# Patient Record
Sex: FEMALE | Race: WHITE
[De-identification: names, ages, dates, MRNs, and addresses within clinical notes are randomized per-mention and may not be internally consistent; named-entity substitution may affect disease eponyms.]

---

## 2017-03-12 ENCOUNTER — HOSPITAL ENCOUNTER (EMERGENCY)
Dept: HOSPITAL 75 - ER | Age: 42
Discharge: HOME | End: 2017-03-12
Payer: MEDICARE

## 2017-03-12 VITALS — HEIGHT: 62 IN | WEIGHT: 146 LBS | BODY MASS INDEX: 26.87 KG/M2

## 2017-03-12 VITALS — SYSTOLIC BLOOD PRESSURE: 149 MMHG | DIASTOLIC BLOOD PRESSURE: 86 MMHG

## 2017-03-12 VITALS — SYSTOLIC BLOOD PRESSURE: 146 MMHG | DIASTOLIC BLOOD PRESSURE: 79 MMHG

## 2017-03-12 DIAGNOSIS — R51: Primary | ICD-10-CM

## 2017-03-12 DIAGNOSIS — R11.2: ICD-10-CM

## 2017-03-12 DIAGNOSIS — I25.2: ICD-10-CM

## 2017-03-12 DIAGNOSIS — Z79.899: ICD-10-CM

## 2017-03-12 DIAGNOSIS — I10: ICD-10-CM

## 2017-03-12 DIAGNOSIS — I25.10: ICD-10-CM

## 2017-03-12 DIAGNOSIS — F17.210: ICD-10-CM

## 2017-03-12 LAB
ALBUMIN SERPL-MCNC: 4 G/DL (ref 3.2–4.5)
ALT SERPL-CCNC: 14 U/L (ref 0–55)
AMYLASE SERPL-CCNC: 47 U/L (ref 25–125)
ANION GAP SERPL CALC-SCNC: 14 MMOL/L (ref 5–14)
APTT BLD: < 20 SEC (ref 24–35)
AST SERPL-CCNC: 17 U/L (ref 5–34)
BASOPHILS # BLD AUTO: 0 10^3/UL (ref 0–0.1)
BASOPHILS NFR BLD AUTO: 0 % (ref 0–10)
BILIRUB SERPL-MCNC: 0.6 MG/DL (ref 0.1–1)
BUN SERPL-MCNC: 10 MG/DL (ref 7–18)
BUN/CREAT SERPL: 12
CALCIUM SERPL-MCNC: 9 MG/DL (ref 8.5–10.1)
CHLORIDE SERPL-SCNC: 112 MMOL/L (ref 98–107)
CO2 SERPL-SCNC: 16 MMOL/L (ref 21–32)
CREAT SERPL-MCNC: 0.81 MG/DL (ref 0.6–1.3)
EOSINOPHIL # BLD AUTO: 0.1 10^3/UL (ref 0–0.3)
EOSINOPHIL NFR BLD AUTO: 1 % (ref 0–10)
ERYTHROCYTE [DISTWIDTH] IN BLOOD BY AUTOMATED COUNT: 12.6 % (ref 10–14.5)
GFR SERPLBLD BASED ON 1.73 SQ M-ARVRAT: > 60 ML/MIN
GLUCOSE SERPL-MCNC: 137 MG/DL (ref 70–105)
INR PPP: 1 (ref 0.8–1.4)
LIPASE SERPL-CCNC: 35 U/L (ref 8–78)
LYMPHOCYTES # BLD AUTO: 1.9 X 10^3 (ref 1–4)
LYMPHOCYTES NFR BLD AUTO: 18 % (ref 12–44)
MAGNESIUM SERPL-MCNC: 1.7 MG/DL (ref 1.8–2.4)
MCH RBC QN AUTO: 33 PG (ref 25–34)
MCHC RBC AUTO-ENTMCNC: 35 G/DL (ref 32–36)
MCV RBC AUTO: 94 FL (ref 80–99)
MONOCYTES # BLD AUTO: 0.5 X 10^3 (ref 0–1)
MONOCYTES NFR BLD AUTO: 5 % (ref 0–12)
NEUTROPHILS # BLD AUTO: 7.9 X 10^3 (ref 1.8–7.8)
NEUTROPHILS NFR BLD AUTO: 76 % (ref 42–75)
PLATELET # BLD: 217 10^3/UL (ref 130–400)
PMV BLD AUTO: 10.9 FL (ref 7.4–10.4)
POTASSIUM SERPL-SCNC: 3.7 MMOL/L (ref 3.6–5)
PROT SERPL-MCNC: 7.1 G/DL (ref 6.4–8.2)
PROTHROMBIN TIME: 12.4 SEC (ref 12.2–14.7)
RBC # BLD AUTO: 4.73 10^6/UL (ref 4.35–5.85)
SODIUM SERPL-SCNC: 142 MMOL/L (ref 135–145)
TROPONIN I SERPL-MCNC: < 0.3 NG/ML (ref ?–0.3)
WBC # BLD AUTO: 10.4 10^3/UL (ref 4.3–11)

## 2017-03-12 PROCEDURE — 36415 COLL VENOUS BLD VENIPUNCTURE: CPT

## 2017-03-12 PROCEDURE — 84484 ASSAY OF TROPONIN QUANT: CPT

## 2017-03-12 PROCEDURE — 83735 ASSAY OF MAGNESIUM: CPT

## 2017-03-12 PROCEDURE — 93005 ELECTROCARDIOGRAM TRACING: CPT

## 2017-03-12 PROCEDURE — 80053 COMPREHEN METABOLIC PANEL: CPT

## 2017-03-12 PROCEDURE — 96375 TX/PRO/DX INJ NEW DRUG ADDON: CPT

## 2017-03-12 PROCEDURE — 71020: CPT

## 2017-03-12 PROCEDURE — 70450 CT HEAD/BRAIN W/O DYE: CPT

## 2017-03-12 PROCEDURE — 85025 COMPLETE CBC W/AUTO DIFF WBC: CPT

## 2017-03-12 PROCEDURE — 85610 PROTHROMBIN TIME: CPT

## 2017-03-12 PROCEDURE — 85730 THROMBOPLASTIN TIME PARTIAL: CPT

## 2017-03-12 PROCEDURE — 84703 CHORIONIC GONADOTROPIN ASSAY: CPT

## 2017-03-12 PROCEDURE — 83690 ASSAY OF LIPASE: CPT

## 2017-03-12 PROCEDURE — 96374 THER/PROPH/DIAG INJ IV PUSH: CPT

## 2017-03-12 PROCEDURE — 93041 RHYTHM ECG TRACING: CPT

## 2017-03-12 PROCEDURE — 82150 ASSAY OF AMYLASE: CPT

## 2017-03-12 NOTE — DIAGNOSTIC IMAGING REPORT
PROCEDURE: CT head without contrast.



TECHNIQUE: Multiple contiguous axial images were obtained

through the brain without the use of intravenous contrast.



INDICATION: Headache and nausea. Pregnancy test was negative.



CT brain without contrast 03/12/2017.



COMPARISON: 11/23/2016.



FINDINGS:



There is no evidence for acute hemorrhage or infarct. The brain

is stable in appearance with no mass, mass effect or midline

shift. There is no hydrocephalus. The calvarium is intact. No

acute sinus disease appreciated.



IMPRESSION:

1. No acute process.



Dictated by:



Dictated on workstation # [id number]

## 2017-03-12 NOTE — DIAGNOSTIC IMAGING REPORT
INDICATION:  Nausea, headache, hypertension..



TECHNIQUE:  Two view chest   10:57 AM



CORRELATION STUDY:   11/20/2016



FINDINGS:

The heart size, mediastinal configuration and pulmonary

vasculature are within normal limits.  The lungs are clear with

no consolidating infiltrate. There is no significant pleural

effusion or pneumothorax.  Visualized osseous structures are

unremarkable.



IMPRESSION:

1. Negative two-view chest.



Dictated by:



Dictated on workstation # [id number]

## 2017-03-12 NOTE — ED HEADACHE
General


Stated Complaint:  HEADACHE/NAUSEA


Source:  patient, EMS





History of Present Illness


Time seen by provider:  09:41


Initial Comments


PT ARRIVES VIA EMS FROM HOME


PT STATES SHE FELT FINE WHEN SHE WOKE UP, THEN WAS HAVING A BOWEL MOVEMENT AND 

BEGAN TO HAVE A SEVERE HEADACHE FOLLOWED BY NAUSEA AND VOMITING--BEGAN 

APPROXIMATELY 1 HOUR AGO


EMS REPORT ELEVATED BP /100


TOOK A CLONIDINE JUST PRIOR TO ARRIVAL


HAS NOT TAKEN ANYTHING FOR HEADACHE


NO DIZZINESS


NO VISION CHANGES


NO PARESTHESIAS OR MOTOR DEFICITS


NO PALPITATIONS


NO CHEST PAIN 


NO SHORTNESS OF BREATH


NO HISTORY OF FREQUENT HEADACHES, BUT OCCASIONALLY HAS HAD HEADACHES LIKE THIS 

WITH BM'S AND HAD VASOVAGAL REACTION WITH SYNCOPE IN THE PAST. ALSO HAD SIMILAR 

WITH MI, BUT THIS IS DIFFERENT THAN WITH MI IN THAT SHE HAD OTHER SYMPTOMS WITH 

MI


PT HAS HX OF HTN, MI, DVT AND IS CURRENTLY TAKING ELIQUIS.





PCP: DR. CAMARGO





Allergies and Home Medications


Allergies


Coded Allergies:  


     prochlorperazine (Unverified  Allergy, Severe, TONGUE SWELLS, 2/16/07)


     metoclopramide (Verified  Allergy, Unknown, 9/7/09)


     topiramate (Unverified  Allergy, Unknown, 6/26/16)


 causes metabolic acidosis





Home Medications


Alprazolam 1 Mg Tablet 1 MG PO TID PRN PRN ANXIETY (Reported) 


   TAKES 1/2 TO 1 (1MG) TABLET 


Apixaban 5 Mg Tablet #60 5 MG PO BID 


   Prescribed by: STACY CAMARGO on 11/22/16 0857


Atorvastatin Calcium 20 Mg Tablet 20 MG PO HS (Reported) 


Carisoprodol 350 Mg Tablet 350 MG PO BID PRN PRN MUSCLE SPASMS (Reported) 


Escitalopram Oxalate 20 Mg Tablet 20 MG PO HS (Reported) 


Famotidine 20 Mg Tablet 20 MG PO HS (Reported) 


Folic Acid 1 Mg Tablet 1 MG PO HS (Reported) 


Lamotrigine 100 Mg Tab.er.24 #30 (Reported) 


Levetiracetam 500 Mg Tablet #60 500 MG PO BID 


   Prescribed by: STACY CAMARGO on 11/22/16 0857


Metoprolol Tartrate 25 Mg Tablet 25 MG PO HS (Reported) 


Oxycodone HCl/Acetaminophen 1 Each Tablet 1 TAB PO Q6H PRN PRN SEVERE PAIN (

Reported) 





Constitutional:   no symptoms reported


Eyes:   No Symptoms Reported


Ears, Nose, Mouth, Throat:   no symptoms reported


Respiratory:   no symptoms reported


Cardiovascular:   no symptoms reported


Gastrointestinal:   see HPI nausea vomiting


Genitourinary:   no symptoms reported


Musculoskeletal:   no symptoms reported


Skin:   no symptoms reported


Psychiatric/Neurological:   See HPI Headache





Past Medical-Social-Family Hx


Patient Social History


Alcohol Use:  Occasionally Uses


Recreational Drug Use:  Yes


Drug of Choice:  MARIJUANA


Smoking Status:  Current Everyday Smoker (1 PPD)


Type Used:  Cigarettes


2nd Hand Smoke Exposure:  Yes


Recent Hopitalizations:  Yes (just discharged 2 days ago for supratherapeutic 

INR)





Immunizations Up To Date


Tetanus Booster (TDap):  Less than 5yrs





Seasonal Allergies


Seasonal Allergies:  No





Surgeries


HX Surgeries:  Yes (CARDIAC CATH  X 2--2007 & 2011--NO INTERVENTION; LEFT 

SALPINGO-OOPHORECTOMY FOR TORSION; BILATERAL HERNIA SURGERY AS CHILD)


Surgeries:  Abdominal, Adenoidectomy, Cardiac, Oophorectomy, Tonsillectomy





Respiratory


Hx Respiratory Disorders:  Yes


Respiratory Disorders:  Pneumonia





Cardiovascular


Hx Cardiac Disorders:  Yes (hx blood clots; MI X 2-PER PT. CARDIAC CATHS X2 --

NO INTERVENTION)


Cardiac Disorders:  Coronary Artery Disease, Deep Vein Thrombosis, Heart Attack

, Hypertension





Neurological


Hx Neurological Disorders:  Yes


Neurological Disorders:  Seizure Disorder





Reproductive System


Hx Reproductive Disorders:  Yes (HPV; OVARIAN TORSION/LEFT S.O.)


Sexually Transmitted Disease:  Yes (HPV)


Female Reproductive Disorders:  Polycystic Ovarian Dis





Genitourinary


Hx Genitourinary Disorders:  Yes (uti)


Genitourinary Disorders:  Bladder Infection





Gastrointestinal


Hx Gastrointestinal Disorders:  Yes (GI BLEED 11/20/16,  ALL OTHER DISORDERS AS 

A CHILD)


Gastrointestinal Disorders:  Abdominal Hernia, Gastroesophageal Reflux, 

Gastrointestinal Bleed, Hepatitis





Musculoskeletal


Hx Musculoskeletal Disorders:  Yes (STENOSIS)


Musculoskeletal Disorders:  Scoliosis, Chronic Back Pain





Endocrine


Hx Endocrine Disorders:  No





HEENT


HX ENT Disorders:  No


Loss of Vision:  Denies


Hearing Impairment:  Denies





Cancer


Hx Cancer:  No





Psychosocial


Hx Psychiatric Problems:  Yes


Behavioral Health Disorders:  Anxiety, Depression





Integumentary


HX Skin/Integumentary Disorder:  No (BRUISING)





Blood Transfusions


Hx Blood Disorders:  Yes (DVT; FACTOR 5 LEIDEN)





Family Medical History


Significant Family History:  Heart Disease, Cancer, Hypertension


Family Medial History:  


Family history: Cardiovascular disease


  03 FATHER


Family history: Coronary thrombosis


  03 FATHER


Family history: Diabetes mellitus


  03 MOTHER


Heart disease


  03 FATHER


History of - disorder


  03 FATHER (FACTOR FIVE LEIDEN)


Psychotic disorder


  03 MOTHER (DEPRESSION)





Physical Exam


Vital Signs





 Vital Sign - Last 12Hours








 3/12/17





 09:39


 


Temp 96.2


 


Pulse 58


 


Resp 18


 


B/P 161/82


 


Pulse Ox 96


 


O2 Delivery Room Air





Capillary Refill :


General Appearance:   WD/WN no apparent distress (RETCHING AND ANXIOUS ON 

ARRIVAL ) other (RETCHING, LOOKS UNCOMFORTABLE, ANXIOUS)


HEENT:   PERRL/EOMI normal ENT inspection


Neck:   non-tender full range of motion supple normal inspectionNo carotid bruit


Cardiovascular:   regular rate, rhythm no murmur


Respiratory:   normal breath sounds no respiratory distress no accessory muscle 

use


Gastrointestinal:   normal bowel sounds non tender soft no organomegaly


Extremities:   normal range of motion non-tender normal inspection no pedal 

edema no calf tenderness normal capillary refill


Psychiatric:   alert oriented x 3


Crainal Nerves:   normal hearing normal speech PERRL


Motor/Sensory:   no motor deficit no sensory deficit no pronator drift


Skin:   normal color warm/dry





Progress/Results/Core Measures


Results/Orders


Lab Results





 Laboratory Tests








Test


  3/12/17


10:15 Range/Units


 


 


Activated Partial


Thromboplast Time < 20 L


  24-35  SEC


 


 


Alanine Aminotransferase


(ALT/SGPT) 14 


  0-55  U/L


 


 


Albumin 4.0  3.2-4.5  G/DL


 


Alkaline Phosphatase 69    U/L


 


Amylase Level 47    U/L


 


Anion Gap 14  5-14  MMOL/L


 


Aspartate Amino Transf


(AST/SGOT) 17 


  5-34  U/L


 


 


BUN/Creatinine Ratio 12   


 


Basophils # (Auto)


  0.0 


  0.0-0.1


10^3/uL


 


Basophils (%) (Auto) 0  0-10  %


 


Blood Urea Nitrogen 10  7-18  MG/DL


 


Calcium Level 9.0  8.5-10.1  MG/DL


 


Carbon Dioxide Level 16 L 21-32  MMOL/L


 


Chloride Level 112 H   MMOL/L


 


Creatinine


  0.81 


  0.60-1.30


MG/DL


 


Eosinophils # (Auto)


  0.1 


  0.0-0.3


10^3/uL


 


Eosinophils (%) (Auto) 1  0-10  %


 


Estimat Glomerular Filtration


Rate > 60 


   


 


 


Glucose Level 137 H   MG/DL


 


Hematocrit 45  35-52  %


 


Hemoglobin 15.4  11.5-16.0  G/DL


 


INR Comment 1.0  0.8-1.4  


 


Lipase 35  8-78  U/L


 


Lymphocytes # (Auto) 1.9  1.0-4.0  X 10^3


 


Lymphocytes (%) (Auto) 18  12-44  %


 


Magnesium Level 1.7 L 1.8-2.4  MG/DL


 


Mean Corpuscular Hemoglobin 33  25-34  PG


 


Mean Corpuscular Hemoglobin


Concent 35 


  32-36  G/DL


 


 


Mean Corpuscular Volume 94  80-99  FL


 


Mean Platelet Volume 10.9 H 7.4-10.4  FL


 


Monocytes # (Auto) 0.5  0.0-1.0  X 10^3


 


Monocytes (%) (Auto) 5  0-12  %


 


Neutrophils # (Auto) 7.9 H 1.8-7.8  X 10^3


 


Neutrophils (%) (Auto) 76 H 42-75  %


 


Platelet Count


  217 


  130-400


10^3/uL


 


Potassium Level 3.7  3.6-5.0  MMOL/L


 


Prothrombin Time 12.4  12.2-14.7  SEC


 


Red Blood Count


  4.73 


  4.35-5.85


10^6/uL


 


Red Cell Distribution Width 12.6  10.0-14.5  %


 


Serum Pregnancy Test,


Qualitative NEGATIVE 


  NEGATIVE  


 


 


Sodium Level 142  135-145  MMOL/L


 


Total Bilirubin 0.6  0.1-1.0  MG/DL


 


Total Protein 7.1  6.4-8.2  G/DL


 


Troponin I < 0.30  <0.30  NG/ML


 


White Blood Count


  10.4 


  4.3-11.0


10^3/uL








My Orders





 Orders-CHE BEARD DO


Saline Lock/Iv-Start (3/12/17 09:42)


Ekg Tracing (3/12/17 09:42)


Monitor-Rhythm Ecg Trace Only (3/12/17 09:42)


Ct Head Wo (3/12/17 09:42)


Amylase (3/12/17 09:42)


Cbc With Automated Diff (3/12/17 09:42)


Comprehensive Metabolic Panel (3/12/17 09:42)


Hcg,Qualitative Serum (3/12/17 09:42)


Lipase (3/12/17 09:42)


Magnesium (3/12/17 09:42)


Protime With Inr (3/12/17 09:42)


Partial Thromboplastin Time (3/12/17 09:42)


Troponin I (3/12/17 09:42)


Ondansetron Injection (Zofran Injectio (3/12/17 09:45)


Ondansetron  Oral Dissolve Tab (Zofran (3/12/17 10:00)


Ondansetron  Oral Dissolve Tab (Zofran (3/12/17 09:58)


Chest Pa/Lat (2 View) (3/12/17 10:18)


Ondansetron Injection (Zofran Injectio (3/12/17 10:30)


Fentanyl  Injection (Sublimaze Injection (3/12/17 10:48)





Medications Given in ED





 Current Medications








 Medications  Dose


 Ordered  Sig/Elizabeth


 Route  Start Time


 Stop Time Status Last Admin


Dose Admin


 


 Ondansetron HCl  4 mg  ONCE  ONCE


 IVP  3/12/17 10:30


 3/12/17 10:31 DC 3/12/17 10:27


4 MG


 


 Ondansetron HCl  8 mg  ONCE  ONCE


 PO  3/12/17 10:00


 3/12/17 10:01 DC 3/12/17 10:00


8 MG








Vital Signs/I&O





 Vital Sign - Last 12Hours








 3/12/17 3/12/17





 09:39 10:58


 


Temp 96.2 


 


Pulse 58 77


 


Resp 18 18


 


B/P 161/82 146/79


 


Pulse Ox 96 96


 


O2 Delivery Room Air Room Air








Progress Note :  


Progress Note


SYMPTOMS IMPROVING EVEN PRIOR TO BEING GIVEN ANY MEDICATIONS


HEADACHE AND NAUSEA MUCH BETTER EVEN BEFORE GOING TO CT, ABLE TO SIT UP NOW--

COULD NOT SIT UP INITIALLY AS IT CAUSED HER HEAD TO HURT WORSE. 


ON RETURN FROM CT, PT IS SITTING UP AND SMILING, AND TALKATIVE, AND CONTINUES 

TO FEEL BETTER


SYMPTOMS COMPLETELY RESOLVED AT DISMISSAL





ECG


Initial ECG Impression Time:  09:08


Initial ECG Rate:  51


Initial ECG Rhythm:  Normal Sinus


Initial ECG Impression:  Normal


Initial ECG Comparisson:  Unchanged (EXCEPT T-WAVES NOW PROMINENT)





Diagnostic Imaging





Comments


CXR--NO ACUTE PROCESS, PER RADIOLOGIST REPORT @ 1049


CT HEAD--NO ACUTE PROCESS, PER RADIOLOGIST REPORT @ 1053


   Reviewed:  Reviewed by Me





Departure


Communication


Progress Notes


1105--DR. SORIANO HERE TO SEE PT. HE DOES NOT FEEL THAT PT HAS ANY CARDIAC 

CONCERNS AT THIS TIME. HE WILL SEE PT IN OFFICE TOMORROW. PT AND FAMILY 

COMFORTABLE WITH THIS PLAN.





Impression


Impression:  


 Primary Impression:  


 Headache


Disposition:  01 HOME, SELF-CARE


Condition:  Improved





Departure-Patient Inst.


Referrals:  


STACY CAMARGO MD (PCP/Family)


Primary Care Physician








YONATHAN SORIANO MD


Patient Instructions:  Headache, Adult (DC)





Add. Discharge Instructions:


HOME, REST





LOTS OF CLEAR LIQUIDS





TAKE YOUR MEDICATIONS AS PRESCRIBED





TYLENOL AND MOTRIN AS NEEDED FOR PAIN 





TAKE YOUR HOME ZOFRAN AS NEEDED FOR NAUSEA





FOLLOW UP WITH DR. SORIANO TOMORROW





RETURN TO ER IF SYMPTOMS RETURN.








CHE BEARD DO Mar 12, 2017 09:57

## 2017-03-12 NOTE — XMS REPORT
Continuity of Care Document

 Created on: 2017



Erin Richmond

External Reference #: [id number]

: 1975

Sex: Female



Demographics







 Address  [address] [place]  [address]

 

 Home Phone  [phone]

 

 Preferred Language  English

 

 Marital Status  Single

 

 Islam Affiliation  CAT

 

 Race  White or 

 

 Ethnic Group  Not  or 





Author







 Author  [facility]

 

 Organization  [facility]

 

 Address  Unknown

 

 Phone  Unavailable







Support







 Name  Relationship  Address  Phone

 

 , Kelsy Blake  [place], [place]    [phone]







Care Team Providers







 Care Team Member Name  Role  Phone

 

 Milena Nixon  PCP  [phone]







Source Comments

Some departments are not documenting in the electronic medical record.  If you 
do not see the information that you expected, contact Release of Information in 
the Health Information Management department at [phone] for further 
assistance in locating additional records.[facility]



Active Allergies and Adverse Reactions







    



  Allergen   Noted Date   Severity   Reactions   Comments

 

    



  Aripiprazole   2008     Panic attacks

 

    



  Prochlorperazine   2008    SHORTNESS OF BREATH, 



  Edisylate     SWELLING 







Current Medications







      



  Prescription   Sig.   Disp.   Refills   Start   End Date   Status



      Date  

 

      



  COUMADIN PO      20    Active



      08  

 

      



  METOPROLOL TARTRATE PO      20    Active



      08  

 

      



  LOVASTATIN PO      20    Active



      08  

 

      



  LEXAPRO PO      20    Active



      08  

 

      



  PEPCID PO      20    Active



      08  

 

      



  aspirin 81 mg chew tablet      20    Active



      08  

 

      



  FOLIC ACID PO      20    Active



      08  

 

      



  M-VIT PO      20    Active



      08  

 

      



  escitalopram (LEXAPRO) 10   Take 1 Tab by mouth   30   0   20    Active



  mg tablet   Daily.     08  

 

      



  escitalopram (LEXAPRO) 10   Take 1 Tab by mouth   30   0   20    Active



  mg tablet   Daily.     08  

 

      



  mirtazapine (REMERON) 15   Take 1 Tab by mouth At   30   0   20    
Active



  mg tablet   Bedtime Daily.     08  







Active Problems







 



  Problem   Noted Date

 

 



  Generalized anxiety disorder   2008







Social History







    



  Tobacco Use   Types   Packs/Day   Years Used   Date

 

    



  Never Smoker    









   



  Alcohol Use   Drinks/Week   oz/Week   Comments

 

   



  Yes     rare







Last Filed Vital Signs







  



  Vital Sign   Reading   Time Taken

 

  



  Blood Pressure   121/71   2008 11:00 AM CST

 

  



  Pulse   54   2008 11:00 AM CST

 

  



  Temperature   36.6   C (97.9   F)   2008 11:00 AM CST

 

  



  Respiratory Rate   -   -

 

  



  Height   1.62 m (5' 3.78")   2008 11:00 PM CST

 

  



  Weight   77 kg (169 lb 12.1 oz)   2008 11:00 PM CST

 

  



  Body Mass Index   29.34   2008 11:00 PM CST

 

  



  Oxygen Saturation   97%   2008  6:15 PM CST







Plan of Care







   



  Health Maintenance   Due Date   Last Done   Comments

 

   



  Physical (Comprehensive)   1984  



  Exam   

 

   



  Pertussis Vaccine   1988  

 

   



  Tetanus Vaccine   1994  

 

   



  Cervical Cancer Screening   1998  

 

   



  Influenza Vaccine   2016  







Results from Last 3 Months

Not on file

## 2017-04-12 ENCOUNTER — HOSPITAL ENCOUNTER (OUTPATIENT)
Dept: HOSPITAL 75 - CARD | Age: 42
End: 2017-04-12
Attending: INTERNAL MEDICINE
Payer: MEDICARE

## 2017-04-12 DIAGNOSIS — D68.51: ICD-10-CM

## 2017-04-12 DIAGNOSIS — I65.23: ICD-10-CM

## 2017-04-12 DIAGNOSIS — I25.10: Primary | ICD-10-CM

## 2017-04-12 DIAGNOSIS — I10: ICD-10-CM

## 2017-04-12 DIAGNOSIS — E78.2: ICD-10-CM

## 2017-04-12 PROCEDURE — 93351 STRESS TTE COMPLETE: CPT

## 2017-04-12 PROCEDURE — 93306 TTE W/DOPPLER COMPLETE: CPT

## 2017-04-13 NOTE — STRESS TEST
PROCEDURE PHYSICIAN:   YONATHAN SORIANO

 

DATE OF PROCEDURE:  04/12/2017

 

EXERCISE STRESS ECHOCARDIOGRAM REPORT: 

 

INDICATION:

1.   Coronary artery disease.  

2.   Chest pain. 

 

BASELINE HEART RATE: 

58 

 

BASELINE BLOOD PRESSURE: 

140/81  

 

BASELINE EKG: 

Sinus rhythm with no ischemic changes. 

 

IN SUMMARY:

The patient started exercising with a baseline heart rate, blood

pressure, EKG mentioned above.  Heart rate was increasing

appropriately with her exercise. She was able to exercise for a

total of 9 minutes and 30 seconds on standard Jerad protocol,

achieving maximum heart rate of 170, which is 94% of maximum

expected heart rate. With peak exercise level, her blood pressure

was 214/83.  Early in recovery, within 30 seconds of recovery,

the patient had a transient episode of bradycardia alternating

with sinus tachycardia.  Heart rate was ranging between 150 then

dropping suddenly to 80. The patient had a short episode of

junctional rhythm with heart rate 72. She felt extremely tired

and continued to have transient episode of atrial versus sinus

tachycardia.  At minute 2 and 30 seconds, the patient had another

episode of heart rate of 60 then she recovered.  At the end of

the test, her heart rate was 71. Blood pressure 170/88. She was

feeling better. Echocardiographic images were reviewed acquired

in the apical 4 chamber, apical 2 chamber, parasternal long axis,

and parasternal short axis views. Review of the images showed

normal left ventricular size with normal contractility with no

ischemic changes. 

 

IN CONCLUSION:

1.   Excellent exercise tolerance a total of 9 minutes 30 seconds

on standard Jerad protocol. Total of 10.1 METs, achieving 94% of

maximum expected heart rate. Sudden drop in heart rate during

recovery after 30 seconds of recovery with the heart rate

dropping from 170 to 70 with the patient feeling tired, but no

dizziness or lightheadedness.  Continued to have alternating

sinus bradycardia and sinus tachycardia early in recovery then

continued with sinus rhythm.  Had transient episode of escape

junctional rhythm. 

2.   Hypertensive response to exercise returned to baseline

during recovery. 

3.   The patient was instructed to discontinuing metoprolol and

continue on lisinopril and monitoring her symptoms.  

 

 

 

 

Job ID: [id number]

Dictated Date: 04/12/2017 16:37:06 

Transcription Date: 04/13/2017 08:41:36 / tbk

## 2017-08-07 ENCOUNTER — HOSPITAL ENCOUNTER (OUTPATIENT)
Dept: HOSPITAL 75 - REHAB | Age: 42
LOS: 38 days | Discharge: HOME | End: 2017-09-14
Attending: FAMILY MEDICINE
Payer: MEDICARE

## 2017-08-07 DIAGNOSIS — M54.6: ICD-10-CM

## 2017-08-07 DIAGNOSIS — M54.5: Primary | ICD-10-CM

## 2017-09-08 ENCOUNTER — HOSPITAL ENCOUNTER (OUTPATIENT)
Dept: HOSPITAL 75 - RAD | Age: 42
End: 2017-09-08
Attending: NURSE PRACTITIONER
Payer: MEDICARE

## 2017-09-08 DIAGNOSIS — R07.89: Primary | ICD-10-CM

## 2017-09-08 PROCEDURE — 71020: CPT

## 2017-10-25 ENCOUNTER — HOSPITAL ENCOUNTER (OUTPATIENT)
Dept: HOSPITAL 75 - LAB | Age: 42
End: 2017-10-25
Attending: FAMILY MEDICINE
Payer: MEDICARE

## 2017-10-25 DIAGNOSIS — I10: Primary | ICD-10-CM

## 2017-10-25 DIAGNOSIS — Z79.899: ICD-10-CM

## 2017-10-25 LAB
ALBUMIN SERPL-MCNC: 4.4 GM/DL (ref 3.2–4.5)
ALT SERPL-CCNC: 12 U/L (ref 0–55)
ANION GAP SERPL CALC-SCNC: 9 MMOL/L (ref 5–14)
AST SERPL-CCNC: 18 U/L (ref 5–34)
BASOPHILS # BLD AUTO: 0 10^3/UL (ref 0–0.1)
BASOPHILS NFR BLD AUTO: 1 % (ref 0–10)
BILIRUB SERPL-MCNC: 1.1 MG/DL (ref 0.1–1)
BUN SERPL-MCNC: 8 MG/DL (ref 7–18)
BUN/CREAT SERPL: 10
CALCIUM SERPL-MCNC: 9.4 MG/DL (ref 8.5–10.1)
CHLORIDE SERPL-SCNC: 110 MMOL/L (ref 98–107)
CHOLEST SERPL-MCNC: 139 MG/DL (ref ?–200)
CO2 SERPL-SCNC: 19 MMOL/L (ref 21–32)
CREAT SERPL-MCNC: 0.8 MG/DL (ref 0.6–1.3)
EOSINOPHIL # BLD AUTO: 0.1 10^3/UL (ref 0–0.3)
EOSINOPHIL NFR BLD AUTO: 2 % (ref 0–10)
ERYTHROCYTE [DISTWIDTH] IN BLOOD BY AUTOMATED COUNT: 12.5 % (ref 10–14.5)
GFR SERPLBLD BASED ON 1.73 SQ M-ARVRAT: > 60 ML/MIN
GLUCOSE SERPL-MCNC: 100 MG/DL (ref 70–105)
LDLC SERPL DIRECT ASSAY-MCNC: 73 MG/DL (ref 1–129)
LYMPHOCYTES # BLD AUTO: 2.3 X 10^3 (ref 1–4)
LYMPHOCYTES NFR BLD AUTO: 29 % (ref 12–44)
MCH RBC QN AUTO: 34 PG (ref 25–34)
MCHC RBC AUTO-ENTMCNC: 35 G/DL (ref 32–36)
MCV RBC AUTO: 97 FL (ref 80–99)
MONOCYTES # BLD AUTO: 0.6 X 10^3 (ref 0–1)
MONOCYTES NFR BLD AUTO: 8 % (ref 0–12)
NEUTROPHILS # BLD AUTO: 4.7 X 10^3 (ref 1.8–7.8)
NEUTROPHILS NFR BLD AUTO: 60 % (ref 42–75)
PLATELET # BLD: 246 10^3/UL (ref 130–400)
PMV BLD AUTO: 10.2 FL (ref 7.4–10.4)
POTASSIUM SERPL-SCNC: 4.2 MMOL/L (ref 3.6–5)
PROT SERPL-MCNC: 8.1 GM/DL (ref 6.4–8.2)
RBC # BLD AUTO: 4.58 10^6/UL (ref 4.35–5.85)
SODIUM SERPL-SCNC: 138 MMOL/L (ref 135–145)
TRIGL SERPL-MCNC: 118 MG/DL (ref ?–150)
TSH SERPL-ACNC: 0.8 UIU/ML (ref 0.35–4.94)
VLDLC SERPL CALC-MCNC: 24 MG/DL (ref 5–40)
WBC # BLD AUTO: 7.8 10^3/UL (ref 4.3–11)

## 2017-10-25 PROCEDURE — 36415 COLL VENOUS BLD VENIPUNCTURE: CPT

## 2017-10-25 PROCEDURE — 80053 COMPREHEN METABOLIC PANEL: CPT

## 2017-10-25 PROCEDURE — 85025 COMPLETE CBC W/AUTO DIFF WBC: CPT

## 2017-10-25 PROCEDURE — 84443 ASSAY THYROID STIM HORMONE: CPT

## 2017-10-25 PROCEDURE — 80061 LIPID PANEL: CPT

## 2017-11-12 ENCOUNTER — HOSPITAL ENCOUNTER (EMERGENCY)
Dept: HOSPITAL 75 - ER | Age: 42
Discharge: HOME | End: 2017-11-12
Payer: MEDICARE

## 2017-11-12 VITALS — WEIGHT: 140 LBS | BODY MASS INDEX: 25.76 KG/M2 | HEIGHT: 62 IN

## 2017-11-12 VITALS — DIASTOLIC BLOOD PRESSURE: 72 MMHG | SYSTOLIC BLOOD PRESSURE: 127 MMHG

## 2017-11-12 DIAGNOSIS — Z86.718: ICD-10-CM

## 2017-11-12 DIAGNOSIS — Z82.49: ICD-10-CM

## 2017-11-12 DIAGNOSIS — F41.9: ICD-10-CM

## 2017-11-12 DIAGNOSIS — Z90.89: ICD-10-CM

## 2017-11-12 DIAGNOSIS — G40.909: ICD-10-CM

## 2017-11-12 DIAGNOSIS — N39.0: Primary | ICD-10-CM

## 2017-11-12 DIAGNOSIS — M41.20: ICD-10-CM

## 2017-11-12 DIAGNOSIS — I10: ICD-10-CM

## 2017-11-12 DIAGNOSIS — Z87.01: ICD-10-CM

## 2017-11-12 DIAGNOSIS — Z77.22: ICD-10-CM

## 2017-11-12 DIAGNOSIS — Z79.01: ICD-10-CM

## 2017-11-12 DIAGNOSIS — K21.9: ICD-10-CM

## 2017-11-12 DIAGNOSIS — Z87.19: ICD-10-CM

## 2017-11-12 DIAGNOSIS — I25.2: ICD-10-CM

## 2017-11-12 DIAGNOSIS — Z87.448: ICD-10-CM

## 2017-11-12 DIAGNOSIS — I25.10: ICD-10-CM

## 2017-11-12 DIAGNOSIS — F32.9: ICD-10-CM

## 2017-11-12 LAB
ALBUMIN SERPL-MCNC: 4.2 GM/DL (ref 3.2–4.5)
ALT SERPL-CCNC: 17 U/L (ref 0–55)
ANION GAP SERPL CALC-SCNC: 12 MMOL/L (ref 5–14)
AST SERPL-CCNC: 26 U/L (ref 5–34)
BASOPHILS # BLD AUTO: 0.1 10^3/UL (ref 0–0.1)
BASOPHILS NFR BLD AUTO: 3 % (ref 0–10)
BILIRUB SERPL-MCNC: 0.6 MG/DL (ref 0.1–1)
BILIRUB UR QL STRIP: (no result)
BUN SERPL-MCNC: 10 MG/DL (ref 7–18)
BUN/CREAT SERPL: 14
CALCIUM SERPL-MCNC: 9.3 MG/DL (ref 8.5–10.1)
CHLORIDE SERPL-SCNC: 106 MMOL/L (ref 98–107)
CO2 SERPL-SCNC: 20 MMOL/L (ref 21–32)
CREAT SERPL-MCNC: 0.72 MG/DL (ref 0.6–1.3)
EOSINOPHIL # BLD AUTO: 0.2 10^3/UL (ref 0–0.3)
EOSINOPHIL NFR BLD AUTO: 3 % (ref 0–10)
ERYTHROCYTE [DISTWIDTH] IN BLOOD BY AUTOMATED COUNT: 11.7 % (ref 10–14.5)
GFR SERPLBLD BASED ON 1.73 SQ M-ARVRAT: > 60 ML/MIN
GLUCOSE SERPL-MCNC: 93 MG/DL (ref 70–105)
KETONES UR QL STRIP: (no result)
LEUKOCYTE ESTERASE UR QL STRIP: (no result)
LIPASE SERPL-CCNC: 30 U/L (ref 8–78)
LYMPHOCYTES # BLD AUTO: 1.8 X 10^3 (ref 1–4)
LYMPHOCYTES NFR BLD AUTO: 37 % (ref 12–44)
MCH RBC QN AUTO: 34 PG (ref 25–34)
MCHC RBC AUTO-ENTMCNC: 36 G/DL (ref 32–36)
MCV RBC AUTO: 95 FL (ref 80–99)
MONOCYTES # BLD AUTO: 0.6 X 10^3 (ref 0–1)
MONOCYTES NFR BLD AUTO: 12 % (ref 0–12)
NEUTROPHILS # BLD AUTO: 2.2 X 10^3 (ref 1.8–7.8)
NEUTROPHILS NFR BLD AUTO: 46 % (ref 42–75)
NITRITE UR QL STRIP: NEGATIVE
PH UR STRIP: 6.5 [PH] (ref 5–9)
PLATELET # BLD: 182 10^3/UL (ref 130–400)
PMV BLD AUTO: 10.1 FL (ref 7.4–10.4)
POTASSIUM SERPL-SCNC: 4.2 MMOL/L (ref 3.6–5)
PROT SERPL-MCNC: 8.3 GM/DL (ref 6.4–8.2)
PROT UR QL STRIP: (no result)
RBC # BLD AUTO: 4.51 10^6/UL (ref 4.35–5.85)
SODIUM SERPL-SCNC: 138 MMOL/L (ref 135–145)
SP GR UR STRIP: 1.01 (ref 1.02–1.02)
SQUAMOUS #/AREA URNS HPF: (no result) /HPF
TROPONIN I SERPL-MCNC: < 0.3 NG/ML (ref ?–0.3)
UROBILINOGEN UR-MCNC: 4 MG/DL
WBC # BLD AUTO: 4.9 10^3/UL (ref 4.3–11)
WBC #/AREA URNS HPF: (no result) /HPF

## 2017-11-12 PROCEDURE — 85025 COMPLETE CBC W/AUTO DIFF WBC: CPT

## 2017-11-12 PROCEDURE — 36415 COLL VENOUS BLD VENIPUNCTURE: CPT

## 2017-11-12 PROCEDURE — 99282 EMERGENCY DEPT VISIT SF MDM: CPT

## 2017-11-12 PROCEDURE — 71260 CT THORAX DX C+: CPT

## 2017-11-12 PROCEDURE — 74177 CT ABD & PELVIS W/CONTRAST: CPT

## 2017-11-12 PROCEDURE — 83690 ASSAY OF LIPASE: CPT

## 2017-11-12 PROCEDURE — 71020: CPT

## 2017-11-12 PROCEDURE — 84484 ASSAY OF TROPONIN QUANT: CPT

## 2017-11-12 PROCEDURE — 80053 COMPREHEN METABOLIC PANEL: CPT

## 2017-11-12 PROCEDURE — 81000 URINALYSIS NONAUTO W/SCOPE: CPT

## 2017-11-12 NOTE — ED ABDOMINAL PAIN
General


Chief Complaint:  Abdominal/GI Problems


Stated Complaint:  L SIDE ABD PAIN


Source of Information:  Patient


Exam Limitations:  No Limitations





History of Present Illness


Time Seen By Provider:  13:26


Initial Comments


To ER with left-sided abdominal pain.  This is been present since yesterday.  

No known injury or precipitating event.  She did have a low-grade fever last 

night and does have chills today.  No cough.  Pain is worsened by deep 

breathing.  No dysuria.  No bowel changes.  She is on Eliquis for history of 

factor V deficiency


Timing/Duration:  1-2 Days


Severity/Quality:  Moderate


Location:  [facility]


Radiation:  Chest


Activities at Onset:  None





Allergies and Home Medications


Allergies


Coded Allergies:  


     prochlorperazine (Unverified  Allergy, Severe, TONGUE SWELLS, 07)


     metoclopramide (Verified  Allergy, Unknown, 09)


     topiramate (Unverified  Allergy, Unknown, 16)


 causes metabolic acidosis





Home Medications


Alprazolam 1 Mg Tablet, 1 MG PO TID PRN for ANXIETY, (Reported)


   TAKES 1/2 TO 1 (1MG) TABLET 


Apixaban 5 Mg Tablet, 5 MG PO BID, #60 Ref 11


   Prescribed by: STACY HURT on 16 0857


Atorvastatin Calcium 20 Mg Tablet, 20 MG PO HS, (Reported)


Carisoprodol 350 Mg Tablet, 350 MG PO BID PRN for MUSCLE SPASMS, (Reported)


Escitalopram Oxalate 20 Mg Tablet, 20 MG PO HS, (Reported)


Famotidine 20 Mg Tablet, 20 MG PO HS, (Reported)


Folic Acid 1 Mg Tablet, 1 MG PO HS, (Reported)


Lamotrigine 100 Mg Tab.er.24, #30 (Reported)


Levetiracetam 500 Mg Tablet, 500 MG PO BID, #60 Ref 11


   Prescribed by: STACY HURT on 16 0857


Metoprolol Tartrate 25 Mg Tablet, 25 MG PO HS, (Reported)


Oxycodone HCl/Acetaminophen 1 Each Tablet, 1 TAB PO Q6H PRN for SEVERE PAIN, (

Reported)





Review of Systems


Constitutional:  see HPI, chills, No fever


EENTM:  No Symptoms Reported


Respiratory:  See HPI


Cardiovascular:  See HPI, Chest Pain


Gastrointestinal:  No Symptoms Reported


Genitourinary:  No Symptoms Reported


Musculoskeletal:  no symptoms reported


Skin:  no symptoms reported


Endocrine:  No Symptoms Reported





Past Medical-Social-Family Hx


Patient Social History


Drug of Choice:  MARIJUANA


Type Used:  Cigarettes


2nd Hand Smoke Exposure:  Yes


Recent Foreign Travel:  No


Contact w/Someone Who Travel:  No


Recent Hopitalizations:  Yes (just discharged 2 days ago for supratherapeutic 

INR)





Immunizations Up To Date


Tetanus Booster (TDap):  Less than 5yrs





Seasonal Allergies


Seasonal Allergies:  No





Surgeries


Surgeries:  Abdominal, Adenoidectomy, Cardiac, Oophorectomy, Tonsillectomy





Respiratory


Respiratory Disorders:  Pneumonia


Currently Using CPAP:  No


Currently Using BIPAP:  No





Cardiovascular


Cardiac Disorders:  Coronary Artery Disease, Deep Vein Thrombosis, Heart Attack

, Hypertension





Neurological


Neurological Disorders:  Seizure Disorder





Reproductive System


Hx Reproductive Disorders:  Yes (HPV; OVARIAN TORSION/LEFT S.O.)


Sexually Transmitted Disease:  Yes (HPV)


Female Reproductive Disorders:  Polycystic Ovarian Dis





Genitourinary


Genitourinary Disorders:  Bladder Infection





Gastrointestinal


Gastrointestinal Disorders:  Abdominal Hernia, Gastroesophageal Reflux, 

Gastrointestinal Bleed, Hepatitis





Musculoskeletal


Musculoskeletal Disorders:  Scoliosis, Chronic Back Pain





HEENT


Loss of Vision:  Denies


Hearing Impairment:  Denies





Psychosocial


Behavioral Health Disorders:  Anxiety, Depression





Family Medical History


Significant Family History:  Heart Disease, Cancer, Hypertension


Family Medial History:  


Family history: Cardiovascular disease


  03 FATHER


Family history: Coronary thrombosis


  03 FATHER


Family history: Diabetes mellitus


  03 MOTHER


Heart disease


  03 FATHER


History of - disorder


  03 FATHER (FACTOR FIVE LEIDEN)


Psychotic disorder


  03 MOTHER (DEPRESSION)





Physical Exam


Vital Signs





 VS - Last 72 Hours, by Label








 17





 13:17


 


Temp 98.8


 


Pulse 66


 


Resp 18


 


B/P (MAP) 131/82


 


Pulse Ox 97





Capillary Refill :


General Appearance:  WD/WN, no apparent distress


HEENT:  PERRL/EOMI, normal ENT inspection


Neck:  non-tender, full range of motion


Respiratory:  normal breath sounds, no respiratory distress, no accessory 

muscle use


Cardiovascular:  regular rate, rhythm, no murmur


Gastrointestinal:  normal bowel sounds, soft, tenderness (ttp luq/llq)


Extremities:  normal range of motion, non-tender


Neurologic/Psychiatric:  alert, normal mood/affect, oriented x 3


Skin:  normal color, warm/dry





Progress/Results/Core Measures


Results/Orders


Lab Results





Laboratory Tests








Test


  17


13:25 17


14:07 Range/Units


 


 


White Blood Count


  4.9 


  


  4.3-11.0


10^3/uL


 


Red Blood Count


  4.51 


  


  4.35-5.85


10^6/uL


 


Hemoglobin 15.2   11.5-16.0  G/DL


 


Hematocrit 43   35-52  %


 


Mean Corpuscular Volume 95   80-99  FL


 


Mean Corpuscular Hemoglobin 34   25-34  PG


 


Mean Corpuscular Hemoglobin


Concent 36 


  


  32-36  G/DL


 


 


Red Cell Distribution Width 11.7   10.0-14.5  %


 


Platelet Count


  182 


  


  130-400


10^3/uL


 


Mean Platelet Volume 10.1   7.4-10.4  FL


 


Neutrophils (%) (Auto) 46   42-75  %


 


Lymphocytes (%) (Auto) 37   12-44  %


 


Monocytes (%) (Auto) 12   0-12  %


 


Eosinophils (%) (Auto) 3   0-10  %


 


Basophils (%) (Auto) 3   0-10  %


 


Neutrophils # (Auto) 2.2   1.8-7.8  X 10^3


 


Lymphocytes # (Auto) 1.8   1.0-4.0  X 10^3


 


Monocytes # (Auto) 0.6   0.0-1.0  X 10^3


 


Eosinophils # (Auto)


  0.2 


  


  0.0-0.3


10^3/uL


 


Basophils # (Auto)


  0.1 


  


  0.0-0.1


10^3/uL


 


Sodium Level 138   135-145  MMOL/L


 


Potassium Level 4.2   3.6-5.0  MMOL/L


 


Chloride Level 106     MMOL/L


 


Carbon Dioxide Level 20 L  21-32  MMOL/L


 


Anion Gap 12   5-14  MMOL/L


 


Blood Urea Nitrogen 10   7-18  MG/DL


 


Creatinine


  0.72 


  


  0.60-1.30


MG/DL


 


Estimat Glomerular Filtration


Rate > 60 


  


   


 


 


BUN/Creatinine Ratio 14    


 


Glucose Level 93     MG/DL


 


Calcium Level 9.3   8.5-10.1  MG/DL


 


Total Bilirubin 0.6   0.1-1.0  MG/DL


 


Aspartate Amino Transf


(AST/SGOT) 26 


  


  5-34  U/L


 


 


Alanine Aminotransferase


(ALT/SGPT) 17 


  


  0-55  U/L


 


 


Alkaline Phosphatase 61     U/L


 


Troponin I < 0.30   <0.30  NG/ML


 


Total Protein 8.3 H  6.4-8.2  GM/DL


 


Albumin 4.2   3.2-4.5  GM/DL


 


Lipase 30   8-78  U/L


 


Urine Color  YELLOW   


 


Urine Clarity  CLEAR   


 


Urine pH  6.5  5-9  


 


Urine Specific Gravity  1.015 L 1.016-1.022  


 


Urine Protein  2+ H NEGATIVE  


 


Urine Glucose (UA)  NEGATIVE  NEGATIVE  


 


Urine Ketones  3+ H NEGATIVE  


 


Urine Nitrite  NEGATIVE  NEGATIVE  


 


Urine Bilirubin  1+ H NEGATIVE  


 


Urine Urobilinogen  4 H NORMAL  MG/DL


 


Urine Leukocyte Esterase  1+ H NEGATIVE  


 


Urine RBC (Auto)  1+ H NEGATIVE  


 


Urine RBC  NONE   /HPF


 


Urine WBC  5-10 H  /HPF


 


Urine Squamous Epithelial


Cells 


  5-10 


   /HPF


 


 


Urine Crystals  NONE   /LPF


 


Urine Bacteria  TRACE   /HPF


 


Urine Casts  NONE   /LPF


 


Urine Mucus  SMALL H  /LPF


 


Urine Culture Indicated  NO   








My Orders





Orders - ZAFAR NATARAJAN


Cbc With Automated Diff (17 13:25)


Comprehensive Metabolic Panel (17 13:25)


Lipase (17 13:25)


Ua Culture If Indicated (17 13:25)


Saline Lock/Iv-Start (17 13:25)


Chest Pa/Lat (2 View) (17 13:25)


Ketorolac Injection (Toradol Injection) (17 13:30)


Troponin I (17 13:29)


Ct Chest/Abdomen/Pelvis W (17 14:32)


Iohexol Injection (Omnipaque 350 Mg/Ml 1 (17 14:45)


Ns (Ivpb) (Sodium Chloride 0.9% Ivpb Bag (17 14:45)





Medications Given in ED





Current Medications








 Medications  Dose


 Ordered  Sig/Elizabeth


 Route  Start Time


 Stop Time Status Last Admin


Dose Admin


 


 Iohexol  100 ml  ONCE  ONCE


 IV  17 14:45


 17 14:46 DC 17 14:41


100 ML


 


 Ketorolac


 Tromethamine  30 mg  ONCE  ONCE


 IVP  17 13:30


 17 13:31 DC 17 13:38


30 MG


 


 Sodium Chloride  100 ml  ONCE  ONCE


 IV  17 14:45


 17 14:46 DC 17 14:41


80 ML








Vital Signs/I&O





Vital Sign - Last 12Hours








 17





 13:17


 


Temp 98.8


 


Pulse 66


 


Resp 18


 


B/P (MAP) 131/82


 


Pulse Ox 97











Diagnostic Imaging





   Diagonstic Imaging:  Xray


   Plain Films/CT/US/NM/MRI:  chest


Comments


NAME:   CHIARA AGUIRRE


[facility] REC#:   [id number]


ACCOUNT#:   [id number]


PT STATUS:   REG ER


:   1975


PHYSICIAN:   ZAFAR NATARAJAN


ADMIT DATE:   17/ER


 ***Draft***


Date of Exam:17





CHEST PA/LAT (2 VIEW)








CLINICAL INDICATION: Patient with pain radiating from left


shoulder into back approximately three days.





EXAM: Chest x-ray PA and lateral views.





COMPARISONS: Chest x-ray dated 2017.





FINDINGS:





Lungs/pleura: Lungs are clear. There is no pneumothorax. There is


no pleural effusion.





Mediastinum: Unremarkable. 





Pulmonary vasculature: Unremarkable.





Heart: Unremarkable.





Bones/extrathoracic soft tissue: There are degenerative spurs


involving the thoracic spine.





IMPRESSION:


There is no radiographic evidence of acute cardiopulmonary


process.





  Dictated on workstation # [id number]








Dict:   17 1355


Trans:   17 1403


[id number] [id number]





Interpreted by:     GILMAR PIKE MD


Electronically signed by:





Departure


Impression


Impression:  


 Primary Impression:  


 Urinary tract infection


Disposition:   HOME, SELF-CARE


Condition:  Stable





Departure-Patient Inst.


Decision time for Depature:  15:04


Referrals:  


STACY HURT MD (PCP/Family)


Primary Care Physician


Patient Instructions:  Urinary Tract Infection, Adult (DC)





Add. Discharge Instructions:  


.  Antibiotics as directed


2.  Return to ER for any concerns


3.  Follow-up with Dr. Hurt next week


All discharge instructions reviewed with patient and/or family. Voiced 

understanding.


Scripts


Sulfamethoxazole/Trimethoprim (Bactrim Ds Tablet) 1 Each Tablet


1 EACH PO BID, #10 TAB


   Prov: ZAFAR NATARAJAN         17





Images


Torso/Trunk











1 - Tenderness














ZAFAR NATARAJAN 2017 13:29

## 2017-11-12 NOTE — DIAGNOSTIC IMAGING REPORT
CLINICAL INDICATION: Patient with pain radiating from left

shoulder into back approximately three days.



EXAM: Chest x-ray PA and lateral views.



COMPARISONS: Chest x-ray dated 09/08/2017.



FINDINGS:



Lungs/pleura: Lungs are clear. There is no pneumothorax. There is

no pleural effusion.



Mediastinum: Unremarkable. 



Pulmonary vasculature: Unremarkable.



Heart: Unremarkable.



Bones/extrathoracic soft tissue: There are degenerative spurs

involving the thoracic spine.



IMPRESSION:

There is no radiographic evidence of acute cardiopulmonary

process.



Dictated by: 



  Dictated on workstation # [id number]

## 2017-11-12 NOTE — XMS REPORT
[facility]

 Created on: 2017



Erin Richmond

External Reference #: [id number]

: 1975

Sex: Female



Demographics







 Address  [address] [place] [address]  [address]

 

 Preferred Language  Unknown

 

 Marital Status  Unknown

 

 Muslim Affiliation  Unknown

 

 Race  Unknown

 

 Ethnic Group  Unknown





Author







 Author  CHANDRA SMILEY

 

 Organization  [facility]

 

 Address  [address] [place]  [address]



 

 Phone  [phone]







Care Team Providers







 Care Team Member Name  Role  Phone

 

 CHANDRA SMILEY  Unavailable  [phone]







PROBLEMS







 Type  Condition  ICD9-CM Code  PAU54-ZE Code  Onset Dates  Condition Status  
SNOMED Code

 

 Problem  Dysthymic disorder     F34.1     Active  94284094

 

 Problem  Need for prophylactic vaccination and inoculation, Influenza  V04.81 
       Active  [id number]

 

 Problem  DTAP TEST  V06.1        Active   







ALLERGIES

Unknown Allergies



SOCIAL HISTORY

No smoking Hx information available



PLAN OF CARE





VITAL SIGNS





MEDICATIONS







 Medication  Instructions  Dosage  Frequency  Start Date  End Date  Duration  
Status

 

 Multivitamin Multiple Vitamins     1 tablet by Oral route 1 time per day     
        Active

 

 Folic Acid 1 mg     1 time per day             Active

 

 Soma 350 mg                   Active

 

 Metoprolol Tartrate 25 mg     take 1/2 tablet qAM and 1 tablet qPM     05 Sep, 
2013        Active

 

 Famotidine     by Oral route     04 Oct, 2012        Active

 

 Lexapro 20 mg     1 tablet by Oral route 1 time per day     23 Aug, 2013      
  Active

 

 Ativan                    Active

 

 Lovastatin 10 mg     1 time per day             Active

 

 Eliquis                    Active

 

 Lamictal                    Active







RESULTS

No Results



PROCEDURES







 Procedure  Date Ordered  Related Diagnosis  Body Site

 

 [facility] VISIT MENTAL HEALTH ESTAB PT  2017      

 

 Psychotherapy, patient &/family, 30 minutes, established patient  2017
      







IMMUNIZATIONS

No Known Immunizations

## 2017-11-12 NOTE — DIAGNOSTIC IMAGING REPORT
PROCEDURE: CT chest, abdomen, and pelvis with contrast.



TECHNIQUE: Multiple contiguous axial images were obtained through

the chest, abdomen, and pelvis after the administration of

intravenous contrast.



INDICATION: Back pain, abdominal pain, left shoulder pain. Chills

and night sweats.



COMPARISON is made with a chest radiograph from earlier the same

day and with prior CT abdomen and pelvis from November 20, 2016. 



CORRELATION is also made with CT from November 14, 2016.



FINDINGS:  

The lungs demonstrate no focal pulmonary infiltrates or evidence

of consolidation. There is no effusion or pneumothorax.



There is a small degree of residual parenchymal distortion near

the left hilum with some associated calcifications which are

significantly improved from the prior examination and  suggest a

prior post inflammatory process. There is no longer evidence of a

significant left lower lobe bronchial narrowing. There are a few

small calcifications present within the mediastinum which are

stable from the prior examination. There is no recurrent

mediastinal adenopathy. The aorta appears normal in caliber

without aneurysm or dissection. On this nondedicated exam, there

is no filling defect within the central pulmonary arteries.



The liver demonstrates no evidence of a focal intrahepatic

abnormality. The liver is of low density suggesting a component

of steatosis. The main portal vein is patent. The gallbladder is

nondistended. There is no radiodense gallstone or biliary

dilatation. The spleen appears normal in size. The pancreas is

unremarkable. There is no adrenal mass. The kidneys enhance

normally and appear nonobstructed.



The small and large bowel are normal in caliber without evidence

of obstruction. There is moderate stool demonstrated throughout

the colon. There is no abnormal small or large bowel mucosal

thickening. There are no findings to suggest an appendicitis.



The uterus is unremarkable. The right adnexa again demonstrates

low-density lesions which appear different than on the prior

examination and are likely new rather than 

persistence of the prior findings. The largest apparent cyst

measures 2 cm. The left ovary is not evident. The urinary bladder

is unremarkable.



There are tiny perirectal of lymph nodes demonstrated near the

mesorectal fascia. These are nonspecific. No other abnormal lymph

nodes within the abdomen and pelvis are evident.



There is no acute or suspicious osseous abnormality demonstrated.



IMPRESSION:

1. Other than minimal scarring adjacent to the left hilum, the

lungs now appear clear. Mediastinal and left hilar calcifications

are unchanged from previous examination. This may reflect sequela

of a previous granulomatous process. There is no recurrent

pulmonary mass or mediastinal adenopathy evident. No acute

process within the chest demonstrated.

2. CT of the abdomen and pelvis is remarkable only for small

lymph nodes demonstrated within the fat about the rectum. While

nonspecific, these are likely on an  inflammatory basis. I would

however, recommend followup and reassessment and if a  persistent

finding, colonoscopy may need to be considered.

3. There is no free air, free fluid or abscess.

4. Low-density right ovarian lesion is smaller than on previous

examination and is most compatible with a right ovarian cyst.

This measures 2 cm.



Dictated by: 



  Dictated on workstation # [id number]

## 2017-11-12 NOTE — XMS REPORT
Clinical Summary

 Created on: 2017



Erin Richmond

External Reference #: [id number]

: 1975

Sex: Female



Demographics







 Address  [address] [place]  [address]

 

 Home Phone  [phone]

 

 Preferred Language  English

 

 Marital Status  Unknown

 

 Restorationism Affiliation  CAT

 

 Race  White

 

 Ethnic Group  Not  or 





Author







 Author  [facility]

 

 Organization  [facility]

 

 Address  Unknown

 

 Phone  Unavailable







Support







 Name  Relationship  Address  Phone

 

 , [place], [place]    [phone]







Care Team Providers







 Care Team Member Name  Role  Phone

 

  PCP  Unavailable







Source Comments

Some departments are not documenting in the electronic medical record.  If you 
do not see the information that you expected, contact Release of Information in 
the Health Information Management department at [phone] for further 
assistance in locating additional records.[facility]



Allergies







    



  Active Allergy   Reactions   Severity   Noted Date   Comments

 

    



  Aripiprazole     2008   Panic attacks

 

    



  Prochlorperazine   SHORTNESS OF BREATH,    2008 



  Edisylate   SWELLING   







Current Medications







      



  Prescription   Sig.   Disp.   Refills   Start   End Date   Status



      Date  

 

      



  COUMADIN PO      20    Active



      08  

 

      



  METOPROLOL TARTRATE PO      20    Active



      08  

 

      



  LOVASTATIN PO      20    Active



      08  

 

      



  LEXAPRO PO      20    Active



      08  

 

      



  PEPCID PO      20    Active



      08  

 

      



  aspirin 81 mg chew tablet      20    Active



      08  

 

      



  FOLIC ACID PO      20    Active



      08  

 

      



  M-VIT PO      20    Active



      08  

 

      



  escitalopram (LEXAPRO) 10   Take 1 Tab by mouth   30   0   20    Active



  mg tablet   Daily.     08  

 

      



  escitalopram (LEXAPRO) 10   Take 1 Tab by mouth   30   0   20    Active



  mg tablet   Daily.     08  

 

      



  mirtazapine (REMERON) 15   Take 1 Tab by mouth At   30   0   20    
Active



  mg tablet   Bedtime Daily.     08  







Active Problems







 



  Problem   Noted Date

 

 



  Generalized anxiety disorder   2008







Family History







   



  Medical History   Relation   Name   Comments

 

   



  Depression   Maternal  



   Grandfather  

 

   



  Depression   Mother  

 

   



  Depression   Sister  









   



  Relation   Name   Status   Comments

 

   



  Maternal Grandfather   

 

   



  Mother   

 

   



  Sister   







Social History







    



  Tobacco Use   Types   Packs/Day   Years Used   Date

 

    



  Never Smoker    









   



  Alcohol Use   Drinks/Week   oz/Week   Comments

 

   



  Yes     rare









 



  Sex Assigned at Birth   Date Recorded

 

 



  Not on file 







Last Filed Vital Signs







  



  Vital Sign   Reading   Time Taken

 

  



  Blood Pressure   121/71   2008 11:00 AM CST

 

  



  Pulse   54   2008 11:00 AM CST

 

  



  Temperature   36.6   C (97.9   F)   2008 11:00 AM CST

 

  



  Respiratory Rate   -   -

 

  



  Oxygen Saturation   97%   2008  6:15 PM CST

 

  



  Inhaled Oxygen   -   -



  Concentration  

 

  



  Weight   77 kg (169 lb 12.1 oz)   2008 11:00 PM CST

 

  



  Height   162 cm (5' 3.78")   2008 11:00 PM CST

 

  



  Body Mass Index   29.34   2008 11:00 PM CST







Plan of Treatment







   



  Health Maintenance   Due Date   Last Done   Comments

 

   



  PHYSICAL (COMPREHENSIVE)   1984  



  EXAM   

 

   



  PERTUSSIS VACCINE   1988  

 

   



  TETANUS VACCINE   1994  

 

   



  CERVICAL CANCER SCREENING   2007  

 

   



  BREAST CANCER SCREENING   2017  

 

   



  INFLUENZA VACCINE   2017  







Results

Not on filefrom Last 3 Months

## 2018-03-20 ENCOUNTER — HOSPITAL ENCOUNTER (OUTPATIENT)
Dept: HOSPITAL 75 - REHAB | Age: 43
Discharge: HOME | End: 2018-03-20
Attending: FAMILY MEDICINE
Payer: MEDICARE

## 2018-03-20 DIAGNOSIS — M25.512: Primary | ICD-10-CM

## 2018-04-26 ENCOUNTER — HOSPITAL ENCOUNTER (OUTPATIENT)
Dept: HOSPITAL 75 - RAD | Age: 43
End: 2018-04-26
Attending: OBSTETRICS & GYNECOLOGY
Payer: MEDICARE

## 2018-04-26 DIAGNOSIS — Z12.31: Primary | ICD-10-CM

## 2018-04-26 DIAGNOSIS — N92.1: ICD-10-CM

## 2018-04-26 PROCEDURE — 77067 SCR MAMMO BI INCL CAD: CPT

## 2018-04-26 PROCEDURE — 76856 US EXAM PELVIC COMPLETE: CPT

## 2018-04-26 PROCEDURE — 76830 TRANSVAGINAL US NON-OB: CPT

## 2018-04-26 NOTE — DIAGNOSTIC IMAGING REPORT
INDICATION: Chronic right pelvic pain.



TECHNIQUE: Transabdominal and transvaginal pelvic sonography was

performed.



FINDINGS: The uterus measures 5.6 x 3.4 x 2.9 cm. No uterine mass

is identified. The endometrium is 5 mm in thickness. The right

ovary measures 3.1 x 2.1 x 1.7 cm. There is normal blood flow to

the right ovary. The left ovary is surgically absent. No adnexal

mass or free fluid is seen.



IMPRESSION: Unremarkable transabdominal and transvaginal pelvic

sonography.



Dictated by: 



  Dictated on workstation # [id number]

## 2018-04-26 NOTE — DIAGNOSTIC IMAGING REPORT
INDICATION: Routine screening.



COMPARISON: No prior mammograms are available for comparison.

This is a baseline study.



TECHNIQUE:  Bilateral 3D digital tomographic views were obtained

with [email or web address] and reviewed on a [employer]

workstation.  In addition, CAD - computer aided detection was

utilized.



FINDINGS: Scattered fibroglandular densities are identified

bilaterally. There is a slightly nodular irregular density in the

superior right breast at mid-to-posterior depth. No corresponding

density on the CC view is seen. Additional views are recommended.

There are benign calcifications on the left. The axillae are

unremarkable.



IMPRESSION: Right breast density. Additional views including spot

compression and mediolateral views are recommended for further

evaluation.



ACR BI-RADS Category 0: Incomplete. (Needs additional imaging

evaluation).

Result letter will be mailed to the patient.

Note: At least 10% of breast cancer is not imaged by mammography.



Dictated by: 



  Dictated on workstation # [id number]

## 2018-05-16 ENCOUNTER — HOSPITAL ENCOUNTER (OUTPATIENT)
Dept: HOSPITAL 75 - RAD | Age: 43
End: 2018-05-16
Attending: OBSTETRICS & GYNECOLOGY
Payer: MEDICARE

## 2018-05-16 DIAGNOSIS — N63.11: Primary | ICD-10-CM

## 2018-05-16 NOTE — DIAGNOSTIC IMAGING REPORT
INDICATION: Right breast density. The patient presents for

additional views.



Correlation is made with recent screening study from 4/26/2018.



2-D and 3-D unilateral right diagnostic mammography was

performed. This included spot compression CC and mediolateral

views as well as conventional mediolateral view.



The current study was also evaluated with a Computer Aided

Detection (CAD) system.



Additional views show some persistent mild nodularity in the

upper outer right breast approximately 7 cm from the nipple.

Further evaluation of this area with ultrasound is recommended.

No suspicious calcifications are seen. Remainder of the right

breast is unremarkable.



IMPRESSION: Persistent mild nodularity and increased density in

upper outer right breast posterior depth. Further evaluation with

ultrasound is recommended.



ACR BI-RADS Category 0: Incomplete. (Needs additional imaging

evaluation).

Result letter will be mailed to the patient.

Note: At least 10% of breast cancer is not imaged by mammography.



Dictated by: 



  Dictated on workstation # [id number]

## 2018-05-16 NOTE — DIAGNOSTIC IMAGING REPORT
INDICATION: Asymmetry in the upper-outer right breast on recent

mammogram. The study is performed for further evaluation.



Correlation is made with diagnostic mammogram earlier the same

day. 



FINDINGS: Sonographic interrogation of the upper-outer right

breast was performed. No solid or cystic masses are seen. No

sonographic abnormality is detected.



IMPRESSION: No sonographic abnormality is seen. Even so,

follow-up right mammogram in six months is recommended to confirm

stability of the area of asymmetry in the upper-outer right

breast.



ACR BI-RADS Category 3: Probably benign findings.



Dictated by: 



  Dictated on workstation # [id number]

## 2018-07-10 ENCOUNTER — HOSPITAL ENCOUNTER (OUTPATIENT)
Dept: HOSPITAL 75 - PREOP | Age: 43
Discharge: HOME | End: 2018-07-10
Attending: OBSTETRICS & GYNECOLOGY
Payer: MEDICARE

## 2018-07-10 VITALS — DIASTOLIC BLOOD PRESSURE: 60 MMHG | SYSTOLIC BLOOD PRESSURE: 100 MMHG

## 2018-07-10 VITALS — BODY MASS INDEX: 25.58 KG/M2 | HEIGHT: 62 IN | WEIGHT: 139 LBS

## 2018-07-10 DIAGNOSIS — Z01.818: Primary | ICD-10-CM

## 2018-07-10 PROCEDURE — 87081 CULTURE SCREEN ONLY: CPT

## 2018-07-19 ENCOUNTER — HOSPITAL ENCOUNTER (OUTPATIENT)
Dept: HOSPITAL 75 - SDC | Age: 43
Discharge: HOME | End: 2018-07-19
Attending: OBSTETRICS & GYNECOLOGY
Payer: MEDICARE

## 2018-07-19 VITALS — DIASTOLIC BLOOD PRESSURE: 64 MMHG | SYSTOLIC BLOOD PRESSURE: 111 MMHG

## 2018-07-19 VITALS — DIASTOLIC BLOOD PRESSURE: 70 MMHG | SYSTOLIC BLOOD PRESSURE: 108 MMHG

## 2018-07-19 VITALS — WEIGHT: 136 LBS | HEIGHT: 62 IN | BODY MASS INDEX: 25.03 KG/M2

## 2018-07-19 VITALS — DIASTOLIC BLOOD PRESSURE: 65 MMHG | SYSTOLIC BLOOD PRESSURE: 105 MMHG

## 2018-07-19 VITALS — SYSTOLIC BLOOD PRESSURE: 92 MMHG | DIASTOLIC BLOOD PRESSURE: 48 MMHG

## 2018-07-19 DIAGNOSIS — I10: ICD-10-CM

## 2018-07-19 DIAGNOSIS — R10.2: Primary | ICD-10-CM

## 2018-07-19 DIAGNOSIS — D68.2: ICD-10-CM

## 2018-07-19 DIAGNOSIS — F17.210: ICD-10-CM

## 2018-07-19 DIAGNOSIS — Z79.899: ICD-10-CM

## 2018-07-19 DIAGNOSIS — R56.9: ICD-10-CM

## 2018-07-19 DIAGNOSIS — N92.0: ICD-10-CM

## 2018-07-19 DIAGNOSIS — N72: ICD-10-CM

## 2018-07-19 DIAGNOSIS — Z79.01: ICD-10-CM

## 2018-07-19 LAB
BASOPHILS # BLD AUTO: 0 10^3/UL (ref 0–0.1)
BASOPHILS NFR BLD AUTO: 0 % (ref 0–10)
EOSINOPHIL # BLD AUTO: 0.2 10^3/UL (ref 0–0.3)
EOSINOPHIL NFR BLD AUTO: 2 % (ref 0–10)
ERYTHROCYTE [DISTWIDTH] IN BLOOD BY AUTOMATED COUNT: 11.8 % (ref 10–14.5)
HCT VFR BLD CALC: 39 % (ref 35–52)
HGB BLD-MCNC: 13.5 G/DL (ref 11.5–16)
LYMPHOCYTES # BLD AUTO: 2.4 X 10^3 (ref 1–4)
LYMPHOCYTES NFR BLD AUTO: 33 % (ref 12–44)
MANUAL DIFFERENTIAL PERFORMED BLD QL: NO
MCH RBC QN AUTO: 34 PG (ref 25–34)
MCHC RBC AUTO-ENTMCNC: 35 G/DL (ref 32–36)
MCV RBC AUTO: 98 FL (ref 80–99)
MONOCYTES # BLD AUTO: 0.6 X 10^3 (ref 0–1)
MONOCYTES NFR BLD AUTO: 9 % (ref 0–12)
NEUTROPHILS # BLD AUTO: 4.1 X 10^3 (ref 1.8–7.8)
NEUTROPHILS NFR BLD AUTO: 56 % (ref 42–75)
PLATELET # BLD: 250 10^3/UL (ref 130–400)
PMV BLD AUTO: 9.9 FL (ref 7.4–10.4)
RBC # BLD AUTO: 3.95 10^6/UL (ref 4.35–5.85)
WBC # BLD AUTO: 7.3 10^3/UL (ref 4.3–11)

## 2018-07-19 PROCEDURE — 86900 BLOOD TYPING SEROLOGIC ABO: CPT

## 2018-07-19 PROCEDURE — 85025 COMPLETE CBC W/AUTO DIFF WBC: CPT

## 2018-07-19 PROCEDURE — 86850 RBC ANTIBODY SCREEN: CPT

## 2018-07-19 PROCEDURE — 94664 DEMO&/EVAL PT USE INHALER: CPT

## 2018-07-19 PROCEDURE — 86901 BLOOD TYPING SEROLOGIC RH(D): CPT

## 2018-07-19 PROCEDURE — 36415 COLL VENOUS BLD VENIPUNCTURE: CPT

## 2018-07-19 PROCEDURE — 84703 CHORIONIC GONADOTROPIN ASSAY: CPT

## 2018-07-19 RX ADMIN — KETOROLAC TROMETHAMINE PRN MG: 30 INJECTION, SOLUTION INTRAMUSCULAR; INTRAVENOUS at 15:26

## 2018-07-19 RX ADMIN — SODIUM CHLORIDE, SODIUM LACTATE, POTASSIUM CHLORIDE, AND CALCIUM CHLORIDE PRN MLS/HR: 600; 310; 30; 20 INJECTION, SOLUTION INTRAVENOUS at 08:21

## 2018-07-19 RX ADMIN — KETOROLAC TROMETHAMINE PRN MG: 30 INJECTION, SOLUTION INTRAMUSCULAR; INTRAVENOUS at 08:59

## 2018-07-19 RX ADMIN — SODIUM CHLORIDE, SODIUM LACTATE, POTASSIUM CHLORIDE, AND CALCIUM CHLORIDE PRN MLS/HR: 600; 310; 30; 20 INJECTION, SOLUTION INTRAVENOUS at 07:30

## 2018-07-19 RX ADMIN — MORPHINE SULFATE PRN MG: 10 INJECTION, SOLUTION INTRAMUSCULAR; INTRAVENOUS at 09:21

## 2018-07-19 RX ADMIN — MORPHINE SULFATE PRN MG: 10 INJECTION, SOLUTION INTRAMUSCULAR; INTRAVENOUS at 09:15

## 2018-07-19 NOTE — XMS REPORT
CCD document using C-CDA

 Created on: 2018



Erin Richmond

External Reference #: [id number]

: 1975

Sex: Female



Demographics







 Address  [facility] [address]

[place]  [address]

 

 Home Phone  [phone]

 

 Preferred Language  English

 

 Marital Status  Unknown

 

 Hindu Affiliation  Unknown

 

 Race  White

 

 Ethnic Group  Not  or 





Author







 Author  Jordyn Rosenberg MD, [facility]

 

 Address  [address] [place]  [address]



 

 Phone  [phone]







Care Team Providers







 Care Team Member Name  Role  Phone

 

  PP  Unavailable

 

  CCM  Unavailable



                                            



Summary Purpose

          Interface Exchange                                                   
                 



Insurance Providers

                      





 Payer name                    Policy type / Coverage type                    
Covered party ID                    Effective Begin Date                    
Effective End Date                

 

 [employer] Medicare Part B                    Medicare Part B                    
[id number]                    Unknown                    Unknown                

 

 [place] [facility] [place]                    Medicare Part B                  
  [id number]                    Unknown                    Unknown            
    



                                                                               
         



Family history

                      



Mother            





 Diagnosis                    Age At Onset                

 

 kidney disease                    Unknown                

 

 Depression                    Unknown                



            



Father            





 Diagnosis                    Age At Onset                

 

 Coronary Artery Disease                    Unknown                

 

 Hyperlipidemia                    Unknown                

 

 Bleeding disorders                    Unknown                

 

 Hypertension                    Unknown                



                                                                               
         



Social History

                      





 Social History Element                    Codes                    Description
                    Effective Dates                

 

 Marital status                    Unknown                    Single           
         2015                

 

 Number of children                    Unknown                    0            
        2015                

 

 Tobacco history                    SNOMED CT: 2565035                    
Former smoker                    2015                

 

 Alcohol history                    SNOMED CT: 463082879                    
Never drinks alcohol                    2015                



                                                                               
                                       



Allergies, Adverse Reactions, Alerts

          Allergies, Adverse Reactions, Alerts data not found                  
                                                  



Past Medical History

                      





 Illness                    Codes                    Condition Status          
          Onset Date                    Resolved Date                

 

                     Encounter for immunization                                
      ICD-9: V04.81

ICD-10: Z23                    Active                    10/18/2017            
        Unknown                

 

                     Essential (primary) hypertension                          
            ICD-9: 401.1

ICD-10: I10                    Active                    2016            
        Unknown                

 

                     Generalized anxiety disorder                              
        ICD-9: 300.00

ICD-10: F41.1                    Active                    2015          
          Unknown                

 

                     Low back pain                                      ICD-9: 
724.2

ICD-10: M54.5                    Active                    2015          
          Unknown                

 

                     Other depressive episodes                                 
     ICD-9: 311

ICD-10: F32.8                    Active                    2015          
          Unknown                

 

                     Other generalized epilepsy and epileptic syndromes, not 
intractable, without status epilepticus                                      ICD
-9: 345.90

ICD-10: G40.409                    Active                    2015        
            Unknown                

 

                     Encounter for screening mammogram for malignant neoplasm 
of breast                                      ICD-9: V76.12

ICD-10: Z12.31                    Active                    2017         
           Unknown                

 

                     Pain in thoracic spine                                    
  ICD-9: 724.1

ICD-10: M54.6                    Active                    2015          
          Unknown                

 

                     Essential (primary) hypertension                          
            ICD-9: 401.9

ICD-10: I10                    Active                    2015            
        Unknown                

 

                     Restlessness and agitation                                
      ICD-9: 307.9

ICD-10: R45.1                    Active                    2016          
          Unknown                

 

                     Localized swelling, mass and lump, trunk                  
                    ICD-9: 786.6

ICD-10: R22.2                    Active                    2016          
          Unknown                

 

                     Cough                                      ICD-9: 786.2

ICD-10: R05                    Active                    10/25/2016            
        Unknown                

 

                     Pleurisy                                      ICD-9: 511.0

ICD-10: R09.1                    Active                    10/25/2016          
          Unknown                

 

                     Cervicalgia                                      ICD-9: 
723.1

ICD-10: M54.2                    Active                    2016          
          Unknown                

 

                     Radiculopathy, cervicothoracic region                     
                 ICD-9: 723.4

ICD-10: M54.13                    Active                    2016         
           Unknown                

 

                     Rash and other nonspecific skin eruption                  
                    ICD-9: 782.1

ICD-10: R21                    Active                    2016            
        Unknown                

 

                     Generalized abdominal pain                                
      ICD-9: 789.07

ICD-10: R10.84                    Active                    2016         
           Unknown                

 

                     Gross hematuria                                      ICD-9
: 599.71

ICD-10: R31.0                    Active                    2016          
          Unknown                

 

                     Long term (current) use of antithrombotics/antiplatelets  
                                    ICD-9: V58.63

ICD-10: Z79.02                    Active                    2016         
           Unknown                

 

                     Urinary tract infection, site not specified               
                       ICD-9: 599.0

ICD-10: N39.0                    Active                    2016          
          Unknown                

 

                     Lumbar spine pain                                      ICD-
9: 724.2                    Active                    2015               
     Unknown                

 

                     Plantar fasciitis of right foot                           
           ICD-9: 728.71                    Active                    2015                    Unknown                

 

                     Right knee pain                                      ICD-9
: 719.46                    Active                    2015               
     Unknown                

 

                     Depression                                      Unknown   
                 Active                    2015                    
Unknown                

 

                     Factor V                                      Unknown     
               Active                    2015                    Unknown 
               

 

                     Hypertension                                      Unknown 
                   Active                    2015                    
Unknown                

 

                     Seizures                                      Unknown     
               Active                    2015                    Unknown 
               

 

                     Anxiety                                      ICD-9: 300.00
                    Active                    2015                    
Unknown                

 

                     Depression                                      ICD-9: 311
                    Active                    2015                    
Unknown                

 

                     ESSENTIAL HYPERTENSION                                    
  ICD-9: 401.9                    Active                    2015         
           Unknown                

 

                     Factor 5 Leiden mutation, heterozygous                    
                  ICD-9: 289.81                    Active                    2015                    Unknown                

 

                     Seizure disorder                                      ICD-9
: 345.90                    Active                    2015               
     Unknown                



                                                                               
                                                                               
                                                                               
                                                                               
                                                                               
   



Problems

                      





 Condition                    Codes                    Effective Dates         
           Condition Status                

 

                     Encounter for immunization                                
      ICD-9: V04.81

ICD-10: Z23                    10/18/2017                    Active            
    

 

                     Essential (primary) hypertension                          
            ICD-9: 401.1

ICD-10: I10                    2016                    Active            
    

 

                     Generalized anxiety disorder                              
        ICD-9: 300.00

ICD-10: F41.1                    2015                    Active          
      

 

                     Low back pain                                      ICD-9: 
724.2

ICD-10: M54.5                    2015                    Active          
      

 

                     Other depressive episodes                                 
     ICD-9: 311

ICD-10: F32.8                    2015                    Active          
      

 

                     Other generalized epilepsy and epileptic syndromes, not 
intractable, without status epilepticus                                      ICD
-9: 345.90

ICD-10: G40.409                    2015                    Active        
        

 

                     Encounter for screening mammogram for malignant neoplasm 
of breast                                      ICD-9: V76.12

ICD-10: Z12.31                    2017                    Active         
       

 

                     Pain in thoracic spine                                    
  ICD-9: 724.1

ICD-10: M54.6                    2015                    Active          
      

 

                     Essential (primary) hypertension                          
            ICD-9: 401.9

ICD-10: I10                    2015                    Active            
    

 

                     Restlessness and agitation                                
      ICD-9: 307.9

ICD-10: R45.1                    2016                    Active          
      

 

                     Localized swelling, mass and lump, trunk                  
                    ICD-9: 786.6

ICD-10: R22.2                    2016                    Active          
      

 

                     Cough                                      ICD-9: 786.2

ICD-10: R05                    10/25/2016                    Active            
    

 

                     Pleurisy                                      ICD-9: 511.0

ICD-10: R09.1                    10/25/2016                    Active          
      

 

                     Cervicalgia                                      ICD-9: 
723.1

ICD-10: M54.2                    2016                    Active          
      

 

                     Radiculopathy, cervicothoracic region                     
                 ICD-9: 723.4

ICD-10: M54.13                    2016                    Active         
       

 

                     Rash and other nonspecific skin eruption                  
                    ICD-9: 782.1

ICD-10: R21                    2016                    Active            
    

 

                     Generalized abdominal pain                                
      ICD-9: 789.07

ICD-10: R10.84                    2016                    Active         
       

 

                     Gross hematuria                                      ICD-9
: 599.71

ICD-10: R31.0                    2016                    Active          
      

 

                     Long term (current) use of antithrombotics/antiplatelets  
                                    ICD-9: V58.63

ICD-10: Z79.02                    2016                    Active         
       

 

                     Urinary tract infection, site not specified               
                       ICD-9: 599.0

ICD-10: N39.0                    2016                    Active          
      

 

                     Lumbar spine pain                                      ICD-
9: 724.2                    2015                    Active                

 

                     Plantar fasciitis of right foot                           
           ICD-9: 728.71                    2015                    
Active                

 

                     Right knee pain                                      ICD-9
: 719.46                    2015                    Active                

 

                     Depression                                      Unknown   
                 2015                    Active                

 

                     Factor V                                      Unknown     
               2015                    Active                

 

                     Hypertension                                      Unknown 
                   2015                    Active                

 

                     Seizures                                      Unknown     
               2015                    Active                

 

                     Anxiety                                      ICD-9: 300.00
                    2015                    Active                

 

                     Depression                                      ICD-9: 311
                    2015                    Active                

 

                     ESSENTIAL HYPERTENSION                                    
  ICD-9: 401.9                    2015                    Active         
       

 

                     Factor 5 Leiden mutation, heterozygous                    
                  ICD-9: 289.81                    2015                  
  Active                

 

                     Seizure disorder                                      ICD-9
: 345.90                    2015                    Active                



                                                                               
                                                                               
                                                                               
                                                                               
                                                                               
   



Medications

                      





 Medication                    Codes                    Instructions           
         Start Date                    Stop Date                    Status     
               Fill Instructions                

 

                     Lexapro 20 mg tablet                                      
RxNorm: 592558                    TAKE ONE TABLET BY MOUTH DAILY               
     2018                    Active        
                            

 

                     Ativan 1 mg tablet                                      
RxNorm: 844589                    1-2 Tablet(s) PO TID as needed               
     2018                    Active        
                            

 

                     Percocet 5 mg-325 mg tablet                               
       RxNorm: 7159840                    1-2 Tablet(s) PO Q6 as needed for 
back pain                    2017          
          Inactive                                    

 

                     Percocet 5 mg-325 mg tablet                               
       RxNorm: 4047736                    1-2 Tablet(s) PO Q6 as needed for 
back pain                    11/15/2017                    2017          
          Inactive                                    

 

                     Percocet 5 mg-325 mg tablet                               
       RxNorm: 5636409                    1-2 Tablet(s) PO Q6 as needed for 
back pain                    10/03/2017                    10/13/2017          
          Inactive                                    

 

                     Lexapro 20 mg tablet                                      
RxNorm: 341500                    TAKE ONE TABLET BY MOUTH DAILY               
     2017                    Inactive      
                              

 

                     Percocet 5 mg-325 mg tablet                               
       RxNorm: 5980531                    1-2 Tablet(s) PO Q6 as needed for 
back pain                    2017          
          Inactive                                    

 

                     Percocet 5 mg-325 mg tablet                               
       RxNorm: 0835809                    1-2 Tablet(s) PO Q6 as needed for 
back pain                    2017          
          Inactive                                    

 

                     famotidine 20 mg tablet                                   
   RxNorm: 349770                    1 Tablet(s) PO daily                                        No Stop Date                    Active              
                      

 

                     Percocet 5 mg-325 mg tablet                               
       RxNorm: 4210049                    1 Tablet(s) PO Q6 as needed for back 
pain                    2017               
     Inactive                                    

 

                     Ativan 1 mg tablet                                      
RxNorm: 763835                    1-2 Tablet(s) PO TID as needed               
     2017                    Inactive      
                              

 

                     Percocet 5 mg-325 mg tablet                               
       RxNorm: 1220508                    1 Tablet(s) PO Q6 as needed for back 
pain                    2017               
     Inactive                                    

 

                     Lexapro 20 mg tablet                                      
RxNorm: 521251                    TAKE ONE TABLET BY MOUTH DAILY               
     2017                    Inactive      
                              

 

                     Percocet 5 mg-325 mg tablet                               
       RxNorm: 1110249                    1 Tablet(s) PO Q6 as needed for back 
pain                    2017               
     Inactive                                    

 

                     Percocet 5 mg-325 mg tablet                               
       RxNorm: 1236749                    1 Tablet(s) PO Q6 as needed for pain 
                   2017                    
Inactive                                    

 

                     Ativan 1 mg tablet                                      
RxNorm: 530836                    1-2 Tablet(s) PO TID                    2017                    Inactive                 
                   

 

                     Lamictal 25 mg tablet                                      
RxNorm: 403736                    1 Tablet(s) PO BID                    2017                    Inactive                 
                   

 

                     Flonase Allergy Relief 50 mcg/actuation nasal spray,
suspension                                      RxNorm: 6286420                
    1 Spray NASAL BID                    2017                    Inactive                                    

 

                     Lamictal XR 50 mg tablet,extended release                 
                     RxNorm: 319421                    TAKE ONE TABLET BY MOUTH 
TWICE A DAY                    2016        
            Inactive                                    

 

                     Lamictal XR 25 mg tablet,extended release                 
                     RxNorm: 466367                    1 Tablet(s) PO BID      
              2016                    
Inactive                                    

 

                     Trokendi XR 50 mg capsule, extended release               
                       RxNorm: 0868367                    1 Capsule(s) PO daily
                    2016                    
Inactive                                    

 

                     Lamictal XR 50 mg tablet,extended release                 
                     RxNorm: 147988                    1 Tablet(s) PO daily    
                2016                    
Inactive                                    

 

                     Trokendi XR 50 mg capsule, extended release               
                       RxNorm: 3759481                    1 Capsule(s) PO daily
                    2016                    
Inactive                                    

 

                     Lamictal XR 50 mg tablet,extended release                 
                     RxNorm: 729473                    1 Tablet(s) PO daily    
                2016                    
Inactive                                    

 

                     Ativan 1 mg tablet                                      
RxNorm: 776167                    1-2 Tablet(s) PO TID                    2018                    Inactive                 
                   

 

                     Lexapro 20 mg tablet                                      
RxNorm: 830927                    Tablet(s) TAKE ONE TABLET BY MOUTH DAILY     
               2016                    
Inactive                                    

 

                     Diflucan 150 mg tablet                                    
  RxNorm: 177605                    1 Tablet(s) PO daily                    2016                    Inactive              
                      

 

                     Diflucan 150 mg tablet                                    
  RxNorm: 818398                    1 Tablet(s) PO daily                    2016                    Inactive              
                      

 

                     Zofran 4 mg tablet                                      
RxNorm: 365638                    1 Tablet(s) PO TID as needed nausea          
          2016                    Inactive 
                                   

 

                     Percocet 5 mg-325 mg tablet                               
       RxNorm: 8531188                    1 Tablet(s) PO Q6 as needed for pain 
                   10/26/2016                    2017                    
Inactive                                    

 

                     clonidine HCl 0.1 mg tablet                               
       RxNorm: 601694                    1 Tablet(s) PO BID                    
2016                    Inactive           
                         

 

                     Percocet 5 mg-325 mg tablet                               
       RxNorm: 3022960                    1-2 Tablet(s) PO Q6 as needed for 
pain                    2016               
     Inactive                                    

 

                     clonidine HCl 0.1 mg tablet                               
       RxNorm: 968153                    1 Tablet(s) PO BID                    
2016                    Inactive           
                         

 

                     Lexapro 20 mg tablet                                      
RxNorm: 454700                    TAKE ONE TABLET BY MOUTH DAILY               
     2016                    11/10/2016                    Inactive      
                              

 

                     Kenalog 40 mg/mL suspension for injection                 
                     RxNorm: 8595542                    Milliliter(s) Inj      
              2016                    
Inactive                                    

 

                     Dilantin Kapseal 100 mg capsule                           
           RxNorm: 046454                    TAKE THREE CAPSULES BY MOUTH EVERY 
NIGHT AT BEDTIME                    2016   
                 Inactive                                    

 

                     Keflex 500 mg capsule                                      
RxNorm: 486945                    1 Capsule(s) PO TID                    2016                    Inactive                 
                   

 

                     Lexapro 20 mg tablet                                      
RxNorm: 511348                    TAKE ONE TABLET BY MOUTH DAILY               
     2015                    06/10/2016                    Inactive      
                              

 

                     metoprolol tartrate 25 mg tablet                          
            RxNorm: 243422                    1 Tablet(s) PO BID               
     2015                    Inactive      
                              

 

                     warfarin 5 mg tablet                                      
RxNorm: 763456                    m and w; all other days takes 7.5 Tablet(s) 
PO daily                    2015           
         Inactive                                    

 

                     tramadol 50 mg tablet                                      
RxNorm: 543714                    1-2 Tablet(s) PO Q6 as needed                
    2015                    Inactive       
                             

 

                     Soma 350 mg tablet                                      
RxNorm: 016870                    1 Tablet(s) PO Q6 as needed                  
  2015                    No Stop Date                    Active         
                           

 

                     Xanax 1 mg tablet                                      
RxNorm: 163951                    Tablet(s) PO daily as needed                 
   2015                    Inactive        
                            

 

                     Lexapro 20 mg tablet                                      
RxNorm: 256875                    1 Tablet(s) PO daily                    2015                    Inactive                 
                   

 

                     Dilantin Kapseal 100 mg capsule                           
           RxNorm: 956179                    3 Capsule(s) PO QHS               
     2015                    Inactive      
                              

 

                     Eliquis 5 mg tablet                                      
RxNorm: 8290927                    1 Tablet(s) PO BID                    No 
Start Date                                         Active                      
              

 

                     lisinopril 10 mg tablet                                   
   RxNorm: 454230                    1 Tablet(s) PO daily                    No 
Start Date                                         Active                      
              

 

                     folic acid 1 mg tablet                                    
  RxNorm: 716319                    1 Tablet(s) PO BID                    No 
Start Date                                         Active                      
              

 

                     Norvasc 5 mg tablet                                      
RxNorm: 494628                    1 Tablet(s) PO QAM                    No 
Start Date                                         Active                      
              

 

                     Lamictal  mg tablet,extended release                
                      RxNorm: 388285                    1 Tablet(s) PO QHS -
Started by Dr. Ugarte                    No Start Date                       
                  Active                                    

 

                     warfarin 5 mg tablet                                      
RxNorm: 026118                    m-f all other days takes 7.2 Tablet(s) PO    
                No Start Date                    2015                    
Inactive                                    

 

                     lisinopril 20 mg tablet                                   
   RxNorm: 752084                    1 Tablet(s) PO daily                    No 
Start Date                    10/17/2017                    Inactive           
                         

 

                     tramadol 50 mg tablet                                      
RxNorm: 324190                    1-2 Tablet(s) PO Q6 as needed                
    No Start Date                    11/15/2015                    Inactive    
                                

 

                     Soma 350 mg tablet                                      
RxNorm: 307493                    1 Tablet(s) PO as needed                    
No Start Date                    11/10/2015                    Inactive        
                            

 

                     famotidine 20 mg tablet                                   
   RxNorm: 897003                    1 Tablet(s) PO BID                    No 
Start Date                    2017                    Inactive           
                         

 

                     aspirin, buffered 81 mg tablet                            
          RxNorm: 998561                    1 Tablet(s) PO daily               
     No Start Date                    10/17/2017                    Inactive   
                                 

 

                     Xanax 1 mg tablet                                      
RxNorm: 056040                    Tablet(s) PO as needed                    No 
Start Date                    2015                    Inactive           
                         

 

                     metoprolol tartrate 25 mg tablet                          
            RxNorm: 650565                    1/2 in am a 1 in pm Tablet(s) PO 
BID                    No Start Date                    2015             
       Inactive                                    

 

                     Dilantin Kapseal 100 mg capsule                           
           RxNorm: 418657                    3 Capsule(s) PO daily             
       No Start Date                    2015                    Inactive 
                                   

 

                     lovastatin 20 mg tablet                                   
   RxNorm: 355743                    1 Tablet(s) PO daily                    No 
Start Date                    10/17/2017                    Inactive           
                         

 

                     Lexapro 20 mg tablet                                      
RxNorm: 576410                    1 Tablet(s) PO daily                    No 
Start Date                    2015                    Inactive           
                         

 

                     Percocet 5 mg-325 mg tablet                               
       RxNorm: 4220305                    1 Tablet(s) PO Q6 as needed for back 
pain                    No Start Date                    2017            
        Inactive                                    



                                                                               
                                                                               
                                                                               
                                                                               
                                                                               
                                                                               
                                                                               
                                                                               
                                                        



Medication Administered

                      





 Medication                    Codes                    Instructions           
         Start Date                    Status                

 

 Kenalog 40 mg/mL suspension for injection                    RxNorm: 5190379  
                  Milliliter                    2016                    
No longer Active                



                                                                              



Immunizations

                      





 Vaccine                    Codes                    Date                    
Status                

 

 Influenza                    CVX: 141                    10/18/2017           
         completed                

 

 Influenza                    CVX: 141                    2015           
         completed                

 

 Pneumococcal                    CVX: 33                    2015         
           completed                



                                                                               
                   



Assessments

                      





 Condition                    Codes                    Effective Dates         
       

 

 Other depressive episodes                    ICD-10: F32.8

ICD-9: 311                    10/18/2017                

 

 Other generalized epilepsy and epileptic syndromes, not intractable, without 
status epilepticus                    ICD-10: G40.409

ICD-9: 345.90                    10/18/2017                

 

 Low back pain                    ICD-10: M54.5

ICD-9: 724.2                    10/18/2017                

 

 Encounter for immunization                    ICD-10: Z23

ICD-9: V04.81                    10/18/2017                

 

 Essential (primary) hypertension                    ICD-10: I10

ICD-9: 401.1                    10/18/2017                

 

 Generalized anxiety disorder                    ICD-10: F41.1

ICD-9: 300.00                    10/18/2017                

 

 Pain in thoracic spine                    ICD-10: M54.6

ICD-9: 724.1                    2017                

 

 Encounter for screening mammogram for malignant neoplasm of breast            
        ICD-10: Z12.31

ICD-9: V76.12                    2017                

 

 Essential (primary) hypertension                    ICD-10: I10

ICD-9: 401.9                    2016                

 

 Restlessness and agitation                    ICD-10: R45.1

ICD-9: 307.9                    2016                

 

 Localized swelling, mass and lump, trunk                    ICD-10: R22.2

ICD-9: 786.6                    2016                

 

 Pleurisy                    ICD-10: R09.1

ICD-9: 511.0                    10/26/2016                

 

 Cough                    ICD-10: R05

ICD-9: 786.2                    10/26/2016                

 

 Radiculopathy, cervicothoracic region                    ICD-10: M54.13

ICD-9: 723.4                    2016                

 

 Cervicalgia                    ICD-10: M54.2

ICD-9: 723.1                    2016                

 

 Rash and other nonspecific skin eruption                    ICD-10: R21

ICD-9: 782.1                    2016                

 

 Long term (current) use of antithrombotics/antiplatelets                    ICD
-10: Z79.02

ICD-9: V58.63                    2016                

 

 Gross hematuria                    ICD-10: R31.0

ICD-9: 599.71                    2016                

 

 Generalized abdominal pain                    ICD-10: R10.84

ICD-9: 789.07                    2016                

 

 Urinary tract infection, site not specified                    ICD-10: N39.0

ICD-9: 599.0                    2016                

 

 Plantar fasciitis of right foot                    ICD-9: 728.71              
      2015                

 

 Right knee pain                    ICD-9: 719.46                    2015
                

 

 Lumbar spine pain                    ICD-9: 724.2                    2015                

 

 Seizure disorder                    ICD-9: 345.90                    2015                

 

 Anxiety                    ICD-9: 300.00                    2015        
        

 

 Factor 5 Leiden mutation, heterozygous                    ICD-9: 289.81       
             2015                

 

 ESSENTIAL HYPERTENSION                    ICD-9: 401.9                                    

 

 Depression                    ICD-9: 311                    2015        
        



                                                                    



Reason For Visit

                      





 Reason For Visit                    Effective Dates                    Notes  
              

 

 back pain                    10/18/2017                    thoracic pain that 
wraps around to rib pain.                

 

 back pain                    2017                    thoracic pain that 
wraps around to rib pain.                 

 

 medication follow up                    2017                            
        

 

 back pain                    2016                                    

 

 anxiety                    2016                                    

 

 Hospital Follow Up                    2016                              
      

 

 chest pain/pressure                    10/26/2016                             
       

 

 hypertension                    2016                                    

 

 Hospital Follow Up                    2016                              
      

 

 back pain                    2016                                    

 

 pelvic pain                    2016                                    

 

 pelvic pain                    2016                                    

 

 back pain                    2015                                    

 

 back pain                    2015                                    

 

 foot pain                    2015                                    

 

 depression                    2015                    she uses xanax 
prn.                 



                                                                              



Results

                      





 Observation                    Observation Code                    Item       
             Item Code                    Result                    Date       
         

 

 Urine Culture                    Ucult                    Preliminary         
                               No Growth Day 1                     2016  
              

 

 Urine Culture                    Ucult                    Complete            
                            No Growth Day 2                     2016     
           

 

 Dilantin                    Ord7                    DILANTIN                  
                      6.9 UG/ML                    2016                

 

 Cbc With Differential                    Ord2                    WBC          
                              8.06 K/ul                    2016          
      

 

 Cbc With Differential                    Ord2                    RBC          
                              4.69 M/ul                    2016          
      

 

 Cbc With Differential                    Ord2                    HGB          
                              15.6 g/dl                    2016          
      

 

 Cbc With Differential                    Ord2                    HCT          
                              44.2 %                    2016             
   

 

 Cbc With Differential                    Ord2                    Neut%        
                                58.7 %                    2016           
     

 

 Cbc With Differential                    Ord2                    Lymph%       
                                 30.0 %                    2016          
      

 

 Cbc With Differential                    Ord2                    MCV          
                              94.2 fl                    2016            
    

 

 Cbc With Differential                    Ord2                    MCH          
                              33.3 pg                    2016            
    

 

 Cbc With Differential                    Ord2                    Mono%        
                                7.8 %                    2016            
    

 

 Cbc With Differential                    Ord2                    Eos%         
                               3.3 %                    2016             
   

 

 Cbc With Differential                    Ord2                    MCHC         
                               35.3 pg                    2016           
     

 

 Cbc With Differential                    Ord2                    PLT          
                              292 K/ul                    2016           
     

 

 Cbc With Differential                    Ord2                    Baso%        
                                0.2 %                    2016            
    

 

 Cbc With Differential                    Ord2                    RDW          
                              12.8 %                    2016             
   

 

 Cbc With Differential                    Ord2                    Neut ABS#    
                                    4.72 K/ul                    2016    
            

 

 Cbc With Differential                    Ord2                    Lymph ABS#   
                                     2.42 K/ul                    2016   
             

 

 Cbc With Differential                    Ord2                    Mono ABS#    
                                    0.6 K/ul                    2016     
           

 

 Cbc With Differential                    Ord2                    Eos ABS#     
                                   0.3 K/ul                    2016      
          

 

 Cbc With Differential                    Ord2                    Baso ABS#    
                                    0.0 K/ul                    2016     
           

 

 Cbc With Differential                    Ord2                    New Analyzer 
Notice                                        Please note new ref ranges 
starting 2016 due to implemntation of new five part differential hematolgy 
analyzer.                     2016                

 

 Pt                    [id number]                    PT                           
             26.0 seconds                    2016                

 

 Pt                    [id number]                    INR                          
              2.5                     2016                

 

 Pt                    [id number]                    Low Intensity                
                        - 1.5-2.0                    2016                

 

 Pt                    [id number]                    Mod intensity                
                        - 2.0-3.0                    2016                

 

 Pt                    [id number]                    Hi intensity                 
                       - 3.0-4.0                    2016                

 

 Comp Metabolic                    Axf066                    NA                
                        136 mEq/L                    2016                

 

 Comp Metabolic                    Qxb153                    K                 
                       4.1 mEq/L                    2016                

 

 Comp Metabolic                    Jbu346                    CL                
                        103 mEq/L                    2016                

 

 Comp Metabolic                    Yjg860                    CO2               
                         23.0 mEq/L                    2016              
  

 

 Comp Metabolic                    Pgn799                    ANION GAP         
                               14                     2016                

 

 Comp Metabolic                    Ykj578                    GLUCOSE           
                             88 mg/dL                    2016            
    

 

 Comp Metabolic                    Qei391                    Creat             
                           0.6 mg/dL                    2016             
   

 

 Comp Metabolic                    Zgi160                    eGFR              
                          114 ml/min/1.73m2                    2016      
          

 

 Comp Metabolic                    Pjj935                    BUN               
                         10 mg/dL                    2016                

 

 Comp Metabolic                    Lsn449                    B/C Ratio         
                               16.1 Ratio                    2016        
        

 

 Comp Metabolic                    Fpv640                    CALCIUM           
                             8.7 mg/dL                    2016           
     

 

 Comp Metabolic                    Wfr281                    ALK PHOS          
                              98 U/L                    2016             
   

 

 Comp Metabolic                    Jpa588                    AST(SGOT)         
                               20 U/L                    2016            
    

 

 Comp Metabolic                    Wpa868                    ALT(SGPT)         
                               21 U/L                    2016            
    

 

 Comp Metabolic                    Wdo838                    BILI T            
                            0.5 mg/dL                    2016            
    

 

 Comp Metabolic                    Gyg531                    ALBUMIN           
                             4.1 g/dL                    2016            
    

 

 Comp Metabolic                    Qfu063                    TPRO              
                          7.0 g/dL                    2016                

 

 Comp Metabolic                    Rxn664                    GLOB              
                          2.9 g/dL                    2016                

 

 Comp Metabolic                    Gyi593                    A/G Ratio         
                               1.4 Ratio                    2016         
       

 

 Comp Metabolic                    Wlt093                    Osmo              
                          270 mOsmo                    2016              
  



                                                                               
                                       



Review of Systems

                      





 System                    Result                    Effective Dates           
     

 

 Constitutional                    No recent illness                    10/18/
2017                

 

 Constitutional                    No anorexia                    10/18/2017   
             

 

 Constitutional                    No night sweats                    10/18/
2017                

 

 Constitutional                    No chills                    10/18/2017     
           

 

 Constitutional                    No diaphoresis                    10/18/2017
                

 

 Constitutional                    No fatigue                    10/18/2017    
            

 

 Constitutional                    No fever                    10/18/2017      
          

 

 Constitutional                    No insomnia                    10/18/2017   
             

 

 Constitutional                    No malaise                    10/18/2017    
            

 

 Eyes                    No eye discharge                    10/18/2017        
        

 

 Eyes                    No eye erythema                    10/18/2017         
       

 

 Ears/Nose/Throat/Neck                    No dizziness                    10/18/
2017                

 

 Ears/Nose/Throat/Neck                    No headache                    10/18/
2017                

 

 Cardiovascular                    No chest pain/pressure                    10/
                

 

 Respiratory                    No cough                    10/18/2017         
       

 

 Gastrointestinal                    No abdominal pain                    10/18/
2017                

 

 Gastrointestinal                    No constipation                    10/18/
2017                

 

 Gastrointestinal                    No diarrhea                    10/18/2017 
               

 

 Genitourinary/Nephrology                    No dysuria                    10/18
/2017                

 

 Musculoskeletal                    back pain                    10/18/2017    
            

 

 Dermatologic                    No rash                    10/18/2017         
       

 

 Neurologic                    No alteration of consciousness                  
  10/18/2017                

 

 Neurologic                    neck pain                    10/18/2017         
       

 

 Neurologic                    paresthesia                    10/18/2017       
         

 

 Psychiatric                    No anxiety                    10/18/2017       
         

 

 Psychiatric                    No depression                    10/18/2017    
            

 

 Constitutional                    recent illness                    2017
                

 

 Constitutional                    No anorexia                    2017   
             

 

 Constitutional                    No night sweats                    2017                

 

 Constitutional                    No chills                    2017     
           

 

 Constitutional                    No diaphoresis                    2017
                

 

 Constitutional                    fatigue                    2017       
         

 

 Constitutional                    No fever                    2017      
          

 

 Constitutional                    No insomnia                    2017   
             

 

 Constitutional                    No malaise                    2017    
            

 

 Eyes                    No eye discharge                    2017        
        

 

 Eyes                    No eye erythema                    2017         
       

 

 Ears/Nose/Throat/Neck                    No dizziness                    2017                

 

 Ears/Nose/Throat/Neck                    No headache                    2017                

 

 Cardiovascular                    No chest pain/pressure                                    

 

 Respiratory                    cough                    2017            
    

 

 Gastrointestinal                    No abdominal pain                    2017                

 

 Gastrointestinal                    No constipation                    2017                

 

 Gastrointestinal                    No diarrhea                    2017 
               

 

 Genitourinary/Nephrology                    No dysuria                                    

 

 Musculoskeletal                    back pain                    2017    
            

 

 Musculoskeletal                    neck pain                    2017    
            

 

 Dermatologic                    No rash                    2017         
       

 

 Neurologic                    No alteration of consciousness                  
  2017                

 

 Neurologic                    neck pain                    2017         
       

 

 Psychiatric                    anxiety                    2017          
      

 

 Psychiatric                    depression                    2017       
         

 

 Psychiatric                    disturbances of emotion                                    

 

 Constitutional                    recent illness                    2017
                

 

 Constitutional                    No anorexia                    2017   
             

 

 Constitutional                    No night sweats                    2017                

 

 Constitutional                    No chills                    2017     
           

 

 Constitutional                    No diaphoresis                    2017
                

 

 Constitutional                    fatigue                    2017       
         

 

 Constitutional                    No fever                    2017      
          

 

 Constitutional                    No insomnia                    2017   
             

 

 Constitutional                    No malaise                    2017    
            

 

 Eyes                    No eye discharge                    2017        
        

 

 Eyes                    No eye erythema                    2017         
       

 

 Ears/Nose/Throat/Neck                    No dizziness                    2017                

 

 Ears/Nose/Throat/Neck                    No headache                    2017                

 

 Cardiovascular                    No chest pain/pressure                    2017                

 

 Respiratory                    cough                    2017            
    

 

 Gastrointestinal                    No abdominal pain                    2017                

 

 Gastrointestinal                    No constipation                    2017                

 

 Gastrointestinal                    No diarrhea                    2017 
               

 

 Genitourinary/Nephrology                    No dysuria                                    

 

 Musculoskeletal                    back pain                    2017    
            

 

 Musculoskeletal                    neck pain                    2017    
            

 

 Dermatologic                    No rash                    2017         
       

 

 Neurologic                    No alteration of consciousness                  
  2017                

 

 Neurologic                    neck pain                    2017         
       

 

 Psychiatric                    anxiety                    2017          
      

 

 Psychiatric                    depression                    2017       
         

 

 Psychiatric                    disturbances of emotion                                    

 

 Constitutional                    recent illness                    2016
                

 

 Constitutional                    No anorexia                    2016   
             

 

 Constitutional                    No night sweats                    2016                

 

 Constitutional                    No chills                    2016     
           

 

 Constitutional                    No diaphoresis                    2016
                

 

 Constitutional                    fatigue                    2016       
         

 

 Constitutional                    No fever                    2016      
          

 

 Constitutional                    No insomnia                    2016   
             

 

 Constitutional                    No malaise                    2016    
            

 

 Eyes                    No eye discharge                    2016        
        

 

 Eyes                    No eye erythema                    2016         
       

 

 Ears/Nose/Throat/Neck                    No dizziness                    2016                

 

 Ears/Nose/Throat/Neck                    No headache                    2016                

 

 Cardiovascular                    No chest pain/pressure                                    

 

 Respiratory                    cough                    2016            
    

 

 Gastrointestinal                    No abdominal pain                    2016                

 

 Gastrointestinal                    No constipation                    2016                

 

 Gastrointestinal                    No diarrhea                    2016 
               

 

 Genitourinary/Nephrology                    No dysuria                                    

 

 Musculoskeletal                    back pain                    2016    
            

 

 Musculoskeletal                    neck pain                    2016    
            

 

 Dermatologic                    No rash                    2016         
       

 

 Neurologic                    No alteration of consciousness                  
  2016                

 

 Neurologic                    neck pain                    2016         
       

 

 Psychiatric                    anxiety                    2016          
      

 

 Psychiatric                    depression                    2016       
         

 

 Psychiatric                    disturbances of emotion                                    

 

 Constitutional                    recent illness                    2016
                

 

 Eyes                    No eye erythema                    2016         
       

 

 Respiratory                    No cough                    2016         
       

 

 Psychiatric                    anxiety                    2016          
      

 

 Psychiatric                    depression                    2016       
         

 

 Psychiatric                    psychosis                    2016        
        

 

 Psychiatric                    disturbances of thinking                                    

 

 Psychiatric                    disturbances of emotion                                    

 

 Constitutional                    recent illness                    2016
                

 

 Constitutional                    No anorexia                    2016   
             

 

 Constitutional                    No night sweats                    2016                

 

 Constitutional                    No chills                    2016     
           

 

 Constitutional                    No diaphoresis                    2016
                

 

 Constitutional                    fatigue                    2016       
         

 

 Constitutional                    No fever                    2016      
          

 

 Constitutional                    No insomnia                    2016   
             

 

 Constitutional                    No malaise                    2016    
            

 

 Eyes                    No eye discharge                    2016        
        

 

 Eyes                    No eye erythema                    2016         
       

 

 Ears/Nose/Throat/Neck                    No dizziness                    2016                

 

 Ears/Nose/Throat/Neck                    No headache                    2016                

 

 Cardiovascular                    No chest pain/pressure                    2016                

 

 Respiratory                    cough                    2016            
    

 

 Gastrointestinal                    No abdominal pain                    2016                

 

 Gastrointestinal                    No constipation                    2016                

 

 Gastrointestinal                    No diarrhea                    2016 
               

 

 Genitourinary/Nephrology                    No dysuria                                    

 

 Musculoskeletal                    back pain                    2016    
            

 

 Musculoskeletal                    neck pain                    2016    
            

 

 Dermatologic                    No rash                    2016         
       

 

 Neurologic                    No alteration of consciousness                  
  2016                

 

 Neurologic                    neck pain                    2016         
       

 

 Psychiatric                    No anxiety                    2016       
         

 

 Psychiatric                    No depression                    2016    
            

 

 Constitutional                    recent illness                    10/26/2016
                

 

 Eyes                    No eye discharge                    10/26/2016        
        

 

 Eyes                    No eye erythema                    10/26/2016         
       

 

 Cardiovascular                    chest pain/pressure                    10/26/
2016                

 

 Respiratory                    cough                    10/26/2016            
    

 

 Neurologic                    No alteration of consciousness                  
  10/26/2016                

 

 Neurologic                    No mental status change                    10/26/
2016                

 

 Ears/Nose/Throat/Neck                    No nasal discharge                    
10/26/2016                

 

 Respiratory                    chest tightness                    10/26/2016  
              

 

 Constitutional                    No recent illness                    2016                

 

 Constitutional                    No anorexia                    2016   
             

 

 Constitutional                    No night sweats                    2016                

 

 Constitutional                    No chills                    2016     
           

 

 Constitutional                    No diaphoresis                    2016
                

 

 Constitutional                    No fatigue                    2016    
            

 

 Constitutional                    No fever                    2016      
          

 

 Constitutional                    No insomnia                    2016   
             

 

 Constitutional                    No malaise                    2016    
            

 

 Eyes                    No eye discharge                    2016        
        

 

 Eyes                    No eye erythema                    2016         
       

 

 Ears/Nose/Throat/Neck                    No dizziness                    2016                

 

 Ears/Nose/Throat/Neck                    No headache                    2016                

 

 Cardiovascular                    No chest pain/pressure                                    

 

 Respiratory                    No cough                    2016         
       

 

 Gastrointestinal                    No abdominal pain                    2016                

 

 Gastrointestinal                    No constipation                    2016                

 

 Gastrointestinal                    No diarrhea                    2016 
               

 

 Genitourinary/Nephrology                    No dysuria                                    

 

 Musculoskeletal                    back pain                    2016    
            

 

 Musculoskeletal                    neck pain                    2016    
            

 

 Dermatologic                    No rash                    2016         
       

 

 Neurologic                    No alteration of consciousness                  
  2016                

 

 Neurologic                    neck pain                    2016         
       

 

 Psychiatric                    No anxiety                    2016       
         

 

 Psychiatric                    No depression                    2016    
            

 

 Constitutional                    No fever                    2016      
          

 

 Eyes                    No eye discharge                    2016        
        

 

 Eyes                    No eye erythema                    2016         
       

 

 Cardiovascular                    No chest pain/pressure                                    

 

 Respiratory                    No cough                    2016         
       

 

 Neurologic                    No alteration of consciousness                  
  2016                

 

 Psychiatric                    No anxiety                    2016       
         

 

 Psychiatric                    No depression                    2016    
            

 

 Constitutional                    No recent illness                    2016                

 

 Ears/Nose/Throat/Neck                    No nasal allergies                    
2016                

 

 Ears/Nose/Throat/Neck                    No nasal discharge                    
2016                

 

 Cardiovascular                    No dyspnea                    2016    
            

 

 Respiratory                    No dyspnea                    2016       
         

 

 Dermatologic                    No rash                    2016         
       

 

 Neurologic                    No mental status change                    2016                

 

 Constitutional                    No recent illness                    2016                

 

 Constitutional                    No anorexia                    2016   
             

 

 Constitutional                    No night sweats                    2016                

 

 Constitutional                    No chills                    2016     
           

 

 Constitutional                    No diaphoresis                    2016
                

 

 Constitutional                    No fatigue                    2016    
            

 

 Constitutional                    No fever                    2016      
          

 

 Constitutional                    No insomnia                    2016   
             

 

 Constitutional                    No malaise                    2016    
            

 

 Eyes                    No eye discharge                    2016        
        

 

 Eyes                    No eye erythema                    2016         
       

 

 Ears/Nose/Throat/Neck                    No dizziness                    2016                

 

 Ears/Nose/Throat/Neck                    No headache                    2016                

 

 Cardiovascular                    No chest pain/pressure                                    

 

 Respiratory                    No cough                    2016         
       

 

 Gastrointestinal                    No abdominal pain                    2016                

 

 Gastrointestinal                    No constipation                    2016                

 

 Gastrointestinal                    No diarrhea                    2016 
               

 

 Genitourinary/Nephrology                    No dysuria                                    

 

 Genitourinary/Nephrology                    pelvic pain                                    

 

 Dermatologic                    No rash                    2016         
       

 

 Neurologic                    No alteration of consciousness                  
  2016                

 

 Neurologic                    neck pain                    2016         
       

 

 Neurologic                    paresthesia                    2016       
         

 

 Psychiatric                    No anxiety                    2016       
         

 

 Psychiatric                    No depression                    2016    
            

 

 Musculoskeletal                    back pain                    2016    
            

 

 Constitutional                    No recent illness                    2016                

 

 Constitutional                    No anorexia                    2016   
             

 

 Constitutional                    No night sweats                    2016                

 

 Constitutional                    No chills                    2016     
           

 

 Constitutional                    No diaphoresis                    2016
                

 

 Constitutional                    No fatigue                    2016    
            

 

 Constitutional                    No fever                    2016      
          

 

 Constitutional                    No insomnia                    2016   
             

 

 Constitutional                    No malaise                    2016    
            

 

 Eyes                    No eye discharge                    2016        
        

 

 Eyes                    No eye erythema                    2016         
       

 

 Ears/Nose/Throat/Neck                    No dizziness                    2016                

 

 Ears/Nose/Throat/Neck                    No headache                    2016                

 

 Cardiovascular                    No chest pain/pressure                                    

 

 Respiratory                    No cough                    2016         
       

 

 Gastrointestinal                    No abdominal pain                    2016                

 

 Gastrointestinal                    No constipation                    2016                

 

 Gastrointestinal                    No diarrhea                    2016 
               

 

 Genitourinary/Nephrology                    No dysuria                                    

 

 Musculoskeletal                    back pain                    2016    
            

 

 Musculoskeletal                    neck pain                    2016    
            

 

 Dermatologic                    No rash                    2016         
       

 

 Neurologic                    No alteration of consciousness                  
  2016                

 

 Neurologic                    neck pain                    2016         
       

 

 Neurologic                    paresthesia                    2016       
         

 

 Psychiatric                    No anxiety                    2016       
         

 

 Psychiatric                    No depression                    2016    
            

 

 Genitourinary/Nephrology                    pelvic pain                                    

 

 Genitourinary/Nephrology                    No urinary frequency              
      2016                

 

 Genitourinary/Nephrology                    No urinary incontinence           
         2016                

 

 Genitourinary/Nephrology                    No urinary retention/hesitancy    
                2016                

 

 Genitourinary/Nephrology                    No urinary urgency                
    2016                

 

 Genitourinary/Nephrology                    dysuria                    2016                

 

 Genitourinary/Nephrology                    hematuria                    2016                

 

 Constitutional                    No recent illness                    2016                

 

 Constitutional                    No anorexia                    2016   
             

 

 Constitutional                    No night sweats                    2016                

 

 Constitutional                    No chills                    2016     
           

 

 Constitutional                    No diaphoresis                    2016
                

 

 Constitutional                    fatigue                    2016       
         

 

 Constitutional                    No fever                    2016      
          

 

 Constitutional                    No insomnia                    2016   
             

 

 Constitutional                    No malaise                    2016    
            

 

 Constitutional                    No weight loss                    2016
                

 

 Constitutional                    No weight gain                    2016
                

 

 Constitutional                    No obesity                    2016    
            

 

 Gastrointestinal                    nausea                    2016      
          

 

 Gastrointestinal                    vomiting                    2016    
            

 

 Cardiovascular                    near-syncope/dizziness                                    

 

 Gastrointestinal                    constipation                    2016
                

 

 Gastrointestinal                    No diarrhea                    2016 
               

 

 Respiratory                    No dyspnea                    2016       
         

 

 Respiratory                    No dyspnea on exertion                    2016                

 

 Respiratory                    No daytime hypersomnolence                                    

 

 Respiratory                    No cough                    2016         
       

 

 Ears/Nose/Throat/Neck                    headache                    2016                

 

 Dermatologic                    rash                    2016            
    

 

 Dermatologic                    No sores                    2016        
        

 

 Psychiatric                    anxiety                    2016          
      

 

 Psychiatric                    depression                    2016       
         

 

 Eyes                    No vision change                    2016        
        

 

 Musculoskeletal                    myalgias                    2016     
           

 

 Musculoskeletal                    No muscle weakness                    2016                

 

 Musculoskeletal                    No joint complaint                    2016                

 

 Constitutional                    No recent illness                    2015                

 

 Constitutional                    No anorexia                    2015   
             

 

 Constitutional                    No night sweats                    2015                

 

 Constitutional                    No chills                    2015     
           

 

 Constitutional                    No diaphoresis                    2015
                

 

 Constitutional                    No fatigue                    2015    
            

 

 Constitutional                    No fever                    2015      
          

 

 Constitutional                    No insomnia                    2015   
             

 

 Constitutional                    No malaise                    2015    
            

 

 Eyes                    No eye discharge                    2015        
        

 

 Eyes                    No eye erythema                    2015         
       

 

 Ears/Nose/Throat/Neck                    No dizziness                    2015                

 

 Ears/Nose/Throat/Neck                    No headache                    2015                

 

 Cardiovascular                    No chest pain/pressure                    2015                

 

 Respiratory                    No cough                    2015         
       

 

 Gastrointestinal                    No abdominal pain                    2015                

 

 Gastrointestinal                    No constipation                    2015                

 

 Gastrointestinal                    No diarrhea                    2015 
               

 

 Genitourinary/Nephrology                    No dysuria                                    

 

 Musculoskeletal                    back pain                    2015    
            

 

 Musculoskeletal                    neck pain                    2015    
            

 

 Dermatologic                    No rash                    2015         
       

 

 Neurologic                    No alteration of consciousness                  
  2015                

 

 Neurologic                    neck pain                    2015         
       

 

 Neurologic                    paresthesia                    2015       
         

 

 Dermatologic                    No rash                    2015         
       

 

 Neurologic                    No alteration of consciousness                  
  2015                

 

 Neurologic                    neck pain                    2015         
       

 

 Neurologic                    paresthesia                    2015       
         

 

 Musculoskeletal                    back pain                    2015    
            

 

 Musculoskeletal                    neck pain                    2015    
            

 

 Constitutional                    No recent illness                    2015                

 

 Constitutional                    No anorexia                    2015   
             

 

 Constitutional                    No night sweats                    2015                

 

 Constitutional                    No chills                    2015     
           

 

 Constitutional                    No diaphoresis                    2015
                

 

 Constitutional                    No fatigue                    2015    
            

 

 Constitutional                    No fever                    2015      
          

 

 Constitutional                    No insomnia                    2015   
             

 

 Constitutional                    No malaise                    2015    
            

 

 Constitutional                    No weight loss                    2015
                

 

 Constitutional                    No weight gain                    2015
                

 

 Eyes                    No eye discharge                    2015        
        

 

 Eyes                    No eye erythema                    2015         
       

 

 Ears/Nose/Throat/Neck                    No dizziness                    2015                

 

 Ears/Nose/Throat/Neck                    No headache                    2015                

 

 Cardiovascular                    No chest pain/pressure                    2015                

 

 Respiratory                    No cough                    2015         
       

 

 Gastrointestinal                    No abdominal pain                    2015                

 

 Gastrointestinal                    No constipation                    2015                

 

 Gastrointestinal                    No diarrhea                    2015 
               

 

 Genitourinary/Nephrology                    No dysuria                                    

 

 Constitutional                    No recent illness                    2015                

 

 Constitutional                    No anorexia                    2015   
             

 

 Constitutional                    No night sweats                    2015                

 

 Constitutional                    No chills                    2015     
           

 

 Constitutional                    No diaphoresis                    2015
                

 

 Constitutional                    No fatigue                    2015    
            

 

 Constitutional                    No fever                    2015      
          

 

 Constitutional                    No insomnia                    2015   
             

 

 Constitutional                    No malaise                    2015    
            

 

 Constitutional                    No weight loss                    2015
                

 

 Constitutional                    No weight gain                    2015
                

 

 Constitutional                    No recent illness                    2015                

 

 Constitutional                    No anorexia                    2015   
             

 

 Constitutional                    No night sweats                    2015                

 

 Constitutional                    No chills                    2015     
           

 

 Constitutional                    No weight gain                    2015
                

 

 Constitutional                    No weight loss                    2015
                

 

 Constitutional                    No malaise                    2015    
            

 

 Constitutional                    No insomnia                    2015   
             

 

 Constitutional                    No fever                    2015      
          

 

 Constitutional                    fatigue                    2015       
         

 

 Constitutional                    No diaphoresis                    2015
                

 

 Eyes                    No eye discharge                    2015        
        

 

 Eyes                    No eye erythema                    2015         
       

 

 Ears/Nose/Throat/Neck                    dizziness                    2015                

 

 Ears/Nose/Throat/Neck                    headache                    2015                

 

 Ears/Nose/Throat/Neck                    No nasal discharge                    
2015                

 

 Cardiovascular                    No chest pain/pressure                    2015                

 

 Cardiovascular                    No edema                    2015      
          

 

 Cardiovascular                    No dyspnea                    2015    
            

 

 Respiratory                    No chest congestion                    2015                

 

 Respiratory                    No cough                    2015         
       

 

 Gastrointestinal                    No abdominal pain                    2015                

 

 Gastrointestinal                    No constipation                    2015                

 

 Gastrointestinal                    No diarrhea                    2015 
               

 

 Gastrointestinal                    No vomiting                    2015 
               

 

 Gastrointestinal                    No nausea                    2015   
             

 

 Genitourinary/Nephrology                    No dysuria                                    

 

 Genitourinary/Nephrology                    pelvic pain                    2015                

 

 Musculoskeletal                    No joint complaint                    2015                

 

 Dermatologic                    No rash                    2015         
       

 

 Neurologic                    No alteration of consciousness                  
  2015                

 

 Neurologic                    seizure                    2015           
     

 

 Psychiatric                    anxiety                    2015          
      

 

 Psychiatric                    depression                    2015       
         

 

 Endocrine                    No dry or coarse skin                    2015                

 

 Hematologic/Lymphatic                    abnormal bleeding and bruising       
             2015                



                                                                    



Physical Exam

                      





 Exam Name                    System Name                    Item Name         
           Status                    Result                    Effective Dates 
                   Notes                

 

 Full Exam - General                     Constitutional                     
general appearance                    Development:                    well 
developed                    10/18/2017                    None                

 

 Full Exam - General                     Constitutional                     
general appearance                    Development:                    appears 
stated age                    10/18/2017                    None                

 

 Full Exam - General                     Constitutional                     
general appearance                    Overall:                    well 
developed                    10/18/2017                    None                

 

 Full Exam - General                     Constitutional                     
general appearance                    Overall:                    in no acute 
distress                    10/18/2017                    None                

 

 Full Exam - General                     Constitutional                     
general appearance                    Overall:                    well 
nourished                    10/18/2017                    None                

 

 Full Exam - General                     Constitutional                     
general appearance                    Hygiene/Attention to Grooming:           
         good hygiene                    10/18/2017                    None    
            

 

 Full Exam - General                     Eyes                    conjunctiva
/eyelids                    Overall:                    conjunctiva clear      
              10/18/2017                    None                

 

 Full Exam - General                     Eyes                    conjunctiva
/eyelids                    Overall:                    cornea clear           
         10/18/2017                    None                

 

 Full Exam - General                     Eyes                    conjunctiva
/eyelids                    Overall:                    eyelids normal         
           10/18/2017                    None                

 

 Full Exam - General                     Eyes                    pupils and 
irises                    Overall:                    pupils equal, round, 
reactive to light and accomodation                    10/18/2017               
     None                

 

 Full Exam - General                     Ears/Nose/Throat                  
  otoscopic exam                    Overall:                    external 
auditory canals clear                    10/18/2017                    None    
            

 

 Full Exam - General                     Ears/Nose/Throat                  
  otoscopic exam                    Overall:                    tympanic 
membranes clear                    10/18/2017                    None          
      

 

 Full Exam - General                     Ears/Nose/Throat                  
  lips/teeth/gingiva                    Overall:                    benign lips
                    10/18/2017                    None                

 

 Full Exam - General                     Ears/Nose/Throat                  
  lips/teeth/gingiva                    Overall:                    normal 
dentition                    10/18/2017                    None                

 

 Full Exam - General                     Ears/Nose/Throat                  
  oral cavity/pharynx/larynx                     Overall:                    
oral mucosa clear                    10/18/2017                    None        
        

 

 Full Exam - General                     Ears/Nose/Throat                  
  oral cavity/pharynx/larynx                     Overall:                    
oropharyngeal mucosa clear                    10/18/2017                    
None                

 

 Full Exam - General                     Ears/Nose/Throat                  
  oral cavity/pharynx/larynx                     Overall:                    
hypopharynx benign                    10/18/2017                    None       
         

 

 Full Exam - General                     Ears/Nose/Throat                  
  oral cavity/pharynx/larynx                     Overall:                    no 
masses                    10/18/2017                    None                

 

 Full Exam - General                     Respiratory                    
auscultation                    Overall:                    breath sounds clear 
bilaterally                    10/18/2017                    None              
  

 

 Full Exam - General                     Respiratory                    
respiratory effort/rhythm                    Overall:                    no 
retractions                    10/18/2017                    None              
  

 

 Full Exam - General                     Respiratory                    
respiratory effort/rhythm                    Overall:                    normal 
rate                    10/18/2017                    None                

 

 Full Exam - General                     Cardiovascular                    
extremities                    Overall:                    no clubbing         
           10/18/2017                    None                

 

 Full Exam - General                     Cardiovascular                    
auscultation of heart                    Overall:                    regular 
rate                    10/18/2017                    None                

 

 Full Exam - General                     Cardiovascular                    
auscultation of heart                    Overall:                    normal 
heart sounds                    10/18/2017                    None             
   

 

 Full Exam - General                     Abdomen                    
abdominal exam                    Overall:                    no tenderness    
                10/18/2017                    None                

 

 Full Exam - General                     Abdomen                    
abdominal exam                    Overall:                    normal bowel 
sounds                    10/18/2017                    None                

 

 Full Exam - General                     Musculoskeletal                    
lower extremity                    Palpation -  knee:                    no 
effusion                    10/18/2017                    None                

 

 Full Exam - General                     Musculoskeletal                    
lower extremity                    Inspection -  lower leg:                    
normal appearance                    10/18/2017                    None        
        

 

 Full Exam - General                     Musculoskeletal                    
lower extremity                    Palpation -  lower leg:                    
normal on palpation                    10/18/2017                    None      
          

 

 Full Exam - General                     Musculoskeletal                    
spine, ribs and pelvis                    Posture:                    kyphosis 
                   10/18/2017                    None                

 

 Full Exam - General                     Musculoskeletal                    
spine, ribs and pelvis                    Spine:                    tender @ 
cervical spine                    10/18/2017                    None           
     

 

 Full Exam - General                     Musculoskeletal                    
gait and station                    Overall:                    normal gait    
                10/18/2017                    None                

 

 Full Exam - General                     Musculoskeletal                    
gait and station                    Overall:                    normal station 
                   10/18/2017                    None                

 

 Full Exam - General                     Integument                    
inspection of skin                    Overall:                    few scattered 
moles, no gross abnormalities                    10/18/2017                    
None                

 

 Full Exam - General                     Neurologic                    deep 
tendon reflexes                    Overall:                    deep tendon 
reflexes intact                    10/18/2017                    None          
      

 

 Full Exam - General                     Neurologic                    
cranial nerves                    Overall:                    crainial nerves 2 
- 12 grossly intact                    10/18/2017                    None      
          

 

 Full Exam - General                     Psychiatric                    
orientation/consciousness                    Overall:                    
oriented to person, place and time                    10/18/2017               
     None                

 

 Full Exam - General                     Psychiatric                    
mood and affect                    Overall:                    normal mood and 
affect                    10/18/2017                    None                

 

 Full Exam - General                     Constitutional                     
general appearance                    Development:                    well 
developed                    2017                    None                

 

 Full Exam - General                     Constitutional                     
general appearance                    Development:                    appears 
stated age                    2017                    None                

 

 Full Exam - General                     Constitutional                     
general appearance                    Overall:                    well 
developed                    2017                    None                

 

 Full Exam - General                     Constitutional                     
general appearance                    Overall:                    in no acute 
distress                    2017                    None                

 

 Full Exam - General                     Constitutional                     
general appearance                    Overall:                    well 
nourished                    2017                    None                

 

 Full Exam - General                     Constitutional                     
general appearance                    Hygiene/Attention to Grooming:           
         good hygiene                    2017                    None    
            

 

 Full Exam - General                     Eyes                    conjunctiva
/eyelids                    Overall:                    conjunctiva clear      
              2017                    None                

 

 Full Exam - General                     Eyes                    conjunctiva
/eyelids                    Overall:                    cornea clear           
         2017                    None                

 

 Full Exam - General                     Eyes                    conjunctiva
/eyelids                    Overall:                    eyelids normal         
           2017                    None                

 

 Full Exam - General                     Eyes                    pupils and 
irises                    Overall:                    pupils equal, round, 
reactive to light and accomodation                    2017               
     None                

 

 Full Exam - General                     Ears/Nose/Throat                  
  otoscopic exam                    Overall:                    external 
auditory canals clear                    2017                    None    
            

 

 Full Exam - General                     Ears/Nose/Throat                  
  otoscopic exam                    Overall:                    tympanic 
membranes clear                    2017                    None          
      

 

 Full Exam - General                     Ears/Nose/Throat                  
  lips/teeth/gingiva                    Overall:                    benign lips
                    2017                    None                

 

 Full Exam - General                     Ears/Nose/Throat                  
  lips/teeth/gingiva                    Overall:                    normal 
dentition                    2017                    None                

 

 Full Exam - General                     Ears/Nose/Throat                  
  oral cavity/pharynx/larynx                     Overall:                    
oral mucosa clear                    2017                    None        
        

 

 Full Exam - General                     Ears/Nose/Throat                  
  oral cavity/pharynx/larynx                     Overall:                    
oropharyngeal mucosa clear                    2017                    
None                

 

 Full Exam - General                     Ears/Nose/Throat                  
  oral cavity/pharynx/larynx                     Overall:                    
hypopharynx benign                    2017                    None       
         

 

 Full Exam - General                     Ears/Nose/Throat                  
  oral cavity/pharynx/larynx                     Overall:                    no 
masses                    2017                    None                

 

 Full Exam - General                     Respiratory                    
auscultation                    Overall:                    breath sounds clear 
bilaterally                    2017                    None              
  

 

 Full Exam - General                     Respiratory                    
respiratory effort/rhythm                    Overall:                    no 
retractions                    2017                    None              
  

 

 Full Exam - General                     Respiratory                    
respiratory effort/rhythm                    Overall:                    normal 
rate                    2017                    None                

 

 Full Exam - General                     Cardiovascular                    
extremities                    Overall:                    no clubbing         
           2017                    None                

 

 Full Exam - General                     Cardiovascular                    
auscultation of heart                    Overall:                    regular 
rate                    2017                    None                

 

 Full Exam - General                     Cardiovascular                    
auscultation of heart                    Overall:                    normal 
heart sounds                    2017                    None             
   

 

 Full Exam - General                     Abdomen                    
abdominal exam                    Overall:                    no tenderness    
                2017                    None                

 

 Full Exam - General                     Abdomen                    
abdominal exam                    Overall:                    normal bowel 
sounds                    2017                    None                

 

 Full Exam - General                     Neurologic                    
cranial nerves                    Overall:                    crainial nerves 2 
- 12 grossly intact                    2017                    None      
          

 

 Full Exam - General                     Psychiatric                    
orientation/consciousness                    Overall:                    
oriented to person, place and time                    2017               
     None                

 

 Full Exam - General                     Psychiatric                    
mood and affect                    Mood:                    flat               
     2017                    None                

 

 Full Exam - General                     Psychiatric                    
mood and affect                    Affect:                    flat             
       2017                    None                

 

 Full Exam - General                     Musculoskeletal                    
spine, ribs and pelvis                    Spine:                    tender @ 
thoracic spine                    2017                    None           
     

 

 Full Exam - General                     Musculoskeletal                    
spine, ribs and pelvis                    Spine:                    tender @ 
lumbar spine                    2017                    None             
   

 

 Full Exam - General                     Musculoskeletal                    
spine, ribs and pelvis                    Spine:                    decreased 
extension                    2017                    None                

 

 Full Exam - General                     Constitutional                     
general appearance                    Development:                    well 
developed                    2017                    None                

 

 Full Exam - General                     Constitutional                     
general appearance                    Development:                    appears 
stated age                    2017                    None                

 

 Full Exam - General                     Constitutional                     
general appearance                    Overall:                    well 
developed                    2017                    None                

 

 Full Exam - General                     Constitutional                     
general appearance                    Overall:                    in no acute 
distress                    2017                    None                

 

 Full Exam - General                     Constitutional                     
general appearance                    Overall:                    well 
nourished                    2017                    None                

 

 Full Exam - General                     Constitutional                     
general appearance                    Hygiene/Attention to Grooming:           
         good hygiene                    2017                    None    
            

 

 Full Exam - General                     Eyes                    conjunctiva
/eyelids                    Overall:                    conjunctiva clear      
              2017                    None                

 

 Full Exam - General                     Eyes                    conjunctiva
/eyelids                    Overall:                    cornea clear           
         2017                    None                

 

 Full Exam - General                     Eyes                    conjunctiva
/eyelids                    Overall:                    eyelids normal         
           2017                    None                

 

 Full Exam - General                     Eyes                    pupils and 
irises                    Overall:                    pupils equal, round, 
reactive to light and accomodation                    2017               
     None                

 

 Full Exam - General                     Ears/Nose/Throat                  
  otoscopic exam                    Overall:                    external 
auditory canals clear                    2017                    None    
            

 

 Full Exam - General                     Ears/Nose/Throat                  
  otoscopic exam                    Overall:                    tympanic 
membranes clear                    2017                    None          
      

 

 Full Exam - General                     Ears/Nose/Throat                  
  lips/teeth/gingiva                    Overall:                    benign lips
                    2017                    None                

 

 Full Exam - General 1995                    Ears/Nose/Throat                  
  lips/teeth/gingiva                    Overall:                    normal 
dentition                    2017                    None                

 

 Full Exam - General                     Ears/Nose/Throat                  
  oral cavity/pharynx/larynx                     Overall:                    
oral mucosa clear                    2017                    None        
        

 

 Full Exam - General                     Ears/Nose/Throat                  
  oral cavity/pharynx/larynx                     Overall:                    
oropharyngeal mucosa clear                    2017                    
None                

 

 Full Exam - General                     Ears/Nose/Throat                  
  oral cavity/pharynx/larynx                     Overall:                    
hypopharynx benign                    2017                    None       
         

 

 Full Exam - General                     Ears/Nose/Throat                  
  oral cavity/pharynx/larynx                     Overall:                    no 
masses                    2017                    None                

 

 Full Exam - General                     Respiratory                    
auscultation                    Overall:                    breath sounds clear 
bilaterally                    2017                    None              
  

 

 Full Exam - General                     Respiratory                    
respiratory effort/rhythm                    Overall:                    no 
retractions                    2017                    None              
  

 

 Full Exam - General                     Respiratory                    
respiratory effort/rhythm                    Overall:                    normal 
rate                    2017                    None                

 

 Full Exam - General                     Cardiovascular                    
extremities                    Overall:                    no clubbing         
           2017                    None                

 

 Full Exam - General                     Cardiovascular                    
auscultation of heart                    Overall:                    regular 
rate                    2017                    None                

 

 Full Exam - General                     Cardiovascular                    
auscultation of heart                    Overall:                    normal 
heart sounds                    2017                    None             
   

 

 Full Exam - General                     Abdomen                    
abdominal exam                    Overall:                    no tenderness    
                2017                    None                

 

 Full Exam - General                     Abdomen                    
abdominal exam                    Overall:                    normal bowel 
sounds                    2017                    None                

 

 Full Exam - General                     Neurologic                    
cranial nerves                    Overall:                    crainial nerves 2 
- 12 grossly intact                    2017                    None      
          

 

 Full Exam - General                     Psychiatric                    
orientation/consciousness                    Overall:                    
oriented to person, place and time                    2017               
     None                

 

 Full Exam - General                     Psychiatric                    
mood and affect                    Mood:                    flat               
     2017                    None                

 

 Full Exam - General                     Psychiatric                    
mood and affect                    Affect:                    flat             
       2017                    None                

 

 Full Exam - General                     Constitutional                     
general appearance                    Development:                    well 
developed                    2016                    None                

 

 Full Exam - General                     Constitutional                     
general appearance                    Development:                    appears 
stated age                    2016                    None                

 

 Full Exam - General                     Constitutional                     
general appearance                    Overall:                    well 
developed                    2016                    None                

 

 Full Exam - General                     Constitutional                     
general appearance                    Overall:                    in no acute 
distress                    2016                    None                

 

 Full Exam - General                     Constitutional                     
general appearance                    Overall:                    well 
nourished                    2016                    None                

 

 Full Exam - General                     Constitutional                     
general appearance                    Hygiene/Attention to Grooming:           
         good hygiene                    2016                    None    
            

 

 Full Exam - General                     Eyes                    conjunctiva
/eyelids                    Overall:                    conjunctiva clear      
              2016                    None                

 

 Full Exam - General                     Eyes                    conjunctiva
/eyelids                    Overall:                    cornea clear           
         2016                    None                

 

 Full Exam - General                     Eyes                    conjunctiva
/eyelids                    Overall:                    eyelids normal         
           2016                    None                

 

 Full Exam - General                     Eyes                    pupils and 
irises                    Overall:                    pupils equal, round, 
reactive to light and accomodation                    2016               
     None                

 

 Full Exam - General                     Ears/Nose/Throat                  
  otoscopic exam                    Overall:                    external 
auditory canals clear                    2016                    None    
            

 

 Full Exam - General                     Ears/Nose/Throat                  
  otoscopic exam                    Overall:                    tympanic 
membranes clear                    2016                    None          
      

 

 Full Exam - General                     Ears/Nose/Throat                  
  lips/teeth/gingiva                    Overall:                    benign lips
                    2016                    None                

 

 Full Exam - General                     Ears/Nose/Throat                  
  lips/teeth/gingiva                    Overall:                    normal 
dentition                    2016                    None                

 

 Full Exam - General                     Ears/Nose/Throat                  
  oral cavity/pharynx/larynx                     Overall:                    
oral mucosa clear                    2016                    None        
        

 

 Full Exam - General                     Ears/Nose/Throat                  
  oral cavity/pharynx/larynx                     Overall:                    
oropharyngeal mucosa clear                    2016                    
None                

 

 Full Exam - General                     Ears/Nose/Throat                  
  oral cavity/pharynx/larynx                     Overall:                    
hypopharynx benign                    2016                    None       
         

 

 Full Exam - General                     Ears/Nose/Throat                  
  oral cavity/pharynx/larynx                     Overall:                    no 
masses                    2016                    None                

 

 Full Exam - General                     Respiratory                    
auscultation                    Overall:                    breath sounds clear 
bilaterally                    2016                    None              
  

 

 Full Exam - General                     Respiratory                    
respiratory effort/rhythm                    Overall:                    no 
retractions                    2016                    None              
  

 

 Full Exam - General                     Respiratory                    
respiratory effort/rhythm                    Overall:                    normal 
rate                    2016                    None                

 

 Full Exam - General                     Cardiovascular                    
extremities                    Overall:                    no clubbing         
           2016                    None                

 

 Full Exam - General                     Cardiovascular                    
auscultation of heart                    Overall:                    regular 
rate                    2016                    None                

 

 Full Exam - General                     Cardiovascular                    
auscultation of heart                    Overall:                    normal 
heart sounds                    2016                    None             
   

 

 Full Exam - General                     Abdomen                    
abdominal exam                    Overall:                    no tenderness    
                2016                    None                

 

 Full Exam - General                     Abdomen                    
abdominal exam                    Overall:                    normal bowel 
sounds                    2016                    None                

 

 Full Exam - General                     Neurologic                    
cranial nerves                    Overall:                    crainial nerves 2 
- 12 grossly intact                    2016                    None      
          

 

 Full Exam - General                     Psychiatric                    
orientation/consciousness                    Overall:                    
oriented to person, place and time                    2016               
     None                

 

 Full Exam - General                     Psychiatric                    
mood and affect                    Mood:                    flat               
     2016                    None                

 

 Full Exam - General                     Psychiatric                    
mood and affect                    Affect:                    flat             
       2016                    None                

 

 Full Exam - General                     Constitutional                     
general appearance                    Evidence of Distress:                    
in acute distress                    2016                    None        
        

 

 Full Exam - General                     Constitutional                     
general appearance                    Evidence of Distress:                    
agitated                    2016                    None                

 

 Full Exam - General                     Constitutional                     
general appearance                    Evidence of Distress:                    
anxious                    2016                    None                

 

 Full Exam - General                     Neurologic                    gait
                    Overall:                    no ataxia, no unsteadiness     
               2016                    None                

 

 Full Exam - General                     Psychiatric                    
orientation/consciousness                    Overall:                    
oriented to person, place and time                    2016               
     None                

 

 Full Exam - General                     Psychiatric                    
behavior/psychomotor activity                    Behavior:                    
agitation/restlessness                    2016                    None   
             

 

 Full Exam - General                     Psychiatric                    
mood and affect                    Mood:                    irritable          
          2016                    None                

 

 Full Exam - General                     Psychiatric                    
mood and affect                    Mood:                    angry              
      2016                    None                

 

 Full Exam - General                     Psychiatric                    
mood and affect                    Mood:                    labile mood        
            2016                    None                

 

 Full Exam - General                     Psychiatric                    
mood and affect                    Appropriateness:                    
inappropriate emotional responses                    2016                
    None                

 

 Full Exam - General                     Constitutional                     
general appearance                    Development:                    well 
developed                    2016                    None                

 

 Full Exam - General                     Constitutional                     
general appearance                    Development:                    appears 
stated age                    2016                    None                

 

 Full Exam - General                     Constitutional                     
general appearance                    Overall:                    well 
developed                    2016                    None                

 

 Full Exam - General                     Constitutional                     
general appearance                    Overall:                    in no acute 
distress                    2016                    None                

 

 Full Exam - General                     Constitutional                     
general appearance                    Overall:                    well 
nourished                    2016                    None                

 

 Full Exam - General                     Constitutional                     
general appearance                    Hygiene/Attention to Grooming:           
         good hygiene                    2016                    None    
            

 

 Full Exam - General                     Eyes                    conjunctiva
/eyelids                    Overall:                    conjunctiva clear      
              2016                    None                

 

 Full Exam - General                     Eyes                    conjunctiva
/eyelids                    Overall:                    cornea clear           
         2016                    None                

 

 Full Exam - General                     Eyes                    conjunctiva
/eyelids                    Overall:                    eyelids normal         
           2016                    None                

 

 Full Exam - General                     Eyes                    pupils and 
irises                    Overall:                    pupils equal, round, 
reactive to light and accomodation                    2016               
     None                

 

 Full Exam - General                     Ears/Nose/Throat                  
  otoscopic exam                    Overall:                    external 
auditory canals clear                    2016                    None    
            

 

 Full Exam - General                     Ears/Nose/Throat                  
  otoscopic exam                    Overall:                    tympanic 
membranes clear                    2016                    None          
      

 

 Full Exam - General                     Ears/Nose/Throat                  
  lips/teeth/gingiva                    Overall:                    benign lips
                    2016                    None                

 

 Full Exam - General                     Ears/Nose/Throat                  
  lips/teeth/gingiva                    Overall:                    normal 
dentition                    2016                    None                

 

 Full Exam - General                     Ears/Nose/Throat                  
  oral cavity/pharynx/larynx                     Overall:                    
oral mucosa clear                    2016                    None        
        

 

 Full Exam - General                     Ears/Nose/Throat                  
  oral cavity/pharynx/larynx                     Overall:                    
oropharyngeal mucosa clear                    2016                    
None                

 

 Full Exam - General                     Ears/Nose/Throat                  
  oral cavity/pharynx/larynx                     Overall:                    
hypopharynx benign                    2016                    None       
         

 

 Full Exam - General                     Ears/Nose/Throat                  
  oral cavity/pharynx/larynx                     Overall:                    no 
masses                    2016                    None                

 

 Full Exam - General                     Respiratory                    
auscultation                    Overall:                    breath sounds clear 
bilaterally                    2016                    None              
  

 

 Full Exam - General                     Respiratory                    
respiratory effort/rhythm                    Overall:                    no 
retractions                    2016                    None              
  

 

 Full Exam - General                     Respiratory                    
respiratory effort/rhythm                    Overall:                    normal 
rate                    2016                    None                

 

 Full Exam - General                     Cardiovascular                    
extremities                    Overall:                    no clubbing         
           2016                    None                

 

 Full Exam - General                     Cardiovascular                    
auscultation of heart                    Overall:                    regular 
rate                    2016                    None                

 

 Full Exam - General                     Cardiovascular                    
auscultation of heart                    Overall:                    normal 
heart sounds                    2016                    None             
   

 

 Full Exam - General                     Abdomen                    
abdominal exam                    Overall:                    no tenderness    
                2016                    None                

 

 Full Exam - General                     Abdomen                    
abdominal exam                    Overall:                    normal bowel 
sounds                    2016                    None                

 

 Full Exam - General                     Musculoskeletal                    
spine, ribs and pelvis                    Posture:                    kyphosis 
                   2016                    None                

 

 Full Exam - General                     Musculoskeletal                    
gait and station                    Overall:                    normal gait    
                2016                    None                

 

 Full Exam - General                     Musculoskeletal                    
gait and station                    Overall:                    normal station 
                   2016                    None                

 

 Full Exam - General                     Neurologic                    
cranial nerves                    Overall:                    crainial nerves 2 
- 12 grossly intact                    2016                    None      
          

 

 Full Exam - General                     Psychiatric                    
orientation/consciousness                    Overall:                    
oriented to person, place and time                    2016               
     None                

 

 Full Exam - General                     Psychiatric                    
mood and affect                    Overall:                    normal mood and 
affect                    2016                    None                

 

 Full Exam - General                     Constitutional                     
general appearance                    Overall:                    well 
developed                    10/26/2016                    None                

 

 Full Exam - General                     Constitutional                     
general appearance                    Overall:                    well 
nourished                    10/26/2016                    None                

 

 Full Exam - General                     Eyes                    conjunctiva
/eyelids                    Overall:                    conjunctiva clear      
              10/26/2016                    None                

 

 Full Exam - General                     Eyes                    conjunctiva
/eyelids                    Overall:                    cornea clear           
         10/26/2016                    None                

 

 Full Exam - General                     Eyes                    conjunctiva
/eyelids                    Overall:                    eyelids normal         
           10/26/2016                    None                

 

 Full Exam - General                     Ears/Nose/Throat                  
  lips/teeth/gingiva                    Overall:                    benign lips
                    10/26/2016                    None                

 

 Full Exam - General                     Ears/Nose/Throat                  
  lips/teeth/gingiva                    Overall:                    normal 
dentition                    10/26/2016                    None                

 

 Full Exam - General                     Respiratory                    
auscultation                    Overall:                    breath sounds clear 
bilaterally                    10/26/2016                    None              
  

 

 Full Exam - General                     Respiratory                    
respiratory effort/rhythm                    Overall:                    no 
retractions                    10/26/2016                    None              
  

 

 Full Exam - General                     Respiratory                    
respiratory effort/rhythm                    Overall:                    normal 
rate                    10/26/2016                    None                

 

 Full Exam - General                     Cardiovascular                    
extremities                    Overall:                    no clubbing         
           10/26/2016                    None                

 

 Full Exam - General                     Cardiovascular                    
auscultation of heart                    Overall:                    regular 
rate                    10/26/2016                    None                

 

 Full Exam - General                     Cardiovascular                    
auscultation of heart                    Overall:                    normal 
heart sounds                    10/26/2016                    None             
   

 

 Full Exam - General                     Musculoskeletal                    
spine, ribs and pelvis                    Posture:                    kyphosis 
                   10/26/2016                    None                

 

 Full Exam - General                     Neurologic                    
cranial nerves                    Overall:                    crainial nerves 2 
- 12 grossly intact                    10/26/2016                    None      
          

 

 Full Exam - General                     Psychiatric                    
orientation/consciousness                    Overall:                    
oriented to person, place and time                    10/26/2016               
     None                

 

 Full Exam - General                     Psychiatric                    
mood and affect                    Overall:                    normal mood and 
affect                    10/26/2016                    None                

 

 Full Exam - General                     Respiratory                    
auscultation                    Diffuse:                    diminished         
           10/26/2016                    None                

 

 Full Exam - General                     Constitutional                     
general appearance                    Development:                    well 
developed                    2016                    None                

 

 Full Exam - General                     Constitutional                     
general appearance                    Development:                    appears 
stated age                    2016                    None                

 

 Full Exam - General                     Constitutional                     
general appearance                    Overall:                    well 
developed                    2016                    None                

 

 Full Exam - General                     Constitutional                     
general appearance                    Overall:                    in no acute 
distress                    2016                    None                

 

 Full Exam - General                     Constitutional                     
general appearance                    Overall:                    well 
nourished                    2016                    None                

 

 Full Exam - General                     Constitutional                     
general appearance                    Hygiene/Attention to Grooming:           
         good hygiene                    2016                    None    
            

 

 Full Exam - General                     Eyes                    conjunctiva
/eyelids                    Overall:                    conjunctiva clear      
              2016                    None                

 

 Full Exam - General                     Eyes                    conjunctiva
/eyelids                    Overall:                    cornea clear           
         2016                    None                

 

 Full Exam - General                     Eyes                    conjunctiva
/eyelids                    Overall:                    eyelids normal         
           2016                    None                

 

 Full Exam - General                     Eyes                    pupils and 
irises                    Overall:                    pupils equal, round, 
reactive to light and accomodation                    2016               
     None                

 

 Full Exam - General                     Ears/Nose/Throat                  
  otoscopic exam                    Overall:                    external 
auditory canals clear                    2016                    None    
            

 

 Full Exam - General                     Ears/Nose/Throat                  
  otoscopic exam                    Overall:                    tympanic 
membranes clear                    2016                    None          
      

 

 Full Exam - General                     Ears/Nose/Throat                  
  lips/teeth/gingiva                    Overall:                    benign lips
                    2016                    None                

 

 Full Exam - General                     Ears/Nose/Throat                  
  lips/teeth/gingiva                    Overall:                    normal 
dentition                    2016                    None                

 

 Full Exam - General                     Ears/Nose/Throat                  
  oral cavity/pharynx/larynx                     Overall:                    
oral mucosa clear                    2016                    None        
        

 

 Full Exam - General                     Ears/Nose/Throat                  
  oral cavity/pharynx/larynx                     Overall:                    
oropharyngeal mucosa clear                    2016                    
None                

 

 Full Exam - General                     Ears/Nose/Throat                  
  oral cavity/pharynx/larynx                     Overall:                    
hypopharynx benign                    2016                    None       
         

 

 Full Exam - General                     Ears/Nose/Throat                  
  oral cavity/pharynx/larynx                     Overall:                    no 
masses                    2016                    None                

 

 Full Exam - General                     Respiratory                    
auscultation                    Overall:                    breath sounds clear 
bilaterally                    2016                    None              
  

 

 Full Exam - General                     Respiratory                    
respiratory effort/rhythm                    Overall:                    no 
retractions                    2016                    None              
  

 

 Full Exam - General                     Respiratory                    
respiratory effort/rhythm                    Overall:                    normal 
rate                    2016                    None                

 

 Full Exam - General                     Cardiovascular                    
extremities                    Overall:                    no clubbing         
           2016                    None                

 

 Full Exam - General                     Cardiovascular                    
auscultation of heart                    Overall:                    regular 
rate                    2016                    None                

 

 Full Exam - General                     Cardiovascular                    
auscultation of heart                    Overall:                    normal 
heart sounds                    2016                    None             
   

 

 Full Exam - General                     Abdomen                    
abdominal exam                    Overall:                    no tenderness    
                2016                    None                

 

 Full Exam - General                     Abdomen                    
abdominal exam                    Overall:                    normal bowel 
sounds                    2016                    None                

 

 Full Exam - General                     Musculoskeletal                    
lower extremity                    Palpation -  knee:                    no 
effusion                    2016                    None                

 

 Full Exam - General                     Musculoskeletal                    
lower extremity                    Inspection -  lower leg:                    
normal appearance                    2016                    None        
        

 

 Full Exam - General                     Musculoskeletal                    
lower extremity                    Palpation -  lower leg:                    
normal on palpation                    2016                    None      
          

 

 Full Exam - General                     Musculoskeletal                    
spine, ribs and pelvis                    Posture:                    kyphosis 
                   2016                    None                

 

 Full Exam - General                     Musculoskeletal                    
spine, ribs and pelvis                    Spine:                    tender @ 
cervical spine                    2016                    None           
     

 

 Full Exam - General                     Musculoskeletal                    
gait and station                    Overall:                    normal gait    
                2016                    None                

 

 Full Exam - General                     Musculoskeletal                    
gait and station                    Overall:                    normal station 
                   2016                    None                

 

 Full Exam - General                     Integument                    
inspection of skin                    Overall:                    few scattered 
moles, no gross abnormalities                    2016                    
None                

 

 Full Exam - General                     Neurologic                    deep 
tendon reflexes                    Overall:                    deep tendon 
reflexes intact                    2016                    None          
      

 

 Full Exam - General                     Neurologic                    
cranial nerves                    Overall:                    crainial nerves 2 
- 12 grossly intact                    2016                    None      
          

 

 Full Exam - General                     Psychiatric                    
orientation/consciousness                    Overall:                    
oriented to person, place and time                    2016               
     None                

 

 Full Exam - General                     Psychiatric                    
mood and affect                    Overall:                    normal mood and 
affect                    2016                    None                

 

 Full Exam - General                     Constitutional                     
general appearance                    Overall:                    well 
developed                    2016                    None                

 

 Full Exam - General                     Constitutional                     
general appearance                    Overall:                    in no acute 
distress                    2016                    None                

 

 Full Exam - General                     Constitutional                     
general appearance                    Overall:                    well 
nourished                    2016                    None                

 

 Full Exam - General                     Constitutional                     
general appearance                    Hygiene/Attention to Grooming:           
         good hygiene                    2016                    None    
            

 

 Full Exam - General                     Eyes                    conjunctiva
/eyelids                    Overall:                    conjunctiva clear      
              2016                    None                

 

 Full Exam - General                     Eyes                    conjunctiva
/eyelids                    Overall:                    cornea clear           
         2016                    None                

 

 Full Exam - General                     Eyes                    conjunctiva
/eyelids                    Overall:                    eyelids normal         
           2016                    None                

 

 Full Exam - General                     Eyes                    pupils and 
irises                    Overall:                    pupils equal, round, 
reactive to light and accomodation                    2016               
     None                

 

 Full Exam - General                     Ears/Nose/Throat                  
  lips/teeth/gingiva                    Overall:                    benign lips
                    2016                    None                

 

 Full Exam - General                     Ears/Nose/Throat                  
  lips/teeth/gingiva                    Overall:                    normal 
dentition                    2016                    None                

 

 Full Exam - General                     Ears/Nose/Throat                  
  oral cavity/pharynx/larynx                     Overall:                    
oral mucosa clear                    2016                    None        
        

 

 Full Exam - General                     Respiratory                    
auscultation                    Overall:                    breath sounds clear 
bilaterally                    2016                    None              
  

 

 Full Exam - General                     Respiratory                    
respiratory effort/rhythm                    Overall:                    no 
retractions                    2016                    None              
  

 

 Full Exam - General                     Respiratory                    
respiratory effort/rhythm                    Overall:                    normal 
rate                    2016                    None                

 

 Full Exam - General                     Cardiovascular                    
extremities                    Overall:                    no clubbing         
           2016                    None                

 

 Full Exam - General                     Cardiovascular                    
auscultation of heart                    Overall:                    regular 
rate                    2016                    None                

 

 Full Exam - General                     Cardiovascular                    
auscultation of heart                    Overall:                    normal 
heart sounds                    2016                    None             
   

 

 Full Exam - General                     Musculoskeletal                    
spine, ribs and pelvis                    Posture:                    kyphosis 
                   2016                    None                

 

 Full Exam - General                     Musculoskeletal                    
gait and station                    Overall:                    normal gait    
                2016                    None                

 

 Full Exam - General                     Musculoskeletal                    
gait and station                    Overall:                    normal station 
                   2016                    None                

 

 Full Exam - General                     Neurologic                    
cranial nerves                    Overall:                    crainial nerves 2 
- 12 grossly intact                    2016                    None      
          

 

 Full Exam - General                     Psychiatric                    
orientation/consciousness                    Overall:                    
oriented to person, place and time                    2016               
     None                

 

 Full Exam - General                     Psychiatric                    
mood and affect                    Overall:                    normal mood and 
affect                    2016                    None                

 

 Full Exam - General                     Constitutional                     
general appearance                    Development:                    well 
developed                    2016                    None                

 

 Full Exam - General                     Constitutional                     
general appearance                    Development:                    appears 
stated age                    2016                    None                

 

 Full Exam - General                     Constitutional                     
general appearance                    Overall:                    well 
developed                    2016                    None                

 

 Full Exam - General                     Constitutional                     
general appearance                    Overall:                    in no acute 
distress                    2016                    None                

 

 Full Exam - General                     Constitutional                     
general appearance                    Overall:                    well 
nourished                    2016                    None                

 

 Full Exam - General                     Constitutional                     
general appearance                    Hygiene/Attention to Grooming:           
         good hygiene                    2016                    None    
            

 

 Full Exam - General                     Eyes                    conjunctiva
/eyelids                    Overall:                    conjunctiva clear      
              2016                    None                

 

 Full Exam - General                     Eyes                    conjunctiva
/eyelids                    Overall:                    cornea clear           
         2016                    None                

 

 Full Exam - General                     Eyes                    conjunctiva
/eyelids                    Overall:                    eyelids normal         
           2016                    None                

 

 Full Exam - General                     Eyes                    pupils and 
irises                    Overall:                    pupils equal, round, 
reactive to light and accomodation                    2016               
     None                

 

 Full Exam - General                     Ears/Nose/Throat                  
  otoscopic exam                    Overall:                    external 
auditory canals clear                    2016                    None    
            

 

 Full Exam - General                     Ears/Nose/Throat                  
  otoscopic exam                    Overall:                    tympanic 
membranes clear                    2016                    None          
      

 

 Full Exam - General                     Ears/Nose/Throat                  
  lips/teeth/gingiva                    Overall:                    benign lips
                    2016                    None                

 

 Full Exam - General                     Ears/Nose/Throat                  
  lips/teeth/gingiva                    Overall:                    normal 
dentition                    2016                    None                

 

 Full Exam - General                     Ears/Nose/Throat                  
  oral cavity/pharynx/larynx                     Overall:                    
oral mucosa clear                    2016                    None        
        

 

 Full Exam - General                     Ears/Nose/Throat                  
  oral cavity/pharynx/larynx                     Overall:                    
oropharyngeal mucosa clear                    2016                    
None                

 

 Full Exam - General                     Ears/Nose/Throat                  
  oral cavity/pharynx/larynx                     Overall:                    
hypopharynx benign                    2016                    None       
         

 

 Full Exam - General                     Ears/Nose/Throat                  
  oral cavity/pharynx/larynx                     Overall:                    no 
masses                    2016                    None                

 

 Full Exam - General                     Respiratory                    
auscultation                    Overall:                    breath sounds clear 
bilaterally                    2016                    None              
  

 

 Full Exam - General                     Respiratory                    
respiratory effort/rhythm                    Overall:                    no 
retractions                    2016                    None              
  

 

 Full Exam - General                     Respiratory                    
respiratory effort/rhythm                    Overall:                    normal 
rate                    2016                    None                

 

 Full Exam - General                     Cardiovascular                    
extremities                    Overall:                    no clubbing         
           2016                    None                

 

 Full Exam - General                     Cardiovascular                    
auscultation of heart                    Overall:                    regular 
rate                    2016                    None                

 

 Full Exam - General                     Cardiovascular                    
auscultation of heart                    Overall:                    normal 
heart sounds                    2016                    None             
   

 

 Full Exam - General                     Abdomen                    
abdominal exam                    Overall:                    no tenderness    
                2016                    None                

 

 Full Exam - General                     Abdomen                    
abdominal exam                    Overall:                    normal bowel 
sounds                    2016                    None                

 

 Full Exam - General                     Musculoskeletal                    
lower extremity                    Palpation -  knee:                    no 
effusion                    2016                    None                

 

 Full Exam - General                     Musculoskeletal                    
lower extremity                    Inspection -  lower leg:                    
normal appearance                    2016                    None        
        

 

 Full Exam - General                     Musculoskeletal                    
lower extremity                    Palpation -  lower leg:                    
normal on palpation                    2016                    None      
          

 

 Full Exam - General                     Musculoskeletal                    
spine, ribs and pelvis                    Posture:                    kyphosis 
                   2016                    None                

 

 Full Exam - General                     Musculoskeletal                    
spine, ribs and pelvis                    Spine:                    tender @ 
cervical spine                    2016                    None           
     

 

 Full Exam - General                     Musculoskeletal                    
gait and station                    Overall:                    normal gait    
                2016                    None                

 

 Full Exam - General                     Musculoskeletal                    
gait and station                    Overall:                    normal station 
                   2016                    None                

 

 Full Exam - General                     Integument                    
inspection of skin                    Overall:                    few scattered 
moles, no gross abnormalities                    2016                    
None                

 

 Full Exam - General                     Neurologic                    deep 
tendon reflexes                    Overall:                    deep tendon 
reflexes intact                    2016                    None          
      

 

 Full Exam - General                     Neurologic                    
cranial nerves                    Overall:                    crainial nerves 2 
- 12 grossly intact                    2016                    None      
          

 

 Full Exam - General                     Psychiatric                    
orientation/consciousness                    Overall:                    
oriented to person, place and time                    2016               
     None                

 

 Full Exam - General                     Psychiatric                    
mood and affect                    Overall:                    normal mood and 
affect                    2016                    None                

 

 Full Exam - General                     Constitutional                     
general appearance                    Development:                    well 
developed                    2016                    None                

 

 Full Exam - General                     Constitutional                     
general appearance                    Development:                    appears 
stated age                    2016                    None                

 

 Full Exam - General                     Constitutional                     
general appearance                    Overall:                    well 
developed                    2016                    None                

 

 Full Exam - General                     Constitutional                     
general appearance                    Overall:                    in no acute 
distress                    2016                    None                

 

 Full Exam - General                     Constitutional                     
general appearance                    Overall:                    well 
nourished                    2016                    None                

 

 Full Exam - General                     Constitutional                     
general appearance                    Hygiene/Attention to Grooming:           
         good hygiene                    2016                    None    
            

 

 Full Exam - General                     Eyes                    conjunctiva
/eyelids                    Overall:                    conjunctiva clear      
              2016                    None                

 

 Full Exam - General                     Eyes                    conjunctiva
/eyelids                    Overall:                    cornea clear           
         2016                    None                

 

 Full Exam - General                     Eyes                    conjunctiva
/eyelids                    Overall:                    eyelids normal         
           2016                    None                

 

 Full Exam - General                     Eyes                    pupils and 
irises                    Overall:                    pupils equal, round, 
reactive to light and accomodation                    2016               
     None                

 

 Full Exam - General                     Ears/Nose/Throat                  
  otoscopic exam                    Overall:                    external 
auditory canals clear                    2016                    None    
            

 

 Full Exam - General                     Ears/Nose/Throat                  
  otoscopic exam                    Overall:                    tympanic 
membranes clear                    2016                    None          
      

 

 Full Exam - General                     Ears/Nose/Throat                  
  lips/teeth/gingiva                    Overall:                    benign lips
                    2016                    None                

 

 Full Exam - General                     Ears/Nose/Throat                  
  lips/teeth/gingiva                    Overall:                    normal 
dentition                    2016                    None                

 

 Full Exam - General                     Ears/Nose/Throat                  
  oral cavity/pharynx/larynx                     Overall:                    
oral mucosa clear                    2016                    None        
        

 

 Full Exam - General                     Ears/Nose/Throat                  
  oral cavity/pharynx/larynx                     Overall:                    
oropharyngeal mucosa clear                    2016                    
None                

 

 Full Exam - General                     Ears/Nose/Throat                  
  oral cavity/pharynx/larynx                     Overall:                    
hypopharynx benign                    2016                    None       
         

 

 Full Exam - General                     Ears/Nose/Throat                  
  oral cavity/pharynx/larynx                     Overall:                    no 
masses                    2016                    None                

 

 Full Exam - General                     Respiratory                    
auscultation                    Overall:                    breath sounds clear 
bilaterally                    2016                    None              
  

 

 Full Exam - General                     Respiratory                    
respiratory effort/rhythm                    Overall:                    no 
retractions                    2016                    None              
  

 

 Full Exam - General                     Respiratory                    
respiratory effort/rhythm                    Overall:                    normal 
rate                    2016                    None                

 

 Full Exam - General                     Cardiovascular                    
extremities                    Overall:                    no clubbing         
           2016                    None                

 

 Full Exam - General                     Cardiovascular                    
auscultation of heart                    Overall:                    regular 
rate                    2016                    None                

 

 Full Exam - General                     Cardiovascular                    
auscultation of heart                    Overall:                    normal 
heart sounds                    2016                    None             
   

 

 Full Exam - General                     Abdomen                    
abdominal exam                    Overall:                    no tenderness    
                2016                    None                

 

 Full Exam - General                     Abdomen                    
abdominal exam                    Overall:                    normal bowel 
sounds                    2016                    None                

 

 Full Exam - General                     Musculoskeletal                    
lower extremity                    Palpation -  knee:                    no 
effusion                    2016                    None                

 

 Full Exam - General                     Musculoskeletal                    
lower extremity                    Inspection -  lower leg:                    
normal appearance                    2016                    None        
        

 

 Full Exam - General                     Musculoskeletal                    
lower extremity                    Palpation -  lower leg:                    
normal on palpation                    2016                    None      
          

 

 Full Exam - General                     Musculoskeletal                    
spine, ribs and pelvis                    Posture:                    kyphosis 
                   2016                    None                

 

 Full Exam - General                     Musculoskeletal                    
spine, ribs and pelvis                    Spine:                    tender @ 
cervical spine                    2016                    None           
     

 

 Full Exam - General                     Musculoskeletal                    
gait and station                    Overall:                    normal gait    
                2016                    None                

 

 Full Exam - General                     Musculoskeletal                    
gait and station                    Overall:                    normal station 
                   2016                    None                

 

 Full Exam - General                     Integument                    
inspection of skin                    Overall:                    few scattered 
moles, no gross abnormalities                    2016                    
None                

 

 Full Exam - General                     Neurologic                    deep 
tendon reflexes                    Overall:                    deep tendon 
reflexes intact                    2016                    None          
      

 

 Full Exam - General                     Neurologic                    
cranial nerves                    Overall:                    crainial nerves 2 
- 12 grossly intact                    2016                    None      
          

 

 Full Exam - General                     Psychiatric                    
orientation/consciousness                    Overall:                    
oriented to person, place and time                    2016               
     None                

 

 Full Exam - General                     Psychiatric                    
mood and affect                    Overall:                    normal mood and 
affect                    2016                    None                

 

 Full Exam - General                     Constitutional                     
general appearance                    Overall:                    well 
developed                    2016                    None                

 

 Full Exam - General                     Constitutional                     
general appearance                    Overall:                    in no acute 
distress                    2016                    None                

 

 Full Exam - General                     Constitutional                     
general appearance                    Overall:                    well 
nourished                    2016                    None                

 

 Full Exam - General                     Eyes                    conjunctiva
/eyelids                    Overall:                    conjunctiva clear      
              2016                    None                

 

 Full Exam - General                     Eyes                    pupils and 
irises                    Overall:                    pupils equal, round, 
reactive to light and accomodation                    2016               
     None                

 

 Full Exam - General                     Ears/Nose/Throat                  
  otoscopic exam                    Overall:                    external 
auditory canals clear                    2016                    None    
            

 

 Full Exam - General                     Ears/Nose/Throat                  
  otoscopic exam                    Overall:                    tympanic 
membranes clear                    2016                    None          
      

 

 Full Exam - General                     Ears/Nose/Throat                  
  oral cavity/pharynx/larynx                     Overall:                    
oral mucosa clear                    2016                    None        
        

 

 Full Exam - General                     Respiratory                    
auscultation                    Overall:                    breath sounds clear 
bilaterally                    2016                    None              
  

 

 Full Exam - General                     Respiratory                    
respiratory effort/rhythm                    Overall:                    no 
retractions                    2016                    None              
  

 

 Full Exam - General                     Respiratory                    
respiratory effort/rhythm                    Overall:                    normal 
rate                    2016                    None                

 

 Full Exam - General                     Cardiovascular                    
extremities                    Overall:                    no clubbing         
           2016                    None                

 

 Full Exam - General                     Cardiovascular                    
auscultation of heart                    Overall:                    regular 
rate                    2016                    None                

 

 Full Exam - General                     Cardiovascular                    
auscultation of heart                    Overall:                    normal 
heart sounds                    2016                    None             
   

 

 Full Exam - General                     Cardiovascular                    
auscultation of heart                    Overall:                    no murmurs
                    2016                    None                

 

 Full Exam - General                     Abdomen                    
abdominal exam                    Overall:                    no tenderness    
                2016                    None                

 

 Full Exam - General                     Abdomen                    
abdominal exam                    Overall:                    normal bowel 
sounds                    2016                    None                

 

 Full Exam - General                     Lymphatic                    neck 
nodes                    Overall:                    anterior cervical chain 
benign                    2016                    None                

 

 Full Exam - General                     Lymphatic                    neck 
nodes                    Overall:                    posterior cervical chain 
benign                    2016                    None                

 

 Full Exam - General                     Musculoskeletal                    
gait and station                    Overall:                    normal gait    
                2016                    None                

 

 Full Exam - General                     Musculoskeletal                    
gait and station                    Overall:                    normal station 
                   2016                    None                

 

 Full Exam - General                     Integument                    
inspection of skin                    Overall:                    no rash, 
lesions                    2016                    None                

 

 Full Exam - General                     Neurologic                    
cranial nerves                    Overall:                    crainial nerves 2 
- 12 grossly intact                    2016                    None      
          

 

 Full Exam - General                     Psychiatric                    
orientation/consciousness                    Overall:                    
oriented to person, place and time                    2016               
     None                

 

 Full Exam - General                     Psychiatric                    
mood and affect                    Mood:                    anxious            
        2016                    tearful                

 

 Full Exam - General                     Constitutional                     
general appearance                    Overall:                    well 
developed                    2015                    None                

 

 Full Exam - General                     Constitutional                     
general appearance                    Overall:                    in no acute 
distress                    2015                    None                

 

 Full Exam - General                     Constitutional                     
general appearance                    Overall:                    well 
nourished                    2015                    None                

 

 Full Exam - General                     Respiratory                    
auscultation                    Overall:                    breath sounds clear 
bilaterally                    2015                    None              
  

 

 Full Exam - General                     Respiratory                    
respiratory effort/rhythm                    Overall:                    no 
retractions                    2015                    None              
  

 

 Full Exam - General                     Respiratory                    
respiratory effort/rhythm                    Overall:                    normal 
rate                    2015                    None                

 

 Full Exam - General                     Cardiovascular                    
auscultation of heart                    Overall:                    regular 
rate                    2015                    None                

 

 Full Exam - General                     Cardiovascular                    
auscultation of heart                    Overall:                    normal 
heart sounds                    2015                    None             
   

 

 Full Exam - General                     Musculoskeletal                    
lower extremity                    Palpation -  knee:                    no 
effusion                    2015                    None                

 

 Full Exam - General                     Musculoskeletal                    
lower extremity                    Inspection -  lower leg:                    
normal appearance                    2015                    None        
        

 

 Full Exam - General                     Musculoskeletal                    
lower extremity                    Palpation -  lower leg:                    
normal on palpation                    2015                    None      
          

 

 Full Exam - General                     Musculoskeletal                    
spine, ribs and pelvis                    Posture:                    kyphosis 
                   2015                    None                

 

 Full Exam - General                     Musculoskeletal                    
spine, ribs and pelvis                    Spine:                    tender @ 
cervical spine                    2015                    None           
     

 

 Full Exam - General                     Musculoskeletal                    
gait and station                    Overall:                    normal gait    
                2015                    None                

 

 Full Exam - General                     Musculoskeletal                    
gait and station                    Overall:                    normal station 
                   2015                    None                

 

 Full Exam - General                     Psychiatric                    
orientation/consciousness                    Overall:                    
oriented to person, place and time                    2015               
     None                

 

 Full Exam - General                     Constitutional                     
general appearance                    Development:                    appears 
stated age                    2015                    None                

 

 Full Exam - General                     Constitutional                     
general appearance                    Development:                    well 
developed                    2015                    None                

 

 Full Exam - General                     Constitutional                     
general appearance                    Hygiene/Attention to Grooming:           
         good hygiene                    2015                    None    
            

 

 Full Exam - General                     Eyes                    conjunctiva
/eyelids                    Overall:                    conjunctiva clear      
              2015                    None                

 

 Full Exam - General                     Eyes                    conjunctiva
/eyelids                    Overall:                    cornea clear           
         2015                    None                

 

 Full Exam - General                     Eyes                    conjunctiva
/eyelids                    Overall:                    eyelids normal         
           2015                    None                

 

 Full Exam - General                     Eyes                    pupils and 
irises                    Overall:                    pupils equal, round, 
reactive to light and accomodation                    2015               
     None                

 

 Full Exam - General                     Ears/Nose/Throat                  
  otoscopic exam                    Overall:                    external 
auditory canals clear                    2015                    None    
            

 

 Full Exam - General                     Ears/Nose/Throat                  
  otoscopic exam                    Overall:                    tympanic 
membranes clear                    2015                    None          
      

 

 Full Exam - General                     Ears/Nose/Throat                  
  lips/teeth/gingiva                    Overall:                    benign lips
                    2015                    None                

 

 Full Exam - General                     Ears/Nose/Throat                  
  lips/teeth/gingiva                    Overall:                    normal 
dentition                    2015                    None                

 

 Full Exam - General                     Ears/Nose/Throat                  
  oral cavity/pharynx/larynx                     Overall:                    
hypopharynx benign                    2015                    None       
         

 

 Full Exam - General                     Ears/Nose/Throat                  
  oral cavity/pharynx/larynx                     Overall:                    no 
masses                    2015                    None                

 

 Full Exam - General                     Ears/Nose/Throat                  
  oral cavity/pharynx/larynx                     Overall:                    
oral mucosa clear                    2015                    None        
        

 

 Full Exam - General                     Ears/Nose/Throat                  
  oral cavity/pharynx/larynx                     Overall:                    
oropharyngeal mucosa clear                    2015                    
None                

 

 Full Exam - General                     Cardiovascular                    
extremities                    Overall:                    no clubbing         
           2015                    None                

 

 Full Exam - General                     Abdomen                    
abdominal exam                    Overall:                    no tenderness    
                2015                    None                

 

 Full Exam - General                     Abdomen                    
abdominal exam                    Overall:                    normal bowel 
sounds                    2015                    None                

 

 Full Exam - General                     Integument                    
inspection of skin                    Overall:                    few scattered 
moles, no gross abnormalities                    2015                    
None                

 

 Full Exam - General                     Neurologic                    deep 
tendon reflexes                    Overall:                    deep tendon 
reflexes intact                    2015                    None          
      

 

 Full Exam - General                     Neurologic                    
cranial nerves                    Overall:                    crainial nerves 2 
- 12 grossly intact                    2015                    None      
          

 

 Full Exam - General                     Psychiatric                    
mood and affect                    Overall:                    normal mood and 
affect                    2015                    None                

 

 Full Exam - General                     Constitutional                     
general appearance                    Overall:                    well 
developed                    2015                    None                

 

 Full Exam - General                     Constitutional                     
general appearance                    Overall:                    in no acute 
distress                    2015                    None                

 

 Full Exam - General                     Constitutional                     
general appearance                    Overall:                    well 
nourished                    2015                    None                

 

 Full Exam - General                     Respiratory                    
auscultation                    Overall:                    breath sounds clear 
bilaterally                    2015                    None              
  

 

 Full Exam - General                     Respiratory                    
respiratory effort/rhythm                    Overall:                    no 
retractions                    2015                    None              
  

 

 Full Exam - General                     Respiratory                    
respiratory effort/rhythm                    Overall:                    normal 
rate                    2015                    None                

 

 Full Exam - General                     Cardiovascular                    
auscultation of heart                    Overall:                    regular 
rate                    2015                    None                

 

 Full Exam - General                     Cardiovascular                    
auscultation of heart                    Overall:                    normal 
heart sounds                    2015                    None             
   

 

 Full Exam - General                     Musculoskeletal                    
lower extremity                    Palpation -  knee:                    no 
effusion                    2015                    None                

 

 Full Exam - General                     Musculoskeletal                    
lower extremity                    Inspection -  lower leg:                    
normal appearance                    2015                    None        
        

 

 Full Exam - General                     Musculoskeletal                    
lower extremity                    Palpation -  lower leg:                    
normal on palpation                    2015                    None      
          

 

 Full Exam - General                     Musculoskeletal                    
gait and station                    Overall:                    normal gait    
                2015                    None                

 

 Full Exam - General                     Musculoskeletal                    
gait and station                    Overall:                    normal station 
                   2015                    None                

 

 Full Exam - General                     Psychiatric                    
orientation/consciousness                    Overall:                    
oriented to person, place and time                    2015               
     None                

 

 Full Exam - General                     Musculoskeletal                    
spine, ribs and pelvis                    Posture:                    kyphosis 
                   2015                    None                

 

 Full Exam - General                     Musculoskeletal                    
spine, ribs and pelvis                    Spine:                    tender @ 
cervical spine                    2015                    None           
     

 

 Full Exam - General                     Constitutional                     
general appearance                    Overall:                    well 
developed                    2015                    None                

 

 Full Exam - General                     Constitutional                     
general appearance                    Overall:                    in no acute 
distress                    2015                    None                

 

 Full Exam - General                     Constitutional                     
general appearance                    Overall:                    well 
nourished                    2015                    None                

 

 Full Exam - General                     Psychiatric                    
orientation/consciousness                    Overall:                    
oriented to person, place and time                    2015               
     None                

 

 Full Exam - General                     Respiratory                    
auscultation                    Overall:                    breath sounds clear 
bilaterally                    2015                    None              
  

 

 Full Exam - General                     Respiratory                    
respiratory effort/rhythm                    Overall:                    no 
retractions                    2015                    None              
  

 

 Full Exam - General                     Respiratory                    
respiratory effort/rhythm                    Overall:                    normal 
rate                    2015                    None                

 

 Full Exam - General                     Cardiovascular                    
auscultation of heart                    Overall:                    regular 
rate                    2015                    None                

 

 Full Exam - General                     Cardiovascular                    
auscultation of heart                    Overall:                    normal 
heart sounds                    2015                    None             
   

 

 Full Exam - General                     Musculoskeletal                    
spine, ribs and pelvis                    Overall:                    spine 
benign                    2015                    None                

 

 Full Exam - General                     Musculoskeletal                    
gait and station                    Overall:                    normal gait    
                2015                    None                

 

 Full Exam - General                     Musculoskeletal                    
gait and station                    Overall:                    normal station 
                   2015                    None                

 

 Full Exam - General                     Musculoskeletal                    
lower extremity                    Palpation -  knee:                    no 
effusion                    2015                    None                

 

 Full Exam - General                     Musculoskeletal                    
lower extremity                    Inspection -  lower leg:                    
normal appearance                    2015                    None        
        

 

 Full Exam - General                     Musculoskeletal                    
lower extremity                    Palpation -  lower leg:                    
normal on palpation                    2015                    None      
          

 

 Full Exam - General                     Musculoskeletal                    
lower extremity                    Palpation -  lower leg:                    
tenderness at the calcaneus                    2015                    
None                

 

 Full Exam - General                     Musculoskeletal                    
lower extremity                    Inspection -  foot:                    
callus                    2015                    None                

 

 Full Exam - General                     Constitutional                     
general appearance                    Overall:                    in no acute 
distress                    2015                    None                

 

 Full Exam - General                     Constitutional                     
general appearance                    Overall:                    well 
developed                    2015                    None                

 

 Full Exam - General                     Constitutional                     
general appearance                    Overall:                    well 
nourished                    2015                    None                

 

 Full Exam - General                     Psychiatric                    
orientation/consciousness                    Overall:                    
oriented to person, place and time                    2015               
     None                

 

 Full Exam - General                     Psychiatric                    
mood and affect                    Mood:                    anxious            
        2015                    tearful                

 

 Full Exam - General                     Neurologic                    
cranial nerves                    Overall:                    crainial nerves 2 
- 12 grossly intact                    2015                    None      
          

 

 Full Exam - General                     Integument                    
inspection of skin                    Overall:                    no rash, 
lesions                    2015                    None                

 

 Full Exam - General                     Musculoskeletal                    
gait and station                    Overall:                    normal station 
                   2015                    None                

 

 Full Exam - General                     Musculoskeletal                    
gait and station                    Overall:                    normal gait    
                2015                    None                

 

 Full Exam - General                     Lymphatic                    neck 
nodes                    Overall:                    anterior cervical chain 
benign                    2015                    None                

 

 Full Exam - General                     Lymphatic                    neck 
nodes                    Overall:                    posterior cervical chain 
benign                    2015                    None                

 

 Full Exam - General                     Abdomen                    
abdominal exam                    Overall:                    no tenderness    
                2015                    None                

 

 Full Exam - General                     Abdomen                    
abdominal exam                    Overall:                    normal bowel 
sounds                    2015                    None                

 

 Full Exam - General                     Cardiovascular                    
auscultation of heart                    Overall:                    regular 
rate                    2015                    None                

 

 Full Exam - General                     Cardiovascular                    
auscultation of heart                    Overall:                    normal 
heart sounds                    2015                    None             
   

 

 Full Exam - General                     Cardiovascular                    
auscultation of heart                    Overall:                    no murmurs
                    2015                    None                

 

 Full Exam - General                     Cardiovascular                    
extremities                    Overall:                    no clubbing         
           2015                    None                

 

 Full Exam - General                     Respiratory                    
auscultation                    Overall:                    breath sounds clear 
bilaterally                    2015                    None              
  

 

 Full Exam - General                     Respiratory                    
respiratory effort/rhythm                    Overall:                    normal 
rate                    2015                    None                

 

 Full Exam - General                     Respiratory                    
respiratory effort/rhythm                    Overall:                    no 
retractions                    2015                    None              
  

 

 Full Exam - General                     Ears/Nose/Throat                  
  otoscopic exam                    Overall:                    external 
auditory canals clear                    2015                    None    
            

 

 Full Exam - General                     Ears/Nose/Throat                  
  otoscopic exam                    Overall:                    tympanic 
membranes clear                    2015                    None          
      

 

 Full Exam - General                     Ears/Nose/Throat                  
  oral cavity/pharynx/larynx                     Overall:                    
oral mucosa clear                    2015                    None        
        

 

 Full Exam - General                     Eyes                    conjunctiva
/eyelids                    Overall:                    conjunctiva clear      
              2015                    None                

 

 Full Exam - General                     Eyes                    pupils and 
irises                    Overall:                    pupils equal, round, 
reactive to light and accomodation                    2015               
     None                



                                                                              



Procedures

                      





 Procedure                    Codes                    Date                

 

                     FLU VAC NO PRSV 4 DILLAN 3 YRS+                              
        CPT-4: 31223                    10/18/2017                

 

                     ADMIN INFLUENZA VIRUS VAC                                 
     CPT-4:                     10/18/2017                

 

                     TRIAMCINOLONE ACET INJ NOS                                
      CPT-4:                     2016                

 

                     THER/PROPH/DIAG INJ SC/IM                                 
     CPT-4: 53924                    2016                



                                                                               
                             



Vital Signs

                      





 Date                    Vital                









 10/18/2017                                        Blood Pressure 1: 110/66 Code
: 8480-6                                                          BMI: 25.7 Code
: 18453-0                                                          Heart Rate 1
: 81 bpm                                                          Height: 5'3" 
                                                         SpO2: 97%             
                                             Weight: 145 lbs                   
                

 

 2017                                        Blood Pressure 1: 124/70 Code
: 8480-6                                                          BMI: 25.2 Code
: 68323-2                                                          Heart Rate 1
: 87 bpm                                                          Height: 5'3" 
                                                         SpO2: 97%             
                                             Weight: 142 lbs                   
                









 2017                                        Blood Pressure 1: 160/94 Code
: 8480-6                                                          Blood 
Pressure 1: 136/74 Code: 8480-6                                                
          BMI: 25.5 Code: 82606-2                                              
            Heart Rate 1: 80 bpm                                               
           Height: 5'3"                                                        
  SpO2: 98%                                                          Weight: 
144 lbs                                   









 2016                                        Blood Pressure 1: 122/78 Code
: 8480-6                                                          BMI: 25.9 Code
: 37170-5                                                          Heart Rate 1
: 107 bpm                                                          Height: 5'3"
                                                          SpO2: 98%            
                                              Weight: 146 lbs                  
                 









 2016                                        Height:                     
                                      Weight:                                   









 2016                                        Blood Pressure 1: 112/68 Code
: 8480-6                                                          BMI: 26.7 Code
: 83277-8                                                          Heart Rate 1
: 62 bpm                                                          Height: 5'3" 
                                                         SpO2: 94%             
                                             Weight: 151 lbs                   
                









 10/26/2016                                        Blood Pressure 1: 120/64 Code
: 8480-6                                                          BMI: 26.7 Code
: 56940-4                                                          Heart Rate 1
: 77 bpm                                                          Height: 5'3" 
                                                         SpO2: 97%             
                                             Temperature: 36.7 (C) / 98.1 (F)  
                                                        Weight: 151 lbs        
                           









 2016                                        Blood Pressure 1: 118/80 Code
: 8480-6                                                          BMI: 27.8 Code
: 03227-8                                                          Heart Rate 1
: 80 bpm                                                          Height: 5'3" 
                                                         SpO2: 98%             
                                             Weight: 157 lbs                   
                

 

 2016                                        Blood Pressure 1: 180/106 
Code: 8480-6                                                          BMI: 27.1 
Code: 45018-8                                                          Heart 
Rate 1: 98 bpm                                                          Height: 
5'3"                                                          SpO2: 98%        
                                                  Weight: 153 lbs              
                     

 

 2016                                        Blood Pressure 1: 132/88 Code
: 8480-6                                                          BMI: 31.0 Code
: 61328-3                                                          Heart Rate 1
: 75 bpm                                                          Height: 5'   
                                                       SpO2: 97%               
                                           Weight: 158 lbs 8 oz                
                  

 

 2016                                        Blood Pressure 1: 136/80 Code
: 8480-6                                                          BMI: 32.1 Code
: 71983-4                                                          Heart Rate 1
: 78 bpm                                                          Height: 5'   
                                                       SpO2: 97%               
                                           Weight: 164 lbs 8 oz                
                  

 

 2016                                        Blood Pressure 1: 142/94 Code
: 8480-6                                                          BMI: 32.4 Code
: 45320-4                                                          Heart Rate 1
: 100 bpm                                                          Height: 5'  
                                                        SpO2: 98%              
                                            Weight: 166 lbs                    
               

 

 2015                                        Blood Pressure 1: 120/80 Code
: 8480-6                                                          BMI: 34.2 Code
: 65293-9                                                          Heart Rate 1
: 68 bpm                                                          Height: 5'   
                                                       SpO2: 98%               
                                           Weight: 175 lbs                     
              

 

 2015                                        Blood Pressure 1: 138/70 Code
: 8480-6                                                          BMI: 34.4 Code
: 25060-7                                                          Heart Rate 1
: 61 bpm                                                          Height: 5'   
                                                       SpO2: 97%               
                                           Weight: 176 lbs                     
              

 

 2015                                        Blood Pressure 1: 122/82 Code
: 8480-6                                                          BMI: 34.6 Code
: 22063-4                                                          Heart Rate 1
: 62 bpm                                                          Height: 5'   
                                                       SpO2: 96%               
                                           Weight: 177 lbs                     
              

 

 2015                                        Blood Pressure 1: 132/88 Code
: 8480-6                                                          BMI: 32.1 Code
: 81468-1                                                          Heart Rate 1
: 68 bpm                                                          Height: 5'3" 
                                                         SpO2: 97%             
                                             Weight: 181 lbs                   
                



                                                                               
                                                                               
                                                                                



Functional Status

          No Functional Status data                                            
              



History of Present Illness

                      





 Symptom Name                    Status                    Result              
      Effective Date                    Notes                

 

 back pain                    Location                    thoracic spine       
             10/18/2017                    None                

 

 back pain                    Location                    Cervical spine       
             10/18/2017                    as the day goes on                 

 

 back pain                    Onset and Resolution                    ongoing  
                  10/18/2017                    None                

 

 back pain                    Onset of Symptom                     years ago   
                 10/18/2017                    None                

 

 back pain                    Limitation on Activities                    
moderately limits activities                    10/18/2017                    
None                

 

 back pain                    Severity                    severe               
     10/18/2017                    None                

 

 back pain                    Pertinent Findings                    extremity 
numbness                    10/18/2017                    None                

 

 back pain                    Pertinent Findings                    extremity 
weakness                    10/18/2017                    None                

 

 back pain                    Pertinent Findings                    sleep 
disturbance                    10/18/2017                    None              
  

 

 back pain                    Pertinent Findings                    weakness   
                 10/18/2017                    None                

 

 hypertension                    Onset and Resolution                    
ongoing                    10/18/2017                    None                

 

 hypertension                    Onset of Symptom                    during 
adulthood                    10/18/2017                    None                

 

 hypertension                    Alleviating Factors                    
medication                    10/18/2017                    None                

 

 hypertension                    Pertinent Findings                    Denies 
dizziness                    10/18/2017                    None                

 

 hypertension                    Pertinent Findings                    Denies 
dyspnea                    10/18/2017                    None                

 

 hypertension                    Pertinent Findings                    edema   
                 10/18/2017                    if she eats salty foods         
        

 

 hypertension                    Quality                    primary 
hypertension                    10/18/2017                    None             
   

 

 hypertension                    Blood Pressure Values                    not 
checking blood pressure at home                    10/18/2017                  
  None                

 

 back pain                    Quality                    constant              
      10/18/2017                    None                

 

 hypertension                    Quality                    stable             
       10/18/2017                    None                

 

 back pain                    Location                    thoracic spine       
             2017                    None                

 

 back pain                    Location                    Cervical spine       
             2017                    None                

 

 back pain                    Quality                    intermittent          
          2017                    None                

 

 back pain                    Onset and Resolution                    ongoing  
                  2017                    None                

 

 back pain                    Onset of Symptom                     years ago   
                 2017                    None                

 

 back pain                    Limitation on Activities                    
moderately limits activities                    2017                    
None                

 

 back pain                    Triggers                    no known associated 
factors                    2017                    None                

 

 back pain                    Severity                    severe               
     2017                    None                

 

 back pain                    Pertinent Findings                    extremity 
numbness                    2017                    None                

 

 back pain                    Pertinent Findings                    extremity 
weakness                    2017                    None                

 

 back pain                    Pertinent Findings                    sleep 
disturbance                    2017                    None              
  

 

 back pain                    Pertinent Findings                    weakness   
                 2017                    None                

 

 hypertension                    Onset and Resolution                    
ongoing                    2017                    None                

 

 hypertension                    Onset of Symptom                    during 
adulthood                    2017                    None                

 

 hypertension                    Blood Pressure Values                    
patient checking blood pressure at home - did not bring in readings            
        2017                    None                

 

 hypertension                    Alleviating Factors                    
medication                    2017                    None                

 

 hypertension                    Pertinent Findings                    
dizziness                    2017                    intermittently      
          

 

 hypertension                    Pertinent Findings                    Denies 
dyspnea                    2017                    None                

 

 hypertension                    Pertinent Findings                    Denies 
edema                    2017                    None                

 

 medication follow up                    Location                    oral 
intake                    2017                    None                

 

 medication follow up                    Additional Comments                    
medication use                    2017                    None           
     

 

 back pain                    Location                    thoracic spine       
             2016                    None                

 

 back pain                    Location                    Cervical spine       
             2016                    None                

 

 back pain                    Quality                    intermittent          
          2016                    None                

 

 back pain                    Onset and Resolution                    ongoing  
                  2016                    None                

 

 back pain                    Onset of Symptom                     years ago   
                 2016                    None                

 

 back pain                    Limitation on Activities                    
moderately limits activities                    2016                    
None                

 

 back pain                    Triggers                    no known associated 
factors                    2016                    None                

 

 back pain                    Severity                    severe               
     2016                    None                

 

 back pain                    Pertinent Findings                    extremity 
numbness                    2016                    None                

 

 back pain                    Pertinent Findings                    extremity 
weakness                    2016                    None                

 

 back pain                    Pertinent Findings                    sleep 
disturbance                    2016                    None              
  

 

 back pain                    Pertinent Findings                    weakness   
                 2016                    None                

 

 hypertension                    Onset and Resolution                    
ongoing                    2016                    None                

 

 hypertension                    Onset of Symptom                    during 
adulthood                    2016                    None                

 

 hypertension                    Alleviating Factors                    
medication                    2016                    None                

 

 hypertension                    Pertinent Findings                    
dizziness                    2016                    intermittently      
          

 

 hypertension                    Pertinent Findings                    Denies 
dyspnea                    2016                    None                

 

 hypertension                    Blood Pressure Values                    
patient checking blood pressure at home - did not bring in readings            
        2016                    None                

 

 hypertension                    Pertinent Findings                    Denies 
edema                    2016                    None                

 

 Hospital Follow Up                    Quality                    acute        
            2016                    None                

 

 anxiety                    Quality                    acute                    
2016                    None                

 

 anxiety                    Quality                    agitation               
     2016                    None                

 

 anxiety                    Quality                    worsening               
     2016                    None                

 

 anxiety                    Onset and Resolution                    sudden in 
onset                    2016                    None                

 

 anxiety                    Limitation on Activities                    is 
incapacitating                    2016                    None           
     

 

 anxiety                    Exacerbating Factors                    medication 
                   2016                    None                

 

 Hospital Follow Up                    _                    pneumonia          
          2016                    None                

 

 Hospital Follow Up                    Frequency of Episodes                    
1 weeks                    2016                    None                

 

 Hospital Follow Up                    Alleviating Factors                    
rest                    2016                    None                

 

 Hospital Follow Up                    Alleviating Factors                    
medication                    2016                    None                

 

 Hospital Follow Up                    Pertinent Findings                    
Denies fever                    2016                    None             
   

 

 chest pain/pressure                    Location                    stabbing   
                 10/26/2016                    None                

 

 chest pain/pressure                    Location                    on the left 
side of on the chest                    10/26/2016                    None     
           

 

 chest pain/pressure                    Quality                    aching      
              10/26/2016                    None                

 

 chest pain/pressure                    Quality                    constant    
                10/26/2016                    None                

 

 chest pain/pressure                    Onset and Resolution                    
sudden in onset                    10/26/2016                    None          
      

 

 chest pain/pressure                    Onset of Symptom                    5 
days ago                    10/26/2016                    None                

 

 cough                    Location                    in the throat            
        10/26/2016                    None                

 

 cough                    Quality                    dry                    10/
                    None                

 

 cough                    Quality                    constant                  
  10/26/2016                    None                

 

 cough                    Onset and Resolution                    sudden in 
onset                    10/26/2016                    None                

 

 cough                    Onset of Symptom                    5 days ago       
             10/26/2016                    None                

 

 hypertension                    Quality                    constant           
         2016                    None                

 

 hypertension                    Onset and Resolution                    
ongoing                    2016                    None                

 

 hypertension                    Blood Pressure Values                    
patient checking blood pressure at home - did not bring in readings            
        2016                    None                

 

 hypertension                    Pertinent Findings                    Denies 
dizziness                    2016                    None                

 

 hypertension                    Pertinent Findings                    Denies 
dyspnea                    2016                    None                

 

 hypertension                    Pertinent Findings                    Denies 
edema                    2016                    None                

 

 Hospital Follow Up                    _                    Other: hypertention
                     2016                    None                

 

 Hospital Follow Up                    Quality                    chronic 
illness                    2016                    None                

 

 hypertension                    Quality                    constant           
         2016                    None                

 

 hypertension                    Onset and Resolution                    
ongoing                    2016                    None                

 

 back pain                    Location                    thoracic spine       
             2016                    None                

 

 back pain                    Location                    Cervical spine       
             2016                    None                

 

 back pain                    Onset and Resolution                    ongoing  
                  2016                    None                

 

 back pain                    Onset of Symptom                     years ago   
                 2016                    None                

 

 back pain                    Limitation on Activities                    
moderately limits activities                    2016                    
None                

 

 back pain                    Triggers                    no known associated 
factors                    2016                    None                

 

 back pain                    Severity                    severe               
     2016                    None                

 

 back pain                    Pertinent Findings                    extremity 
numbness                    2016                    None                

 

 back pain                    Pertinent Findings                    extremity 
weakness                    2016                    None                

 

 back pain                    Pertinent Findings                    sleep 
disturbance                    2016                    None              
  

 

 back pain                    Pertinent Findings                    weakness   
                 2016                    None                

 

 hypertension                    Onset and Resolution                    
ongoing                    2016                    None                

 

 hypertension                    Onset of Symptom                    during 
adulthood                    2016                    None                

 

 hypertension                    Blood Pressure Values                    not 
checking blood pressure at home                    2016                  
  None                

 

 hypertension                    Pertinent Findings                    Denies 
dizziness                    2016                    None                

 

 hypertension                    Pertinent Findings                    Denies 
dyspnea                    2016                    None                

 

 back pain                    Quality                    intermittent          
          2016                    None                

 

 back pain                    Frequency of Episodes                    
decreasing                    2016                    None                

 

 hypertension                    Alleviating Factors                    
medication                    2016                    None                

 

 rash                    Location-[facility]                    in a generalized 
area                    2016                    None                

 

 rash                    Location-[facility]                    on the upper body   
                 2016                    None                

 

 rash                    Location-[facility]                    on the lower body   
                 2016                    None                

 

 rash                    Quality                    acute                                        None                

 

 rash                    Quality                    new                                        None                

 

 rash                    Quality                    worsening                  
  2016                    None                

 

 rash                    Quality                    pruritic                    
2016                    None                

 

 rash                    Color                    erythematous                 
   2016                    None                

 

 rash                    Onset and Resolution                    sudden in 
onset                    2016                    None                

 

 rash                    Onset of Symptom                    2.5 weeks ago     
               2016                    None                

 

 rash                    Triggers                    no known triggers         
           2016                    None                

 

 pelvic pain                    Location                    on both sides      
              2016                    None                

 

 pelvic pain                    Quality                    aching              
      2016                    None                

 

 pelvic pain                    Quality                    dull                
    2016                    None                

 

 pelvic pain                    Quality                    sharp               
     2016                    None                

 

 pelvic pain                    Onset and Resolution                    sudden 
in onset                    2016                    None                

 

 pelvic pain                    Frequency of Episodes                    daily 
                   2016                    None                

 

 pelvic pain                    Onset and Resolution                    
resolved                    2016                    None                

 

 pelvic pain                    Location                    on both sides      
              2016                    None                

 

 pelvic pain                    Quality                    dull                
    2016                    None                

 

 pelvic pain                    Quality                    aching              
      2016                    None                

 

 pelvic pain                    Quality                    sharp               
     2016                    None                

 

 pelvic pain                    Onset and Resolution                    sudden 
in onset                    2016                    None                

 

 pelvic pain                    Onset of Symptom                    1 weeks ago
                    2016                    None                

 

 pelvic pain                    Limitation on Activities                    
moderately limits activities                    2016                    
None                

 

 pelvic pain                    Frequency of Episodes                    daily 
                   2016                    None                

 

 pelvic pain                    Pertinent Findings                    bladder 
pain                    2016                    None                

 

 pelvic pain                    Pertinent Findings                    back pain
                    2016                    None                

 

 back pain                    Location                    thoracic spine       
             2015                    None                

 

 back pain                    Quality                    constant              
      2015                    None                

 

 back pain                    Quality                    worsening             
       2015                    None                

 

 back pain                    Onset and Resolution                    ongoing  
                  2015                    None                

 

 back pain                    Onset of Symptom                    _ months ago 
                   2015                    None                

 

 back pain                    Onset of Symptom                    _ years ago  
                  2015                    None                

 

 back pain                    Limitation on Activities                    
moderately limits activities                    2015                    
None                

 

 back pain                    Frequency of Episodes                    
increasing                    2015                    None                

 

 back pain                    Triggers                    no known associated 
factors                    2015                    None                

 

 back pain                    Pertinent Findings                    extremity 
numbness                    2015                    None                

 

 back pain                    Pertinent Findings                    extremity 
weakness                    2015                    None                

 

 back pain                    Pertinent Findings                    sleep 
disturbance                    2015                    None              
  

 

 back pain                    Pertinent Findings                    weakness   
                 2015                    None                

 

 back pain                    Radiating                    down the right arm  
                  2015                    None                

 

 back pain                    Severity                    severe               
     2015                    None                

 

 back pain                    Location                    Cervical spine       
             2015                    None                

 

 back pain                    Location                    thoracic spine       
             2015                    None                

 

 back pain                    Quality                    worsening             
       2015                    None                

 

 back pain                    Quality                    constant              
      2015                    None                

 

 back pain                    Onset and Resolution                    ongoing  
                  2015                    None                

 

 back pain                    Pertinent Findings                    extremity 
numbness                    2015                    None                

 

 back pain                    Pertinent Findings                    extremity 
weakness                    2015                    None                

 

 back pain                    Pertinent Findings                    sleep 
disturbance                    2015                    None              
  

 

 back pain                    Pertinent Findings                    weakness   
                 2015                    None                

 

 back pain                    Onset of Symptom                    _ months ago 
                   2015                    None                

 

 back pain                    Onset of Symptom                    _ years ago  
                  2015                    None                

 

 back pain                    Limitation on Activities                    
moderately limits activities                    2015                    
None                

 

 back pain                    Frequency of Episodes                    
increasing                    2015                    None                

 

 back pain                    Triggers                    no known associated 
factors                    2015                    None                

 

 back pain                    Radiating                    does not radiate    
                2015                    None                

 

 foot pain                    Location                    on the right         
           2015                    None                

 

 foot pain                    Quality                    tingling              
      2015                    None                

 

 foot pain                    Quality                    numbness              
      2015                    None                

 

 foot pain                    Quality                    sharp pain            
        2015                    None                

 

 foot pain                    Quality                    intermittent          
          2015                    None                

 

 foot pain                    Timing of Episodes                    in the 
morning                    2015                    None                

 

 foot pain                    Frequency of Episodes                    daily   
                 2015                    None                

 

 foot pain                    Onset of Symptom                    2 months ago 
                   2015                    None                

 

 foot pain                    Mechanism of injury                    unknown   
                 2015                    None                

 

 foot pain                    Pertinent Findings                    tingling   
                 2015                    None                

 

 foot pain                    Pertinent Findings                    limping    
                2015                    None                

 

 foot pain                    Pertinent Findings                    pain with 
movement                    2015                    None                

 

 knee pain                    Location                    on the right         
           2015                    None                

 

 knee pain                    Quality                    dull pain             
       2015                    None                

 

 knee pain                    Quality                    tenderness            
        2015                    None                

 

 knee pain                    Quality                    throbbing             
       2015                    None                

 

 knee pain                    Pertinent Findings                    limping    
                2015                    None                

 

 knee pain                    Pertinent Findings                    tingling   
                 2015                    None                

 

 seizure                    Quality                    multiple event          
          2015                    last seizure in October around 
[facility]. Seizures started as a teenager-Freshman in highschool               
  

 

 seizure                    Onset and Resolution                    ongoing    
                2015                    None                

 

 seizure                    Onset of Symptom                    _ years ago    
                2015                    None                

 

 seizure                    Frequency of Episodes                    unchanged 
                   2015                    None                

 

 seizure                    Pertinent Findings                    anxiety      
              2015                    None                

 

 seizure                    Pertinent Findings                    dizziness    
                2015                    occ                

 

 seizure                    Pertinent Findings                    Denies 
dyspnea                    2015                    None                

 

 anxiety                    Quality                    intermittent            
        2015                    None                

 

 anxiety                    Onset and Resolution                    ongoing    
                2015                    None                

 

 anxiety                    Frequency of Episodes                    weekly    
                2015                    None                

 

 anxiety                    Frequency of Episodes                    daily     
               2015                    None                

 

 anxiety                    Pertinent Findings                    Denies 
dyspnea                    2015                    None                

 

 anxiety                    Pertinent Findings                    Denies 
insomnia                    2015                    None                

 

 anxiety                    Pertinent Findings                    syncope      
              2015                    only with seizures                 

 

 depression                    Quality                    intermittent         
           2015                    None                

 

 depression                    Onset and Resolution                    ongoing 
                   2015                    None                

 

 depression                    Pertinent Findings                    anxiety   
                 2015                    None                

 

 depression                    Pertinent Findings                    Denies self
-harm                    2015                    None                

 

 hypertension                    Quality                    constant           
         2015                    None                

 

 hypertension                    Onset and Resolution                    
ongoing                    2015                    None                

 

 hypertension                    Blood Pressure Values                    not 
checking blood pressure at home                    2015                  
  has a machine if needed                 

 

 hypertension                    Pertinent Findings                    anxiety 
                   2015                    None                

 

 hypertension                    Pertinent Findings                    
dizziness                    2015                    only occ            
     

 

 hypertension                    Pertinent Findings                    Denies 
dyspnea                    2015                    None                

 

 depression                    Onset of Symptom                    during 
adulthood                    2015                    None                

 

 depression                    Limitation on Activities                    does 
not limit activities                    2015                    None     
           

 

 depression                    Frequency of Episodes                    
unchanged                    2015                    None                

 

 depression                    Significant Medical Conditions                  
  anxiety disorder                    2015                    None       
         

 

 depression                    Triggers                    no known associated 
factors                    2015                    None                

 

 depression                    Alleviating Factors                    
medication                    2015                    None                

 

 anxiety                    Significant Family History                    
anxiety disorder                    2015                    None         
       

 

 anxiety                    Significant Family History                    
depression                    2015                    None                

 

 anxiety                    Triggers                    stress                 
   2015                    None                

 

 anxiety                    Onset of Symptom                    during 
childhood                    2015                    None                

 

 anxiety                    Alleviating Factors                    medication  
                  2015                    None                

 

 seizure                    Significant Medical Conditions                    
known seizure disorder                    2015                    None   
             

 

 seizure                    Significant Medications                    
antiepileptic medications                    2015                    
dilantin                 

 

 seizure                    Triggers                    no known associated 
factors                    2015                    None                

 

 seizure                    Alleviating Factors                    medication  
                  2015                    None                



                                                                    



Advance Directives

          No Advance Directive data                                            
                        



Encounters

                      





 Encounter                    Performer                    Location            
        Codes                    Date                

 

                     [id number] [phone] EST. PATIENT, LEVEL IV

Diagnosis: Essential (primary) hypertension[ICD10: I10]

Diagnosis: Generalized anxiety disorder[ICD10: F41.1]

Diagnosis: Low back pain[ICD10: M54.5]

Diagnosis: Other generalized epilepsy and epileptic syndromes, not intractable, 
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

Diagnosis: Other depressive episodes[ICD10: F32.8]

Diagnosis: Encounter for immunization[ICD10: Z23]                              
        Lynne Hurt MD, [facility]               
     CPT-4: 41077                    10/18/2017                

 

                     [phone] 14756 EST. PATIENT, LEVEL IV

Diagnosis: Essential (primary) hypertension[ICD10: I10]

Diagnosis: Generalized anxiety disorder[ICD10: F41.1]

Diagnosis: Low back pain[ICD10: M54.5]

Diagnosis: Pain in thoracic spine[ICD10: M54.6]

Diagnosis: Encounter for screening mammogram for malignant neoplasm of breast[
ICD10: Z12.31]                                      Lynne Hurt MD, [facility]                    CPT-4: 26126                  
  2017                

 

                     [phone] 13587 EST. PATIENT, LEVEL III

Diagnosis: Essential (primary) hypertension[ICD10: I10]

Diagnosis: Generalized anxiety disorder[ICD10: F41.1]                          
            Lynne Hurt MD, [facility]           
         CPT-4: 56764                    2017                

 

                     [phone] [phone] EST. PATIENT, LEVEL IV

Diagnosis: Essential (primary) hypertension[ICD10: I10]

Diagnosis: Other depressive episodes[ICD10: F32.8]

Diagnosis: Other generalized epilepsy and epileptic syndromes, not intractable, 
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

Diagnosis: Generalized anxiety disorder[ICD10: F41.1]

Diagnosis: Low back pain[ICD10: M54.5]                                      
Lynne Hurt MD, [facility]                    CPT-
4: 27698                    2016                

 

                     [id number] EST. PATIENT, LEVEL IV

Diagnosis: Generalized anxiety disorder[ICD10: F41.1]

Diagnosis: Other depressive episodes[ICD10: F32.8]

Diagnosis: Restlessness and agitation[ICD10: R45.1]                            
          [facility]               
     CPT-4: 56902                    2016                

 

                     [phone] 27912 EST. PATIENT, LEVEL III

Diagnosis: Localized swelling, mass and lump, trunk[ICD10: R22.2]              
                        Lynne Hurt MD 
[facility]                    CPT-4: 53177                    2016              
  

 

                     [id number] EST. PATIENT, LEVEL III

Diagnosis: Pleurisy[ICD10: R09.1]

Diagnosis: Cough[ICD10: R05]                                      Mary Hurt MD [facility]                    CPT-4: 75257      
              10/26/2016                

 

                     [phone] 89907 EST. PATIENT, LEVEL IV

Diagnosis: Essential (primary) hypertension[ICD10: I10]

Diagnosis: Cervicalgia[ICD10: M54.2]

Diagnosis: Radiculopathy, cervicothoracic region[ICD10: M54.13]                
                      Lynne Hurt MD, [facility] 
                   CPT-4: 03545                    2016                

 

                     [id number] EST. PATIENT, LEVEL III

Diagnosis: Essential (primary) hypertension[ICD10: I10]                        
              Mary Hurt MD [facility]           
         CPT-4: 92358                    2016                

 

                     [phone] [phone] EST. PATIENT, LEVEL III

Diagnosis: Essential (primary) hypertension[ICD10: I10]                        
              Lynne Hurt MD, [facility]         
           CPT-4: 71020                    2016                

 

                     [phone] [phone] EST. PATIENT, LEVEL IV

Diagnosis: Essential (primary) hypertension[ICD10: I10]

Diagnosis: Low back pain[ICD10: M54.5]

Diagnosis: Other depressive episodes[ICD10: F32.8]                             
         Lynne Hurt MD [facility]              
      CPT-4: 50654                    2016                

 

                     [phone]) [phone] EST. PATIENT, LEVEL IV

Diagnosis: Generalized abdominal pain[ICD10: R10.84]

Diagnosis: Gross hematuria[ICD10: R31.0]

Diagnosis: Long term (current) use of antithrombotics/antiplatelets[ICD10: 
Z79.02]

Diagnosis: Urinary tract infection, site not specified[ICD10: N39.0]           
                           Jordyn Hurt MD
, [facility]                    CPT-4: 53359                    2016            
    

 

                     [phone] 07421 EST. PATIENT, LEVEL IV

Diagnosis: Cervicalgia[ICD10: M54.2]

Diagnosis: Radiculopathy, cervicothoracic region[ICD10: M54.13]

Diagnosis: Pain in thoracic spine[ICD10: M54.6]                                
      Lynne Hurt MD, [facility]                 
   CPT-4: 70767                    2015                

 

                     [phone] EST. PATIENT, LEVEL III

Diagnosis: Cervicalgia[ICD10: M54.2]

Diagnosis: Pain in thoracic spine[ICD10: M54.6]

Diagnosis: Radiculopathy, cervicothoracic region[ICD10: M54.13]

Diagnosis: Low back pain[ICD10: M54.5]                                      
Jordyn Hurt MD, [facility]                    
CPT-4: 63291                    2015                

 

                     [phone] EST. PATIENT, LEVEL III

Diagnosis: Plantar fasciitis of right foot[ICD9: 728.71]

Diagnosis: Lumbar spine pain[ICD9: 724.2]

Diagnosis: Right knee pain[ICD9: 719.46]                                      
Jordyn Hurt MD, [facility]                    
CPT-4: 19540                    2015                

 

                     ([id number]) OFFICE  VISIT, NEW - LEVEL 4

Diagnosis: ESSENTIAL HYPERTENSION[ICD9: 401.9]

Diagnosis: Seizure disorder[ICD9: 345.90]

Diagnosis: Factor 5 Leiden mutation, heterozygous[ICD9: 289.81]

Diagnosis: Anxiety[ICD9: 300.00]

Diagnosis: Depression[ICD9: 311]                                      Jordyn Hurt MD, [facility]                    CPT-4: 
61484                    2015                



                                                                               
                                                                               
                                                                               
                               



Plan of Care

                      





 Planned Activity                    Notes                    Codes            
        Status                    Date                

 

 Visit Plan:                     Hypertension - well controlled - continue with 
current medications, continue with no added salt diet. Pt has been encouraged 
to exercise daily. The pt has been advised to call the office if there are any 
acute concerns about change in blood pressure readings at home. Pain - chronic 
of low back - I have recommended a referral to Dr. Artis - for injections. 
Refilled muscle relaxers. Chronic Depression and anxiety - the pt has symptoms 
of chronic anxiety and depression that have been fairly well controlled since 
the last office visit. The pt has expected periods of exacerbation with 
abatement of the symptoms with change in situational exposure. No change in 
current medications. flu shot today

                                                             10/18/2017        
        

 

 Appointment: Lynne Hurt 

WPtel:[phone]

 [address] [place], [place] [id number]

                                                             (15 min) 
Moderate                    10/18/2017                

 

 Patient Education: Patient Medication Summary                                 
                            Completed                    10/18/2017            
    

 

 Patient Education: Obesity                                                    
         Completed                    10/18/2017                

 

 Care Plan: Referral Order                                        SNOMED-CT : 
[id number]

                    Pending                    10/18/2017                

 

 Visit Plan:                     Hypertension - well controlled - continue with 
current medications, continue with no added salt diet. Pt has been encouraged 
to exercise daily. The pt has been advised to call the office if there are any 
acute concerns about change in blood pressure readings at home. Back pain, 
Thoracic back pain, - referral to [facility] - at [address] - see if pt 
can get in to be seen for therapy on her upper and lower back. Chronic 
Depression and anxiety - the pt has symptoms of chronic anxiety and depression 
that have been fairly well controlled since the last office visit. The pt has 
expected periods of exacerbation with abatement of the symptoms with change in 
situational exposure. No change in current medications.

                                                             2017        
        

 

 Appointment: Lynne Hurt 

WPtel:[phone]

 [facility]

                                                             (15 min) 
Moderate                    2017                

 

 Patient Education: Patient Medication Summary                                 
                            Completed                    2017            
    

 

 Patient Education: Obesity                                                    
         Completed                    2017                

 

 Care Plan: SCREENINGMAMMOGRAPHYDIGITAL                                        
Critical access hospital : [address]-9

                    Pending                    2017                

 

 Appointment: Lynne Hurt 

WPtel:[phone]

 [facility]

                                                             (30 min) Complex
                    2017                

 

 Visit Plan:                     Hypertension - well controlled - continue with 
current medications, continue with no added salt diet. Pt has been encouraged 
to exercise daily. The pt has been advised to call the office if there are any 
acute concerns about change in blood pressure readings at home. Seizure 
disorder - Depression and Anxiety - continue with current medications - 
symptoms are significantly improved per patient report

                                                             2017        
        

 

 Appointment: Lynne Hurt 

WPtel:[phone]

 [facility]

                                                             (30 min) Complex
                    2017                

 

 Patient Education: Patient Medication Summary                                 
                            Completed                    2017            
    

 

 Patient Education: Obesity                                                    
         Completed                    2017                

 

 Visit Plan:                     Hypertension - well controlled - continue with 
current medications, continue with no added salt diet. Pt has been encouraged 
to exercise daily. The pt has been advised to call the office if there are any 
acute concerns about change in blood pressure readings at home. Depression and 
anxiety - Erin reports that her depression episode related to the keppra seems 
to be resolved - she feels much better on the Lamictal. I have discussed her 
case with the patient and her mom - she had previously seen a psychiatrist -but 
with a shortage of any local psychiatrists, I have recommended that she needs 
to be seen by a psychiatrist at [facility] and we are making a referral to 
outpatient psychiatry. They understand that it will probably be several months 
before we can get Erin in to be seen. Hx of seizure disorder - I have also 
recommended that with her history of seizures we need for her to have a 
relationship with a neurologist - they are okay with staying in the area for a 
neurologist and would like to be seen in [place]. I have recommended Dr. Ugarte at [place] for further evaluation and to establish care. They 
understand that it will most-likely be several months before she is seen by 
neurology.

                                                             2016        
        

 

 Appointment: Lynne Hurt 

WPtel:[phone]

 [facility]

                                                             (15 min) 
Moderate                    2016                

 

 Patient Education: Patient Medication Summary                                 
                            Completed                    2016            
    

 

 Patient Education: Obesity                                                    
         Completed                    2016                

 

 Patient Education: Hypertension                                               
              Completed                    2016                

 

 Visit Plan:                     The pt had recently been in the hospital with 
an INR of over 10. Dr. Cueva manages her coumadin. Dr. Cueva switched her to 
eliquis, therefore her dilantin was changed to keppra due to medication 
interactions. Pt's mother called the clinic this morning stating that Erin was 
very agitated, anxious, and restless - spoke with Dr. Hurt on the phone and 
pt was to stop the keppra and start trokendi 50mg daily. Pt and her mother 
picked up samples and spoke to the pharmacy who told her not to take the 
trokendi because she had metabolic acidosis reaction in the past to topamax. 
Per other staff report the pt and her mother came to the office and demanded to 
speak to a nurse and did not wait in the lobby for a nurse to take her to the 
back but came back to the nurses desk and asked the nurses if they knew who she 
was. Pt was then taken to a room for a walk in appointment. Upon entering the 
room the pt was very agitated and upset stating that everyone in this office is 
an "imbecile and an idiot who is trying to kill her." I explained to her that 
the topamax was listed as an "other reaction" and that I was very sorry that it 
was sent. The patient wanted to know why Dr. Hurt ordered that medication, 
which I explained that it was an alternative to the keppra. I told her that I 
had called Dr. Hurt on her cell phone and that she was not able to access 
her chart. The pt stated that "doctors can pull records up on their phones". 
The pt and her mother were also very upset that she had been released from the 
hospital after adjusting her medications instead of being monitored. The pt's 
mother stated that Dr. Hurt had asked the patient why she was depressed 
which made them both very upset. Erin said that when Dr. Hurt asked her 
about her depression she wanted to "slap her in the face". The pt's mother 
wanted her to be admitted today. I explained that medically she was stable and 
did not need to be admitted. The pt was very upset stating that "it is 
obviously all about money". Pt stated that she should just "stop her medication 
and die so her mother could isabel for medical malpractice". The pt stated that 
she would "go to the ER to get another doctor." The pt's mother stated that 
Erin has not slept and has been getting increasingly agitated. They agreed to 
the new orders to start Lamictal per Dr. Hurt.

                                                             2016        
        

 

 Patient Education: Patient Medication Summary                                 
                            Completed                    2016            
    

 

 Visit Plan:                     Abnormal CT scan - planning on repeat scan to 
see if area on previous scan was truly a mass or just an atypical pneumonia. 
Finish out antibiotics as previously directed. call if having trouble pt to 
have PT/INR continued to be monitored by Dr. Cueva

                                                             2016        
        

 

 Appointment: Lynne Hurt 

WPtel:[phone]

 [address] [place] [address]

                                                             (15 min) 
Moderate                    2016                

 

 Patient Education: Patient Medication Summary                                 
                            Completed                    2016            
    

 

 Patient Education: Obesity                                                    
         Completed                    2016                

 

 Visit Plan:                     Chest pain, cough - lung sounds clear, but 
diminished, will check chest x-ray - will treat as indicated. Pt is to notify 
clinic if symptoms do not improve, if they worsen, or with any questions or 
concerns.

                                                             10/26/2016        
        

 

 Appointment: Jordyn Rosenberg 

WPtel:[phone]

 [address] [facility]

                                                             (30 min) Complex
                    10/26/2016                

 

 Patient Education: Patient Medication Summary                                 
                            Completed                    10/26/2016            
    

 

 Patient Education: Obesity                                                    
         Completed                    10/26/2016                

 

 Visit Plan:                     Hypertension - well controlled - continue with 
current medications, continue with no added salt diet. Pt has been encouraged 
to exercise daily. The pt has been advised to call the office if there are any 
acute concerns about change in blood pressure readings at home. Chronic Pain 
Syndrome - pt has chronic pain - has been maintained on current medications, 
has not sought out other medications, only uses PRN pain medications as directed
, and understands the consequences of over-medication. Chronic Depression and 
anxiety - the pt has symptoms of chronic anxiety and depression that have been 
fairly well controlled since the last office visit. The pt has expected periods 
of exacerbation with abatement of the symptoms with change in situational 
exposure. No change in current medications.

                                                             2016        
        

 

 Patient Education: Patient Medication Summary                                 
                            Completed                    2016            
    

 

 Patient Education: Obesity                                                    
         Completed                    2016                

 

 Patient Education: .Cervicalgia Neck Pain                                     
                        Completed                    2016                

 

 Visit Plan:                     Hypertension - uncontrolled - the patient's 
medications have been modified as documented in the visit note. The patient has 
been counseled to cut back on salt in diet for a no added salt diet, low fat 
diet, start an exercise program with low weight bearing exercises and higher 
aerobic activity for heart health. The patient is to check blood pressure 
readings as an outpatient and either fax, call, or email the readings to the 
office next week for practitioner to review. The pt is to call for acute 
concerns.

                                                             2016        
        

 

 Appointment: Mary Bond 

WPtel:[phone]

 [address] [facility]

                                                             (30 min) Complex
                    2016                

 

 Patient Education: Patient Medication Summary                                 
                            Completed                    2016            
    

 

 Patient Education: Obesity                                                    
         Completed                    2016                

 

 Visit Plan:                     Hypertension - well controlled - continue with 
current medications, continue with no added salt diet. Pt has been encouraged 
to exercise daily. The pt has been advised to call the office if there are any 
acute concerns about change in blood pressure readings at home. Rash - RX for 
injection - kenalog shot

                                                             2016        
        

 

 Patient Education: Patient Medication Summary                                 
                            Completed                    2016            
    

 

 Patient Education: Obesity                                                    
         Completed                    2016                

 

 Visit Plan:                     Hypertension - well controlled - continue with 
current medications, continue with no added salt diet. Pt has been encouraged 
to exercise daily. The pt has been advised to call the office if there are any 
acute concerns about change in blood pressure readings at home. Chronic 
Depression and anxiety - the pt has symptoms of chronic anxiety and depression 
that have been fairly well controlled since the last office visit. The pt has 
expected periods of exacerbation with abatement of the symptoms with change in 
situational exposure. No change in current medications. Chronic Pain Syndrome - 
pt has chronic pain - has been maintained on current medications, has not 
sought out other medications, only uses PRN pain medications as directed, and 
understands the consequences of over-medication.

                                                             2016        
        

 

 Patient Education: Patient Medication Summary                                 
                            Completed                    2016            
    

 

 Patient Education: Hypertension                                               
              Completed                    2016                

 

 Visit Plan:                     Abdominal pain-mild and improved-likely due to 
constipation-check labs today-cld advance to bland as tolerated-ER over the 
weekend if pain worsens-continue adequate fluids Urinary Tract Infection-
discussed natural and expected course of this diagnosis and to alert me if 
symptoms do not follow expected course, or if any worse. UA positive for 
infection today in the office-plan to send for culture and will call patient 
with results. RX sent to patient's pharmacy. Avoid tub baths, restrictive 
underwear, etc. Recommend patient start on probiotic while taking the 
antibiotic to prevent diarrhea. Patient verbalized understanding of plan. 
Chronic Anticoagulant use - Pt has been counseled about the anticoagulant, need 
for serial monitoring, and need for the pt to alert the physician as to any new 
bruising, or acute bleeding. Therapeutic goal for INR is between 2.0 and 3.5.

                                                             2016        
        

 

 Appointment:                                                              (15 
min) Moderate                    2016                

 

 Patient Education: Patient Medication Summary                                 
                            Completed                    2016            
    

 

 Referral: External, Ordering Provider                     Referral            
                             Completed                    2015           
     

 

 Visit Plan:                     Chronic Back pain - the patient was counseled 
to always first attempt to use modalities other than pain medication for 
alleviation of the muscle spasms and pain. The patient was also encouraged to 
continue with back exercises as previously directed. Pt is to use pain 
medication as directed. If pain medications are used inappropriately or early 
refills are requested, the patient understands that is a breech of trust/
contract and could result in the patient's termination from this medical 
practice. Referral to physical therapy/manipulation of her neck/upper back.

                                                             2015        
        

 

 Appointment: Lynne Hurt 

WPtel:[phone]

 [address] [place] [address]

                                                             (15 min) 
Moderate                    2015                

 

 Patient Education: Patient Medication Summary                                 
                            Completed                    2015            
    

 

 Patient Education: .Cervicalgia Neck Pain                                     
                        Completed                    2015                

 

 Care Plan: Referral Order                                        SNOMED-CT : 
[id number]

                    Ordered                    2015                

 

 Visit Plan:                     Cervical, thoracic, and lumbar back pain-
worsening-numbness and tingling down right arm-will xray cervical and thoracic 
spine-plan for MRI of cervical, thoracic and lumbar spine if indicated and 
refer to spine specialist-patient wants to see Dr Miranda if referred.

                                                             2015        
        

 

 Patient Education: Patient Medication Summary                                 
                            Completed                    2015            
    

 

 Patient Education: .Cervicalgia Neck Pain                                     
                        Completed                    2015                

 

 Visit Plan:                     Plantar fasciitis- pt was educated that this 
is an inflammatory process, need to stretch the foot and reduce inflammation by 
using a frozen bottle of water or a tennis ball on the bottom of the foot at 
least three times a day until the pain is improved. Right knee pain-low back 
pain-xray low back and knee to evaluate for acute abnormality-samples of 
pennsaid provided and instructed on use-consider referral for PT or MRI lumbar 
spine if symptoms do not improve-

                                                             2015        
        

 

 Appointment:                                                              (30 
min) Complex                    2015                

 

 Patient Education: Patient Medication Summary                                 
                            Completed                    2015            
    

 

 Visit Plan:                     Hypertension - well controlled - continue with 
current medications, continue with no added salt diet. Pt has been encouraged 
to exercise daily. The pt has been advised to call the office if there are any 
acute concerns about change in blood pressure readings at home. History of 
seizures-check dilantin level Factor V Leiden-on coumadin-managed by Dr Cueva 
Chronic Depression and anxiety - the pt has symptoms of chronic anxiety and 
depression that have been fairly well controlled since the last office visit. 
The pt has expected periods of exacerbation with abatement of the symptoms with 
change in situational exposure. No change in current medications.

                                                             2015        
        

 

 Visit Plan:                     Hypertension - well controlled - continue with 
current medications, continue with no added salt diet. Pt has been encouraged 
to exercise daily. The pt has been advised to call the office if there are any 
acute concerns about change in blood pressure readings at home. History of 
seizures-check dilantin level Factor V Leiden-on coumadin-managed by Dr Cueva 
Chronic Depression and anxiety - the pt has symptoms of chronic anxiety and 
depression that have been fairly well controlled since the last office visit. 
The pt has expected periods of exacerbation with abatement of the symptoms with 
change in situational exposure. No change in current medications.

                                                             2015        
        

 

 Appointment:                                                              (S) 
New Patient                    2015                

 

 Patient Education: Patient Medication Summary                                 
                            Completed                    2015            
    

 

 Patient Education: Hypertension                                               
              Completed                    2015                

 

 Care Plan: COMPLETE CBC AUTOMATED                                        LOINC 
: [id number]

                    Ordered                    2015                

 

 Referral: [facility]                    Referral info faxed. They will 
call the patient to set up date/time.                                         
Completed                                    

 

 Referral: External, Ordering Provider                     Referral            
                             Appointment Requested                             
       

 

 Referral: [facility]                    Referral                         
                Appointment Requested                                    



                                                                               
                                                                               
                                                                               
                                                                               
                                                                               
                                                                               
                                                                               
                                                                               
      



Instructions

                      





 Comment                

 

                     talk to Dr. Kong about an endometrial ablation - 

 . Hypertension - well controlled - continue with current medications, continue 
with no added salt diet.  Pt has been encouraged to exercise daily.

The pt has been advised to call the office if there are any acute concerns 
about change in blood pressure readings at home.



Chronic Pain Syndrome - pt has chronic pain - has been maintained on current 
medications, has not sought out other medications, only uses PRN pain 
medications as directed, and understands the consequences of over-medication.



Chronic Depression and anxiety - the pt has symptoms of chronic anxiety and 
depression that have been fairly well controlled since the last office visit.  
The pt has expected periods of exacerbation with abatement of the symptoms with 
change in situational exposure.  No change in current medications.

                                  

 

                     . Hypertension - well controlled - continue with current 
medications, continue with no added salt diet.  Pt has been encouraged to 
exercise daily.

The pt has been advised to call the office if there are any acute concerns 
about change in blood pressure readings at home.



Depression and anxiety - Erin reports that her depression episode related to 
the keppra seems to be resolved - she feels much better on the Lamictal.  I 
have discussed her case with the patient and her mom - she had previously seen 
a psychiatrist -but with a shortage of any local psychiatrists, I have 
recommended that she needs to be seen by a psychiatrist at [facility] and we 
are making a referral to outpatient psychiatry.  They understand that it will 
probably be several months before we can get Erin in to be seen.



Hx of seizure disorder - I have also recommended that with her history of 
seizures we need for her to have a relationship with a neurologist - they are 
okay with staying in the area for a neurologist and would like to be seen in 
[place].  I have recommended Dr. Ugarte at [place] for further evaluation and 
to establish care.  They understand that it will most-likely be several months 
before she is seen by neurology.

                                  

 

                     . Hypertension - uncontrolled - the patient's medications 
have been modified as documented in the visit note.  The patient has been 
counseled to cut back on salt in diet for a no added salt diet, low fat diet, 
start an exercise program with low weight bearing exercises and higher aerobic 
activity for heart health.  

The patient is to check blood pressure readings as an outpatient and either fax
, call, or email the readings to the office next week for practitioner to 
review.

The pt is to call for acute concerns.

                                  

 

                     . Hypertension - well controlled - continue with current 
medications, continue with no added salt diet.  Pt has been encouraged to 
exercise daily.

The pt has been advised to call the office if there are any acute concerns 
about change in blood pressure readings at home.



Back pain, Thoracic back pain, - referral to [facility] - at [address] 
- see if pt can get in to be seen for therapy on her upper and lower back.





Chronic Depression and anxiety - the pt has symptoms of chronic anxiety and 
depression that have been fairly well controlled since the last office visit.  
The pt has expected periods of exacerbation with abatement of the symptoms with 
change in situational exposure.  No change in current medications.

                                  

 

                     XRAY RIGHT KNEE AND FOOT, LUMBAR SPINE 



 . Plantar fasciitis- pt was educated that this is an inflammatory process, 
need to stretch the foot and reduce inflammation by using a frozen bottle of 
water or a tennis ball on the bottom of the foot at least three times a day 
until the pain is improved.



Right knee pain-low back pain-xray low back and knee to evaluate for acute 
abnormality-samples of pennsaid provided and instructed on use-consider 
referral for PT or  MRI lumbar spine if symptoms do not improve-

                                  

 

                     . Chronic Back pain - the patient was counseled to always 
first attempt to use modalities other than pain medication for alleviation of 
the muscle spasms and pain.  The patient was also encouraged to continue with 
back exercises as previously directed.  Pt is to use pain medication as 
directed.  If pain medications are used inappropriately or early refills are 
requested, the patient understands that  is a breech of trust/contract and 
could result in the patient's termination from this medical practice.

Referral to physical therapy/manipulation of her neck/upper back.              
                    

 

                     . Abdominal pain-mild and improved-likely due to 
constipation-check labs today-cld advance to bland as tolerated-ER over the 
weekend if pain worsens-continue adequate fluids 



Urinary Tract Infection-discussed natural and expected course of this diagnosis 
and to alert me if symptoms do not follow expected course, or if any worse. UA 
positive for infection today in the office-plan to send for culture and will 
call patient with results. RX sent to patient's pharmacy. Avoid tub baths, 
restrictive underwear, etc. Recommend patient start on probiotic while taking 
the antibiotic to prevent diarrhea. Patient verbalized understanding of plan. 



Chronic Anticoagulant use - Pt has been counseled about the anticoagulant, need 
for serial monitoring, and need for the pt to alert the physician as to any new 
bruising, or acute bleeding.  Therapeutic goal for INR is between 2.0 and 3.5.

                                  

 

                     . Hypertension - well controlled - continue with current 
medications, continue with no added salt diet.  Pt has been encouraged to 
exercise daily.

The pt has been advised to call the office if there are any acute concerns 
about change in blood pressure readings at home.



Chronic Depression and anxiety - the pt has symptoms of chronic anxiety and 
depression that have been fairly well controlled since the last office visit.  
The pt has expected periods of exacerbation with abatement of the symptoms with 
change in situational exposure.  No change in current medications.



Chronic Pain Syndrome - pt has chronic pain - has been maintained on current 
medications, has not sought out other medications, only uses PRN pain 
medications as directed, and understands the consequences of over-medication.

                                  

 

                     . Cervical, thoracic, and lumbar back pain-worsening-
numbness and tingling down right arm-will xray cervical and thoracic spine-plan 
for MRI of cervical, thoracic and lumbar spine if indicated and refer to spine 
specialist-patient wants to see Dr Miranda if referred. 

                                  

 

                     . Hypertension - well controlled - continue with current 
medications, continue with no added salt diet.  Pt has been encouraged to 
exercise daily.

The pt has been advised to call the office if there are any acute concerns 
about change in blood pressure readings at home.



Seizure disorder - Depression and Anxiety - continue with current medications - 
symptoms are significantly improved per patient report                         
         

 

                     . Hypertension - well controlled - continue with current 
medications, continue with no added salt diet.  Pt has been encouraged to 
exercise daily.

The pt has been advised to call the office if there are any acute concerns 
about change in blood pressure readings at home.



Rash - RX for injection - kenalog shot

                                  

 

                     . Hypertension - well controlled - continue with current 
medications, continue with no added salt diet.  Pt has been encouraged to 
exercise daily.

The pt has been advised to call the office if there are any acute concerns 
about change in blood pressure readings at home.



Pain - chronic of low back - I have recommended a referral to Dr. Artis - 
for injections.

Refilled muscle relaxers.



Chronic Depression and anxiety - the pt has symptoms of chronic anxiety and 
depression that have been fairly well controlled since the last office visit.  
The pt has expected periods of exacerbation with abatement of the symptoms with 
change in situational exposure.  No change in current medications.



flu shot today

                                  

 

                     . Chest pain, cough - lung sounds clear, but diminished, 
will check chest x-ray - will treat as indicated.  Pt is to notify clinic if 
symptoms do not improve, if they worsen, or with any questions or concerns.    
                               

 

                     . The pt had recently been in the hospital with an INR of 
over 10.  Dr. Cueva manages her coumadin.  Dr. Cueva switched her to eliquis, 
therefore her dilantin was changed to keppra due to medication interactions.  Pt
's mother called the clinic this morning stating that Erin was very agitated, 
anxious, and restless - spoke with Dr. Hurt on the phone and pt was to stop 
the keppra and start trokendi 50mg daily.  Pt and her mother picked up samples 
and spoke to the pharmacy who told her not to take the trokendi because she had 
metabolic acidosis reaction in the past to topamax.  Per other staff report the 
pt and her mother came to the office and demanded to speak to a nurse and did 
not wait in the lobby for a nurse to take her to the back but came back to the 
nurses desk and asked the nurses if they knew who she was.  Pt was then taken 
to a room for a walk in appointment.  Upon entering the room the pt was very 
agitated and upset stating that everyone in this office is an "imbecile and an 
idiot who is trying to kill her." I explained to her that the topamax was 
listed as an "other reaction" and that I was very sorry that it was sent.  The 
patient wanted to know why Dr. Hurt ordered that medication, which I 
explained that it was an alternative to the keppra.  I told her that I had 
called Dr. Hurt on her cell phone and that she was not able to access her 
chart. The pt stated that "doctors can pull records up on their phones". The pt 
and her mother were also very upset that she had been released from the 
hospital after adjusting her medications instead of being monitored.  The pt's 
mother stated that Dr. Hurt had asked the patient why she was depressed 
which made them both very upset.  Erin said that when Dr. Hurt asked her 
about her depression she wanted to "slap her in the face".  The pt's mother 
wanted her to be admitted today.   I explained that medically she was stable 
and did not need to be admitted.  The pt was very upset stating that "it is 
obviously all about money".  Pt stated that she should just "stop her 
medication and die so her mother could isabel for medical malpractice".  The pt 
stated that she would "go to the ER to get another doctor." The pt's mother 
stated that Erin has not slept and has been getting increasingly agitated.  
They agreed to the new orders to start Lamictal per Dr. Hurt.  

                                  

 

                     . Abnormal CT scan - planning on repeat scan to see if 
area on previous scan was truly a mass or just an atypical pneumonia.

Finish out antibiotics as previously directed.

call if having trouble

pt to have PT/INR continued to be monitored by Dr. Cueva                       
           

 

                     . Hypertension - well controlled - continue with current 
medications, continue with no added salt diet.  Pt has been encouraged to 
exercise daily.

The pt has been advised to call the office if there are any acute concerns 
about change in blood pressure readings at home.



History of seizures-check dilantin level 



Factor V Leiden-on coumadin-managed by Dr Cueva 



Chronic Depression and anxiety - the pt has symptoms of chronic anxiety and 
depression that have been fairly well controlled since the last office visit.  
The pt has expected periods of exacerbation with abatement of the symptoms with 
change in situational exposure.  No change in current medications.

                                  

 

                     . Hypertension - well controlled - continue with current 
medications, continue with no added salt diet.  Pt has been encouraged to 
exercise daily.

The pt has been advised to call the office if there are any acute concerns 
about change in blood pressure readings at home.



History of seizures-check dilantin level 



Factor V Leiden-on coumadin-managed by Dr Cueva 



Chronic Depression and anxiety - the pt has symptoms of chronic anxiety and 
depression that have been fairly well controlled since the last office visit.  
The pt has expected periods of exacerbation with abatement of the symptoms with 
change in situational exposure.  No change in current medications.

## 2018-07-19 NOTE — PROGRESS NOTE-POST OPERATIVE
Post-Operative Progess Note


Surgeon (s)/Assistant (s)


Surgeon


UMA HANDLEY DO


Assistant:  Alison Cole





Pre-Operative Diagnosis


CPP, Menorrhagia, Factor V Leiden





Post-Operative Diagnosis





same





Procedure & Operative Findings


Date of Procedure


7/19/18


Procedure Performed/Findings


RATLH w/ tubes


Anesthesia Type


GETA





Estimated Blood Loss


Estimated blood loss (mL):  min





Specimens/Packing


Specimens Removed


uterus and tubes











UMA HANDLEY DO Jul 19, 2018 08:43

## 2018-07-19 NOTE — XMS REPORT
Continuity of Care Document

 Created on: 2018



CHIARA AGUIRRE

External Reference #: [id number]

: 1975

Sex: Female



Demographics







 Address  [address] [place]  [address]

 

 Home Phone  [phone] x

 

 Preferred Language  Unknown

 

 Marital Status  Unknown

 

 Cheondoism Affiliation  Unknown

 

 Race  Unknown

 

 Ethnic Group  Unknown





Author







 Author  [facility]

 

 Address  Unknown

 

 Phone  Unavailable



              



Allergies

      





 Active            Description            Code            Type            
Severity            Reaction            Onset            Reported/Identified   
         Relationship to Patient            Clinical Status        

 

 Yes            prochlorperazine            [id number]            Drug Allergy 
           Severe            TONGUE SWELLS                         2007  
                                

 

 Yes            metoclopramide            [id number]            Drug Allergy   
         Unknown            N/A                         2009             
                     

 

 Yes            Topamax                         Drug Allergy            N/A    
        N/A                         2011                                  

 

 Yes            Topamax                         Drug Allergy                   
                                2011                                  

 

 Yes            Compazine                         Drug Allergy            N/A  
          N/A                         2012                               
   

 

 Yes            Compazine                         Drug Allergy                 
                                  2012                                  

 

 Yes            Geodon                         Drug Allergy            N/A     
       N/A                         2014                                  

 

 Yes            topiramate            [id number]            Drug Allergy       
     Unknown            N/A                         2016                 
                 



                                



Medications

      



There is no data.                  



Problems

      





 Date Dx Coded            Attending            Type            Code            
Diagnosis            Diagnosed By        

 

 1208            STACY CAMARGO MD            Ot            M25.511   
         PAIN IN RIGHT SHOULDER                     

 

 1208            STACY CAMARGO MD            Ot            M25.611   
         STIFFNESS OF RIGHT SHOULDER, NOT ELSEWHE                     

 

 1208            STACY CAMARGO MD            Ot            M54.2     
       CERVICALGIA                     

 

 1208            STACY CAMARGO MD            Ot            M54.6     
       PAIN IN THORACIC SPINE                     

 

 1622            STACY CAMARGO MD            Ot            M25.512   
         PAIN IN LEFT SHOULDER                     

 

 2008            ALEJANDRA SALAZAR DO                         296.30      
      MAJOR DEPRESSIVE AFFECTIVE DISORDER RECURRENT EPISODE UNSPECIFIED DEGREE 
                    

 

 2008            ALEJANDRA SALAZAR DO                         300.00      
      AN ANXIETY UNSPEC                     

 

 2008            ALEJANDRA SALAZAR DO                         296.30      
      MAJOR DEPRESSIVE AFFECTIVE DISORDER RECURRENT EPISODE UNSPECIFIED DEGREE 
                    

 

 2008            ALEJANDRA SALAZAR DO                         300.00      
      AN ANXIETY UNSPEC                     

 

 2008            MARIN STROUD                         296.30 
           MAJOR DEPRESSIVE AFFECTIVE DISORDER RECURRENT EPISODE UNSPECIFIED 
DEGREE                     

 

 2008            MARIN STROUD                         300.00 
           AN ANXIETY UNSPEC                     

 

 2008            CHAY BUSTAMANTE MARIN MONTEIRO                         296.30 
           MAJOR DEPRESSIVE AFFECTIVE DISORDER RECURRENT EPISODE UNSPECIFIED 
DEGREE                     

 

 2008            CHAY BUSTAMANTE MARIN MONTEIRO                         300.00 
           AN ANXIETY UNSPEC                     

 

 2008            DAVID MCCAIN DO K                         296.30         
   MAJOR DEPRESSIVE AFFECTIVE DISORDER RECURRENT EPISODE UNSPECIFIED DEGREE    
                 

 

 2008            DAVID MCCAIN DO K                         300.00         
   AN ANXIETY UNSPEC                     

 

 2008            ALEJANDRA SALAZAR DO                         301.82      
      AVOIDANT PERSONALITY DISORDER                     

 

 2008            ALEJANDRA SALAZAR DO                         307.47      
      SI DYSSOMNIA NOS                     

 

 2008            ALEJANDRA SALAZAR DO                         301.82      
      AVOIDANT PERSONALITY DISORDER                     

 

 2008            ALEJANDRA SALAZAR DO                         307.47      
      SI DYSSOMNIA NOS                     

 

 2008            CHAY BUSTAMANTE MARIN HARRELLH                         301.82 
           AVOIDANT PERSONALITY DISORDER                     

 

 2008            CHAY BUSTAMANTE MARIN MONTEIRO                         307.47 
           SI DYSSOMNIA NOS                     

 

 2008            CHAY BUSTAMANTE MARIN CAYETANO                         301.82 
           AVOIDANT PERSONALITY DISORDER                     

 

 2008            CHAY BUSTAMANTE MARIN MONTEIRO                         307.47 
           SI DYSSOMNIA NOS                     

 

 2008            DAVID MCCAIN DO                         301.82         
   AVOIDANT PERSONALITY DISORDER                     

 

 2008            DAVID MCCAIN DO K                         307.47         
   SI DYSSOMNIA NOS                     

 

 10/28/2008            ALEJANDRA SALAZAR DO                         V58.69      
      MEDICATION HIGH RISK                     

 

 10/28/2008            ALEJANDRA SALAZAR DO                         V58.69      
      MEDICATION HIGH RISK                     

 

 10/28/2008            CHAY BUSTAMANTE MARIN CAYETANO                         V58.69 
           MEDICATION HIGH RISK                     

 

 10/28/2008            CHAY BUSTAMANTE MARIN CAYETANO                         V58.69 
           MEDICATION HIGH RISK                     

 

 10/28/2008            DAVID MCCAIN DO                         V58.69         
   MEDICATION HIGH RISK                     

 

 2008            ALEJANDRA SALAZAR DO F                         300.01      
      AN PANIC DIS W/O AGORA                     

 

 2008            ALEJANDRA SALAZAR DO F                         300.23      
      AN SOCIAL PHOBIA                     

 

 2008            ALEJANDRA SALAZAR DO F                         300.01      
      AN PANIC DIS W/O AGORA                     

 

 2008            ALEJANDRA SALAZAR DO F                         300.23      
      AN SOCIAL PHOBIA                     

 

 2008            MARIN STROUD                         300.01 
           AN PANIC DIS W/O AGORA                     

 

 2008            CARTWRIGHT APRMATMARIN                         300.23 
           AN SOCIAL PHOBIA                     

 

 2008            CARTWRIGHT APRMAT MARIN MONTEIRO                         300.01 
           AN PANIC DIS W/O AGORA                     

 

 2008            CARTWRIGHT APRMAT MARIN MONTEIRO                         300.23 
           AN SOCIAL PHOBIA                     

 

 2008            DAVID MCCAIN DO                         300.01         
   AN PANIC DIS W/O AGORA                     

 

 2008            DAVID MCCAIN DO                         300.23         
   AN SOCIAL PHOBIA                     

 

 2009            ALEJANDRA SALAZAR DO                         300.4       
     MO DYSTHYMIC DISORDER                     

 

 2009            ALEJANDRA SALAZAR DO                         300.4       
     MO DYSTHYMIC DISORDER                     

 

 2009            CARTWRIGHT APRMAT MARIN MONTEIRO                         300.4  
          MO DYSTHYMIC DISORDER                     

 

 2009            CARTWRIGHT APRMAT MARIN MONTEIRO                         300.4  
          MO DYSTHYMIC DISORDER                     

 

 2009            DAVID MCCAIN DO                         300.4          
  MO DYSTHYMIC DISORDER                     

 

 2009            ALEJANDRA SALAZAR DO                         301.6       
     PD DEPENDENT                     

 

 2009            ALEJANDRA SALAZAR DO                         301.6       
     PD DEPENDENT                     

 

 2009            CARTWRIGHT APRMAT MARIN MONTEIRO                         301.6  
          PD DEPENDENT                     

 

 2009            CARTWRIGHT APRMAT MARIN MONTEIRO                         301.6  
          PD DEPENDENT                     

 

 2009            DAVID MCCAIN DO                         301.6          
  PD DEPENDENT                     

 

 2010                         Ot            V58.61                       
           

 

 2010                         Ot            V58.69                       
           

 

 2010                         Ot            V58.83                       
           

 

 2010            ALEJANDRA SALAZAR DO                         296.32      
      MO DEPRESSIVE RECURRENT MODERATE                     

 

 2010            ALEJANDRA SALAZAR DO                         296.32      
      MO DEPRESSIVE RECURRENT MODERATE                     

 

 2010            CARTWRIGHT APRMAT MARIN MONTEIRO                         296.32 
           MO DEPRESSIVE RECURRENT MODERATE                     

 

 2010            CARTWRIGHT DIANNA MARIN MONTEIRO                         296.32 
           MO DEPRESSIVE RECURRENT MODERATE                     

 

 2010            DAVID MCCAIN DO                         296.32         
   MO DEPRESSIVE RECURRENT MODERATE                     

 

 2010                         Ot            V58.61                       
           

 

 2010                         Ot            V58.83                       
           

 

 2010                         Ot            780.39                       
           

 

 2010                         Ot            V58.61                       
           

 

 2010                         Ot            V58.69                       
           

 

 2010                         Ot            V71.4                        
          

 

 2010                         Ot            296.33                       
           

 

 2010                         Ot            V58.61                       
           

 

 2010                         Ot            V58.69                       
           

 

 2010                         Ot            V58.83                       
           

 

 2011                         Ot            296.33            RECUR DEPR 
DISOR-SEVERE                     

 

 2011                         Ot            V58.61            
ANTICOAGULANTS,LT,CURRENT USE                     

 

 2011                         Ot            V58.69            OTH MED,LT,
CURRENT USE                     

 

 2011                         Ot            V58.83            ENCOUNTER 
FOR THERAPEUTIC DRUG MONITORIN                     

 

 2011                         Ot            272.4            
HYPERLIPIDEMIA NEC/NOS                     

 

 2011                         Ot            276.2            ACIDOSIS    
                 

 

 2011                         Ot            288.60            LEUKOCYTOSIS
, UNSPECIFIED                     

 

 2011                         Ot            305.1            TOBACCO USE 
DISORDER                     

 

 2011                         Ot            311            DEPRESSIVE 
DISORDER NEC                     

 

 2011                         Ot            412            OLD MYOCARDIAL 
INFARCT                     

 

 2011                         Ot            414.00            CORON 
ATHEROSCLER NOS TYPE VESSEL, NATIV                     

 

 2011                         Ot            511.0            PLEURISY W/O 
EFFUS OR TB                     

 

 2011                         Ot            786.52            PAINFUL 
RESPIRATION                     

 

 2011                         Ot            V58.61            
ANTICOAGULANTS,LT,CURRENT USE                     

 

 2011                         Ot            V58.69            OTH MED,LT,
CURRENT USE                     

 

 2011                         Ot            300.4            DYSTHYMIC 
DISORDER                     

 

 2011                         Ot            305.1            TOBACCO USE 
DISORDER                     

 

 2011                         Ot            414.00            CORON 
ATHEROSCLER NOS TYPE VESSEL, NATIV                     

 

 2011                         Ot            786.09            RESPIRATORY 
ABNORM NEC                     

 

 2011                         Ot            786.59            CHEST PAIN 
NEC                     

 

 2011                         Ot            V17.49            FAMILY 
HISTORY OF OTHER CARDIOVASCULAR D                     

 

 2011                         Ot            V58.66            LONG-TERM (
CURRENT) USE OF ASPIRIN                     

 

 2011                         Ot            V58.69            OTH MED,LT,
CURRENT USE                     

 

 2011                         Ot            296.33            RECUR DEPR 
DISOR-SEVERE                     

 

 2011                         Ot            V58.61            
ANTICOAGULANTS,LT,CURRENT USE                     

 

 2011                         Ot            V58.69            OTH MED,LT,
CURRENT USE                     

 

 2011                         Ot            V58.83            ENCOUNTER 
FOR THERAPEUTIC DRUG MONITORIN                     

 

 10/21/2011                         Ot            327.23            OBSTRUCTIVE 
SLEEP APNEA (ADULT) (PEDIATR                     

 

 2011                         Ot            296.33            RECUR DEPR 
DISOR-SEVERE                     

 

 2011                         Ot            V58.61            
ANTICOAGULANTS,LT,CURRENT USE                     

 

 2011                         Ot            V58.69            OTH MED,LT,
CURRENT USE                     

 

 2011                         Ot            V58.83            ENCOUNTER 
FOR THERAPEUTIC DRUG MONITORIN                     

 

 2012                         Ot            296.33            RECUR DEPR 
DISOR-SEVERE                     

 

 2012                         Ot            V58.61            
ANTICOAGULANTS,LT,CURRENT USE                     

 

 2012                         Ot            V58.69            OTH MED,LT,
CURRENT USE                     

 

 2012                         Ot            V58.83            ENCOUNTER 
FOR THERAPEUTIC DRUG MONITORIN                     

 

 2013                         Ot            V58.61            
ANTICOAGULANTS,LT,CURRENT USE                     

 

 2013                         Ot            V58.83            ENCOUNTER 
FOR THERAPEUTIC DRUG MONITORIN                     

 

 2013            YONATHAN SORIANO MD            Ot            V58.61      
      ANTICOAGULANTS,LT,CURRENT USE                     

 

 2013            YONATHAN SORIANO MD            Ot            V58.69      
      OTH MED,LT,CURRENT USE                     

 

 2013            YONATHAN SORIANO MD            Ot            V58.83      
      ENCOUNTER FOR THERAPEUTIC DRUG MONITORIN                     

 

 10/22/2013            YONATHAN SORIANO MD            Ot            V58.61      
      ANTICOAGULANTS,LT,CURRENT USE                     

 

 10/22/2013            YONATHAN SORIANO MD            Ot            V58.69      
      OTH MED,LT,CURRENT USE                     

 

 10/22/2013            YONATHAN SORIANO MD            Ot            V58.83      
      ENCOUNTER FOR THERAPEUTIC DRUG MONITORIN                     

 

 2013            BLANCA DOUGLAS MD            Ot            272.4  
          HYPERLIPIDEMIA NEC/NOS                     

 

 2013            BLANCA DOUGLAS MD            Ot            276.8  
          HYPOPOTASSEMIA                     

 

 2013            BLANCA DOUGLAS MD            Ot            286.3  
          BECK DEF CLOT FACTOR NEC                     

 

 2013            BLANCA DOUGLAS MD            Ot            305.1  
          TOBACCO USE DISORDER                     

 

 2013            BLANCA DOUGLAS MD            Ot            311    
        DEPRESSIVE DISORDER NEC                     

 

 2013            BLANCA DOUGLAS MD            Ot            345.90 
           EPILEPSY UNSPEC W/O MENTION INTRACTABLE                      

 

 2013            BLANCA DOUGLAS MD            Ot            412    
        OLD MYOCARDIAL INFARCT                     

 

 2013            BLANCA DOUGLAS MD            Ot            414.01 
           CORONARY ATHEROSCLEROSIS OF NATIVE CORON                     

 

 2013            BLANCA DOUGLAS MD            Ot            620.1  
          CORPUS LUTEUM CYST                     

 

 2013            BLANCA DOUGLAS MD            Ot            625.8  
          FEM GENITAL SYMPTOMS NEC                     

 

 2013            BLANCA DOUGLAS MD            Ot            789.03 
           ABDOMINAL PAIN, RIGHT LOWER QUADRANT                     

 

 2013            BLANCA DOUGLAS MD            Ot            795.39 
           OTHER NONSPEC POS CULTURE FINDINGS                     

 

 2013            BLANCA DOUGLAS MD            Ot            V03.82 
           PROPHYLACTIC VACC AGAINST STREPTOCOCCUS                      

 

 2013            BLANCA DOUGLAS MD            Ot            V04.81 
           ND FOR PROPHYLACTIC VACCIN AND INOCULATI                     

 

 2014            YONATHAN SORIANO MD            Ot            V58.61      
      ANTICOAGULANTS,LT,CURRENT USE                     

 

 2014            YONATHAN SORIANO MD            Ot            V58.69      
      OTH MED,LT,CURRENT USE                     

 

 2014            YONATHAN SORIANO MD            Ot            V58.83      
      ENCOUNTER FOR THERAPEUTIC DRUG MONITORIN                     

 

 2014            YONATHAN SORIANO MD            Ot            V58.61      
      ANTICOAGULANTS,LT,CURRENT USE                     

 

 2014            YONATHAN SORIANO MD            Ot            V58.69      
      OTH MED,LT,CURRENT USE                     

 

 2014            YONATHAN SORIANO MD            Ot            V58.83      
      ENCOUNTER FOR THERAPEUTIC DRUG MONITORIN                     

 

 2014            BLANCA DOUGLAS MD            Ot            789.03 
                                 

 

 2015            DVAID MCCAIN DO                         V04.81         
   FLU SHOT                     

 

 2015            DAVID MCCAIN DO                         V06.1          
  TDAP DX                     

 

 2015                         Ot            V58.69                       
           

 

 2015                         Ot            V58.83                       
           

 

 2015                         Ot            272.4                        
          

 

 2015                         Ot            401.9                        
          

 

 2015                         Ot            414.01                       
           

 

 2015                         Ot            272.4                        
          

 

 2015                         Ot            296.33                       
           

 

 2015                         Ot            V58.69                       
           

 

 2015                         Ot            780.2                        
          

 

 2015                         Ot            V58.61                       
           

 

 2015                         Ot            V58.69                       
           

 

 2015                         Ot            414.9                        
          

 

 2015                         Ot            V58.61                       
           

 

 2015                         Ot            V58.66                       
           

 

 2015                         Ot            V58.69                       
           

 

 2015                         Ot            397.0                        
          

 

 2015                         Ot            424.0                        
          

 

 2015                         Ot            786.50                       
           

 

 2015                         Ot            272.4                        
          

 

 2015                         Ot            286.3                        
          

 

 2015                         Ot            401.9                        
          

 

 2015                         Ot            414.00                       
           

 

 2015                         Ot            780.79                       
           

 

 2015                         Ot            786.05                       
           

 

 2015                         Ot            794.39                       
           

 

 2015                         Ot            V58.61                       
           

 

 2015                         Ot            V58.69                       
           

 

 2015                         Ot            V72.63                       
           

 

 2015                         Ot            V72.81                       
           

 

 2015                         Ot            786.05                       
           

 

 2015                         Ot            V58.61                       
           

 

 2015                         Ot            V58.83                       
           

 

 2015                         Ot            V58.61                       
           

 

 2015                         Ot            V58.69                       
           

 

 2015                         Ot            V58.83                       
           

 

 2015            STELLA SOMMER, BLANCA NIEVES            Ot            272.4  
                                

 

 2015            STELLA SOMMER, BLANCA NIEVES            Ot            401.9  
                                

 

 2015            MALINA TORRES            Ot            
272.4                                  

 

 2015            STELLA SOMMER, BLANCA NIEVES            Ot            789.00 
                                 

 

 2015            STELLA SOMMER, BLANCA NIEVES            Ot            789.03 
                                 

 

 2015            BO SOMMER, YONATHAN LINDQUIST            Ot            V58.61      
                            

 

 2015            BO SOMMER, YONATHAN LINDQUIST            Ot            V58.69      
                            

 

 2015            BO SOMMER, YONATHAN LINDQUIST            Ot            V58.83      
                            

 

 2015            BO SOMMER, YONATHAN LINDQUIST            Ot            V58.61      
                            

 

 2015            BO SOMMER, YONATHAN J            Ot            V58.69      
                            

 

 2015            BO SOMMER, YONATHAN J            Ot            V58.83      
                            

 

 2015            BO SOMMER, YONATHAN LINDQUIST            Ot            V58.61      
                            

 

 2015            BO SOMMER, YONATHAN LINDQUIST            Ot            V58.69      
                            

 

 2015            BO SOMMER, YONATHAN LINDQUIST            Ot            V58.83      
                            

 

 2015            BO SOMMER, YONATHAN LINDQUIST            Ot            V58.61      
                            

 

 2015            BO SOMMER, YONATHAN J            Ot            V58.69      
                            

 

 2015            BO SOMMER, YONATHAN J            Ot            V58.83      
                            

 

 2015            BO SOMMER, YONATHAN J            Ot            V58.61      
                            

 

 2015            BO SOMMER, YONATHAN J            Ot            V58.69      
                            

 

 2015            BO SOMMER, YONATHAN LINDQUIST            Ot            V58.83      
                            

 

 2015            BO SOMMER, YONATHAN LINDQUIST            Ot            V58.61      
                            

 

 2015            BO SOMMER, YONATHAN LINDQUIST            Ot            V58.69      
                            

 

 2015            BO SOMMER, YONATHAN J            Ot            V58.83      
                            

 

 2015            BO SOMMER, YONATHAN LINDQUIST            Ot            V58.61      
                            

 

 2015            YONATHAN SORIANO MD            Ot            V58.69      
                            

 

 2015            YONATHAN SORIANO MD            Ot            V58.83      
                            

 

 2015            YONATHAN SORIANO MD            Ot            V58.61      
                            

 

 2015            YONATHAN SORIANO MD            Ot            V58.69      
                            

 

 2015            YONATHAN SORIANO MD            Ot            V58.83      
                            

 

 2015            YONATHAN SORIANO MD            Ot            V58.61      
      ANTICOAGULANTS,LT,CURRENT USE                     

 

 2015            YONATHAN SROIANO MD            Ot            V58.69      
      OTH MED,LT,CURRENT USE                     

 

 2015            YONATHAN SORIANO MD            Ot            V58.83      
      ENCOUNTER FOR THERAPEUTIC DRUG MONITORIN                     

 

 2015                         Ot            272.4                        
          

 

 2015                         Ot            296.33                       
           

 

 2015                         Ot            V58.69                       
           

 

 2015                         Ot            780.2                        
          

 

 2015                         Ot            V58.61                       
           

 

 2015                         Ot            V58.69                       
           

 

 2015                         Ot            414.9                        
          

 

 2015                         Ot            V58.61                       
           

 

 2015                         Ot            V58.66                       
           

 

 2015                         Ot            V58.69                       
           

 

 2015                         Ot            397.0                        
          

 

 2015                         Ot            424.0                        
          

 

 2015                         Ot            786.50                       
           

 

 2015                         Ot            272.4                        
          

 

 2015                         Ot            286.3                        
          

 

 2015                         Ot            401.9                        
          

 

 2015                         Ot            414.00                       
           

 

 2015                         Ot            780.79                       
           

 

 2015                         Ot            786.05                       
           

 

 2015                         Ot            794.39                       
           

 

 2015                         Ot            V58.61                       
           

 

 2015                         Ot            V58.69                       
           

 

 2015                         Ot            V72.63                       
           

 

 2015                         Ot            V72.81                       
           

 

 2015                         Ot            786.05                       
           

 

 2015                         Ot            V58.61                       
           

 

 2015                         Ot            V58.83                       
           

 

 2015                         Ot            V58.61                       
           

 

 2015                         Ot            V58.69                       
           

 

 2015                         Ot            V58.83                       
           

 

 2015            BLANCA DOUGLAS MD            Ot            272.4  
                                

 

 2015            BLANCA DOUGLAS MD            Ot            401.9  
                                

 

 2015            MALINA TORRES            Ot            
272.4                                  

 

 2015            BLANCA DOUGLAS MD            Ot            789.00 
                                 

 

 2015            STELLA SOMMER, BLANCA NIEVES            Ot            789.03 
                                 

 

 2015            BO SOMMER, YONATHAN LINDQUIST            Ot            V58.61      
                            

 

 2015            BO SOMMER, YONATHAN LINDQUIST            Ot            V58.69      
                            

 

 2015            YONATHAN SORIANO MD            Ot            V58.83      
                            

 

 2015            BO SOMMER, YONATHAN LINDQUIST            Ot            V58.61      
                            

 

 2015            BO SOMMER, YONATHAN LINDQUIST            Ot            V58.69      
                            

 

 2015            BO SOMMER, YONATHAN LINDQUIST            Ot            V58.83      
                            

 

 2015            BO SOMMER, YONATHAN LINDQUIST            Ot            V58.61      
                            

 

 2015            BO SOMMER, YONATHAN LINDQUIST            Ot            V58.69      
                            

 

 2015            BO SOMMER, YONATHAN LINDQUIST            Ot            V58.83      
                            

 

 2015            YONATHAN SORIANO MD            Ot            V58.61      
                            

 

 2015            BO SOMMER, YONATHAN LINDQUIST            Ot            V58.69      
                            

 

 2015            BO SOMMER, YONATHAN LINDQUIST            Ot            V58.83      
                            

 

 2015            STACY CAMARGO MD            Ot            719.46    
                              

 

 2015            STACY CAMARGO MD            Ot            724.5     
                             

 

 2015            STACY CAMARGO MD            Ot            729.5     
                             

 

 2015            YONATHAN SORIANO MD            Ot            V58.61      
      ANTICOAGULANTS,LT,CURRENT USE                     

 

 2015            YONATHAN SORIANO MD            Ot            V58.69      
      OTH MED,LT,CURRENT USE                     

 

 2015            YONATHAN SORIANO MD            Ot            V58.83      
      ENCOUNTER FOR THERAPEUTIC DRUG MONITORIN                     

 

 10/06/2015            STACY CAMARGO MD            Ot            719.46    
                              

 

 10/06/2015            STACY CAMARGO MD            Ot            724.5     
                             

 

 10/06/2015            STACY CAMARGO MD            Ot            729.5     
                             

 

 2015            DARRIAN FARMER ARNP            Ot            M54.2 
                                 

 

 2015            DARRIAN FARMER ARNP            Ot            M54.6 
                                 

 

 2015            DARRIAN FARMER ARNP            Ot            M50.20
                                  

 

 2015            DARRIAN FARMER ARNP            Ot            M51.24
                                  

 

 2015                         Ot            272.4                        
          

 

 2015                         Ot            296.33                       
           

 

 2015                         Ot            V58.69                       
           

 

 2015                         Ot            780.2                        
          

 

 2015                         Ot            V58.61                       
           

 

 2015                         Ot            V58.69                       
           

 

 2015                         Ot            414.9                        
          

 

 2015                         Ot            V58.61                       
           

 

 2015                         Ot            V58.66                       
           

 

 2015                         Ot            V58.69                       
           

 

 2015                         Ot            397.0                        
          

 

 2015                         Ot            424.0                        
          

 

 2015                         Ot            786.50                       
           

 

 2015                         Ot            272.4                        
          

 

 2015                         Ot            286.3                        
          

 

 2015                         Ot            401.9                        
          

 

 2015                         Ot            414.00                       
           

 

 2015                         Ot            780.79                       
           

 

 2015                         Ot            786.05                       
           

 

 2015                         Ot            794.39                       
           

 

 2015                         Ot            V58.61                       
           

 

 2015                         Ot            V58.69                       
           

 

 2015                         Ot            V72.63                       
           

 

 2015                         Ot            V72.81                       
           

 

 2015                         Ot            786.05                       
           

 

 2015                         Ot            V58.61                       
           

 

 2015                         Ot            V58.83                       
           

 

 2015                         Ot            V58.61                       
           

 

 2015                         Ot            V58.69                       
           

 

 2015                         Ot            V58.83                       
           

 

 2015            STELLA SOMMER, BLANCA NIEVES            Ot            272.4  
                                

 

 2015            STELLA SOMMER, BLANCA NIEVES            Ot            401.9  
                                

 

 2015            MALINA TORRES            Ot            
272.4                                  

 

 2015            STELLA SOMMER, BLANCA NIEVES            Ot            789.00 
                                 

 

 2015            STELLA SOMMER, BLANCA NIEVES            Ot            789.03 
                                 

 

 2015            MARY JO SOMMER, STACY DOVE            Ot            719.46    
                              

 

 2015            MARY JO SOMMER, STACY DOVE            Ot            724.5     
                             

 

 2015            MARY JO SOMMER, STACY DOVE            Ot            729.5     
                             

 

 2015            YONATHAN SORIANO MD            Ot            V58.61      
                            

 

 2015            YONATHAN SORIANO MD            Ot            V58.69      
                            

 

 2015            YONATHAN SORIANO MD            Ot            V58.83      
                            

 

 2015            YONATHAN SORIANO MD            Ot            E78.2       
                           

 

 2015            YONATHAN SORIANO MD            Ot            I10         
                         

 

 2015            YONATHAN SORIANO MD            Ot            I25.10      
                            

 

 2015            YONATHAN SORIANO MD            Ot            I65.23      
                            

 

 2015            DARRIAN FARMERP            Ot            M54.2 
                                 

 

 2015            DARRIAN FARMERP            Ot            M54.6 
                                 

 

 2015            DARRIAN FARMER ARNP            Ot            M50.20
                                  

 

 2015            DARRIAN FAMRERP            Ot            M51.24
                                  

 

 2015            YONATHAN SORIANO MD            Ot            E78.2       
                           

 

 2015            YONATHAN SORIANO MD            Ot            I10         
                         

 

 2015            YONATHAN SORIANO MD            Ot            I25.10      
                            

 

 2015            YONATHAN SORIANO MD            Ot            I65.23      
                            

 

 2015            DARRIAN FARMERP            Ot            M54.2 
                                 

 

 2015            DARRIAN FARMERP            Ot            M54.6 
                                 

 

 2015            DARRIAN FARMERP            Ot            M50.20
                                  

 

 2015            DARRIAN FARMERP            Ot            M51.24
                                  

 

 2015            YONATHAN SORIANO MD            Ot            E78.2       
                           

 

 2015            YONATHAN SORIANO MD            Ot            I10         
                         

 

 2015            YONATHAN SORIANO MD            Ot            I25.10      
                            

 

 2015            YONATHAN SORIANO MD            Ot            I65.23      
                            

 

 2015                         Ot            272.4                        
          

 

 2015                         Ot            296.33                       
           

 

 2015                         Ot            V58.69                       
           

 

 2015                         Ot            780.2                        
          

 

 2015                         Ot            V58.61                       
           

 

 2015                         Ot            V58.69                       
           

 

 2015                         Ot            414.9                        
          

 

 2015                         Ot            V58.61                       
           

 

 2015                         Ot            V58.66                       
           

 

 2015                         Ot            V58.69                       
           

 

 2015                         Ot            397.0                        
          

 

 2015                         Ot            424.0                        
          

 

 2015                         Ot            786.50                       
           

 

 2015                         Ot            272.4                        
          

 

 2015                         Ot            286.3                        
          

 

 2015                         Ot            401.9                        
          

 

 2015                         Ot            414.00                       
           

 

 2015                         Ot            780.79                       
           

 

 2015                         Ot            786.05                       
           

 

 2015                         Ot            794.39                       
           

 

 2015                         Ot            V58.61                       
           

 

 2015                         Ot            V58.69                       
           

 

 2015                         Ot            V72.63                       
           

 

 2015                         Ot            V72.81                       
           

 

 2015                         Ot            786.05                       
           

 

 2015                         Ot            V58.61                       
           

 

 2015                         Ot            V58.83                       
           

 

 2015                         Ot            V58.61                       
           

 

 2015                         Ot            V58.69                       
           

 

 2015                         Ot            V58.83                       
           

 

 2015            STELLA SOMMER, BLANCA NIEVES            Ot            272.4  
                                

 

 2015            STELLA SOMMER, BLANCA NIEVES            Ot            401.9  
                                

 

 2015            RUDY CHERY MALINA K            Ot            
272.4                                  

 

 2015            STELLA SOMMER, BLANCA NIEVES            Ot            789.00 
                                 

 

 2015            STELLA SOMMER, BLANCA NIEVES            Ot            789.03 
                                 

 

 2015            MARY JO SOMMER, STACY DOVE            Ot            719.46    
                              

 

 2015            MARY JO SOMMER, STACY DOVE            Ot            724.5     
                             

 

 2015            MARY JO SOMMER, STACY DOVE            Ot            729.5     
                             

 

 2015            BO SOMMER, YONATHAN LINDQUIST            Ot            V58.61      
                            

 

 2015            BO SOMMER, YONATHAN LINDQUIST            Ot            V58.69      
                            

 

 2015            BO SOMMER, YONATHAN J            Ot            V58.83      
                            

 

 2015            BO SOMMER, YONATHAN LINDQUIST            Ot            E78.2       
                           

 

 2015            BO SOMMER, YONTAHAN LINDQUIST            Ot            I10         
                         

 

 2015            BO SOMMER, YONATHAN LINDQUIST            Ot            I25.10      
                            

 

 2015            BO SOMMER, YONATHAN LINDQUIST            Ot            I65.23      
                            

 

 2015            DARRIAN FARMER ARNP            Ot            M54.2 
                                 

 

 2015            DARRIAN FARMER ARNP            Ot            M54.6 
                                 

 

 2015            DARRIAN FARMER ARNP            Ot            M50.20
                                  

 

 2015            DARRIAN FARMER ARNP            Ot            M51.24
                                  

 

 2015            MARY JO SOMMER, STACY DOVE            Ot            M40.204   
                               

 

 2015            MARY JO SOMMER, STACY DOVE            Ot            M54.10    
                              

 

 2015            STACY CAMARGO MD            Ot            M54.2     
                             

 

 2015            STACY CAMARGO MD            Ot            M54.6     
                             

 

 2016            STACY CAMARGO MD            Ot            M40.204   
                               

 

 2016            STACY CAMARGO MD            Ot            M54.10    
                              

 

 2016            MARY JO SOMMER, STACY DOVE            Ot            M54.2     
                             

 

 2016            MARY JO SOMMER, STACY DOVE            Ot            M54.6     
                             

 

 2016                         Ot            R31.9                        
          

 

 2016            MARY JO SOMMER, STACY DOVE            Ot            M40.204   
                               

 

 2016            MARY JO SOMMER, STACY DOVE            Ot            M54.10    
                              

 

 2016            MARY JO SOMMER, STACY DOVE            Ot            M54.2     
                             

 

 2016            MARY JO SOMMER, STACY DOVE            Ot            M54.6     
                             

 

 2016                         Ot            R31.9                        
          

 

 2016                         Ot            272.4                        
          

 

 2016                         Ot            296.33                       
           

 

 2016                         Ot            V58.69                       
           

 

 2016                         Ot            780.2                        
          

 

 2016                         Ot            V58.61                       
           

 

 2016                         Ot            V58.69                       
           

 

 2016                         Ot            414.9                        
          

 

 2016                         Ot            V58.61                       
           

 

 2016                         Ot            V58.66                       
           

 

 2016                         Ot            V58.69                       
           

 

 2016                         Ot            397.0                        
          

 

 2016                         Ot            424.0                        
          

 

 2016                         Ot            786.50                       
           

 

 2016                         Ot            272.4                        
          

 

 2016                         Ot            286.3                        
          

 

 2016                         Ot            401.9                        
          

 

 2016                         Ot            414.00                       
           

 

 2016                         Ot            780.79                       
           

 

 2016                         Ot            786.05                       
           

 

 2016                         Ot            794.39                       
           

 

 2016                         Ot            V58.61                       
           

 

 2016                         Ot            V58.69                       
           

 

 2016                         Ot            V72.63                       
           

 

 2016                         Ot            V72.81                       
           

 

 2016                         Ot            786.05                       
           

 

 2016                         Ot            V58.61                       
           

 

 2016                         Ot            V58.83                       
           

 

 2016                         Ot            V58.61                       
           

 

 2016                         Ot            V58.69                       
           

 

 2016                         Ot            V58.83                       
           

 

 2016            STELLA SOMMER, BLANCA NIEVES            Ot            272.4  
                                

 

 2016            STELLA SOMMER, BLANCA NIEVES            Ot            401.9  
                                

 

 2016            MALINA TORRES            Ot            
272.4                                  

 

 2016            STELLA SOMMER, BLANCA NIEVES            Ot            789.00 
                                 

 

 2016            STELLA SOMMER, BLANCA M            Ot            789.03 
                                 

 

 2016            MARY JO SOMMER, STACY DOVE            Ot            719.46    
                              

 

 2016            MARY JO SOMMER, STACY DOVE            Ot            724.5     
                             

 

 2016            MARY JO SOMMER, STACY DOVE            Ot            729.5     
                             

 

 2016            BO SOMMER, YONATHAN LINDQUIST            Ot            V58.61      
                            

 

 2016            BO SOMMER, YONATHAN LINDQUIST            Ot            V58.69      
                            

 

 2016            BO SOMMER, YONATHAN LINDQUIST            Ot            V58.83      
                            

 

 2016            BO SOMMER, YONATHAN LINDQUIST            Ot            E78.2       
                           

 

 2016            BO SOMMER, YONATHAN LINDQUIST            Ot            I10         
                         

 

 2016            BO SOMMER, YONATHAN LINDQUIST            Ot            I25.10      
                            

 

 2016            BO SOMMER, YONATHAN LINDQUIST            Ot            I65.23      
                            

 

 2016            DARRIAN FARMER ARNP            Ot            M54.2 
                                 

 

 2016            DARRIAN FARMER ARNP            Ot            M54.6 
                                 

 

 2016            DARRIAN FARMER ARNP            Ot            M50.20
                                  

 

 2016            DARRIAN FARMER ARNP            Ot            M51.24
                                  

 

 2016            MARY JO SOMMER, STACY DOVE            Ot            M40.204   
                               

 

 2016            MARY JO SOMMER, STACY DOVE            Ot            M54.10    
                              

 

 2016            MARY JO SOMMER, STACY DOVE            Ot            M54.2     
                             

 

 2016            MARY JO SOMMER, STACY DOVE            Ot            M54.6     
                             

 

 2016                         Ot            R31.9                        
          

 

 2016            BO SOMMER, YONATHAN LINDQUIST            Ot            V58.61      
                            

 

 2016            BO SOMMER, YONATHAN LINDQUIST            Ot            V58.69      
                            

 

 2016            BO SOMMER, YONATHAN LINDQUIST            Ot            V58.83      
                            

 

 2016            MARY JO SOMMER, STACY DOVE            Ot            M40.204   
         UNSPECIFIED KYPHOSIS, THORACIC REGION                     

 

 2016            MARY JO SOMMER, STACY DOVE            Ot            M54.10    
        RADICULOPATHY, SITE UNSPECIFIED                     

 

 2016            MARY JO SOMMER, STACY DOVE            Ot            M54.2     
       CERVICALGIA                     

 

 2016            STACY CAMARGO MD            Ot            M54.6     
       PAIN IN THORACIC SPINE                     

 

 2016            YONATHAN SORIANO MD            Ot            V58.61      
                            

 

 2016            YONATHAN SORIANO MD            Ot            V58.69      
                            

 

 2016            YONATHAN SORIANO MD, Ot            V58.83      
                            

 

 2016            ENDER WEEMSUMA S            Ot            N94.9     
                             

 

 2016            KATIEUMA BARRETT DO            Ot            N94.9     
                             

 

 2016            YONATHAN SORIANO MD, Ot            D68.2       
     HEREDITARY DEFICIENCY OF OTHER CLOTTING                      

 

 2016            YONATHAN SORIANO MD, Ot            Z79.01      
      LONG TERM (CURRENT) USE OF ANTICOAGULANT                     

 

 2016            YONATHAN SORIANO MD, Ot            Z79.899     
       OTHER LONG TERM (CURRENT) DRUG THERAPY                     

 

 2016            YONATHAN SORIANO MD, Ot            D68.2       
     HEREDITARY DEFICIENCY OF OTHER CLOTTING                      

 

 2016            YONATHAN SORIANO MD, Ot            Z79.01      
      LONG TERM (CURRENT) USE OF ANTICOAGULANT                     

 

 2016            YONATHAN SORIANO MD, Ot            Z79.899     
       OTHER LONG TERM (CURRENT) DRUG THERAPY                     

 

 2016            YONATHAN SORIANO MD, Ot            D68.2       
     HEREDITARY DEFICIENCY OF OTHER CLOTTING                      

 

 2016            YONATHAN SORIANO MD, Ot            Z79.01      
      LONG TERM (CURRENT) USE OF ANTICOAGULANT                     

 

 2016            YONATHAN SORIANO MD, Ot            Z79.899     
       OTHER LONG TERM (CURRENT) DRUG THERAPY                     

 

 2016            MALINA TORRES            Ot            
272.4            HYPERLIPIDEMIA NEC/NOS                     

 

 2016            YONATHAN SORIANO MD, Ot            D68.2       
     HEREDITARY DEFICIENCY OF OTHER CLOTTING                      

 

 2016            YONATHAN SORIANO MD, Ot            Z79.01      
      LONG TERM (CURRENT) USE OF ANTICOAGULANT                     

 

 2016            YONATHAN SORIANO MD, Ot            Z79.899     
       OTHER LONG TERM (CURRENT) DRUG THERAPY                     

 

 2016                         Ot            V58.61            
ANTICOAGULANTS,LT,CURRENT USE                     

 

 2016                         Ot            V58.83            ENCOUNTER 
FOR THERAPEUTIC DRUG MONITORIN                     

 

 2016                         Ot            V58.61            
ANTICOAGULANTS,LT,CURRENT USE                     

 

 2016                         Ot            V58.69            OTH MED,LT,
CURRENT USE                     

 

 2016                         Ot            V58.83            ENCOUNTER 
FOR THERAPEUTIC DRUG MONITORIN                     

 

 2016            YONATHAN SORIANO MD, Ot            D68.2       
     HEREDITARY DEFICIENCY OF OTHER CLOTTING                      

 

 2016            YONATHAN SORIANO MD, Ot            Z79.01      
      LONG TERM (CURRENT) USE OF ANTICOAGULANT                     

 

 2016            YONATHAN SORIANO MD, Ot            Z79.899     
       OTHER LONG TERM (CURRENT) DRUG THERAPY                     

 

 2016            JUDD ANDERSON MD            Ot            F17.210 
           NICOTINE DEPENDENCE, CIGARETTES, UNCOMPL                     

 

 2016            JUDD ANDERSON MD            Ot            I10     
       ESSENTIAL (PRIMARY) HYPERTENSION                     

 

 2016            JUDD ANDERSON MD            Ot            R11.2   
         NAUSEA WITH VOMITING, UNSPECIFIED                     

 

 2016            JUDD ANDERSON MD            Ot            R51     
       HEADACHE                     

 

 2016            JUDD ANDERSON MD            Ot            I10     
       ESSENTIAL (PRIMARY) HYPERTENSION                     

 

 2016            JUDD ANDERSON MD            Ot            R11.2   
         NAUSEA WITH VOMITING, UNSPECIFIED                     

 

 2016            JUDD ANDERSON MD            Ot            R51     
       HEADACHE                     

 

 2016            JUDD ANDERSON MD            Ot            F17.210 
           NICOTINE DEPENDENCE, CIGARETTES, UNCOMPL                     

 

 2016            JUDD ANDERSON MD            Ot            I10     
       ESSENTIAL (PRIMARY) HYPERTENSION                     

 

 2016            JUDD ANDERSON MD            Ot            R11.2   
         NAUSEA WITH VOMITING, UNSPECIFIED                     

 

 2016            JUDD ANDERSON MD            Ot            R51     
       HEADACHE                     

 

 2016            YONATHAN SORIANO MD            Ot            D68.2       
     HEREDITARY DEFICIENCY OF OTHER CLOTTING                      

 

 2016            YONATHAN SORIANO MD            Ot            Z79.01      
      LONG TERM (CURRENT) USE OF ANTICOAGULANT                     

 

 2016            YONATHAN SORIANO MD            Ot            Z79.899     
       OTHER LONG TERM (CURRENT) DRUG THERAPY                     

 

 2016            REINA LUO            Ot            R63.4     
       ABNORMAL WEIGHT LOSS                     

 

 2016            YONATHAN SORIANO MD            Ot            D68.2       
     HEREDITARY DEFICIENCY OF OTHER CLOTTING                      

 

 2016            YONATHAN SORIANO MD            Ot            Z79.01      
      LONG TERM (CURRENT) USE OF ANTICOAGULANT                     

 

 2016            YONATHAN SORIANO MD            Ot            Z79.899     
       OTHER LONG TERM (CURRENT) DRUG THERAPY                     

 

 2016            YONATHAN SORIANO MD            Ot            D68.2       
     HEREDITARY DEFICIENCY OF OTHER CLOTTING                      

 

 2016            YONATHAN SORIANO MD            Ot            Z79.01      
      LONG TERM (CURRENT) USE OF ANTICOAGULANT                     

 

 2016            YONATHAN SORIANO MD            Ot            Z79.899     
       OTHER LONG TERM (CURRENT) DRUG THERAPY                     

 

 2016            VALERIO, REINA M APRN            Ot            R07.9     
       CHEST PAIN, UNSPECIFIED                     

 

 2016            REINA LUO APRN            Ot            R63.4     
       ABNORMAL WEIGHT LOSS                     

 

 2016            REINA LUO APRN            Ot            R07.9     
       CHEST PAIN, UNSPECIFIED                     

 

 2016            REINA LUO APRN            Ot            R63.4     
       ABNORMAL WEIGHT LOSS                     

 

 2016            REINA LUO APRN            Ot            R07.9     
       CHEST PAIN, UNSPECIFIED                     

 

 2016            REINA LUO APRN            Ot            R63.4     
       ABNORMAL WEIGHT LOSS                     

 

 2016                         Ot            D68.51            ACTIVATED 
PROTEIN C RESISTANCE                     

 

 2016                         Ot            E78.2            MIXED 
HYPERLIPIDEMIA                     

 

 2016                         Ot            I10            ESSENTIAL (
PRIMARY) HYPERTENSION                     

 

 2016                         Ot            I25.10            ATHSCL 
HEART DISEASE OF NATIVE CORONARY                      

 

 2016                         Ot            I65.23            OCCLUSION 
AND STENOSIS OF BILATERAL [place]                     

 

 2016                         Ot            414.9            CHR ISCHEMIC 
HRT DIS NOS                     

 

 2016                         Ot            V58.61            
ANTICOAGULANTS,LT,CURRENT USE                     

 

 2016                         Ot            V58.66            LONG-TERM (
CURRENT) USE OF ASPIRIN                     

 

 2016                         Ot            V58.69            OTH MED,LT,
CURRENT USE                     

 

 2016                         Ot            397.0            TRICUSPID 
VALVE DISEASE                     

 

 2016                         Ot            424.0            MITRAL VALVE 
DISORDER                     

 

 2016                         Ot            786.50            CHEST PAIN 
NOS                     

 

 2016                         Ot            272.4            
HYPERLIPIDEMIA NEC/NOS                     

 

 2016                         Ot            286.3            BECK DEF 
CLOT FACTOR NEC                     

 

 2016                         Ot            401.9            HYPERTENSION 
NOS                     

 

 2016                         Ot            414.00            CORON 
ATHEROSCLER NOS TYPE VESSEL, NATIV                     

 

 2016                         Ot            780.79            OTH MALAISE 
FATIGUE                     

 

 2016                         Ot            786.05            SHORTNESS 
OF BREATH                     

 

 2016                         Ot            794.39            ABN 
CARDIOVASC STUDY NEC                     

 

 2016                         Ot            V58.61            
ANTICOAGULANTS,LT,CURRENT USE                     

 

 2016                         Ot            V58.69            OTH MED,LT,
CURRENT USE                     

 

 2016                         Ot            V72.63            PRE-
PROCEDURAL LABORATORY EXAMINATION                     

 

 2016                         Ot            V72.81            EXAM-PRE-
OPERATIVE CARDIOVASCULAR                     

 

 2016                         Ot            786.05            SHORTNESS 
OF BREATH                     

 

 2016                         Ot            V58.61            
ANTICOAGULANTS,LT,CURRENT USE                     

 

 2016                         Ot            V58.83            ENCOUNTER 
FOR THERAPEUTIC DRUG MONITORIN                     

 

 2016                         Ot            V58.61            
ANTICOAGULANTS,LT,CURRENT USE                     

 

 2016                         Ot            V58.69            OTH MED,LT,
CURRENT USE                     

 

 2016                         Ot            V58.83            ENCOUNTER 
FOR THERAPEUTIC DRUG MONITORIN                     

 

 2016            BLANCA DOUGLAS MD            Ot            272.4  
          HYPERLIPIDEMIA NEC/NOS                     

 

 2016            BLANCA DOUGLAS MD            Ot            401.9  
          HYPERTENSION NOS                     

 

 2016            MALINA TORRES            Ot            
272.4            HYPERLIPIDEMIA NEC/NOS                     

 

 2016            BLANCA DOUGLAS MD            Ot            789.00 
           ABDOMINAL PAIN, UNSPECIFIED SITE                     

 

 2016            BLANCA DOUGLAS MD            Ot            789.03 
           ABDOMINAL PAIN, RIGHT LOWER QUADRANT                     

 

 2016            MARY JO SOMMER, STACY DOVE            Ot            719.46    
        JOINT PAIN-L/LEG                     

 

 2016            STACY CAMARGO MD            Ot            724.5     
       BACKACHE NOS                     

 

 2016            STACY CAMARGO MD            Ot            729.5     
       PAIN IN LIMB                     

 

 2016            YONATHAN SORIANO MD            Ot            E78.2       
     MIXED HYPERLIPIDEMIA                     

 

 2016            YONATHAN SORIANO MD            Ot            I10         
   ESSENTIAL (PRIMARY) HYPERTENSION                     

 

 2016            YONATHAN SORIANO MD            Ot            I25.10      
      ATHSCL HEART DISEASE OF NATIVE CORONARY                      

 

 2016            YONATHAN SORIANO MD            Ot            I65.23      
      OCCLUSION AND STENOSIS OF BILATERAL [place]                     

 

 2016            DARRIAN FARMERP            Ot            M54.2 
           CERVICALGIA                     

 

 2016            DARRIAN FARMER            Ot            M54.6 
           PAIN IN THORACIC SPINE                     

 

 2016            DARRIAN FARMERP            Ot            M50.20
            OTHER CERVICAL DISC DISPLACEMENT, UNSP C                     

 

 2016            DARRIAN FARMERP            Ot            M51.24
            OTHER INTERVERTEBRAL DISC DISPLACEMENT,                      

 

 2016                         Ot            R31.9            HEMATURIA, 
UNSPECIFIED                     

 

 2016            UMA HANDLEY DO            Ot            N94.9     
       UNSP COND ASSOC W FEMALE GENITAL ORGANS                      

 

 2016            YONATHAN SORIANO MD            Ot            D68.2       
     HEREDITARY DEFICIENCY OF OTHER CLOTTING                      

 

 2016            YONATHAN SORIANO MD            Ot            Z79.01      
      LONG TERM (CURRENT) USE OF ANTICOAGULANT                     

 

 2016            YONATHAN SORIANO MD, Ot            Z79.899     
       OTHER LONG TERM (CURRENT) DRUG THERAPY                     

 

 2016            REINA LUO            Ot            R07.9     
       CHEST PAIN, UNSPECIFIED                     

 

 2016            REINA LUO            Ot            R63.4     
       ABNORMAL WEIGHT LOSS                     

 

 2016                         Ot            D68.51            ACTIVATED 
PROTEIN C RESISTANCE                     

 

 2016                         Ot            E78.2            MIXED 
HYPERLIPIDEMIA                     

 

 2016                         Ot            I10            ESSENTIAL (
PRIMARY) HYPERTENSION                     

 

 2016                         Ot            I25.10            ATHSCL 
HEART DISEASE OF NAT[place] CORONARY                      

 

 2016                         Ot            I65.23            OCCLUSION 
AND STENOSIS OF BILATERAL [place]                     

 

 2016            STACY CAMARGO MD            Ot            M25.511   
         PAIN IN RIGHT SHOULDER                     

 

 2016            STACY CAMARGO MD            Ot            M25.611   
         STIFFNESS OF RIGHT SHOULDER, NOT ELSEWHE                     

 

 2016            STACY CAMARGO MD            Ot            M54.2     
       CERVICALGIA                     

 

 2016            STACY CAMARGO MD            Ot            M54.6     
       PAIN IN THORACIC SPINE                     

 

 2016            YONATHAN SORIANO MD, Ot            D68.2       
     HEREDITARY DEFICIENCY OF OTHER CLOTTING                      

 

 2016            YONATHAN SORIANO MD            Ot            Z79.01      
      LONG TERM (CURRENT) USE OF ANTICOAGULANT                     

 

 2016            YONATHAN SORIANO MD, Ot            Z79.899     
       OTHER LONG TERM (CURRENT) DRUG THERAPY                     

 

 2016            YONATHAN SORIANO MD, Ot            D68.2       
     HEREDITARY DEFICIENCY OF OTHER CLOTTING                      

 

 2016            YONATHAN SORIANO MD, Ot            Z79.01      
      LONG TERM (CURRENT) USE OF ANTICOAGULANT                     

 

 2016            YONATHAN SORIANO MD, Ot            Z79.899     
       OTHER LONG TERM (CURRENT) DRUG THERAPY                     

 

 10/03/2016                         Ot            D68.51            ACTIVATED 
PROTEIN C RESISTANCE                     

 

 10/03/2016                         Ot            E78.2            MIXED 
HYPERLIPIDEMIA                     

 

 10/03/2016                         Ot            I10            ESSENTIAL (
PRIMARY) HYPERTENSION                     

 

 10/03/2016                         Ot            I25.10            ATHSCL 
HEART DISEASE OF [place] CORONARY                      

 

 10/03/2016                         Ot            I65.23            OCCLUSION 
AND STENOSIS OF BILATERAL [place]                     

 

 10/04/2016            YONATHAN SORIANO MD, Ot            D68.2       
     HEREDITARY DEFICIENCY OF OTHER CLOTTING                      

 

 10/04/2016            YONATHAN SORIANO MD, Ot            Z79.01      
      LONG TERM (CURRENT) USE OF ANTICOAGULANT                     

 

 10/04/2016            YONATHAN SORIANO MD, Ot            Z79.899     
       OTHER LONG TERM (CURRENT) DRUG THERAPY                     

 

 10/14/2016                         Ot            D68.51            ACTIVATED 
PROTEIN C RESISTANCE                     

 

 10/14/2016                         Ot            E78.2            MIXED 
HYPERLIPIDEMIA                     

 

 10/14/2016                         Ot            I10            ESSENTIAL (
PRIMARY) HYPERTENSION                     

 

 10/14/2016                         Ot            I25.10            ATHSCL 
HEART DISEASE OF [place] CORONARY                      

 

 10/14/2016                         Ot            I65.23            OCCLUSION 
AND STENOSIS OF BILATERAL [place]                     

 

 10/27/2016                         Ot            414.9            CHR ISCHEMIC 
HRT DIS NOS                     

 

 10/27/2016                         Ot            V58.61            
ANTICOAGULANTS,LT,CURRENT USE                     

 

 10/27/2016                         Ot            V58.66            LONG-TERM (
CURRENT) USE OF ASPIRIN                     

 

 10/27/2016                         Ot            V58.69            OTH MED,LT,
CURRENT USE                     

 

 10/27/2016                         Ot            397.0            TRICUSPID 
VALVE DISEASE                     

 

 10/27/2016                         Ot            424.0            MITRAL VALVE 
DISORDER                     

 

 10/27/2016                         Ot            786.50            CHEST PAIN 
NOS                     

 

 10/27/2016                         Ot            272.4            
HYPERLIPIDEMIA NEC/NOS                     

 

 10/27/2016                         Ot            286.3            BECK DEF 
CLOT FACTOR NEC                     

 

 10/27/2016                         Ot            401.9            HYPERTENSION 
NOS                     

 

 10/27/2016                         Ot            414.00            CORON 
ATHEROSCLER NOS TYPE VESSEL, NATIV                     

 

 10/27/2016                         Ot            780.79            OTH MALAISE 
FATIGUE                     

 

 10/27/2016                         Ot            786.05            SHORTNESS 
OF BREATH                     

 

 10/27/2016                         Ot            794.39            ABN 
CARDIOVASC STUDY NEC                     

 

 10/27/2016                         Ot            V58.61            
ANTICOAGULANTS,LT,CURRENT USE                     

 

 10/27/2016                         Ot            V58.69            OTH MED,LT,
CURRENT USE                     

 

 10/27/2016                         Ot            V72.63            PRE-
PROCEDURAL LABORATORY EXAMINATION                     

 

 10/27/2016                         Ot            V72.81            EXAM-PRE-
OPERATIVE CARDIOVASCULAR                     

 

 10/27/2016                         Ot            786.05            SHORTNESS 
OF BREATH                     

 

 10/27/2016                         Ot            V58.61            
ANTICOAGULANTS,LT,CURRENT USE                     

 

 10/27/2016                         Ot            V58.83            ENCOUNTER 
FOR THERAPEUTIC DRUG MONITORIN                     

 

 10/27/2016                         Ot            V58.61            
ANTICOAGULANTS,LT,CURRENT USE                     

 

 10/27/2016                         Ot            V58.69            OTH MED,LT,
CURRENT USE                     

 

 10/27/2016                         Ot            V58.83            ENCOUNTER 
FOR THERAPEUTIC DRUG MONITORIN                     

 

 10/27/2016            STELLA SOMMER, BLANCA NIEVES            Ot            272.4  
          HYPERLIPIDEMIA NEC/NOS                     

 

 10/27/2016            BLANCA DOUGLAS MD            Ot            401.9  
          HYPERTENSION NOS                     

 

 10/27/2016            MONTGOMERYMALINA TITUS            Ot            
272.4            HYPERLIPIDEMIA NEC/NOS                     

 

 10/27/2016            STELLA SOMMER, BLANCA NIEVES            Ot            789.00 
           ABDOMINAL PAIN, UNSPECIFIED SITE                     

 

 10/27/2016            STELLA SOMMER, BLANCA NIEVES            Ot            789.03 
           ABDOMINAL PAIN, RIGHT LOWER QUADRANT                     

 

 10/27/2016            STACY CAMARGO MD            Ot            719.46    
        JOINT PAIN-L/LEG                     

 

 10/27/2016            STACY CAMARGO MD            Ot            724.5     
       BACKACHE NOS                     

 

 10/27/2016            STACY CAMARGO MD            Ot            729.5     
       PAIN IN LIMB                     

 

 10/27/2016            YONATHAN SORIANO MD            Ot            E78.2       
     MIXED HYPERLIPIDEMIA                     

 

 10/27/2016            YONATHAN SORIANO MD            Ot            I10         
   ESSENTIAL (PRIMARY) HYPERTENSION                     

 

 10/27/2016            YONATHAN SORIANO MD            Ot            I25.10      
      ATHSCL HEART DISEASE OF NATIVE CORONARY                      

 

 10/27/2016            YONATHAN SORIANO MD            Ot            I65.23      
      OCCLUSION AND STENOSIS OF BILATERAL WILKERSON                     

 

 10/27/2016            DARRIAN FARMER ARNP            Ot            M54.2 
           CERVICALGIA                     

 

 10/27/2016            DARRIAN FARMER ARNP            Ot            M54.6 
           PAIN IN THORACIC SPINE                     

 

 10/27/2016            DARRIAN FARMER ARNP            Ot            M50.20
            OTHER CERVICAL DISC DISPLACEMENT, UNSP C                     

 

 10/27/2016            DARRIAN FARMER ARNP            Ot            M51.24
            OTHER INTERVERTEBRAL DISC DISPLACEMENT,                      

 

 10/27/2016                         Ot            R31.9            HEMATURIA, 
UNSPECIFIED                     

 

 10/27/2016            FENARNOLD DO, UMA S            Ot            N94.9     
       UNSP COND ASSOC W FEMALE GENITAL ORGANS                      

 

 10/27/2016            REINA LUO APRN            Ot            R07.9     
       CHEST PAIN, UNSPECIFIED                     

 

 10/27/2016            REINA LUO APRN            Ot            R63.4     
       ABNORMAL WEIGHT LOSS                     

 

 10/27/2016                         Ot            D68.51            ACTIVATED 
PROTEIN C RESISTANCE                     

 

 10/27/2016                         Ot            E78.2            MIXED 
HYPERLIPIDEMIA                     

 

 10/27/2016                         Ot            I10            ESSENTIAL (
PRIMARY) HYPERTENSION                     

 

 10/27/2016                         Ot            I25.10            ATHSCL 
HEART DISEASE OF NAT[place] CORONARY                      

 

 10/27/2016                         Ot            I65.23            OCCLUSION 
AND STENOSIS OF BILATERAL [place]KERSON                     

 

 10/27/2016            YONATHAN SORIANO MD            Ot            D68.2       
     HEREDITARY DEFICIENCY OF OTHER CLOTTING                      

 

 10/27/2016            YONATHAN SORIANO MD            Ot            Z79.01      
      LONG TERM (CURRENT) USE OF ANTICOAGULANT                     

 

 10/27/2016            YONATHAN SORIANO MD, Ot            Z79.899     
       OTHER LONG TERM (CURRENT) DRUG THERAPY                     

 

 10/27/2016            STACY CAMARGO MD            Ot            R05       
     COUGH                     

 

 10/27/2016            STACY CAMARGO MD            Ot            R06.02    
        SHORTNESS OF BREATH                     

 

 10/28/2016            STACY CAMARGO MD            Ot            D68.2     
       HEREDITARY DEFICIENCY OF OTHER CLOTTING                      

 

 10/28/2016            STACY CAMARGO MD            Ot            F17.210   
         NICOTINE DEPENDENCE, CIGARETTES, UNCOMPL                     

 

 10/28/2016            STACY CAMARGO MD            Ot            F32.9     
       MAJOR DEPRESSIVE DISORDER, SINGLE EPISOD                     

 

 10/28/2016            STACY CAMARGO MD            Ot            F41.9     
       ANXIETY DISORDER, UNSPECIFIED                     

 

 10/28/2016            STACY CAMARGO MD, Ot            G40.909   
         EPILEPSY, UNSP, NOT INTRACTABLE, WITHOUT                     

 

 10/28/2016            STACY CAMARGO MD            Ot            I10       
     ESSENTIAL (PRIMARY) HYPERTENSION                     

 

 10/28/2016            STACY CAMARGO MD            Ot            I25.2     
       OLD MYOCARDIAL INFARCTION                     

 

 10/28/2016            STACY CAMARGO MD            Ot            J18.9     
       PNEUMONIA, UNSPECIFIED ORGANISM                     

 

 10/28/2016            STACY CAMARGO MD            Ot            R07.2     
       PRECORDIAL PAIN                     

 

 10/28/2016            STACY CAMARGO MD            Ot            R10.13    
        EPIGASTRIC PAIN                     

 

 10/28/2016            STACY CAMARGO MD            Ot            R91.8     
       OTHER NONSPECIFIC ABNORMAL FINDING OF NIHARIKA                     

 

 10/28/2016            STACY CAMARGO MD            Ot            Z86.718   
         PERSONAL HISTORY OF OTHER VENOUS THROMBO                     

 

 2016            YONATHAN SORIANO MD, Ot            D68.2       
     HEREDITARY DEFICIENCY OF OTHER CLOTTING                      

 

 2016            YONATHAN SORIANO MD, Ot            Z79.01      
      LONG TERM (CURRENT) USE OF ANTICOAGULANT                     

 

 2016            YONATHAN SORIANO MD, Ot            Z79.899     
       OTHER LONG TERM (CURRENT) DRUG THERAPY                     

 

 2016            YONATHAN SORIANO MD, Ot            D68.2       
     HEREDITARY DEFICIENCY OF OTHER CLOTTING                      

 

 2016            YONATHAN SORIANO MD, Ot            Z79.01      
      LONG TERM (CURRENT) USE OF ANTICOAGULANT                     

 

 2016            YONATHAN SORIANO MD, Ot            Z79.899     
       OTHER LONG TERM (CURRENT) DRUG THERAPY                     

 

 2016            YONATHAN SORIANO MD, Ot            D68.2       
     HEREDITARY DEFICIENCY OF OTHER CLOTTING                      

 

 2016            YONATHAN SORIANO MD            Ot            Z79.01      
      LONG TERM (CURRENT) USE OF ANTICOAGULANT                     

 

 2016            YONATHAN SORIANO MD, Ot            Z79.899     
       OTHER LONG TERM (CURRENT) DRUG THERAPY                     

 

 11/15/2016            STACY CAMARGO MD, Ot            R05       
     COUGH                     

 

 11/15/2016            STACY CAMARGO MD, Ot            R06.02    
        SHORTNESS OF BREATH                     

 

 2016            STACY CAMARGO MD            Ot            R91.8     
       OTHER NONSPECIFIC ABNORMAL FINDING OF NIHARIKA                     

 

 2016            STACY CAMARGO MD, Ot            D68.2     
       HEREDITARY DEFICIENCY OF OTHER CLOTTING                      

 

 2016            STACY CAMARGO MD, Ot            D68.32    
        HEMORRHAGIC DISORD D/T EXTRINSIC CIRCULA                     

 

 2016            STACY CAMARGO MD, Ot            E28.2     
       POLYCYSTIC OVARIAN SYNDROME                     

 

 2016            STACY CAMARGO MD, Ot            F41.9     
       ANXIETY DISORDER, UNSPECIFIED                     

 

 2016            STACY CAMARGO MD            Ot            G40.909   
         EPILEPSY, UNSP, NOT INTRACTABLE, WITHOUT                     

 

 2016            STACY CAMARGO MD            Ot            I25.2     
       OLD MYOCARDIAL INFARCTION                     

 

 2016            STACY CAMARGO MD            Ot            I95.9     
       HYPOTENSION, UNSPECIFIED                     

 

 2016            STACY CAMARGO MD, Ot            K21.9     
       GASTRO-ESOPHAGEAL REFLUX DISEASE WITHOUT                     

 

 2016            STACY CAMARGO MD            Ot            R10.31    
        RIGHT LOWER QUADRANT PAIN                     

 

 2016            STACY CAMARGO MD, Ot            R10.32    
        LEFT LOWER QUADRANT PAIN                     

 

 2016            STACY CAMARGO MD, Ot            R11.2     
       NAUSEA WITH VOMITING, UNSPECIFIED                     

 

 2016            STACY CAMARGO MD, Ot            T45.515A  
          ADVERSE EFFECT OF ANTICOAGULANTS, INITIA                     

 

 2016            STACY CAMARGO MD            Ot            Z86.718   
         PERSONAL HISTORY OF OTHER VENOUS THROMBO                     

 

 2016            STACY CAMARGO MD, Ot            Z87.01    
        PERSONAL HISTORY OF PNEUMONIA (RECURRENT                     

 

 2016            CHE BEARD DO            Ot            D68.51         
   ACTIVATED PROTEIN C RESISTANCE                     

 

 2016            CHE BEARD DO            Ot            F17.210        
    NICOTINE DEPENDENCE, CIGARETTES, UNCOMPL                     

 

 2016            CHE BEARD DO            Ot            R20.2          
  PARESTHESIA OF SKIN                     

 

 2016            CHE BEARD DO            Ot            Z79.899        
    OTHER LONG TERM (CURRENT) DRUG THERAPY                     

 

 2016            STACY CAMARGO MD            Ot            R05       
     COUGH                     

 

 2016            STACY CAMARGO MD            Ot            R06.02    
        SHORTNESS OF BREATH                     

 

 2016            STACY CAMARGO MD            Ot            D64.9     
       ANEMIA, UNSPECIFIED                     

 

 2016            YONATHAN SORIANO MD, Ot            D68.2       
     HEREDITARY DEFICIENCY OF OTHER CLOTTING                      

 

 2016            YONATHAN SORIANO MD            Ot            Z79.01      
      LONG TERM (CURRENT) USE OF ANTICOAGULANT                     

 

 2016            YONATHAN SORIANO MD, Ot            Z79.899     
       OTHER LONG TERM (CURRENT) DRUG THERAPY                     

 

 2016            STACY CAMARGO MD, Ot            D64.9     
       ANEMIA, UNSPECIFIED                     

 

 2016            YONATHAN SORIANO MD, Ot            D68.2       
     HEREDITARY DEFICIENCY OF OTHER CLOTTING                      

 

 2016            YONATHAN SORIANO MD, Ot            Z79.01      
      LONG TERM (CURRENT) USE OF ANTICOAGULANT                     

 

 2016            YONATHAN SORIANO MD, Ot            Z79.899     
       OTHER LONG TERM (CURRENT) DRUG THERAPY                     

 

 2016            STACY CAMARGO MD            Ot            R91.8     
       OTHER NONSPECIFIC ABNORMAL FINDING OF NIHARIKA                     

 

 2016            STACY CAMARGO MD            Ot            D64.9     
       ANEMIA, UNSPECIFIED                     

 

 2016            STACY CAMARGO MD            Ot            R91.8     
       OTHER NONSPECIFIC ABNORMAL FINDING OF NIHARIKA                     

 

 2016            STACY CAMARGO MD            Ot            D64.9     
       ANEMIA, UNSPECIFIED                     

 

 2017            YONATHAN SORIANO MD, Ot            D68.2       
     HEREDITARY DEFICIENCY OF OTHER CLOTTING                      

 

 2017            YONATHAN SORIANO MD, Ot            Z79.01      
      LONG TERM (CURRENT) USE OF ANTICOAGULANT                     

 

 2017            YONATHAN SORIANO MD, Ot            Z79.899     
       OTHER LONG TERM (CURRENT) DRUG THERAPY                     

 

 2017            CHE BEARD DO            Ot            F17.210        
    NICOTINE DEPENDENCE, CIGARETTES, UNCOMPL                     

 

 2017            CHE BEARD DO            Ot            I10            
ESSENTIAL (PRIMARY) HYPERTENSION                     

 

 2017            CHE BEARD DO            Ot            I25.10         
   ATHSCL HEART DISEASE OF NAT[place] CORONARY                      

 

 2017            CHE BEARD DO            Ot            I25.2          
  OLD MYOCARDIAL INFARCTION                     

 

 2017            KISHA DO CHE K            Ot            R11.2          
  NAUSEA WITH VOMITING, UNSPECIFIED                     

 

 2017            KISHA DO, CHE K            Ot            R51            
HEADACHE                     

 

 2017            KISHA SREEKANTH WEEMSA K            Ot            Z79.899        
    OTHER LONG TERM (CURRENT) DRUG THERAPY                     

 

 2017            KISHA SREEKANTH WEEMSA K            Ot            F17.210        
    NICOTINE DEPENDENCE, CIGARETTES, UNCOMPL                     

 

 2017            KISHA DO, CHE K            Ot            I10            
ESSENTIAL (PRIMARY) HYPERTENSION                     

 

 2017            KISHA DO, CHE K            Ot            I25.10         
   ATHSCL HEART DISEASE OF [place] CORONARY                      

 

 2017            KISHA DO CHE K            Ot            I25.2          
  OLD MYOCARDIAL INFARCTION                     

 

 2017            KISHA SREEKANTH WEEMSA K            Ot            R11.2          
  NAUSEA WITH VOMITING, UNSPECIFIED                     

 

 2017            KISHA SREEKANTH WEEMSA K            Ot            R51            
HEADACHE                     

 

 2017            KISHA SREEKANTH WEEMSA K            Ot            Z79.899        
    OTHER LONG TERM (CURRENT) DRUG THERAPY                     

 

 2017            YONATHAN SORIANO MD            Ot            D68.51      
      ACTIVATED PROTEIN C RESISTANCE                     

 

 2017            YONATHAN SORIANO MD            Ot            E78.2       
     MIXED HYPERLIPIDEMIA                     

 

 2017            YONATHAN SORIANO MD            Ot            I10         
   ESSENTIAL (PRIMARY) HYPERTENSION                     

 

 2017            YONATHAN SORIANO MD            Ot            I25.10      
      ATHSCL HEART DISEASE OF [place] CORONARY                      

 

 2017            YONATHAN SORIANO MD            Ot            I65.23      
      OCCLUSION AND STENOSIS OF BILATERAL [place]                     

 

 2017            YONATHAN SORIANO MD            Ot            D68.51      
      ACTIVATED PROTEIN C RESISTANCE                     

 

 2017            YONATHAN SORIANO MD            Ot            E78.2       
     MIXED HYPERLIPIDEMIA                     

 

 2017            YONATHAN SORIANO MD            Ot            I10         
   ESSENTIAL (PRIMARY) HYPERTENSION                     

 

 2017            YONATHAN SORIANO MD            Ot            I25.10      
      ATHSCL HEART DISEASE OF [place] CORONARY                      

 

 2017            YONATHAN SORIANO MD            Ot            I65.23      
      OCCLUSION AND STENOSIS OF BILATERAL [place]                     

 

 2017            YONATHAN SORIANO MD            Ot            D68.51      
      ACTIVATED PROTEIN C RESISTANCE                     

 

 2017            YONATHAN SORIANO MD            Ot            E78.2       
     MIXED HYPERLIPIDEMIA                     

 

 2017            YONATHAN SORIANO MD            Ot            I10         
   ESSENTIAL (PRIMARY) HYPERTENSION                     

 

 2017            YONATHAN SORIANO MD, Ot            I25.10      
      ATHSCL HEART DISEASE OF [place] CORONARY                      

 

 2017            YONATHAN SORIANO MD            Ot            I65.23      
      OCCLUSION AND STENOSIS OF BILATERAL [place]                     

 

 2017            YONATHAN SORIANO MD            Ot            D68.51      
      ACTIVATED PROTEIN C RESISTANCE                     

 

 2017            YONATHAN SORIANO MD            Ot            E78.2       
     MIXED HYPERLIPIDEMIA                     

 

 2017            YONATHAN SORIANO MD            Ot            I10         
   ESSENTIAL (PRIMARY) HYPERTENSION                     

 

 2017            YONATHAN SORIANO MD            Ot            I25.10      
      ATHSCL HEART DISEASE OF [place] CORONARY                      

 

 2017            YONATHAN SORIANO MD            Ot            I65.23      
      OCCLUSION AND STENOSIS OF BILATERAL [place]                     

 

 2017            MARY JO SOMMER, STACY DOVE            Ot            M54.5     
       LOW BACK PAIN                     

 

 2017            MARY JO SOMMER, STACY DOVE            Ot            M54.6     
       PAIN IN THORACIC SPINE                     

 

 2017            MARY JO SOMMER, STACY DOVE            Ot            M54.5     
       LOW BACK PAIN                     

 

 2017            MARY JO SOMMER, STACY DOVE            Ot            M54.6     
       PAIN IN THORACIC SPINE                     

 

 2017            MARY JO SOMMER, STACY A            Ot            M54.5     
       LOW BACK PAIN                     

 

 2017            MARY JO SOMMER, STACY DOVE            Ot            M54.6     
       PAIN IN THORACIC SPINE                     

 

 2017            MARY JO SOMMER, STACY DOVE            Ot            M54.5     
       LOW BACK PAIN                     

 

 2017            MARY JO SOMMER, STACY DOVE            Ot            M54.6     
       PAIN IN THORACIC SPINE                     

 

 2017                         Ot            R07.89            OTHER CHEST 
PAIN                     

 

 2017                         Ot            R07.89            OTHER CHEST 
PAIN                     

 

 10/06/2017                         Ot            R07.89            OTHER CHEST 
PAIN                     

 

 2017            ZAFAR NATARAJAN            Ot            F32.9      
      MAJOR DEPRESSIVE DISORDER, SINGLE EPISOD                     

 

 2017            ZAFAR NATARAJAN            Ot            F41.9      
      ANXIETY DISORDER, UNSPECIFIED                     

 

 2017            ZAFAR NATARAJAN            Ot            G40.909    
        EPILEPSY, UNSP, NOT INTRACTABLE, WITHOUT                     

 

 2017            ZAFAR NATARAJAN            Ot            I10        
    ESSENTIAL (PRIMARY) HYPERTENSION                     

 

 2017            ZAFAR NATARAJAN            Ot            I25.10     
       ATHSCL HEART DISEASE OF [place] CORONARY                      

 

 2017            ZAFAR NATARAJAN            Ot            I25.2      
      OLD MYOCARDIAL INFARCTION                     

 

 2017            ZAFAR NATARAJAN            Ot            K21.9      
      GASTRO-ESOPHAGEAL REFLUX DISEASE WITHOUT                     

 

 2017            ZAFAR ANTARAJAN            Ot            M41.20     
       OTHER IDIOPATHIC SCOLIOSIS, SITE UNSPECI                     

 

 2017            ZAFAR NATARAJAN            Ot            N39.0      
      URINARY TRACT INFECTION, SITE NOT SPECIF                     

 

 2017            ZAFAR NATARAJAN            Ot            R10.12     
       LEFT UPPER QUADRANT PAIN                     

 

 2017            ZAFAR NATARAJAN            Ot            Z77.22     
       CNTCT W AND EXPSR TO ENVIRON TOBACCO SMO                     

 

 2017            ZAFAR NATARAJAN            Ot            Z79.01     
       LONG TERM (CURRENT) USE OF ANTICOAGULANT                     

 

 2017            ZAFAR NATARAJAN            Ot            Z82.49     
       FAMILY HX OF ISCHEM HEART DIS AND OTH DI                     

 

 2017            ZAFAR NATARAJAN            Ot            Z86.718    
        PERSONAL HISTORY OF OTHER VENOUS THROMBO                     

 

 2017            ZAFAR NATARAJAN            Ot            Z87.01     
       PERSONAL HISTORY OF PNEUMONIA (RECURRENT                     

 

 2017            ZAFAR NATARAJAN            Ot            Z87.19     
       PERSONAL HISTORY OF OTHER DISEASES OF TH                     

 

 2017            ZAFAR NATARAJAN            Ot            Z87.448    
        PERSONAL HISTORY OF OTHER DISEASES OF UR                     

 

 2017            ZAFAR NATARAJAN            Ot            Z90.89     
       ACQUIRED ABSENCE OF OTHER ORGANS                     

 

 2017            ZAFAR NATARAJAN            Ot            F32.9      
      MAJOR DEPRESSIVE DISORDER, SINGLE EPISOD                     

 

 2017            ZAFAR NATARAJAN            Ot            F41.9      
      ANXIETY DISORDER, UNSPECIFIED                     

 

 2017            ZAFAR NATARAJAN            Ot            G40.909    
        EPILEPSY, UNSP, NOT INTRACTABLE, WITHOUT                     

 

 2017            ZAFAR NATARAJAN            Ot            I10        
    ESSENTIAL (PRIMARY) HYPERTENSION                     

 

 2017            ZAFAR NATARAJAN            Ot            I25.10     
       ATHSCL HEART DISEASE OF [place] CORONARY                      

 

 2017            ZAFAR NATARAJAN            Ot            I25.2      
      OLD MYOCARDIAL INFARCTION                     

 

 2017            ZAFAR NATARAJAN            Ot            K21.9      
      GASTRO-ESOPHAGEAL REFLUX DISEASE WITHOUT                     

 

 2017            ZAFAR NATARAJAN            Ot            M41.20     
       OTHER IDIOPATHIC SCOLIOSIS, SITE UNSPECI                     

 

 2017            ZAFAR NATARAJAN            Ot            N39.0      
      URINARY TRACT INFECTION, SITE NOT SPECIF                     

 

 2017            ZAFAR NATARAJAN            Ot            R10.12     
       LEFT UPPER QUADRANT PAIN                     

 

 2017            ZAFAR NATARAJAN            Ot            Z77.22     
       CNTCT W AND EXPSR TO ENVIRON TOBACCO SMO                     

 

 2017            ZAFAR NATARAJAN            Ot            Z79.01     
       LONG TERM (CURRENT) USE OF ANTICOAGULANT                     

 

 2017            ZAFAR NATARAJAN            Ot            Z82.49     
       FAMILY HX OF ISCHEM HEART DIS AND OTH DI                     

 

 2017            ZAFAR NATARAJAN            Ot            Z86.718    
        PERSONAL HISTORY OF OTHER VENOUS THROMBO                     

 

 2017            AZFAR NATARAJAN APRMAT            Ot            Z87.01     
       PERSONAL HISTORY OF PNEUMONIA (RECURRENT                     

 

 2017            ZAFAR NATARAJAN            Ot            Z87.19     
       PERSONAL HISTORY OF OTHER DISEASES OF TH                     

 

 2017            ZAFAR NATARAJAN            Ot            Z87.448    
        PERSONAL HISTORY OF OTHER DISEASES OF UR                     

 

 2017            ZAFAR NATARAJAN            Ot            Z90.89     
       ACQUIRED ABSENCE OF OTHER ORGANS                     

 

 11/15/2017            STACY CAMARGO MD            Ot            I10       
     ESSENTIAL (PRIMARY) HYPERTENSION                     

 

 11/15/2017            STACY CAMARGO MD            Ot            Z79.899   
         OTHER LONG TERM (CURRENT) DRUG THERAPY                     

 

 2017            STACY CAMARGO MD            Ot            I10       
     ESSENTIAL (PRIMARY) HYPERTENSION                     

 

 2017            STACY CAMARGO MD            Ot            Z79.899   
         OTHER LONG TERM (CURRENT) DRUG THERAPY                     

 

 2018            STACY CAMARGO MD            Ot            M25.512   
         PAIN IN LEFT SHOULDER                     

 

 2018            UMA HANDLEY DO S            Ot            Z12.31    
        ENCNTR SCREEN MAMMOGRAM FOR MALIGNANT NE                     

 

 2018            UMA HANDLEY DO S            Ot            Z12.31    
        ENCNTR SCREEN MAMMOGRAM FOR MALIGNANT NE                     

 

 2018            UMA HANDLEY DO            Ot            N92.1     
       EXCESSIVE AND FREQUENT MENSTRUATION WITH                     

 

 2018            UMA HANDLEY DO S            Ot            Z12.31    
        ENCNTR SCREEN MAMMOGRAM FOR MALIGNANT NE                     

 

 2018            UMA HANDLEY DO S            Ot            N92.1     
       EXCESSIVE AND FREQUENT MENSTRUATION WITH                     

 

 2018            UMA HANDLEY DO            Ot            Z12.31    
        ENCNTR SCREEN MAMMOGRAM FOR MALIGNANT NE                     

 

 2018            UMA HANDLEY DO S            Ot            N63.11    
        UNSPECIFIED LUMP IN THE RIGHT BREAST, UP                     

 

 2018            UMA HANDLEY DO            Ot            N92.1     
       EXCESSIVE AND FREQUENT MENSTRUATION WITH                     

 

 2018            UMA HANDLEY DO            Ot            Z12.31    
        ENCNTR SCREEN MAMMOGRAM FOR MALIGNANT NE                     

 

 2018            UMA HANDLEY DO            Ot            N63.11    
        UNSPECIFIED LUMP IN THE RIGHT BREAST, UP                     

 

 06/15/2018            UMA HANDLEY DO            Ot            N63.11    
        UNSPECIFIED LUMP IN THE RIGHT BREAST, UP                     

 

 2018            UMA HANDLEY DO            Ot            Z01.818   
         ENCOUNTER FOR OTHER PREPROCEDURAL EXAMIN                     

 

 2018            UMA HANDLEY DO            Ot            Z01.818   
         ENCOUNTER FOR OTHER PREPROCEDURAL EXAMIN                     



                                                                               
                                                                               
                                                                               
                                                                               
                                                                               
                                                                               
                                                                               
                                                                               
                                                                               
                                                                               
                                                                               
                                                                               
                                                                               
                                                                               
                                                                               
                                                                               
                                                                               
                                                                               
                                                                               
                                                                               
                    



Procedures

      





 Code            Description            Performed By            Performed On   
     

 

             65.41                                                             
        2013        



                  



Results

      





 Test            Result            Range        









 Complete blood count (CBC) with automated white blood cell (WBC) differential 
- 10/27/16 06:35         









 Blood leukocytes automated count (number/volume)            10.1 10*3/uL      
      4.3-11.0        

 

 Blood erythrocytes automated count (number/volume)            4.05 10*6/uL    
        4.35-5.85        

 

 Venous blood hemoglobin measurement (mass/volume)            14.0 g/dL        
    11.5-16.0        

 

 Blood hematocrit (volume fraction)            39 %            35-52        

 

 Automated erythrocyte mean corpuscular volume            96 [foz_us]          
  80-99        

 

 Automated erythrocyte mean corpuscular hemoglobin (mass per erythrocyte)      
      35 pg            25-34        

 

 Automated erythrocyte mean corpuscular hemoglobin concentration measurement (
mass/volume)            36 g/dL            32-36        

 

 Automated erythrocyte distribution width ratio            12.1 %            
10.0-14.5        

 

 Automated blood platelet count (count/volume)            268 10*3/uL          
  130-400        

 

 Automated blood platelet mean volume measurement            10.0 [foz_us]     
       7.4-10.4        

 

 Automated blood neutrophils/100 leukocytes            68 %            42-75   
     

 

 Automated blood lymphocytes/100 leukocytes            20 %            12-44   
     

 

 Blood monocytes/100 leukocytes            9 %            0-12        

 

 Automated blood eosinophils/100 leukocytes            3 %            0-10     
   

 

 Automated blood basophils/100 leukocytes            0 %            0-10        

 

 Blood neutrophils automated count (number/volume)            6.9 10*3         
   1.8-7.8        

 

 Blood lymphocytes automated count (number/volume)            2.1 10*3         
   1.0-4.0        

 

 Blood monocytes automated count (number/volume)            0.9 10*3            
0.0-1.0        

 

 Automated eosinophil count            0.3 10*3/uL            0.0-0.3        

 

 Automated blood basophil count (count/volume)            0.0 10*3/uL          
  0.0-0.1        









 PT panel in platelet poor plasma by coagulation assay - 10/27/16 06:35         









 Prothrombin time (PT) in platelet poor plasma by coagulation assay            
27.6 s            12.2-14.7        

 

 INR in platelet poor plasma or blood by coagulation assay            2.6      
       0.8-1.4        









 Activated partial thromboplastin time (aPTT) in platelet poor plasma 
bycoagulation assay - 10/27/16 06:35         









 Activated partial thromboplastin time (aPTT) in platelet poor plasma 
bycoagulation assay            62 s            24-35        









 Comprehensive metabolic panel - 10/27/16 06:35         









 Serum or plasma sodium measurement (moles/volume)            138 mmol/L       
     135-145        

 

 Serum or plasma potassium measurement (moles/volume)            3.9 mmol/L    
        3.6-5.0        

 

 Serum or plasma chloride measurement (moles/volume)            107 mmol/L     
               

 

 Carbon dioxide            22 mmol/L            21-32        

 

 Serum or plasma anion gap determination (moles/volume)            9 mmol/L    
        5-14        

 

 Serum or plasma urea nitrogen measurement (mass/volume)            8 mg/dL    
        7-18        

 

 Serum or plasma creatinine measurement (mass/volume)            0.65 mg/dL    
        0.60-1.30        

 

 Serum or plasma urea nitrogen/creatinine mass ratio            12             
NRG        

 

 Serum or plasma creatinine measurement with calculation of estimated 
glomerular filtration rate            >             NRG        

 

 Serum or plasma glucose measurement (mass/volume)            96 mg/dL         
           

 

 Serum or plasma calcium measurement (mass/volume)            8.6 mg/dL        
    8.5-10.1        

 

 Serum or plasma total bilirubin measurement (mass/volume)            0.5 mg/dL
            0.1-1.0        

 

 Serum or plasma alkaline phosphatase measurement (enzymatic activity/volume)  
          91 U/L                    

 

 Serum or plasma aspartate aminotransferase measurement (enzymatic activity/
volume)            18 U/L            5-34        

 

 Serum or plasma alanine aminotransferase measurement (enzymatic activity/volume
)            25 U/L            0-55        

 

 Serum or plasma protein measurement (mass/volume)            6.8 g/dL         
   6.4-8.2        

 

 Serum or plasma albumin measurement (mass/volume)            3.9 g/dL         
   3.2-4.5        









 Magnesium - 10/27/16 06:35         









 Magnesium            2.1 mg/dL            1.8-2.4        









 Serum or plasma troponin i.cardiac measurement (mass/volume) - 10/27/16 06:35 
        









 Serum or plasma troponin i.cardiac measurement (mass/volume)            < ng/
mL            <0.30        









 Myoglobin, serum - 10/27/16 06:35         









 Myoglobin, serum            27.2 ng/mL            10.0-92.0        









 Serum or plasma amylase measurement (enzymatic activity/volume) - 10/27/16 06:
35         









 Serum or plasma amylase measurement (enzymatic activity/volume)            39 U
/L                    









 Lipase - 10/27/16 06:35         









 Lipase            23 U/L            8-78        









 Serum or plasma phenytoin measurement (mass/volume) - 10/27/16 06:35         









 Serum or plasma phenytoin measurement (mass/volume)            5.2 ug/mL      
      10.0-20.0        









 Bacterial blood culture - 10/27/16 08:40         









 Bacterial blood culture            NG             NRG        









 Bacterial blood culture - 10/27/16 08:54         









 Bacterial blood culture            NG             NRG        









 Complete blood count (CBC) with automated white blood cell (WBC) differential 
- 10/28/16 08:10         









 Blood leukocytes automated count (number/volume)            7.0 10*3/uL       
     4.3-11.0        

 

 Blood erythrocytes automated count (number/volume)            3.56 10*6/uL    
        4.35-5.85        

 

 Venous blood hemoglobin measurement (mass/volume)            12.2 g/dL        
    11.5-16.0        

 

 Blood hematocrit (volume fraction)            34 %            35-52        

 

 Automated erythrocyte mean corpuscular volume            96 [foz_us]          
  80-99        

 

 Automated erythrocyte mean corpuscular hemoglobin (mass per erythrocyte)      
      34 pg            25-34        

 

 Automated erythrocyte mean corpuscular hemoglobin concentration measurement (
mass/volume)            36 g/dL            32-36        

 

 Automated erythrocyte distribution width ratio            11.9 %            
10.0-14.5        

 

 Automated blood platelet count (count/volume)            247 10*3/uL          
  130-400        

 

 Automated blood platelet mean volume measurement            9.8 [foz_us]      
      7.4-10.4        

 

 Automated blood neutrophils/100 leukocytes            61 %            42-75   
     

 

 Automated blood lymphocytes/100 leukocytes            27 %            12-44   
     

 

 Blood monocytes/100 leukocytes            9 %            0-12        

 

 Automated blood eosinophils/100 leukocytes            3 %            0-10     
   

 

 Automated blood basophils/100 leukocytes            0 %            0-10        

 

 Blood neutrophils automated count (number/volume)            4.3 10*3         
   1.8-7.8        

 

 Blood lymphocytes automated count (number/volume)            1.9 10*3         
   1.0-4.0        

 

 Blood monocytes automated count (number/volume)            0.6 10*3            
0.0-1.0        

 

 Automated eosinophil count            0.2 10*3/uL            0.0-0.3        

 

 Automated blood basophil count (count/volume)            0.0 10*3/uL          
  0.0-0.1        









 Lipid 1996 panel - 10/28/16 08:10         









 Serum or plasma triglyceride measurement (mass/volume)            72 mg/dL    
        <150        

 

 Serum or plasma cholesterol measurement (mass/volume)            114 mg/dL    
        < 200        

 

 Serum or plasma cholesterol in HDL measurement (mass/volume)            28 mg/
dL            40-60        

 

 Cholesterol in LDL [mass/volume] in serum or plasma by direct assay            
60 mg/dL            1-129        

 

 Serum or plasma cholesterol in VLDL measurement (mass/volume)            14 mg/
dL            5-40        









 Comprehensive metabolic panel - 10/28/16 08:10         









 Serum or plasma sodium measurement (moles/volume)            138 mmol/L       
     135-145        

 

 Serum or plasma potassium measurement (moles/volume)            3.8 mmol/L    
        3.6-5.0        

 

 Serum or plasma chloride measurement (moles/volume)            110 mmol/L     
               

 

 Carbon dioxide            20 mmol/L            21-32        

 

 Serum or plasma anion gap determination (moles/volume)            8 mmol/L    
        5-14        

 

 Serum or plasma urea nitrogen measurement (mass/volume)            4 mg/dL    
        7-18        

 

 Serum or plasma creatinine measurement (mass/volume)            0.58 mg/dL    
        0.60-1.30        

 

 Serum or plasma urea nitrogen/creatinine mass ratio            7             
NRG        

 

 Serum or plasma creatinine measurement with calculation of estimated 
glomerular filtration rate            >             NRG        

 

 Serum or plasma glucose measurement (mass/volume)            93 mg/dL         
           

 

 Serum or plasma calcium measurement (mass/volume)            8.2 mg/dL        
    8.5-10.1        

 

 Serum or plasma total bilirubin measurement (mass/volume)            0.2 mg/dL
            0.1-1.0        

 

 Serum or plasma alkaline phosphatase measurement (enzymatic activity/volume)  
          78 U/L                    

 

 Serum or plasma aspartate aminotransferase measurement (enzymatic activity/
volume)            17 U/L            5-34        

 

 Serum or plasma alanine aminotransferase measurement (enzymatic activity/volume
)            22 U/L            0-55        

 

 Serum or plasma protein measurement (mass/volume)            6.1 g/dL         
   6.4-8.2        

 

 Serum or plasma albumin measurement (mass/volume)            3.3 g/dL         
   3.2-4.5        









 PT panel in platelet poor plasma by coagulation assay - 10/28/16 08:10         









 Prothrombin time (PT) in platelet poor plasma by coagulation assay            
31.7 s            12.2-14.7        

 

 INR in platelet poor plasma or blood by coagulation assay            3.1      
       0.8-1.4        









 Complete blood count (CBC) with automated white blood cell (WBC) differential 
- 16 12:23         









 Blood leukocytes automated count (number/volume)            18.3 10*3/uL      
      4.3-11.0        

 

 Blood erythrocytes automated count (number/volume)            4.96 10*6/uL    
        4.35-5.85        

 

 Venous blood hemoglobin measurement (mass/volume)            16.7 g/dL        
    11.5-16.0        

 

 Blood hematocrit (volume fraction)            46 %            35-52        

 

 Automated erythrocyte mean corpuscular volume            93 [foz_us]          
  80-99        

 

 Automated erythrocyte mean corpuscular hemoglobin (mass per erythrocyte)      
      34 pg            25-34        

 

 Automated erythrocyte mean corpuscular hemoglobin concentration measurement (
mass/volume)            36 g/dL            32-36        

 

 Automated erythrocyte distribution width ratio            11.9 %            
10.0-14.5        

 

 Automated blood platelet count (count/volume)            281 10*3/uL          
  130-400        

 

 Automated blood platelet mean volume measurement            11.1 [foz_us]     
       7.4-10.4        

 

 Automated blood neutrophils/100 leukocytes            77 %            42-75   
     

 

 Automated blood lymphocytes/100 leukocytes            14 %            12-44   
     

 

 Blood monocytes/100 leukocytes            8 %            0-12        

 

 Automated blood eosinophils/100 leukocytes            0 %            0-10     
   

 

 Automated blood basophils/100 leukocytes            0 %            0-10        

 

 Blood neutrophils automated count (number/volume)            14.2 10*3        
    1.8-7.8        

 

 Blood lymphocytes automated count (number/volume)            2.6 10*3         
   1.0-4.0        

 

 Blood monocytes automated count (number/volume)            1.5 10*3            
0.0-1.0        

 

 Automated eosinophil count            0.0 10*3/uL            0.0-0.3        

 

 Automated blood basophil count (count/volume)            0.0 10*3/uL          
  0.0-0.1        









 Whole blood basic metabolic panel - 16 12:23         









 Serum or plasma sodium measurement (moles/volume)            134 mmol/L       
     135-145        

 

 Serum or plasma potassium measurement (moles/volume)            3.7 mmol/L    
        3.6-5.0        

 

 Serum or plasma chloride measurement (moles/volume)            101 mmol/L     
               

 

 Carbon dioxide            17 mmol/L            21-32        

 

 Serum or plasma anion gap determination (moles/volume)            16 mmol/L   
         5-14        

 

 Serum or plasma urea nitrogen measurement (mass/volume)            11 mg/dL   
         7-18        

 

 Serum or plasma creatinine measurement (mass/volume)            0.76 mg/dL    
        0.60-1.30        

 

 Serum or plasma urea nitrogen/creatinine mass ratio            14             
NRG        

 

 Serum or plasma creatinine measurement with calculation of estimated 
glomerular filtration rate            >             NRG        

 

 Serum or plasma glucose measurement (mass/volume)            150 mg/dL        
            

 

 Serum or plasma calcium measurement (mass/volume)            9.7 mg/dL        
    8.5-10.1        









 Blood manual differential performed detection - 16 12:23         









 Blood monocytes/100 leukocytes            5 %            NRG        

 

 Manual blood segmented neutrophils/100 leukocytes            81 %            
NRG        

 

 Manual blood lymphocytes/100 leukocytes            12 %            NRG        

 

 Manual eosinophils/100 leukocytes in nose            2 %            NRG        

 

 Blood erythrocyte morphology finding identification            NORMAL         
    NRG        









 PT panel in platelet poor plasma by coagulation assay - 16 12:23         









 Prothrombin time (PT) in platelet poor plasma by coagulation assay            
85.2 s            12.2-14.7        

 

 INR in platelet poor plasma or blood by coagulation assay            10.6     
        0.8-1.4        









 Activated partial thromboplastin time (aPTT) in platelet poor plasma 
bycoagulation assay - 16 12:23         









 Activated partial thromboplastin time (aPTT) in platelet poor plasma 
bycoagulation assay            178 s            24-35        









 Blood lactic acid measurement (moles/volume) - 16 13:05         









 Blood lactic acid measurement (moles/volume)            1.6 mmol/L            
0.5-2.0        









 Serum or plasma C reactive protein measurement (mass/volume) - 16 13:05 
        









 Serum or plasma C reactive protein measurement (mass/volume)            10.16 
mg/dL            0.00-0.50        









 Bacterial blood culture - 16 13:05         









 Bacterial blood culture            NG             NRG        









 Bacterial blood culture - 16 13:10         









 Bacterial blood culture            NG             NRG        









 Urine beta human chorionic gonadotropin (hCG) measurement - 16 13:19    
     









 Urine beta human chorionic gonadotropin (hCG) measurement            NEGATIVE 
            NEGATIVE        









 Complete urinalysis with reflex to culture - 16 13:19         









 Urine color determination            RED             NRG        

 

 Urine clarity determination            BLOODY             NRG        

 

 Urine pH measurement by test strip            6.5             5-9        

 

 Specific gravity of urine by test strip            1.015             1.016-
1.022        

 

 Urine protein assay by test strip, semi-quantitative            4+             
NEGATIVE        

 

 Urine glucose detection by automated test strip            NEGATIVE           
  NEGATIVE        

 

 Erythrocytes detection in urine sediment by light microscopy            5+    
         NEGATIVE        

 

 Urine ketones detection by automated test strip            3+             
NEGATIVE        

 

 Urine nitrite detection by test strip            NEGATIVE             NEGATIVE
        

 

 Urine total bilirubin detection by test strip            NEGATIVE             
NEGATIVE        

 

 Urine urobilinogen measurement by automated test strip (mass/volume)          
  NORMAL             NORMAL        

 

 Urine leukocyte esterase detection by dipstick            NEGATIVE             
NEGATIVE        

 

 Automated urine sediment erythrocyte count by microscopy (number/high power 
field)            TNTC             NRG        

 

 Automated urine sediment leukocyte count by microscopy (number/high power field
)             [HPF]            NRG        

 

 Bacteria detection in urine sediment by light microscopy            NEGATIVE  
           NRG        

 

 Squamous epithelial cells detection in urine sediment by light microscopy     
       10-25             NRG        

 

 Crystals detection in urine sediment by light microscopy            NONE      
       NRG        

 

 Casts detection in urine sediment by light microscopy            NONE         
    NRG        

 

 Mucus detection in urine sediment by light microscopy            NEGATIVE     
        NRG        

 

 Complete urinalysis with reflex to culture            NO             NRG      
  









 FRESH FROZEN PLASMA - 16 13:54         









 FRESH FROZEN PLASMA                           PRSMD [id number]    [id number]    
           NRG        









 Blood type T Indirect antibody screen panel - 16 13:54         









 ABO+Rh group            AP             NRG        

 

 Transfusion band number            [id number]             NRG        

 

 Blood group antibody screen            NEGATIVE             NRG        









 Whole blood hemoglobin and hematocrit panel - 16 19:33         









 Venous blood hemoglobin measurement (mass/volume)            13.4 g/dL        
    11.5-16.0        

 

 Blood hematocrit (volume fraction)            37 %            35-52        









 Whole blood hemoglobin and hematocrit panel - 16 00:20         









 Venous blood hemoglobin measurement (mass/volume)            13.1 g/dL        
    11.5-16.0        

 

 Blood hematocrit (volume fraction)            37 %            35-52        









 Complete blood count (CBC) with automated white blood cell (WBC) differential 
- 16 04:40         









 Blood leukocytes automated count (number/volume)            9.2 10*3/uL       
     4.3-11.0        

 

 Blood erythrocytes automated count (number/volume)            3.63 10*6/uL    
        4.35-5.85        

 

 Venous blood hemoglobin measurement (mass/volume)            12.3 g/dL        
    11.5-16.0        

 

 Blood hematocrit (volume fraction)            35 %            35-52        

 

 Automated erythrocyte mean corpuscular volume            96 [foz_us]          
  80-99        

 

 Automated erythrocyte mean corpuscular hemoglobin (mass per erythrocyte)      
      34 pg            25-34        

 

 Automated erythrocyte mean corpuscular hemoglobin concentration measurement (
mass/volume)            35 g/dL            32-36        

 

 Automated erythrocyte distribution width ratio            11.4 %            
10.0-14.5        

 

 Automated blood platelet count (count/volume)            170 10*3/uL          
  130-400        

 

 Automated blood platelet mean volume measurement            10.7 [foz_us]     
       7.4-10.4        

 

 Automated blood neutrophils/100 leukocytes            53 %            42-75   
     

 

 Automated blood lymphocytes/100 leukocytes            36 %            12-44   
     

 

 Blood monocytes/100 leukocytes            10 %            0-12        

 

 Automated blood eosinophils/100 leukocytes            1 %            0-10     
   

 

 Automated blood basophils/100 leukocytes            0 %            0-10        

 

 Blood neutrophils automated count (number/volume)            4.8 10*3         
   1.8-7.8        

 

 Blood lymphocytes automated count (number/volume)            3.3 10*3         
   1.0-4.0        

 

 Blood monocytes automated count (number/volume)            0.9 10*3            
0.0-1.0        

 

 Automated eosinophil count            0.1 10*3/uL            0.0-0.3        

 

 Automated blood basophil count (count/volume)            0.0 10*3/uL          
  0.0-0.1        









 PT panel in platelet poor plasma by coagulation assay - 16 04:40         









 Prothrombin time (PT) in platelet poor plasma by coagulation assay            
13.7 s            12.2-14.7        

 

 INR in platelet poor plasma or blood by coagulation assay            1.1      
       0.8-1.4        









 Comprehensive metabolic panel - 16 04:40         









 Serum or plasma sodium measurement (moles/volume)            136 mmol/L       
     135-145        

 

 Serum or plasma potassium measurement (moles/volume)            3.7 mmol/L    
        3.6-5.0        

 

 Serum or plasma chloride measurement (moles/volume)            105 mmol/L     
               

 

 Carbon dioxide            21 mmol/L            21-32        

 

 Serum or plasma anion gap determination (moles/volume)            10 mmol/L   
         5-14        

 

 Serum or plasma urea nitrogen measurement (mass/volume)            14 mg/dL   
         7-18        

 

 Serum or plasma creatinine measurement (mass/volume)            0.65 mg/dL    
        0.60-1.30        

 

 Serum or plasma urea nitrogen/creatinine mass ratio            22             
NRG        

 

 Serum or plasma creatinine measurement with calculation of estimated 
glomerular filtration rate            >             NRG        

 

 Serum or plasma glucose measurement (mass/volume)            82 mg/dL         
           

 

 Serum or plasma calcium measurement (mass/volume)            8.4 mg/dL        
    8.5-10.1        

 

 Serum or plasma total bilirubin measurement (mass/volume)            1.2 mg/dL
            0.1-1.0        

 

 Serum or plasma alkaline phosphatase measurement (enzymatic activity/volume)  
          70 U/L                    

 

 Serum or plasma aspartate aminotransferase measurement (enzymatic activity/
volume)            25 U/L            5-34        

 

 Serum or plasma alanine aminotransferase measurement (enzymatic activity/volume
)            21 U/L            0-55        

 

 Serum or plasma protein measurement (mass/volume)            6.2 g/dL         
   6.4-8.2        

 

 Serum or plasma albumin measurement (mass/volume)            3.6 g/dL         
   3.2-4.5        









 Serum or plasma phosphate measurement (mass/volume) - 16 04:40         









 Serum or plasma phosphate measurement (mass/volume)            3.2 mg/dL      
      2.3-4.7        









 Magnesium - 16 04:40         









 Magnesium            2.0 mg/dL            1.8-2.4        









 Whole blood hemoglobin and hematocrit panel - 16 08:25         









 Venous blood hemoglobin measurement (mass/volume)            11.1 g/dL        
    11.5-16.0        

 

 Blood hematocrit (volume fraction)            31 %            35-52        









 Methicillin resistant Staphylococcus aureus (MRSA) screening culture -  09:00         









 Methicillin resistant Staphylococcus aureus (MRSA) screening culture          
  NEG             NRG        









 Whole blood hemoglobin and hematocrit panel - 16 16:00         









 Venous blood hemoglobin measurement (mass/volume)            10.8 g/dL        
    11.5-16.0        

 

 Blood hematocrit (volume fraction)            31 %            35-52        









 Automated blood complete blood count (hemogram) panel - 16 05:05         









 Blood leukocytes automated count (number/volume)            6.6 10*3/uL       
     4.3-11.0        

 

 Blood erythrocytes automated count (number/volume)            3.29 10*6/uL    
        4.35-5.85        

 

 Venous blood hemoglobin measurement (mass/volume)            11.2 g/dL        
    11.5-16.0        

 

 Blood hematocrit (volume fraction)            32 %            35-52        

 

 Automated erythrocyte mean corpuscular volume            96 [foz_us]          
  80-99        

 

 Automated erythrocyte mean corpuscular hemoglobin (mass per erythrocyte)      
      34 pg            25-34        

 

 Automated erythrocyte mean corpuscular hemoglobin concentration measurement (
mass/volume)            35 g/dL            32-36        

 

 Automated erythrocyte distribution width ratio            11.4 %            
10.0-14.5        

 

 Automated blood platelet count (count/volume)            145 10*3/uL          
  130-400        

 

 Automated blood platelet mean volume measurement            10.2 [foz_us]     
       7.4-10.4        









 Comprehensive metabolic panel - 16 05:05         









 Serum or plasma sodium measurement (moles/volume)            138 mmol/L       
     135-145        

 

 Serum or plasma potassium measurement (moles/volume)            3.5 mmol/L    
        3.6-5.0        

 

 Serum or plasma chloride measurement (moles/volume)            108 mmol/L     
               

 

 Carbon dioxide            21 mmol/L            21-32        

 

 Serum or plasma anion gap determination (moles/volume)            9 mmol/L    
        5-14        

 

 Serum or plasma urea nitrogen measurement (mass/volume)            11 mg/dL   
         7-18        

 

 Serum or plasma creatinine measurement (mass/volume)            0.62 mg/dL    
        0.60-1.30        

 

 Serum or plasma urea nitrogen/creatinine mass ratio            18             
NRG        

 

 Serum or plasma creatinine measurement with calculation of estimated 
glomerular filtration rate            >             NRG        

 

 Serum or plasma glucose measurement (mass/volume)            94 mg/dL         
           

 

 Serum or plasma calcium measurement (mass/volume)            8.2 mg/dL        
    8.5-10.1        

 

 Serum or plasma total bilirubin measurement (mass/volume)            0.3 mg/dL
            0.1-1.0        

 

 Serum or plasma alkaline phosphatase measurement (enzymatic activity/volume)  
          67 U/L                    

 

 Serum or plasma aspartate aminotransferase measurement (enzymatic activity/
volume)            22 U/L            5-34        

 

 Serum or plasma alanine aminotransferase measurement (enzymatic activity/volume
)            18 U/L            0-55        

 

 Serum or plasma protein measurement (mass/volume)            5.6 g/dL         
   6.4-8.2        

 

 Serum or plasma albumin measurement (mass/volume)            3.2 g/dL         
   3.2-4.5        









 Complete blood count (CBC) with automated white blood cell (WBC) differential 
- 16 21:26         









 Blood leukocytes automated count (number/volume)            7.8 10*3/uL       
     4.3-11.0        

 

 Blood erythrocytes automated count (number/volume)            3.16 10*6/uL    
        4.35-5.85        

 

 Venous blood hemoglobin measurement (mass/volume)            10.8 g/dL        
    11.5-16.0        

 

 Blood hematocrit (volume fraction)            30 %            35-52        

 

 Automated erythrocyte mean corpuscular volume            96 [foz_us]          
  80-99        

 

 Automated erythrocyte mean corpuscular hemoglobin (mass per erythrocyte)      
      34 pg            25-34        

 

 Automated erythrocyte mean corpuscular hemoglobin concentration measurement (
mass/volume)            36 g/dL            32-36        

 

 Automated erythrocyte distribution width ratio            11.2 %            
10.0-14.5        

 

 Automated blood platelet count (count/volume)            169 10*3/uL          
  130-400        

 

 Automated blood platelet mean volume measurement            10.6 [foz_us]     
       7.4-10.4        

 

 Automated blood neutrophils/100 leukocytes            54 %            42-75   
     

 

 Automated blood lymphocytes/100 leukocytes            35 %            12-44   
     

 

 Blood monocytes/100 leukocytes            7 %            0-12        

 

 Automated blood eosinophils/100 leukocytes            4 %            0-10     
   

 

 Automated blood basophils/100 leukocytes            0 %            0-10        

 

 Blood neutrophils automated count (number/volume)            4.2 10*3         
   1.8-7.8        

 

 Blood lymphocytes automated count (number/volume)            2.7 10*3         
   1.0-4.0        

 

 Blood monocytes automated count (number/volume)            0.6 10*3            
0.0-1.0        

 

 Automated eosinophil count            0.3 10*3/uL            0.0-0.3        

 

 Automated blood basophil count (count/volume)            0.0 10*3/uL          
  0.0-0.1        









 PT panel in platelet poor plasma by coagulation assay - 16 21:26         









 Prothrombin time (PT) in platelet poor plasma by coagulation assay            
13.2 s            12.2-14.7        

 

 INR in platelet poor plasma or blood by coagulation assay            1.0      
       0.8-1.4        









 Activated partial thromboplastin time (aPTT) in platelet poor plasma 
bycoagulation assay - 16 21:26         









 Activated partial thromboplastin time (aPTT) in platelet poor plasma 
bycoagulation assay            26 s            24-35        









 Serum or plasma choriogonadotropin (pregnancy test) detection - 16 21:26
         









 Serum or plasma choriogonadotropin (pregnancy test) detection            
NEGATIVE             NEGATIVE        









 Comprehensive metabolic panel - 16 21:26         









 Serum or plasma sodium measurement (moles/volume)            140 mmol/L       
     135-145        

 

 Serum or plasma potassium measurement (moles/volume)            3.7 mmol/L    
        3.6-5.0        

 

 Serum or plasma chloride measurement (moles/volume)            109 mmol/L     
               

 

 Carbon dioxide            22 mmol/L            21-32        

 

 Serum or plasma anion gap determination (moles/volume)            9 mmol/L    
        5-14        

 

 Serum or plasma urea nitrogen measurement (mass/volume)            9 mg/dL    
        7-18        

 

 Serum or plasma creatinine measurement (mass/volume)            0.65 mg/dL    
        0.60-1.30        

 

 Serum or plasma urea nitrogen/creatinine mass ratio            14             
NRG        

 

 Serum or plasma creatinine measurement with calculation of estimated 
glomerular filtration rate            >             NRG        

 

 Serum or plasma glucose measurement (mass/volume)            93 mg/dL         
           

 

 Serum or plasma calcium measurement (mass/volume)            8.4 mg/dL        
    8.5-10.1        

 

 Serum or plasma total bilirubin measurement (mass/volume)            0.3 mg/dL
            0.1-1.0        

 

 Serum or plasma alkaline phosphatase measurement (enzymatic activity/volume)  
          69 U/L                    

 

 Serum or plasma aspartate aminotransferase measurement (enzymatic activity/
volume)            20 U/L            5-34        

 

 Serum or plasma alanine aminotransferase measurement (enzymatic activity/volume
)            19 U/L            0-55        

 

 Serum or plasma protein measurement (mass/volume)            5.8 g/dL         
   6.4-8.2        

 

 Serum or plasma albumin measurement (mass/volume)            3.4 g/dL         
   3.2-4.5        









 Magnesium - 16 21:26         









 Magnesium            2.2 mg/dL            1.8-2.4        









 Serum or plasma thyrotropin measurement by detection limit <=0.05 miu/l (units/
volume) - 16 21:26         









 Serum or plasma thyrotropin measurement by detection limit <=0.05 miu/l (units/
volume)            1.94 u[iU]/mL            0.35-4.94        









 Complete blood count (CBC) with automated white blood cell (WBC) differential 
- 16 15:59         









 Blood leukocytes automated count (number/volume)            7.0 10*3/uL       
     4.3-11.0        

 

 Blood erythrocytes automated count (number/volume)            3.57 10*6/uL    
        4.35-5.85        

 

 Venous blood hemoglobin measurement (mass/volume)            12.1 g/dL        
    11.5-16.0        

 

 Blood hematocrit (volume fraction)            34 %            35-52        

 

 Automated erythrocyte mean corpuscular volume            94 [foz_us]          
  80-99        

 

 Automated erythrocyte mean corpuscular hemoglobin (mass per erythrocyte)      
      34 pg            25-34        

 

 Automated erythrocyte mean corpuscular hemoglobin concentration measurement (
mass/volume)            36 g/dL            32-36        

 

 Automated erythrocyte distribution width ratio            11.2 %            
10.0-14.5        

 

 Automated blood platelet count (count/volume)            217 10*3/uL          
  130-400        

 

 Automated blood platelet mean volume measurement            10.6 [foz_us]     
       7.4-10.4        

 

 Automated blood neutrophils/100 leukocytes            52 %            42-75   
     

 

 Automated blood lymphocytes/100 leukocytes            39 %            12-44   
     

 

 Blood monocytes/100 leukocytes            7 %            0-12        

 

 Automated blood eosinophils/100 leukocytes            3 %            0-10     
   

 

 Automated blood basophils/100 leukocytes            0 %            0-10        

 

 Blood neutrophils automated count (number/volume)            3.6 10*3         
   1.8-7.8        

 

 Blood lymphocytes automated count (number/volume)            2.7 10*3         
   1.0-4.0        

 

 Blood monocytes automated count (number/volume)            0.5 10*3            
0.0-1.0        

 

 Automated eosinophil count            0.2 10*3/uL            0.0-0.3        

 

 Automated blood basophil count (count/volume)            0.0 10*3/uL          
  0.0-0.1        









 Complete blood count (CBC) with automated white blood cell (WBC) differential 
- 17 10:15         









 Blood leukocytes automated count (number/volume)            10.4 10*3/uL      
      4.3-11.0        

 

 Blood erythrocytes automated count (number/volume)            4.73 10*6/uL    
        4.35-5.85        

 

 Venous blood hemoglobin measurement (mass/volume)            15.4 g/dL        
    11.5-16.0        

 

 Blood hematocrit (volume fraction)            45 %            35-52        

 

 Automated erythrocyte mean corpuscular volume            94 [foz_us]          
  80-99        

 

 Automated erythrocyte mean corpuscular hemoglobin (mass per erythrocyte)      
      33 pg            25-34        

 

 Automated erythrocyte mean corpuscular hemoglobin concentration measurement (
mass/volume)            35 g/dL            32-36        

 

 Automated erythrocyte distribution width ratio            12.6 %            
10.0-14.5        

 

 Automated blood platelet count (count/volume)            217 10*3/uL          
  130-400        

 

 Automated blood platelet mean volume measurement            10.9 [foz_us]     
       7.4-10.4        

 

 Automated blood neutrophils/100 leukocytes            76 %            42-75   
     

 

 Automated blood lymphocytes/100 leukocytes            18 %            12-44   
     

 

 Blood monocytes/100 leukocytes            5 %            0-12        

 

 Automated blood eosinophils/100 leukocytes            1 %            0-10     
   

 

 Automated blood basophils/100 leukocytes            0 %            0-10        

 

 Blood neutrophils automated count (number/volume)            7.9 10*3         
   1.8-7.8        

 

 Blood lymphocytes automated count (number/volume)            1.9 10*3         
   1.0-4.0        

 

 Blood monocytes automated count (number/volume)            0.5 10*3            
0.0-1.0        

 

 Automated eosinophil count            0.1 10*3/uL            0.0-0.3        

 

 Automated blood basophil count (count/volume)            0.0 10*3/uL          
  0.0-0.1        









 Serum or plasma choriogonadotropin (pregnancy test) detection - 17 10:15
         









 Serum or plasma choriogonadotropin (pregnancy test) detection            
NEGATIVE             NEGATIVE        









 PT panel in platelet poor plasma by coagulation assay - 17 10:15         









 Prothrombin time (PT) in platelet poor plasma by coagulation assay            
12.4 s            12.2-14.7        

 

 INR in platelet poor plasma or blood by coagulation assay            1.0      
       0.8-1.4        









 Activated partial thromboplastin time (aPTT) in platelet poor plasma 
bycoagulation assay - 17 10:15         









 Activated partial thromboplastin time (aPTT) in platelet poor plasma 
bycoagulation assay            < s            24-35        









 Comprehensive metabolic panel - 17 10:15         









 Serum or plasma sodium measurement (moles/volume)            142 mmol/L       
     135-145        

 

 Serum or plasma potassium measurement (moles/volume)            3.7 mmol/L    
        3.6-5.0        

 

 Serum or plasma chloride measurement (moles/volume)            112 mmol/L     
               

 

 Carbon dioxide            16 mmol/L            21-32        

 

 Serum or plasma anion gap determination (moles/volume)            14 mmol/L   
         5-14        

 

 Serum or plasma urea nitrogen measurement (mass/volume)            10 mg/dL   
         7-18        

 

 Serum or plasma creatinine measurement (mass/volume)            0.81 mg/dL    
        0.60-1.30        

 

 Serum or plasma urea nitrogen/creatinine mass ratio            12             
NRG        

 

 Serum or plasma creatinine measurement with calculation of estimated 
glomerular filtration rate            >             NRG        

 

 Serum or plasma glucose measurement (mass/volume)            137 mg/dL        
            

 

 Serum or plasma calcium measurement (mass/volume)            9.0 mg/dL        
    8.5-10.1        

 

 Serum or plasma total bilirubin measurement (mass/volume)            0.6 mg/dL
            0.1-1.0        

 

 Serum or plasma alkaline phosphatase measurement (enzymatic activity/volume)  
          69 U/L                    

 

 Serum or plasma aspartate aminotransferase measurement (enzymatic activity/
volume)            17 U/L            5-34        

 

 Serum or plasma alanine aminotransferase measurement (enzymatic activity/volume
)            14 U/L            0-55        

 

 Serum or plasma protein measurement (mass/volume)            7.1 g/dL         
   6.4-8.2        

 

 Serum or plasma albumin measurement (mass/volume)            4.0 g/dL         
   3.2-4.5        









 Magnesium - 17 10:15         









 Magnesium            1.7 mg/dL            1.8-2.4        









 Serum or plasma troponin i.cardiac measurement (mass/volume) - 17 10:15 
        









 Serum or plasma troponin i.cardiac measurement (mass/volume)            < ng/
mL            <0.30        









 Serum or plasma amylase measurement (enzymatic activity/volume) - 17 10:
15         









 Serum or plasma amylase measurement (enzymatic activity/volume)            47 U
/L                    









 Lipase - 17 10:15         









 Lipase            35 U/L            8-78        









 Complete blood count (CBC) with automated white blood cell (WBC) differential 
- 10/25/17 10:20         









 Blood leukocytes automated count (number/volume)            7.8 10*3/uL       
     4.3-11.0        

 

 Blood erythrocytes automated count (number/volume)            4.58 10*6/uL    
        4.35-5.85        

 

 Venous blood hemoglobin measurement (mass/volume)            15.6 g/dL        
    11.5-16.0        

 

 Blood hematocrit (volume fraction)            45 %            35-52        

 

 Automated erythrocyte mean corpuscular volume            97 [foz_us]          
  80-99        

 

 Automated erythrocyte mean corpuscular hemoglobin (mass per erythrocyte)      
      34 pg            25-34        

 

 Automated erythrocyte mean corpuscular hemoglobin concentration measurement (
mass/volume)            35 g/dL            32-36        

 

 Automated erythrocyte distribution width ratio            12.5 %            
10.0-14.5        

 

 Automated blood platelet count (count/volume)            246 10*3/uL          
  130-400        

 

 Automated blood platelet mean volume measurement            10.2 [foz_us]     
       7.4-10.4        

 

 Automated blood neutrophils/100 leukocytes            60 %            42-75   
     

 

 Automated blood lymphocytes/100 leukocytes            29 %            12-44   
     

 

 Blood monocytes/100 leukocytes            8 %            0-12        

 

 Automated blood eosinophils/100 leukocytes            2 %            0-10     
   

 

 Automated blood basophils/100 leukocytes            1 %            0-10        

 

 Blood neutrophils automated count (number/volume)            4.7 10*3         
   1.8-7.8        

 

 Blood lymphocytes automated count (number/volume)            2.3 10*3         
   1.0-4.0        

 

 Blood monocytes automated count (number/volume)            0.6 10*3            
0.0-1.0        

 

 Automated eosinophil count            0.1 10*3/uL            0.0-0.3        

 

 Automated blood basophil count (count/volume)            0.0 10*3/uL          
  0.0-0.1        









 Comprehensive metabolic panel - 10/25/17 10:20         









 Serum or plasma sodium measurement (moles/volume)            138 mmol/L       
     135-145        

 

 Serum or plasma potassium measurement (moles/volume)            4.2 mmol/L    
        3.6-5.0        

 

 Serum or plasma chloride measurement (moles/volume)            110 mmol/L     
               

 

 Carbon dioxide            19 mmol/L            21-32        

 

 Serum or plasma anion gap determination (moles/volume)            9 mmol/L    
        5-14        

 

 Serum or plasma urea nitrogen measurement (mass/volume)            8 mg/dL    
        7-18        

 

 Serum or plasma creatinine measurement (mass/volume)            0.80 mg/dL    
        0.60-1.30        

 

 Serum or plasma urea nitrogen/creatinine mass ratio            10             
NRG        

 

 Serum or plasma creatinine measurement with calculation of estimated 
glomerular filtration rate            >             NRG        

 

 Serum or plasma glucose measurement (mass/volume)            100 mg/dL        
            

 

 Serum or plasma calcium measurement (mass/volume)            9.4 mg/dL        
    8.5-10.1        

 

 Serum or plasma total bilirubin measurement (mass/volume)            1.1 mg/dL
            0.1-1.0        

 

 Serum or plasma alkaline phosphatase measurement (enzymatic activity/volume)  
          56 U/L                    

 

 Serum or plasma aspartate aminotransferase measurement (enzymatic activity/
volume)            18 U/L            5-34        

 

 Serum or plasma alanine aminotransferase measurement (enzymatic activity/volume
)            12 U/L            0-55        

 

 Serum or plasma protein measurement (mass/volume)            8.1 g/dL         
   6.4-8.2        

 

 Serum or plasma albumin measurement (mass/volume)            4.4 g/dL         
   3.2-4.5        









 Lipid 1996 panel - 10/25/17 10:20         









 Serum or plasma triglyceride measurement (mass/volume)            118 mg/dL   
         <150        

 

 Serum or plasma cholesterol measurement (mass/volume)            139 mg/dL    
        < 200        

 

 Serum or plasma cholesterol in HDL measurement (mass/volume)            43 mg/
dL            40-60        

 

 Cholesterol in LDL [mass/volume] in serum or plasma by direct assay            
73 mg/dL            1-129        

 

 Serum or plasma cholesterol in VLDL measurement (mass/volume)            24 mg/
dL            5-40        









 THYROID STIMULATING HORMONE - 10/25/17 10:20         









 THYROID STIMULATING HORMONE            0.80 u[iU]/mL            0.35-4.94     
   









 Complete blood count (CBC) with automated white blood cell (WBC) differential 
- 17 13:25         









 Blood leukocytes automated count (number/volume)            4.9 10*3/uL       
     4.3-11.0        

 

 Blood erythrocytes automated count (number/volume)            4.51 10*6/uL    
        4.35-5.85        

 

 Venous blood hemoglobin measurement (mass/volume)            15.2 g/dL        
    11.5-16.0        

 

 Blood hematocrit (volume fraction)            43 %            35-52        

 

 Automated erythrocyte mean corpuscular volume            95 [foz_us]          
  80-99        

 

 Automated erythrocyte mean corpuscular hemoglobin (mass per erythrocyte)      
      34 pg            25-34        

 

 Automated erythrocyte mean corpuscular hemoglobin concentration measurement (
mass/volume)            36 g/dL            32-36        

 

 Automated erythrocyte distribution width ratio            11.7 %            
10.0-14.5        

 

 Automated blood platelet count (count/volume)            182 10*3/uL          
  130-400        

 

 Automated blood platelet mean volume measurement            10.1 [foz_us]     
       7.4-10.4        

 

 Automated blood neutrophils/100 leukocytes            46 %            42-75   
     

 

 Automated blood lymphocytes/100 leukocytes            37 %            12-44   
     

 

 Blood monocytes/100 leukocytes            12 %            0-12        

 

 Automated blood eosinophils/100 leukocytes            3 %            0-10     
   

 

 Automated blood basophils/100 leukocytes            3 %            0-10        

 

 Blood neutrophils automated count (number/volume)            2.2 10*3         
   1.8-7.8        

 

 Blood lymphocytes automated count (number/volume)            1.8 10*3         
   1.0-4.0        

 

 Blood monocytes automated count (number/volume)            0.6 10*3            
0.0-1.0        

 

 Automated eosinophil count            0.2 10*3/uL            0.0-0.3        

 

 Automated blood basophil count (count/volume)            0.1 10*3/uL          
  0.0-0.1        









 Comprehensive metabolic panel - 17 13:25         









 Serum or plasma sodium measurement (moles/volume)            138 mmol/L       
     135-145        

 

 Serum or plasma potassium measurement (moles/volume)            4.2 mmol/L    
        3.6-5.0        

 

 Serum or plasma chloride measurement (moles/volume)            106 mmol/L     
               

 

 Carbon dioxide            20 mmol/L            21-32        

 

 Serum or plasma anion gap determination (moles/volume)            12 mmol/L   
         5-14        

 

 Serum or plasma urea nitrogen measurement (mass/volume)            10 mg/dL   
         7-18        

 

 Serum or plasma creatinine measurement (mass/volume)            0.72 mg/dL    
        0.60-1.30        

 

 Serum or plasma urea nitrogen/creatinine mass ratio            14             
NRG        

 

 Serum or plasma creatinine measurement with calculation of estimated 
glomerular filtration rate            >             NRG        

 

 Serum or plasma glucose measurement (mass/volume)            93 mg/dL         
           

 

 Serum or plasma calcium measurement (mass/volume)            9.3 mg/dL        
    8.5-10.1        

 

 Serum or plasma total bilirubin measurement (mass/volume)            0.6 mg/dL
            0.1-1.0        

 

 Serum or plasma alkaline phosphatase measurement (enzymatic activity/volume)  
          61 U/L                    

 

 Serum or plasma aspartate aminotransferase measurement (enzymatic activity/
volume)            26 U/L            5-34        

 

 Serum or plasma alanine aminotransferase measurement (enzymatic activity/volume
)            17 U/L            0-55        

 

 Serum or plasma protein measurement (mass/volume)            8.3 g/dL         
   6.4-8.2        

 

 Serum or plasma albumin measurement (mass/volume)            4.2 g/dL         
   3.2-4.5        









 Serum or plasma troponin i.cardiac measurement (mass/volume) - 17 13:25 
        









 Serum or plasma troponin i.cardiac measurement (mass/volume)            < ng/
mL            <0.30        









 Lipase - 17 13:25         









 Lipase            30 U/L            8-78        









 Complete urinalysis with reflex to culture - 17 14:07         









 Urine color determination            YELLOW             NRG        

 

 Urine clarity determination            CLEAR             NRG        

 

 Urine pH measurement by test strip            6.5             5-9        

 

 Specific gravity of urine by test strip            1.015             1.016-
1.022        

 

 Urine protein assay by test strip, semi-quantitative            2+             
NEGATIVE        

 

 Urine glucose detection by automated test strip            NEGATIVE           
  NEGATIVE        

 

 Erythrocytes detection in urine sediment by light microscopy            1+    
         NEGATIVE        

 

 Urine ketones detection by automated test strip            3+             
NEGATIVE        

 

 Urine nitrite detection by test strip            NEGATIVE             NEGATIVE
        

 

 Urine total bilirubin detection by test strip            1+             
NEGATIVE        

 

 Urine urobilinogen measurement by automated test strip (mass/volume)          
  4 mg/dL            NORMAL        

 

 Urine leukocyte esterase detection by dipstick            1+             
NEGATIVE        

 

 Automated urine sediment erythrocyte count by microscopy (number/high power 
field)            NONE             NRG        

 

 Automated urine sediment leukocyte count by microscopy (number/high power field
)             [HPF]            NRG        

 

 Bacteria detection in urine sediment by light microscopy            TRACE     
        NRG        

 

 Squamous epithelial cells detection in urine sediment by light microscopy     
       5-10             NRG        

 

 Crystals detection in urine sediment by light microscopy            NONE      
       NRG        

 

 Casts detection in urine sediment by light microscopy            NONE         
    NRG        

 

 Mucus detection in urine sediment by light microscopy            SMALL        
     NRG        

 

 Complete urinalysis with reflex to culture            NO             NRG      
  









 Methicillin resistant Staphylococcus aureus (MRSA) screening culture - 07/10/
18 09:35         









 Methicillin resistant Staphylococcus aureus (MRSA) screening culture          
  NEG             NRG        



                                                                               
                                                                       



Encounters

      





 ACCT No.            Visit Date/Time            Discharge            Status    
        Pt. Type            Provider            Facility            Loc./Unit  
          Complaint        

 

 [id number]            2015 09:18:00            2015 23:59:59          
  CLS            Outpatient            DAVID MCCAIN DO                        
                       

 

 [id number]            2014 14:55:00            2014 23:59:59          
  CLS            Outpatient            MARIN STROUD                
                               

 

 [id number]            2014 11:44:00            2014 23:59:59          
  CLS            Outpatient            MARIN STROUD                
                               

 

 [id number]            2013 16:02:00            2013 23:59:59          
  CLS            Outpatient            ALEJANDRA SALAZAR DO                     
                          

 

 [id number]            10/04/2012 13:57:00            10/04/2012 23:59:59          
  CLS            Outpatient            ALEJANDRA SALAZAR DO                     
                          

 

 [id number]            11/15/2017 14:51:17            11/15/2017 23:59:59            
CLS            Outpatient                                                      
      

 

 [id number]            2015 04:44:40                         [facility]            
Document Registration                                                          
  

 

 [id number]            07/10/2018 09:05:00            07/10/2018 10:00:00    
        DIS            Outpatient            UMA HANDLEY DO [facility]            PREOP            CHRONIC PELVIC PAIN
,FACTOR V LEIDEN        

 

 [id number]            2018 14:09:00            2018 23:59:59    
        CLS            Outpatient            UMA HANDLEY DO            
[facility]            RAD            R92.8 ABN MAMMO     
   

 

 [id number]            2018 14:27:00            2018 23:59:59    
        CLS            Outpatient            UMA HANDLEY DO            
[facility]            RAD            SCREENING        

 

 [id number]            2018 14:15:00            2018 16:23:00    
        DIS            Outpatient            STACY CAMARGO MD            
[facility]            REHAB            L SHOULDER PAIN 
WITH ROTATOR CUFF INFLAMMATION        

 

 [id number]            2017 13:16:00            2017 15:42:00    
        DIS            Emergency            DELGADOZAFAR APRN            [facility]            ER            L SIDE ABD PAIN        

 

 [id number]            10/25/2017 09:59:00            10/25/2017 23:59:59    
        CLS            Outpatient            STACY CAMARGO MD            
[facility]            LAB            I10, Z79.899        

 

 [id number]            2017 11:15:00            2017 11:59:00    
        DIS            Outpatient            STACY CAMARGO MD            
[facility]            REHAB            LBP; THORACIC PAIN
        

 

 [id number]            2017 11:49:00            2017 23:59:59    
        CLS            Preadmit            STACY CAMARGO MD            [facility]            RAD            SCREENING Z12.31        

 

 [id number]            2017 08:35:00            2017 23:59:59    
        CLS            Outpatient            YONATHAN SORIANO MD            [facility]            CARD            CAD,CAROTID STENOSIS   
     

 

 [id number]            2017 09:40:00            2017 11:56:00    
        DIS            Emergency            CHE BEARD DO            [facility]            ER            HEADACHE/NAUSEA        

 

 [id number]            2017 00:09:00            2017 23:59:59    
        CLS            Preadmit            YONATHAN SORIANO MD            [facility]            LAB            COUMIDIN,THERAPY,MED 
MGMT        

 

 [id number]            10/31/2016 10:02:00            2017 00:01:00    
        DIS            Outpatient            YONATHAN SORIANO MD            [facility]            LAB            COUMIDIN,THERAPY,MED 
MGMT        

 

 [id number]            2016 15:27:00            2016 23:59:59    
        CLS            Outpatient            STACY CAMARGO MD            
[facility]            LAB            ANEMIA        

 

 [id number]            2016 20:47:00            2016 22:28:00    
        DIS            Emergency            CHE BEARD DO            [facility]            ER            FACIAL NUMBNESS        

 

 [id number]            2016 16:25:00            2016 09:20:00    
        DIS            Inpatient            STACY CAMARGO MD            [facility]            ICU            SUPRATHERAPRUTIC INR; 
HEMATURIA; HEMATEMESIS        

 

 [id number]            2016 08:51:00            2016 23:59:59    
        CLS            Outpatient            STACY CAMARGO MD            
[facility]            RAD            LUNG MASS        

 

 [id number]            10/27/2016 08:51:00            10/28/2016 10:40:00    
        DIS            Inpatient            STACY CAMARGO MD            [facility]            4TH            POST OBSTRUCTIVE PNA LLL
        

 

 [id number]            10/26/2016 09:36:00            10/26/2016 23:59:59    
        CLS            Outpatient            STACY CAMARGO MD            
[facility]            RAD            SOB,COUGH        

 

 [id number]            2016 10:51:00            2016 00:01:00    
        DIS            Outpatient            YONATHAN SORIANO MD            [facility]            LAB            COUMIDIN,THERAPY,MED 
MGMT        

 

 [id number]            2016 11:16:00            2016 12:09:00    
        DIS            Outpatient            STACY CAMARGO MD            
[facility]            REHAB            MID BACK/NECK/
SHOULDER PAIN AND STIFFNESS        

 

 [id number]            2016 13:32:00            2016 23:59:59    
        CLS            Outpatient            REINA LUO            
[facility]            LAB            CHEST PAIN, ABN 
WEIGHT LOSS        

 

 [id number]            2016 20:25:00            2016 22:38:00    
        DIS            Emergency            JUDD ANDERSON MD            
[facility]            ER            HEADACHE/ELEVATED BP/
LOW HEART RATE        

 

 [id number]            2016 12:20:00            2016 00:01:00    
        DIS            Outpatient            YONATHAN SORIANO MD            [facility]            LAB            COUMIDIN,THERAPY,MED 
MGMT        

 

 [id number]            2016 08:59:00            2016 10:05:00    
        DIS            Outpatient            STACY CAMARGO MD            
[facility]            REHAB            NECK AND UPPER 
BACK PAIN,KYPHOSIS,RADICULOPATHY        

 

 [id number]            2016 11:41:00            2016 23:59:59    
        CLS            Outpatient            UMA HANDLEY DO S            
[facility]            RAD            ACUTE PELVIC PAIN   
     

 

 [id number]            2015 07:47:00            2015 23:59:59    
        CLS            Outpatient            YONATHAN SORIANO MD            [facility]            CARD            CAD,CELESTE,HLP,HTN        

 

 [id number]            2015 14:31:00            2015 23:59:59    
        CLS            Outpatient            DARRIAN FARMER ARNP          
  [facility]            RAD            NECK THORACIC 
LUMBAR BACK PAIN        

 

 [id number]            2015 15:06:00            2015 23:59:59    
        CLS            Outpatient            DARRIAN FARMER          
  [facility]            RAD            NECK PAIN,THORACIC 
BACK PAIN        

 

 [id number]            2015 10:17:00            2015 00:01:00    
        DIS            Outpatient            YONATHAN SORIANO MD            [facility]            LAB            COUMIDIN,THERAPY,MED 
MGMT        

 

 [id number]            2015 10:22:00            2015 23:59:59    
        CLS            Outpatient            STACY CAMARGO MD            
[facility]            RAD            KNEE PAIN,BACK PAIN,
HEEL PAIN        

 

 [id number]            2015 09:14:00            2015 00:01:00    
        DIS            Outpatient            YONATHAN SORIANO MD            [facility]            LAB            COUMIDIN,THERAPY,MED 
MGMT        

 

 [id number]            10/28/2014 09:51:00            2014 00:01:00    
        DIS            Outpatient            YONATHAN SORIANO MD            [facility]            LAB            COUMIDIN,THERAPY,MED 
MGMT        

 

 [id number]            2014 12:56:00            2014 23:59:59    
        CLS            Outpatient            BLANCA DOUGLAS MD            
[facility]            RAD            RLQ PAIN        

 

 [id number]            2014 11:41:00            2014 23:59:59    
        CLS            Outpatient            BLANCA DOUGLAS MD            
[facility]            RAD            ABD PAIN        

 

 [id number]            2014 09:10:00            2014 00:01:00    
        DIS            Outpatient            YONATHAN SORIANO MD            [facility]            LAB            COUMIDIN,THERAPY,MED 
MGMT        

 

 [id number]            2013 10:39:00            2013 12:00:00    
        DIS            Inpatient            BLANCA DOUGLAS MD            
[facility]            SURGICAL            ABD PAIN        

 

 [id number]            10/21/2013 08:42:00            10/22/2013 00:01:00    
        DIS            Outpatient            YONATHAN SORIANO MD            [facility]            LAB            COUMIDIN,THERAPY,MED 
MGMT        

 

 [id number]            10/21/2013 08:38:00            10/21/2013 23:59:59    
        CLS            Outpatient            MALINA TORRES    
        [facility]            LAB            
HYPERLIPIDEMIA        

 

 [id number]            10/21/2013 08:32:00            10/21/2013 23:59:59    
        CLS            Outpatient            BLANCA DOUGLAS MD            
[facility]            LAB                     

 

 [id number]            2013 10:10:00            2013 00:01:00    
        DIS            Outpatient            YONATHAN SORIANO MD            [facility]            LAB            MED MANGEMENT        

 

 [id number]            2018 07:30:00                         UMA Clement DO            [facility]            SDC            CHRONIC PELVIC PAIN,FACTOR V LEIDEN        

 

 [id number]            2017 16:55:00                                   
   Document Registration                                                       
     

 

 [id number]            2016 07:48:00                                   
   Document Registration                                                       
     

 

 [id number]            2016 16:15:00                                   
   Document Registration                                                       
     

 

 [id number]            2015 08:43:00                                   
   Document Registration                                                       
     

 

 [id number]            2015 08:42:00                                   
   Document Registration                                                       
     

 

 [id number]            2015 08:42:00                                   
   Document Registration                                                       
     

 

 [id number]            2015 08:42:00                                   
   Document Registration                                                       
     

 

 [id number]            2015 08:42:00                                   
   Document Registration                                                       
     

 

 [id number]            2015 08:42:00                                   
   Document Registration                                                       
     

 

 [id number]            2013 00:00:00                                   
   Document Registration                                                       
     

 

 [id number]            2012 14:28:00                                   
   Document Registration                                                       
     

 

 [id number]            2012 12:16:00                                   
   Document Registration                                                       
     

 

 [id number]            10/20/2011 21:26:00                                   
   Document Registration                                                       
     

 

 [id number]            2011 13:07:00                                   
   Document Registration                                                       
     

 

 [id number]            2011 14:34:00                                   
   Document Registration                                                       
     

 

 [id number]            2011 07:00:00                                   
   Document Registration                                                       
     

 

 [id number]            2011 08:57:00                                   
   Document Registration                                                       
     

 

 [id number]            2011 06:20:00                                   
   Document Registration                                                       
     

 

 [id number]            2011 09:10:00                                   
   Document Registration                                                       
     

 

 [id number]            2011 20:45:00                                   
   Document Registration                                                       
     

 

 [id number]            02/15/2011 13:30:00                                   
   Document Registration                                                       
     

 

 [id number]            10/20/2010 06:22:00                                   
   Document Registration                                                       
     

 

 [id number]            10/20/2010 06:16:00                                   
   Document Registration                                                       
     

 

 [id number]            10/20/2010 06:12:00                                   
   Document Registration                                                       
     

 

 [id number]            2010 13:51:00                                   
   Document Registration                                                       
     

 

 [id number]            2010 13:32:00                                   
   Document Registration                                                       
     

 

 [id number]            2010 08:12:00                                   
   Document Registration

## 2018-07-19 NOTE — OPERATIVE REPORT
DATE OF SERVICE:  



PREOPERATIVE DIAGNOSES:

1.  A 43-year-old female with chronic pelvic pain.

2.  Menorrhagia.

3.  Factor V Leiden.



POSTOPERATIVE DIAGNOSES:

1.  A 43-year-old female with chronic pelvic pain.

2.  Menorrhagia.

3.  Factor V Leiden.



PROCEDURE:

Robotic-assisted total laparoscopic hysterectomy with bilateral salpingectomy.



SURGEON:

Newton Kong DO



ASSISTANT:

DEV Mendosa



ANESTHESIA:

General endotracheal.



ESTIMATED BLOOD LOSS:

Minimal.



URINE OUTPUT

20 mL clear at the end of the procedure.



FLUIDS:

2 liters lactated Ringer solution.



FINDINGS:

A grossly normal appearing uterus and bilateral fallopian tubes, absent of the

left ovary, the right ovary is multicystic, but grossly normal appearing.



SPECIMENS SENT:

Uterus, bilateral fallopian tubes.



INDICATIONS FOR PROCEDURE:

This 43-year-old female is a longstanding patient of mine that has been dealing

with chronic pelvic pain for quite some time.  We have tried more conservative

measures dealing with her pelvic pain that have been unsuccessful.  We are

limited somewhat by our treatment methods as the patient has factor V Leiden and

a history of thromboembolic disease in the past and has been on Eliquis.  We

discussed proceeding with hysterectomy.  The patient is actually very adamant 
and

wishes to proceed.  Risks of the procedure were discussed with the patient in

detail including risks of bleeding, infection, damage to surrounding structures

including, but not limited to bowel, bladder, ureter, kidneys, postoperative and

preoperative expectations as well as postoperative recovery time and

expectations discussed with the patient in detail.  Risks from anesthesia and

even death were also discussed and possible need for reoperation.  After all of

her questions were answered, consent was obtained in the preoperative area and

the patient was taken to the operating room.



OPERATIVE REPORT IN DETAIL:

Once in the operating room, general anesthesia was found to be adequate.  She

was placed in dorsal lithotomy position, prepped and draped in normal sterile

fashion.  A Enriquez catheter was placed using sterile technique.  A timeout was

performed.  A weighted speculum was inserted in the patient's vagina.  A right

angle retractor was used to visualize the cervix, which was grasped at 12

o'clock position using a long Allis clamp.  I then placed an 0 Vicryl suture

through the anterior lip of the cervix and used this as my retraction point

removing the Allis clamp.  I then gently sound the uterine cavity, depth was

found to be 6 to 7 cm.  I then gently dilated the cervix using Hegar dilators to

allow placement of my MARLON uterine manipulator, which I selected a 6 cm MARLON

uterine manipulator tip and a 3 cm colpotomy ring.  Once this was advanced into

the uterus, the balloon was deployed and the colpotomy ring was advanced around

the vaginal fornix.  I was able to appreciate excellent bimanual manipulation on

abdominal examination.  I then performed a change of gloves and took my

attention to the abdomen where infraumbilically I infiltrated the area using

0.25% Marcaine to make an 8 mm incision and directed Veress needle through the

incision until intraperitoneal placement was confirmed using a saline drop test.

 I then proceeded with insufflation using CO2 gas and opening pressure of 4 mmHg

was noted.  I proceeded to a maximum pressure of 15 mmHg, at which point I

removed the Veress needle and introduced an 8 mm blunt da Vince camera trocar. 

Once this was in place, I was able to confirm intraperitoneal placement using

the da Vince laparoscope.  I then had the patient placed in steep Trendelenburg

and I was able to visualize all my findings as described above.  I placed two

lateral trocars approximately 8 cm lateral to my infraumbilical trocar.  The

skin was infiltrated using 0.25% Marcaine, 8 mm incisions were made and the

trocars were placed under direct visualization of the laparoscope.  Once these

were in place, I brought in the da Vince robot and docked in the appropriate

fashion.  I placed the da Vince vessel sealer in the left hand and the monopolar

mis in the right hand.  On the left side, there was the absence of the ovary,

so I started by disconnecting the fallopian tube from the mesosalpinx and then

grasped the round ligament, bipolar cauterized this and transected this using

the vessel sealer.  This was all done with the vessel sealer to cauterize and

transect as I go.  Then, I was able to grasp the entire broad ligament, bipolar

cauterized and transected this down to the level of the lower uterine segment,

at which point I  the anterior and posterior leaflets of the broad

ligament.  The anterior leaflet was taken around to the anterior vaginal fornix

exposing the anterior vaginal fornix cup of the MARLON uterine manipulator.  The

posterior leaflet was taken around to the posterior vaginal fornix.  This allows

me to skeletonize the uterine vessels laterally, which were bipolar cauterized

and transected using the vessel sealer.  On the right side, the ovary was

present, so I started at the uteroovarian ligament.  I bipolar cauterized and

transected this using the vessel sealer.  I created a window in the mesosalpinx

using the monopolar mis.  I then took this down the mesosalpinx using the

vessel sealer transecting the fallopian tube from its surrounding blood supply. 

I then grasped the round ligament, bipolar cauterized and transected this using

the vessel sealer.  I then was able to grasp the entire broad ligament.  I

bipolar cauterized and transected this using the vessel sealer down to the level

of the lower uterine segment, which in similar fashion I  the anterior

and posterior leaflets.  The anterior leaflet was taken around to my anterior

vaginal fornix dissection.  The posterior leaflet was taken around to the

posterior vaginal fornix dissection.  This allowed me to skeletonize the uterine

vessels laterally, which I bipolar cauterized and transected using the vessel

sealer.  I then created a colpotomy at the 12 o'clock position using monopolar

mis and took this circumferentially around the vaginal cuff amputating the

cervix away from the vaginal fornix.  The entire specimen was then removed

through the vagina.  I then closed the lateral vaginal apices of the vaginal

cuff using 2-0 Vicryl suture in a figure-of-eight fashion, colposuspending them

to the uterosacral ligaments.  I then closed the remainder of the vaginal fornix

using 2-0 V-Loc in a running fashion.  There was no active bleeding noted from

any of my dissection planes after this was completed.  I then undocked the da

Vince robot and proceeded with the remainder of the case laparoscopically by

copiously irrigating the pelvis using normal saline.  Once again, no active

bleeding was noted from any of my dissection planes.  I placed FloSeal

hemostatic agent over all my planes of dissection.  I had the patient taken out

of steep Trendelenburg where I removed the lateral trocars under direct

visualization of the laparoscope.  The infraumbilical trocar was then left in

place to release insufflation and to introduce 10 mL of 0.25% Marcaine for

postoperative pain management.  I then removed this trocar as well.  Skin was

then reapproximated using 4-0 Monocryl in interrupted subcuticular stitches. 

Dermabond was applied to the incision and Band-Aids were placed over these. 

Enriquez catheter was left in place.  The patient tolerated the procedure well and

was taken to recovery area in stable condition.  Lap and sponge counts were

correct at the end of the procedure.  Instrument count was correct as well.  One

gram of Ancef and 500 mg of Flagyl were given preoperatively for infection

prophylaxis.





Job ID: [id number]

DocumentID: [id number]

Dictated Date:  07/19/2018 10:07:22

Transcription Date: 07/19/2018 18:34:11

Dictated By: DO CHRISTIANO SUMNER

## 2018-07-19 NOTE — XMS REPORT
Clinical Summary

 Created on: 2018



Erin Richmond

External Reference #: [id number]

: 1975

Sex: Female



Demographics







 Address  [address] [place]  [address]

 

 Home Phone  [phone]

 

 Preferred Language  English

 

 Marital Status  Unknown

 

 Congregation Affiliation  CAT

 

 Race  White

 

 Ethnic Group  Not  or 





Author







 Author  [facility]

 

 Organization  [facility]

 

 Address  Unknown

 

 Phone  Unavailable







Support







 Name  Relationship  Address  Phone

 

 Kelsy Blake  ANGEL  [place]    [phone]







Care Team Providers







 Care Team Member Name  Role  Phone

 

 Milena Nixon MD  PCP  [phone]

 

 Debbie Lovett RN  Unavailable  Unavailable

 

 Jorge Rodriguez MD  Unavailable  Unavailable







Source Comments

Some departments are not documenting in the electronic medical record.  If you 
do not see the information that you expected, contact Release of Information in 
the Health Information Management department at [phone] for further 
assistance in locating additional records.[facility]



Allergies







    



  Active Allergy   Reactions   Severity   Noted Date   Comments

 

    



  Aripiprazole     2008   Panic attacks

 

    



  Prochlorperazine   SHORTNESS OF BREATH,    2008 



  Edisylate   SWELLING   







Current Medications







      



  Prescription   Sig.   Disp.   Refills   Start   End Date   Status



      Date  

 

      



  COUMADIN PO      20    Active



      08  

 

      



  METOPROLOL TARTRATE PO      20    Active



      08  

 

      



  LOVASTATIN PO      20    Active



      08  

 

      



  LEXAPRO PO      20    Active



      08  

 

      



  PEPCID PO      20    Active



      08  

 

      



  aspirin 81 mg chew tablet      20    Active



      08  

 

      



  FOLIC ACID PO      20    Active



      08  

 

      



  M-VIT PO      20    Active



      08  

 

      



  escitalopram (LEXAPRO) 10   Take 1 Tab by mouth   30   0   20    Active



  mg tablet   Daily.     08  

 

      



  escitalopram (LEXAPRO) 10   Take 1 Tab by mouth   30   0   20    Active



  mg tablet   Daily.     08  

 

      



  mirtazapine (REMERON) 15   Take 1 Tab by mouth At   30   0   20    
Active



  mg tablet   Bedtime Daily.     08  







Active Problems







 



  Problem   Noted Date

 

 



  Generalized anxiety disorder   2008







Family History







   



  Medical History   Relation   Name   Comments

 

   



  Depression   Maternal  



   Grandfather  

 

   



  Depression   Mother  

 

   



  Depression   Sister  









   



  Relation   Name   Status   Comments

 

   



  Maternal Grandfather   

 

   



  Mother   

 

   



  Sister   







Social History







    



  Tobacco Use   Types   Packs/Day   Years Used   Date

 

    



  Never Smoker    









   



  Alcohol Use   Drinks/Week   oz/Week   Comments

 

   



  Yes     rare









 



  Sex Assigned at Birth   Date Recorded

 

 



  Not on file 







Last Filed Vital Signs







  



  Vital Sign   Reading   Time Taken

 

  



  Blood Pressure   121/71   2008 11:00 AM CST

 

  



  Pulse   54   2008 11:00 AM CST

 

  



  Temperature   36.6 C (97.9 F)   2008 11:00 AM CST

 

  



  Respiratory Rate   -   -

 

  



  Oxygen Saturation   97%   2008  6:15 PM CST

 

  



  Inhaled Oxygen   -   -



  Concentration  

 

  



  Weight   77 kg (169 lb 12.1 oz)   2008 11:00 PM CST

 

  



  Height   162 cm (5' 3.78")   2008 11:00 PM CST

 

  



  Body Mass Index   29.34   2008 11:00 PM CST







Plan of Treatment







   



  Health Maintenance   Due Date   Last Done   Comments

 

   



  PHYSICAL (COMPREHENSIVE)   1984  



  EXAM   

 

   



  PERTUSSIS VACCINE   1988  

 

   



  HIV SCREENING   1992  

 

   



  TETANUS VACCINE   1994  

 

   



  CERVICAL CANCER SCREENING   2007  

 

   



  BREAST CANCER SCREENING   2017  

 

   



  INFLUENZA VACCINE   10/01/2018  







Results

Not on filefrom Last 3 Months

## 2018-07-19 NOTE — DISCHARGE INST-WOMEN'S SERVICE
Discharge Inst-Women's Serv


Depart Medication/Instructions


New, Converted or Re-Newed RX:  RX on Chart





Consults/Follow Up


Additional Follow Up:  Yes


Orders/Referrals


Dr. Kong in 7-10 days and in 8 weeks





Activity


Activity:  Activity as Tolerated


Driving Instructions:  No Driving for 1 Week


NO SMOKING:  NO SMOKING


Nothing Inside Vagina:  No Douching, No Susan Moore, No Tampons





Diet


Discharge Diet:  No Restrictions


Symptoms to Report to :  Bleeding Excessive, Pain Increased, Fever Over 101 

Degrees F, Vaginal Bleeding Increase, Questions/Concerns


For Any Problems or Questions:  Contact Your Physician





Skin/Wound Care


Infection Signs and Symptoms:  Increased Redness, Foul Odor of Wound, Increased 

Drainage, Skin Itchy or Has a Rash, Increased Swelling, Temperature Above 101  F


Operative Area Clean and Dry:  Keep Incision Clean/Dry


Stitches/Staples/Dermabond:  Dermabond, Care of Stitches


Bathing Instructions:  UMA Mills DO Jul 19, 2018 08:41

## 2018-07-19 NOTE — XMS REPORT
CCD document using C-CDA

 Created on: 2018



Erin Richmond

External Reference #: [id number]

: 1975

Sex: Female



Demographics







 Address  [facility] [address]

[place]  [address]

 

 Home Phone  [phone]

 

 Preferred Language  English

 

 Marital Status  Unknown

 

 Sabianist Affiliation  Unknown

 

 Race  White

 

 Ethnic Group  Not  or 





Author







 Author  Jordyn Rosenberg MD, [facility]

 

 Address  [address] [place]  [address]



 

 Phone  [phone]







Care Team Providers







 Care Team Member Name  Role  Phone

 

  PP  Unavailable

 

  CCM  Unavailable



                                            



Summary Purpose

          Interface Exchange                                                   
                 



Insurance Providers

                      





 Payer name                    Policy type / Coverage type                    
Covered party ID                    Effective Begin Date                    
Effective End Date                

 

 [employer] Medicare Part B                    Medicare Part B                    
[id number]                    Unknown                    Unknown                

 

 [place] [employer]                    Medicare Part B                  
  [id number]                    Unknown                    Unknown            
    



                                                                               
         



Family history

                      



Mother            





 Diagnosis                    Age At Onset                

 

 kidney disease                    Unknown                

 

 Depression                    Unknown                



            



Father            





 Diagnosis                    Age At Onset                

 

 Coronary Artery Disease                    Unknown                

 

 Hyperlipidemia                    Unknown                

 

 Bleeding disorders                    Unknown                

 

 Hypertension                    Unknown                



                                                                               
         



Social History

                      





 Social History Element                    Codes                    Description
                    Effective Dates                

 

 Marital status                    Unknown                    Single           
         2015                

 

 Number of children                    Unknown                    0            
        2015                

 

 Tobacco history                    SNOMED CT: 5875199                    
Former smoker                    2015                

 

 Alcohol history                    SNOMED CT: 840568298                    
Never drinks alcohol                    2015                



                                                                               
                                       



Allergies, Adverse Reactions, Alerts

          Allergies, Adverse Reactions, Alerts data not found                  
                                                  



Past Medical History

                      





 Illness                    Codes                    Condition Status          
          Onset Date                    Resolved Date                

 

                     Encounter for immunization                                
      ICD-9: V04.81

ICD-10: Z23                    Active                    10/18/2017            
        Unknown                

 

                     Essential (primary) hypertension                          
            ICD-9: 401.1

ICD-10: I10                    Active                    2016            
        Unknown                

 

                     Generalized anxiety disorder                              
        ICD-9: 300.00

ICD-10: F41.1                    Active                    2015          
          Unknown                

 

                     Low back pain                                      ICD-9: 
724.2

ICD-10: M54.5                    Active                    2015          
          Unknown                

 

                     Other depressive episodes                                 
     ICD-9: 311

ICD-10: F32.8                    Active                    2015          
          Unknown                

 

                     Other generalized epilepsy and epileptic syndromes, not 
intractable, without status epilepticus                                      ICD
-9: 345.90

ICD-10: G40.409                    Active                    2015        
            Unknown                

 

                     Encounter for screening mammogram for malignant neoplasm 
of breast                                      ICD-9: V76.12

ICD-10: Z12.31                    Active                    2017         
           Unknown                

 

                     Pain in thoracic spine                                    
  ICD-9: 724.1

ICD-10: M54.6                    Active                    2015          
          Unknown                

 

                     Essential (primary) hypertension                          
            ICD-9: 401.9

ICD-10: I10                    Active                    2015            
        Unknown                

 

                     Restlessness and agitation                                
      ICD-9: 307.9

ICD-10: R45.1                    Active                    2016          
          Unknown                

 

                     Localized swelling, mass and lump, trunk                  
                    ICD-9: 786.6

ICD-10: R22.2                    Active                    2016          
          Unknown                

 

                     Cough                                      ICD-9: 786.2

ICD-10: R05                    Active                    10/25/2016            
        Unknown                

 

                     Pleurisy                                      ICD-9: 511.0

ICD-10: R09.1                    Active                    10/25/2016          
          Unknown                

 

                     Cervicalgia                                      ICD-9: 
723.1

ICD-10: M54.2                    Active                    2016          
          Unknown                

 

                     Radiculopathy, cervicothoracic region                     
                 ICD-9: 723.4

ICD-10: M54.13                    Active                    2016         
           Unknown                

 

                     Rash and other nonspecific skin eruption                  
                    ICD-9: 782.1

ICD-10: R21                    Active                    2016            
        Unknown                

 

                     Generalized abdominal pain                                
      ICD-9: 789.07

ICD-10: R10.84                    Active                    2016         
           Unknown                

 

                     Gross hematuria                                      ICD-9
: 599.71

ICD-10: R31.0                    Active                    2016          
          Unknown                

 

                     Long term (current) use of antithrombotics/antiplatelets  
                                    ICD-9: V58.63

ICD-10: Z79.02                    Active                    2016         
           Unknown                

 

                     Urinary tract infection, site not specified               
                       ICD-9: 599.0

ICD-10: N39.0                    Active                    2016          
          Unknown                

 

                     Lumbar spine pain                                      ICD-
9: 724.2                    Active                    2015               
     Unknown                

 

                     Plantar fasciitis of right foot                           
           ICD-9: 728.71                    Active                    2015                    Unknown                

 

                     Right knee pain                                      ICD-9
: 719.46                    Active                    2015               
     Unknown                

 

                     Depression                                      Unknown   
                 Active                    2015                    
Unknown                

 

                     Factor V                                      Unknown     
               Active                    2015                    Unknown 
               

 

                     Hypertension                                      Unknown 
                   Active                    2015                    
Unknown                

 

                     Seizures                                      Unknown     
               Active                    2015                    Unknown 
               

 

                     Anxiety                                      ICD-9: 300.00
                    Active                    2015                    
Unknown                

 

                     Depression                                      ICD-9: 311
                    Active                    2015                    
Unknown                

 

                     ESSENTIAL HYPERTENSION                                    
  ICD-9: 401.9                    Active                    2015         
           Unknown                

 

                     Factor 5 Leiden mutation, heterozygous                    
                  ICD-9: 289.81                    Active                    2015                    Unknown                

 

                     Seizure disorder                                      ICD-9
: 345.90                    Active                    2015               
     Unknown                



                                                                               
                                                                               
                                                                               
                                                                               
                                                                               
   



Problems

                      





 Condition                    Codes                    Effective Dates         
           Condition Status                

 

                     Encounter for immunization                                
      ICD-9: V04.81

ICD-10: Z23                    10/18/2017                    Active            
    

 

                     Essential (primary) hypertension                          
            ICD-9: 401.1

ICD-10: I10                    2016                    Active            
    

 

                     Generalized anxiety disorder                              
        ICD-9: 300.00

ICD-10: F41.1                    2015                    Active          
      

 

                     Low back pain                                      ICD-9: 
724.2

ICD-10: M54.5                    2015                    Active          
      

 

                     Other depressive episodes                                 
     ICD-9: 311

ICD-10: F32.8                    2015                    Active          
      

 

                     Other generalized epilepsy and epileptic syndromes, not 
intractable, without status epilepticus                                      ICD
-9: 345.90

ICD-10: G40.409                    2015                    Active        
        

 

                     Encounter for screening mammogram for malignant neoplasm 
of breast                                      ICD-9: V76.12

ICD-10: Z12.31                    2017                    Active         
       

 

                     Pain in thoracic spine                                    
  ICD-9: 724.1

ICD-10: M54.6                    2015                    Active          
      

 

                     Essential (primary) hypertension                          
            ICD-9: 401.9

ICD-10: I10                    2015                    Active            
    

 

                     Restlessness and agitation                                
      ICD-9: 307.9

ICD-10: R45.1                    2016                    Active          
      

 

                     Localized swelling, mass and lump, trunk                  
                    ICD-9: 786.6

ICD-10: R22.2                    2016                    Active          
      

 

                     Cough                                      ICD-9: 786.2

ICD-10: R05                    10/25/2016                    Active            
    

 

                     Pleurisy                                      ICD-9: 511.0

ICD-10: R09.1                    10/25/2016                    Active          
      

 

                     Cervicalgia                                      ICD-9: 
723.1

ICD-10: M54.2                    2016                    Active          
      

 

                     Radiculopathy, cervicothoracic region                     
                 ICD-9: 723.4

ICD-10: M54.13                    2016                    Active         
       

 

                     Rash and other nonspecific skin eruption                  
                    ICD-9: 782.1

ICD-10: R21                    2016                    Active            
    

 

                     Generalized abdominal pain                                
      ICD-9: 789.07

ICD-10: R10.84                    2016                    Active         
       

 

                     Gross hematuria                                      ICD-9
: 599.71

ICD-10: R31.0                    2016                    Active          
      

 

                     Long term (current) use of antithrombotics/antiplatelets  
                                    ICD-9: V58.63

ICD-10: Z79.02                    2016                    Active         
       

 

                     Urinary tract infection, site not specified               
                       ICD-9: 599.0

ICD-10: N39.0                    2016                    Active          
      

 

                     Lumbar spine pain                                      ICD-
9: 724.2                    2015                    Active                

 

                     Plantar fasciitis of right foot                           
           ICD-9: 728.71                    2015                    
Active                

 

                     Right knee pain                                      ICD-9
: 719.46                    2015                    Active                

 

                     Depression                                      Unknown   
                 2015                    Active                

 

                     Factor V                                      Unknown     
               2015                    Active                

 

                     Hypertension                                      Unknown 
                   2015                    Active                

 

                     Seizures                                      Unknown     
               2015                    Active                

 

                     Anxiety                                      ICD-9: 300.00
                    2015                    Active                

 

                     Depression                                      ICD-9: 311
                    2015                    Active                

 

                     ESSENTIAL HYPERTENSION                                    
  ICD-9: 401.9                    2015                    Active         
       

 

                     Factor 5 Leiden mutation, heterozygous                    
                  ICD-9: 289.81                    2015                  
  Active                

 

                     Seizure disorder                                      ICD-9
: 345.90                    2015                    Active                



                                                                               
                                                                               
                                                                               
                                                                               
                                                                               
   



Medications

                      





 Medication                    Codes                    Instructions           
         Start Date                    Stop Date                    Status     
               Fill Instructions                

 

                     Percocet 5 mg-325 mg tablet                               
       RxNorm: 7674622                    1-2 Tablet(s) PO Q6 as needed for 
back pain                    2018          
          Active                                    

 

                     Lexapro 20 mg tablet                                      
RxNorm: 269747                    TAKE ONE TABLET BY MOUTH DAILY               
     2018                    Active        
                            

 

                     Ativan 1 mg tablet                                      
RxNorm: 034628                    1-2 Tablet(s) PO TID as needed               
     2018                    Active        
                            

 

                     Percocet 5 mg-325 mg tablet                               
       RxNorm: 4329732                    1-2 Tablet(s) PO Q6 as needed for 
back pain                    2017          
          Inactive                                    

 

                     Percocet 5 mg-325 mg tablet                               
       RxNorm: 5777594                    1-2 Tablet(s) PO Q6 as needed for 
back pain                    11/15/2017                    2017          
          Inactive                                    

 

                     Percocet 5 mg-325 mg tablet                               
       RxNorm: 1796345                    1-2 Tablet(s) PO Q6 as needed for 
back pain                    10/03/2017                    10/13/2017          
          Inactive                                    

 

                     Lexapro 20 mg tablet                                      
RxNorm: 988723                    TAKE ONE TABLET BY MOUTH DAILY               
     2017                    Inactive      
                              

 

                     Percocet 5 mg-325 mg tablet                               
       RxNorm: 3407103                    1-2 Tablet(s) PO Q6 as needed for 
back pain                    2017          
          Inactive                                    

 

                     Percocet 5 mg-325 mg tablet                               
       RxNorm: 5228683                    1-2 Tablet(s) PO Q6 as needed for 
back pain                    2017          
          Inactive                                    

 

                     famotidine 20 mg tablet                                   
   RxNorm: 085081                    1 Tablet(s) PO daily                                        No Stop Date                    Active              
                      

 

                     Percocet 5 mg-325 mg tablet                               
       RxNorm: 0429859                    1 Tablet(s) PO Q6 as needed for back 
pain                    2017               
     Inactive                                    

 

                     Ativan 1 mg tablet                                      
RxNorm: 426059                    1-2 Tablet(s) PO TID as needed               
     2017                    Inactive      
                              

 

                     Percocet 5 mg-325 mg tablet                               
       RxNorm: 8963424                    1 Tablet(s) PO Q6 as needed for back 
pain                    2017               
     Inactive                                    

 

                     Lexapro 20 mg tablet                                      
RxNorm: 532175                    TAKE ONE TABLET BY MOUTH DAILY               
     2017                    Inactive      
                              

 

                     Percocet 5 mg-325 mg tablet                               
       RxNorm: 6029231                    1 Tablet(s) PO Q6 as needed for back 
pain                    2017               
     Inactive                                    

 

                     Percocet 5 mg-325 mg tablet                               
       RxNorm: 4537525                    1 Tablet(s) PO Q6 as needed for pain 
                   2017                    
Inactive                                    

 

                     Ativan 1 mg tablet                                      
RxNorm: 181292                    1-2 Tablet(s) PO TID                    2017                    Inactive                 
                   

 

                     Lamictal 25 mg tablet                                      
RxNorm: 585695                    1 Tablet(s) PO BID                    2017                    Inactive                 
                   

 

                     Flonase Allergy Relief 50 mcg/actuation nasal spray,
suspension                                      RxNorm: 6699151                
    1 Spray NASAL BID                    2017                    Inactive                                    

 

                     Lamictal XR 50 mg tablet,extended release                 
                     RxNorm: 553561                    TAKE ONE TABLET BY MOUTH 
TWICE A DAY                    2016        
            Inactive                                    

 

                     Lamictal XR 25 mg tablet,extended release                 
                     RxNorm: 465867                    1 Tablet(s) PO BID      
              2016                    
Inactive                                    

 

                     Trokendi XR 50 mg capsule, extended release               
                       RxNorm: 6537545                    1 Capsule(s) PO daily
                    2016                    
Inactive                                    

 

                     Lamictal XR 50 mg tablet,extended release                 
                     RxNorm: 480580                    1 Tablet(s) PO daily    
                2016                    
Inactive                                    

 

                     Trokendi XR 50 mg capsule, extended release               
                       RxNorm: 4954995                    1 Capsule(s) PO daily
                    2016                    
Inactive                                    

 

                     Lamictal XR 50 mg tablet,extended release                 
                     RxNorm: 799957                    1 Tablet(s) PO daily    
                2016                    
Inactive                                    

 

                     Ativan 1 mg tablet                                      
RxNorm: 753658                    1-2 Tablet(s) PO TID                    2018                    Inactive                 
                   

 

                     Lexapro 20 mg tablet                                      
RxNorm: 143503                    Tablet(s) TAKE ONE TABLET BY MOUTH DAILY     
               2016                    
Inactive                                    

 

                     Diflucan 150 mg tablet                                    
  RxNorm: 172745                    1 Tablet(s) PO daily                    2016                    Inactive              
                      

 

                     Diflucan 150 mg tablet                                    
  RxNorm: 308742                    1 Tablet(s) PO daily                    2016                    Inactive              
                      

 

                     Zofran 4 mg tablet                                      
RxNorm: 203196                    1 Tablet(s) PO TID as needed nausea          
          2016                    Inactive 
                                   

 

                     Percocet 5 mg-325 mg tablet                               
       RxNorm: 2489830                    1 Tablet(s) PO Q6 as needed for pain 
                   10/26/2016                    2017                    
Inactive                                    

 

                     clonidine HCl 0.1 mg tablet                               
       RxNorm: 898408                    1 Tablet(s) PO BID                    
2016                    Inactive           
                         

 

                     Percocet 5 mg-325 mg tablet                               
       RxNorm: 6194748                    1-2 Tablet(s) PO Q6 as needed for 
pain                    2016               
     Inactive                                    

 

                     clonidine HCl 0.1 mg tablet                               
       RxNorm: 479120                    1 Tablet(s) PO BID                    
2016                    Inactive           
                         

 

                     Lexapro 20 mg tablet                                      
RxNorm: 593298                    TAKE ONE TABLET BY MOUTH DAILY               
     2016                    11/10/2016                    Inactive      
                              

 

                     Kenalog 40 mg/mL suspension for injection                 
                     RxNorm: 1000092                    Milliliter(s) Inj      
              2016                    
Inactive                                    

 

                     Dilantin Kapseal 100 mg capsule                           
           RxNorm: 033176                    TAKE THREE CAPSULES BY MOUTH EVERY 
NIGHT AT BEDTIME                    2016   
                 Inactive                                    

 

                     Keflex 500 mg capsule                                      
RxNorm: 557047                    1 Capsule(s) PO TID                    2016                    Inactive                 
                   

 

                     Lexapro 20 mg tablet                                      
RxNorm: 134074                    TAKE ONE TABLET BY MOUTH DAILY               
     2015                    06/10/2016                    Inactive      
                              

 

                     metoprolol tartrate 25 mg tablet                          
            RxNorm: 772616                    1 Tablet(s) PO BID               
     2015                    Inactive      
                              

 

                     warfarin 5 mg tablet                                      
RxNorm: 774693                    m and w; all other days takes 7.5 Tablet(s) 
PO daily                    2015           
         Inactive                                    

 

                     tramadol 50 mg tablet                                      
RxNorm: 971066                    1-2 Tablet(s) PO Q6 as needed                
    2015                    Inactive       
                             

 

                     Soma 350 mg tablet                                      
RxNorm: 879035                    1 Tablet(s) PO Q6 as needed                  
  2015                    No Stop Date                    Active         
                           

 

                     Xanax 1 mg tablet                                      
RxNorm: 547979                    Tablet(s) PO daily as needed                 
   2015                    Inactive        
                            

 

                     Lexapro 20 mg tablet                                      
RxNorm: 368629                    1 Tablet(s) PO daily                    2015                    Inactive                 
                   

 

                     Dilantin Kapseal 100 mg capsule                           
           RxNorm: 582556                    3 Capsule(s) PO QHS               
     2015                    Inactive      
                              

 

                     Eliquis 5 mg tablet                                      
RxNorm: 9488664                    1 Tablet(s) PO BID                    No 
Start Date                                         Active                      
              

 

                     lisinopril 10 mg tablet                                   
   RxNorm: 531643                    1 Tablet(s) PO daily                    No 
Start Date                                         Active                      
              

 

                     folic acid 1 mg tablet                                    
  RxNorm: 433615                    1 Tablet(s) PO BID                    No 
Start Date                                         Active                      
              

 

                     Norvasc 5 mg tablet                                      
RxNorm: 877027                    1 Tablet(s) PO QAM                    No 
Start Date                                         Active                      
              

 

                     Lamictal  mg tablet,extended release                
                      RxNorm: 037112                    1 Tablet(s) PO QHS -
Started by Dr. Ugarte                    No Start Date                       
                  Active                                    

 

                     warfarin 5 mg tablet                                      
RxNorm: 710897                    m-f all other days takes 7.2 Tablet(s) PO    
                No Start Date                    2015                    
Inactive                                    

 

                     lisinopril 20 mg tablet                                   
   RxNorm: 042270                    1 Tablet(s) PO daily                    No 
Start Date                    10/17/2017                    Inactive           
                         

 

                     tramadol 50 mg tablet                                      
RxNorm: 601210                    1-2 Tablet(s) PO Q6 as needed                
    No Start Date                    11/15/2015                    Inactive    
                                

 

                     Soma 350 mg tablet                                      
RxNorm: 360858                    1 Tablet(s) PO as needed                    
No Start Date                    11/10/2015                    Inactive        
                            

 

                     famotidine 20 mg tablet                                   
   RxNorm: 989361                    1 Tablet(s) PO BID                    No 
Start Date                    2017                    Inactive           
                         

 

                     aspirin, buffered 81 mg tablet                            
          RxNorm: 323025                    1 Tablet(s) PO daily               
     No Start Date                    10/17/2017                    Inactive   
                                 

 

                     Xanax 1 mg tablet                                      
RxNorm: 215710                    Tablet(s) PO as needed                    No 
Start Date                    2015                    Inactive           
                         

 

                     metoprolol tartrate 25 mg tablet                          
            RxNorm: 583284                    1/2 in am a 1 in pm Tablet(s) PO 
BID                    No Start Date                    2015             
       Inactive                                    

 

                     Dilantin Kapseal 100 mg capsule                           
           RxNorm: 644558                    3 Capsule(s) PO daily             
       No Start Date                    2015                    Inactive 
                                   

 

                     lovastatin 20 mg tablet                                   
   RxNorm: 233146                    1 Tablet(s) PO daily                    No 
Start Date                    10/17/2017                    Inactive           
                         

 

                     Lexapro 20 mg tablet                                      
RxNorm: 264065                    1 Tablet(s) PO daily                    No 
Start Date                    2015                    Inactive           
                         

 

                     Percocet 5 mg-325 mg tablet                               
       RxNorm: 1373410                    1 Tablet(s) PO Q6 as needed for back 
pain                    No Start Date                    2017            
        Inactive                                    



                                                                               
                                                                               
                                                                               
                                                                               
                                                                               
                                                                               
                                                                               
                                                                               
                                                                  



Medication Administered

                      





 Medication                    Codes                    Instructions           
         Start Date                    Status                

 

 Kenalog 40 mg/mL suspension for injection                    RxNorm: 7492663  
                  Milliliter                    2016                    
No longer Active                



                                                                              



Immunizations

                      





 Vaccine                    Codes                    Date                    
Status                

 

 Influenza                    CVX: 141                    10/18/2017           
         completed                

 

 Influenza                    CVX: 141                    2015           
         completed                

 

 Pneumococcal                    CVX: 33                    2015         
           completed                



                                                                               
                   



Assessments

                      





 Condition                    Codes                    Effective Dates         
       

 

 Other depressive episodes                    ICD-10: F32.8

ICD-9: 311                    10/18/2017                

 

 Other generalized epilepsy and epileptic syndromes, not intractable, without 
status epilepticus                    ICD-10: G40.409

ICD-9: 345.90                    10/18/2017                

 

 Low back pain                    ICD-10: M54.5

ICD-9: 724.2                    10/18/2017                

 

 Encounter for immunization                    ICD-10: Z23

ICD-9: V04.81                    10/18/2017                

 

 Essential (primary) hypertension                    ICD-10: I10

ICD-9: 401.1                    10/18/2017                

 

 Generalized anxiety disorder                    ICD-10: F41.1

ICD-9: 300.00                    10/18/2017                

 

 Pain in thoracic spine                    ICD-10: M54.6

ICD-9: 724.1                    2017                

 

 Encounter for screening mammogram for malignant neoplasm of breast            
        ICD-10: Z12.31

ICD-9: V76.12                    2017                

 

 Essential (primary) hypertension                    ICD-10: I10

ICD-9: 401.9                    2016                

 

 Restlessness and agitation                    ICD-10: R45.1

ICD-9: 307.9                    2016                

 

 Localized swelling, mass and lump, trunk                    ICD-10: R22.2

ICD-9: 786.6                    2016                

 

 Pleurisy                    ICD-10: R09.1

ICD-9: 511.0                    10/26/2016                

 

 Cough                    ICD-10: R05

ICD-9: 786.2                    10/26/2016                

 

 Radiculopathy, cervicothoracic region                    ICD-10: M54.13

ICD-9: 723.4                    2016                

 

 Cervicalgia                    ICD-10: M54.2

ICD-9: 723.1                    2016                

 

 Rash and other nonspecific skin eruption                    ICD-10: R21

ICD-9: 782.1                    2016                

 

 Long term (current) use of antithrombotics/antiplatelets                    ICD
-10: Z79.02

ICD-9: V58.63                    2016                

 

 Gross hematuria                    ICD-10: R31.0

ICD-9: 599.71                    2016                

 

 Generalized abdominal pain                    ICD-10: R10.84

ICD-9: 789.07                    2016                

 

 Urinary tract infection, site not specified                    ICD-10: N39.0

ICD-9: 599.0                    2016                

 

 Plantar fasciitis of right foot                    ICD-9: 728.71              
      2015                

 

 Right knee pain                    ICD-9: 719.46                    2015
                

 

 Lumbar spine pain                    ICD-9: 724.2                    2015                

 

 Seizure disorder                    ICD-9: 345.90                    2015                

 

 Anxiety                    ICD-9: 300.00                    2015        
        

 

 Factor 5 Leiden mutation, heterozygous                    ICD-9: 289.81       
             2015                

 

 ESSENTIAL HYPERTENSION                    ICD-9: 401.9                                    

 

 Depression                    ICD-9: 311                    2015        
        



                                                                    



Reason For Visit

                      





 Reason For Visit                    Effective Dates                    Notes  
              

 

 back pain                    10/18/2017                    thoracic pain that 
wraps around to rib pain.                

 

 back pain                    2017                    thoracic pain that 
wraps around to rib pain.                 

 

 medication follow up                    2017                            
        

 

 back pain                    2016                                    

 

 anxiety                    2016                                    

 

 Hospital Follow Up                    2016                              
      

 

 chest pain/pressure                    10/26/2016                             
       

 

 hypertension                    2016                                    

 

 Hospital Follow Up                    2016                              
      

 

 back pain                    2016                                    

 

 pelvic pain                    2016                                    

 

 pelvic pain                    2016                                    

 

 back pain                    2015                                    

 

 back pain                    2015                                    

 

 foot pain                    2015                                    

 

 depression                    2015                    she uses xanax 
prn.                 



                                                                              



Results

                      





 Observation                    Observation Code                    Item       
             Item Code                    Result                    Date       
         

 

 Urine Culture                    Ucult                    Preliminary         
                               No Growth Day 1                     2016  
              

 

 Urine Culture                    Ucult                    Complete            
                            No Growth Day 2                     2016     
           

 

 Dilantin                    Ord7                    DILANTIN                  
                      6.9 UG/ML                    2016                

 

 Pt                    [id number]                    PT                           
             26.0 seconds                    2016                

 

 Pt                    [id number]                    INR                          
              2.5                     2016                

 

 Pt                    [id number]                    Low Intensity                
                        - 1.5-2.0                    2016                

 

 Pt                    [id number]                    Mod intensity                
                        - 2.0-3.0                    2016                

 

 Pt                    [id number]                    Hi intensity                 
                       - 3.0-4.0                    2016                

 

 Cbc With Differential                    Ord2                    WBC          
                              8.06 K/ul                    2016          
      

 

 Cbc With Differential                    Ord2                    RBC          
                              4.69 M/ul                    2016          
      

 

 Cbc With Differential                    Ord2                    HGB          
                              15.6 g/dl                    2016          
      

 

 Cbc With Differential                    Ord2                    Neut%        
                                58.7 %                    2016           
     

 

 Cbc With Differential                    Ord2                    HCT          
                              44.2 %                    2016             
   

 

 Cbc With Differential                    Ord2                    MCV          
                              94.2 fl                    2016            
    

 

 Cbc With Differential                    Ord2                    Lymph%       
                                 30.0 %                    2016          
      

 

 Cbc With Differential                    Ord2                    MCH          
                              33.3 pg                    2016            
    

 

 Cbc With Differential                    Ord2                    Mono%        
                                7.8 %                    2016            
    

 

 Cbc With Differential                    Ord2                    Eos%         
                               3.3 %                    2016             
   

 

 Cbc With Differential                    Ord2                    MCHC         
                               35.3 pg                    2016           
     

 

 Cbc With Differential                    Ord2                    PLT          
                              292 K/ul                    2016           
     

 

 Cbc With Differential                    Ord2                    Baso%        
                                0.2 %                    2016            
    

 

 Cbc With Differential                    Ord2                    RDW          
                              12.8 %                    2016             
   

 

 Cbc With Differential                    Ord2                    Neut ABS#    
                                    4.72 K/ul                    2016    
            

 

 Cbc With Differential                    Ord2                    Lymph ABS#   
                                     2.42 K/ul                    2016   
             

 

 Cbc With Differential                    Ord2                    Mono ABS#    
                                    0.6 K/ul                    2016     
           

 

 Cbc With Differential                    Ord2                    Eos ABS#     
                                   0.3 K/ul                    2016      
          

 

 Cbc With Differential                    Ord2                    Baso ABS#    
                                    0.0 K/ul                    2016     
           

 

 Cbc With Differential                    Ord2                    New Analyzer 
Notice                                        Please note new ref ranges 
starting 2016 due to implemntation of new five part differential hematolgy 
analyzer.                     2016                

 

 Comp Metabolic                    Fvx057                    NA                
                        136 mEq/L                    2016                

 

 Comp Metabolic                    Szz718                    K                 
                       4.1 mEq/L                    2016                

 

 Comp Metabolic                    Rok859                    CL                
                        103 mEq/L                    2016                

 

 Comp Metabolic                    Vcp746                    CO2               
                         23.0 mEq/L                    2016              
  

 

 Comp Metabolic                    Bib425                    ANION GAP         
                               14                     2016                

 

 Comp Metabolic                    Fnq703                    GLUCOSE           
                             88 mg/dL                    2016            
    

 

 Comp Metabolic                    Zbp552                    Creat             
                           0.6 mg/dL                    2016             
   

 

 Comp Metabolic                    Pkj314                    eGFR              
                          114 ml/min/1.73m2                    2016      
          

 

 Comp Metabolic                    Lra218                    BUN               
                         10 mg/dL                    2016                

 

 Comp Metabolic                    Rxs996                    B/C Ratio         
                               16.1 Ratio                    2016        
        

 

 Comp Metabolic                    Lof135                    CALCIUM           
                             8.7 mg/dL                    2016           
     

 

 Comp Metabolic                    Vou957                    ALK PHOS          
                              98 U/L                    2016             
   

 

 Comp Metabolic                    Bgj100                    AST(SGOT)         
                               20 U/L                    2016            
    

 

 Comp Metabolic                    Ayo653                    ALT(SGPT)         
                               21 U/L                    2016            
    

 

 Comp Metabolic                    Psi778                    BILI T            
                            0.5 mg/dL                    2016            
    

 

 Comp Metabolic                    Obg454                    ALBUMIN           
                             4.1 g/dL                    2016            
    

 

 Comp Metabolic                    Rud875                    TPRO              
                          7.0 g/dL                    2016                

 

 Comp Metabolic                    Eyi080                    GLOB              
                          2.9 g/dL                    2016                

 

 Comp Metabolic                    Amj720                    A/G Ratio         
                               1.4 Ratio                    2016         
       

 

 Comp Metabolic                    Ezg838                    Osmo              
                          270 mOsmo                    2016              
  



                                                                               
                                       



Review of Systems

                      





 System                    Result                    Effective Dates           
     

 

 Constitutional                    No recent illness                    10/18/
2017                

 

 Constitutional                    No anorexia                    10/18/2017   
             

 

 Constitutional                    No night sweats                    10/18/
2017                

 

 Constitutional                    No chills                    10/18/2017     
           

 

 Constitutional                    No diaphoresis                    10/18/2017
                

 

 Constitutional                    No fatigue                    10/18/2017    
            

 

 Constitutional                    No fever                    10/18/2017      
          

 

 Constitutional                    No insomnia                    10/18/2017   
             

 

 Constitutional                    No malaise                    10/18/2017    
            

 

 Eyes                    No eye discharge                    10/18/2017        
        

 

 Eyes                    No eye erythema                    10/18/2017         
       

 

 Ears/Nose/Throat/Neck                    No dizziness                    10/18/
2017                

 

 Ears/Nose/Throat/Neck                    No headache                    10/18/
2017                

 

 Cardiovascular                    No chest pain/pressure                    10/
                

 

 Respiratory                    No cough                    10/18/2017         
       

 

 Gastrointestinal                    No abdominal pain                    10/18/
2017                

 

 Gastrointestinal                    No constipation                    10/18/
2017                

 

 Gastrointestinal                    No diarrhea                    10/18/2017 
               

 

 Genitourinary/Nephrology                    No dysuria                    10/18
/2017                

 

 Musculoskeletal                    back pain                    10/18/2017    
            

 

 Dermatologic                    No rash                    10/18/2017         
       

 

 Neurologic                    No alteration of consciousness                  
  10/18/2017                

 

 Neurologic                    neck pain                    10/18/2017         
       

 

 Neurologic                    paresthesia                    10/18/2017       
         

 

 Psychiatric                    No anxiety                    10/18/2017       
         

 

 Psychiatric                    No depression                    10/18/2017    
            

 

 Constitutional                    recent illness                    2017
                

 

 Constitutional                    No anorexia                    2017   
             

 

 Constitutional                    No night sweats                    2017                

 

 Constitutional                    No chills                    2017     
           

 

 Constitutional                    No diaphoresis                    2017
                

 

 Constitutional                    fatigue                    2017       
         

 

 Constitutional                    No fever                    2017      
          

 

 Constitutional                    No insomnia                    2017   
             

 

 Constitutional                    No malaise                    2017    
            

 

 Eyes                    No eye discharge                    2017        
        

 

 Eyes                    No eye erythema                    2017         
       

 

 Ears/Nose/Throat/Neck                    No dizziness                    2017                

 

 Ears/Nose/Throat/Neck                    No headache                    2017                

 

 Cardiovascular                    No chest pain/pressure                                    

 

 Respiratory                    cough                    2017            
    

 

 Gastrointestinal                    No abdominal pain                    2017                

 

 Gastrointestinal                    No constipation                    2017                

 

 Gastrointestinal                    No diarrhea                    2017 
               

 

 Genitourinary/Nephrology                    No dysuria                                    

 

 Musculoskeletal                    back pain                    2017    
            

 

 Musculoskeletal                    neck pain                    2017    
            

 

 Dermatologic                    No rash                    2017         
       

 

 Neurologic                    No alteration of consciousness                  
  2017                

 

 Neurologic                    neck pain                    2017         
       

 

 Psychiatric                    anxiety                    2017          
      

 

 Psychiatric                    depression                    2017       
         

 

 Psychiatric                    disturbances of emotion                                    

 

 Constitutional                    recent illness                    2017
                

 

 Constitutional                    No anorexia                    2017   
             

 

 Constitutional                    No night sweats                    2017                

 

 Constitutional                    No chills                    2017     
           

 

 Constitutional                    No diaphoresis                    2017
                

 

 Constitutional                    fatigue                    2017       
         

 

 Constitutional                    No fever                    2017      
          

 

 Constitutional                    No insomnia                    2017   
             

 

 Constitutional                    No malaise                    2017    
            

 

 Eyes                    No eye discharge                    2017        
        

 

 Eyes                    No eye erythema                    2017         
       

 

 Ears/Nose/Throat/Neck                    No dizziness                    2017                

 

 Ears/Nose/Throat/Neck                    No headache                    2017                

 

 Cardiovascular                    No chest pain/pressure                    2017                

 

 Respiratory                    cough                    2017            
    

 

 Gastrointestinal                    No abdominal pain                    2017                

 

 Gastrointestinal                    No constipation                    2017                

 

 Gastrointestinal                    No diarrhea                    2017 
               

 

 Genitourinary/Nephrology                    No dysuria                                    

 

 Musculoskeletal                    back pain                    2017    
            

 

 Musculoskeletal                    neck pain                    2017    
            

 

 Dermatologic                    No rash                    2017         
       

 

 Neurologic                    No alteration of consciousness                  
  2017                

 

 Neurologic                    neck pain                    2017         
       

 

 Psychiatric                    anxiety                    2017          
      

 

 Psychiatric                    depression                    2017       
         

 

 Psychiatric                    disturbances of emotion                                    

 

 Constitutional                    recent illness                    2016
                

 

 Constitutional                    No anorexia                    2016   
             

 

 Constitutional                    No night sweats                    2016                

 

 Constitutional                    No chills                    2016     
           

 

 Constitutional                    No diaphoresis                    2016
                

 

 Constitutional                    fatigue                    2016       
         

 

 Constitutional                    No fever                    2016      
          

 

 Constitutional                    No insomnia                    2016   
             

 

 Constitutional                    No malaise                    2016    
            

 

 Eyes                    No eye discharge                    2016        
        

 

 Eyes                    No eye erythema                    2016         
       

 

 Ears/Nose/Throat/Neck                    No dizziness                    2016                

 

 Ears/Nose/Throat/Neck                    No headache                    2016                

 

 Cardiovascular                    No chest pain/pressure                                    

 

 Respiratory                    cough                    2016            
    

 

 Gastrointestinal                    No abdominal pain                    2016                

 

 Gastrointestinal                    No constipation                    2016                

 

 Gastrointestinal                    No diarrhea                    2016 
               

 

 Genitourinary/Nephrology                    No dysuria                                    

 

 Musculoskeletal                    back pain                    2016    
            

 

 Musculoskeletal                    neck pain                    2016    
            

 

 Dermatologic                    No rash                    2016         
       

 

 Neurologic                    No alteration of consciousness                  
  2016                

 

 Neurologic                    neck pain                    2016         
       

 

 Psychiatric                    anxiety                    2016          
      

 

 Psychiatric                    depression                    2016       
         

 

 Psychiatric                    disturbances of emotion                                    

 

 Constitutional                    recent illness                    2016
                

 

 Eyes                    No eye erythema                    2016         
       

 

 Respiratory                    No cough                    2016         
       

 

 Psychiatric                    anxiety                    2016          
      

 

 Psychiatric                    depression                    2016       
         

 

 Psychiatric                    psychosis                    2016        
        

 

 Psychiatric                    disturbances of thinking                                    

 

 Psychiatric                    disturbances of emotion                                    

 

 Constitutional                    recent illness                    2016
                

 

 Constitutional                    No anorexia                    2016   
             

 

 Constitutional                    No night sweats                    2016                

 

 Constitutional                    No chills                    2016     
           

 

 Constitutional                    No diaphoresis                    2016
                

 

 Constitutional                    fatigue                    2016       
         

 

 Constitutional                    No fever                    2016      
          

 

 Constitutional                    No insomnia                    2016   
             

 

 Constitutional                    No malaise                    2016    
            

 

 Eyes                    No eye discharge                    2016        
        

 

 Eyes                    No eye erythema                    2016         
       

 

 Ears/Nose/Throat/Neck                    No dizziness                    2016                

 

 Ears/Nose/Throat/Neck                    No headache                    2016                

 

 Cardiovascular                    No chest pain/pressure                    2016                

 

 Respiratory                    cough                    2016            
    

 

 Gastrointestinal                    No abdominal pain                    2016                

 

 Gastrointestinal                    No constipation                    2016                

 

 Gastrointestinal                    No diarrhea                    2016 
               

 

 Genitourinary/Nephrology                    No dysuria                                    

 

 Musculoskeletal                    back pain                    2016    
            

 

 Musculoskeletal                    neck pain                    2016    
            

 

 Dermatologic                    No rash                    2016         
       

 

 Neurologic                    No alteration of consciousness                  
  2016                

 

 Neurologic                    neck pain                    2016         
       

 

 Psychiatric                    No anxiety                    2016       
         

 

 Psychiatric                    No depression                    2016    
            

 

 Constitutional                    recent illness                    10/26/2016
                

 

 Eyes                    No eye discharge                    10/26/2016        
        

 

 Eyes                    No eye erythema                    10/26/2016         
       

 

 Cardiovascular                    chest pain/pressure                    10/26/
2016                

 

 Respiratory                    cough                    10/26/2016            
    

 

 Neurologic                    No alteration of consciousness                  
  10/26/2016                

 

 Neurologic                    No mental status change                    10/26/
2016                

 

 Ears/Nose/Throat/Neck                    No nasal discharge                    
10/26/2016                

 

 Respiratory                    chest tightness                    10/26/2016  
              

 

 Constitutional                    No recent illness                    2016                

 

 Constitutional                    No anorexia                    2016   
             

 

 Constitutional                    No night sweats                    2016                

 

 Constitutional                    No chills                    2016     
           

 

 Constitutional                    No diaphoresis                    2016
                

 

 Constitutional                    No fatigue                    2016    
            

 

 Constitutional                    No fever                    2016      
          

 

 Constitutional                    No insomnia                    2016   
             

 

 Constitutional                    No malaise                    2016    
            

 

 Eyes                    No eye discharge                    2016        
        

 

 Eyes                    No eye erythema                    2016         
       

 

 Ears/Nose/Throat/Neck                    No dizziness                    2016                

 

 Ears/Nose/Throat/Neck                    No headache                    2016                

 

 Cardiovascular                    No chest pain/pressure                                    

 

 Respiratory                    No cough                    2016         
       

 

 Gastrointestinal                    No abdominal pain                    2016                

 

 Gastrointestinal                    No constipation                    2016                

 

 Gastrointestinal                    No diarrhea                    2016 
               

 

 Genitourinary/Nephrology                    No dysuria                                    

 

 Musculoskeletal                    back pain                    2016    
            

 

 Musculoskeletal                    neck pain                    2016    
            

 

 Dermatologic                    No rash                    2016         
       

 

 Neurologic                    No alteration of consciousness                  
  2016                

 

 Neurologic                    neck pain                    2016         
       

 

 Psychiatric                    No anxiety                    2016       
         

 

 Psychiatric                    No depression                    2016    
            

 

 Constitutional                    No fever                    2016      
          

 

 Eyes                    No eye discharge                    2016        
        

 

 Eyes                    No eye erythema                    2016         
       

 

 Cardiovascular                    No chest pain/pressure                                    

 

 Respiratory                    No cough                    2016         
       

 

 Neurologic                    No alteration of consciousness                  
  2016                

 

 Psychiatric                    No anxiety                    2016       
         

 

 Psychiatric                    No depression                    2016    
            

 

 Constitutional                    No recent illness                    2016                

 

 Ears/Nose/Throat/Neck                    No nasal allergies                    
2016                

 

 Ears/Nose/Throat/Neck                    No nasal discharge                    
2016                

 

 Cardiovascular                    No dyspnea                    2016    
            

 

 Respiratory                    No dyspnea                    2016       
         

 

 Dermatologic                    No rash                    2016         
       

 

 Neurologic                    No mental status change                    2016                

 

 Constitutional                    No recent illness                    2016                

 

 Constitutional                    No anorexia                    2016   
             

 

 Constitutional                    No night sweats                    2016                

 

 Constitutional                    No chills                    2016     
           

 

 Constitutional                    No diaphoresis                    2016
                

 

 Constitutional                    No fatigue                    2016    
            

 

 Constitutional                    No fever                    2016      
          

 

 Constitutional                    No insomnia                    2016   
             

 

 Constitutional                    No malaise                    2016    
            

 

 Eyes                    No eye discharge                    2016        
        

 

 Eyes                    No eye erythema                    2016         
       

 

 Ears/Nose/Throat/Neck                    No dizziness                    2016                

 

 Ears/Nose/Throat/Neck                    No headache                    2016                

 

 Cardiovascular                    No chest pain/pressure                                    

 

 Respiratory                    No cough                    2016         
       

 

 Gastrointestinal                    No abdominal pain                    2016                

 

 Gastrointestinal                    No constipation                    2016                

 

 Gastrointestinal                    No diarrhea                    2016 
               

 

 Genitourinary/Nephrology                    No dysuria                                    

 

 Genitourinary/Nephrology                    pelvic pain                                    

 

 Dermatologic                    No rash                    2016         
       

 

 Neurologic                    No alteration of consciousness                  
  2016                

 

 Neurologic                    neck pain                    2016         
       

 

 Neurologic                    paresthesia                    2016       
         

 

 Psychiatric                    No anxiety                    2016       
         

 

 Psychiatric                    No depression                    2016    
            

 

 Musculoskeletal                    back pain                    2016    
            

 

 Constitutional                    No recent illness                    2016                

 

 Constitutional                    No anorexia                    2016   
             

 

 Constitutional                    No night sweats                    2016                

 

 Constitutional                    No chills                    2016     
           

 

 Constitutional                    No diaphoresis                    2016
                

 

 Constitutional                    No fatigue                    2016    
            

 

 Constitutional                    No fever                    2016      
          

 

 Constitutional                    No insomnia                    2016   
             

 

 Constitutional                    No malaise                    2016    
            

 

 Eyes                    No eye discharge                    2016        
        

 

 Eyes                    No eye erythema                    2016         
       

 

 Ears/Nose/Throat/Neck                    No dizziness                    2016                

 

 Ears/Nose/Throat/Neck                    No headache                    2016                

 

 Cardiovascular                    No chest pain/pressure                                    

 

 Respiratory                    No cough                    2016         
       

 

 Gastrointestinal                    No abdominal pain                    2016                

 

 Gastrointestinal                    No constipation                    2016                

 

 Gastrointestinal                    No diarrhea                    2016 
               

 

 Genitourinary/Nephrology                    No dysuria                                    

 

 Musculoskeletal                    back pain                    2016    
            

 

 Musculoskeletal                    neck pain                    2016    
            

 

 Dermatologic                    No rash                    2016         
       

 

 Neurologic                    No alteration of consciousness                  
  2016                

 

 Neurologic                    neck pain                    2016         
       

 

 Neurologic                    paresthesia                    2016       
         

 

 Psychiatric                    No anxiety                    2016       
         

 

 Psychiatric                    No depression                    2016    
            

 

 Genitourinary/Nephrology                    pelvic pain                                    

 

 Genitourinary/Nephrology                    No urinary frequency              
      2016                

 

 Genitourinary/Nephrology                    No urinary incontinence           
         2016                

 

 Genitourinary/Nephrology                    No urinary retention/hesitancy    
                2016                

 

 Genitourinary/Nephrology                    No urinary urgency                
    2016                

 

 Genitourinary/Nephrology                    dysuria                    2016                

 

 Genitourinary/Nephrology                    hematuria                    2016                

 

 Constitutional                    No recent illness                    2016                

 

 Constitutional                    No anorexia                    2016   
             

 

 Constitutional                    No night sweats                    2016                

 

 Constitutional                    No chills                    2016     
           

 

 Constitutional                    No diaphoresis                    2016
                

 

 Constitutional                    fatigue                    2016       
         

 

 Constitutional                    No fever                    2016      
          

 

 Constitutional                    No insomnia                    2016   
             

 

 Constitutional                    No malaise                    2016    
            

 

 Constitutional                    No weight loss                    2016
                

 

 Constitutional                    No weight gain                    2016
                

 

 Constitutional                    No obesity                    2016    
            

 

 Gastrointestinal                    nausea                    2016      
          

 

 Gastrointestinal                    vomiting                    2016    
            

 

 Cardiovascular                    near-syncope/dizziness                                    

 

 Gastrointestinal                    constipation                    2016
                

 

 Gastrointestinal                    No diarrhea                    2016 
               

 

 Respiratory                    No dyspnea                    2016       
         

 

 Respiratory                    No dyspnea on exertion                    2016                

 

 Respiratory                    No daytime hypersomnolence                                    

 

 Respiratory                    No cough                    2016         
       

 

 Ears/Nose/Throat/Neck                    headache                    2016                

 

 Dermatologic                    rash                    2016            
    

 

 Dermatologic                    No sores                    2016        
        

 

 Psychiatric                    anxiety                    2016          
      

 

 Psychiatric                    depression                    2016       
         

 

 Eyes                    No vision change                    2016        
        

 

 Musculoskeletal                    myalgias                    2016     
           

 

 Musculoskeletal                    No muscle weakness                    2016                

 

 Musculoskeletal                    No joint complaint                    2016                

 

 Constitutional                    No recent illness                    2015                

 

 Constitutional                    No anorexia                    2015   
             

 

 Constitutional                    No night sweats                    2015                

 

 Constitutional                    No chills                    2015     
           

 

 Constitutional                    No diaphoresis                    2015
                

 

 Constitutional                    No fatigue                    2015    
            

 

 Constitutional                    No fever                    2015      
          

 

 Constitutional                    No insomnia                    2015   
             

 

 Constitutional                    No malaise                    2015    
            

 

 Eyes                    No eye discharge                    2015        
        

 

 Eyes                    No eye erythema                    2015         
       

 

 Ears/Nose/Throat/Neck                    No dizziness                    2015                

 

 Ears/Nose/Throat/Neck                    No headache                    2015                

 

 Cardiovascular                    No chest pain/pressure                    2015                

 

 Respiratory                    No cough                    2015         
       

 

 Gastrointestinal                    No abdominal pain                    2015                

 

 Gastrointestinal                    No constipation                    2015                

 

 Gastrointestinal                    No diarrhea                    2015 
               

 

 Genitourinary/Nephrology                    No dysuria                                    

 

 Musculoskeletal                    back pain                    2015    
            

 

 Musculoskeletal                    neck pain                    2015    
            

 

 Dermatologic                    No rash                    2015         
       

 

 Neurologic                    No alteration of consciousness                  
  2015                

 

 Neurologic                    neck pain                    2015         
       

 

 Neurologic                    paresthesia                    2015       
         

 

 Dermatologic                    No rash                    2015         
       

 

 Neurologic                    No alteration of consciousness                  
  2015                

 

 Neurologic                    neck pain                    2015         
       

 

 Neurologic                    paresthesia                    2015       
         

 

 Musculoskeletal                    back pain                    2015    
            

 

 Musculoskeletal                    neck pain                    2015    
            

 

 Constitutional                    No recent illness                    2015                

 

 Constitutional                    No anorexia                    2015   
             

 

 Constitutional                    No night sweats                    2015                

 

 Constitutional                    No chills                    2015     
           

 

 Constitutional                    No diaphoresis                    2015
                

 

 Constitutional                    No fatigue                    2015    
            

 

 Constitutional                    No fever                    2015      
          

 

 Constitutional                    No insomnia                    2015   
             

 

 Constitutional                    No malaise                    2015    
            

 

 Constitutional                    No weight loss                    2015
                

 

 Constitutional                    No weight gain                    2015
                

 

 Eyes                    No eye discharge                    2015        
        

 

 Eyes                    No eye erythema                    2015         
       

 

 Ears/Nose/Throat/Neck                    No dizziness                    2015                

 

 Ears/Nose/Throat/Neck                    No headache                    2015                

 

 Cardiovascular                    No chest pain/pressure                    2015                

 

 Respiratory                    No cough                    2015         
       

 

 Gastrointestinal                    No abdominal pain                    2015                

 

 Gastrointestinal                    No constipation                    2015                

 

 Gastrointestinal                    No diarrhea                    2015 
               

 

 Genitourinary/Nephrology                    No dysuria                                    

 

 Constitutional                    No recent illness                    2015                

 

 Constitutional                    No anorexia                    2015   
             

 

 Constitutional                    No night sweats                    2015                

 

 Constitutional                    No chills                    2015     
           

 

 Constitutional                    No diaphoresis                    2015
                

 

 Constitutional                    No fatigue                    2015    
            

 

 Constitutional                    No fever                    2015      
          

 

 Constitutional                    No insomnia                    2015   
             

 

 Constitutional                    No malaise                    2015    
            

 

 Constitutional                    No weight loss                    2015
                

 

 Constitutional                    No weight gain                    2015
                

 

 Constitutional                    No recent illness                    2015                

 

 Constitutional                    No anorexia                    2015   
             

 

 Constitutional                    No night sweats                    2015                

 

 Constitutional                    No chills                    2015     
           

 

 Constitutional                    No weight gain                    2015
                

 

 Constitutional                    No weight loss                    2015
                

 

 Constitutional                    No malaise                    2015    
            

 

 Constitutional                    No insomnia                    2015   
             

 

 Constitutional                    No fever                    2015      
          

 

 Constitutional                    fatigue                    2015       
         

 

 Constitutional                    No diaphoresis                    2015
                

 

 Eyes                    No eye discharge                    2015        
        

 

 Eyes                    No eye erythema                    2015         
       

 

 Ears/Nose/Throat/Neck                    dizziness                    2015                

 

 Ears/Nose/Throat/Neck                    headache                    2015                

 

 Ears/Nose/Throat/Neck                    No nasal discharge                    
2015                

 

 Cardiovascular                    No chest pain/pressure                    2015                

 

 Cardiovascular                    No edema                    2015      
          

 

 Cardiovascular                    No dyspnea                    2015    
            

 

 Respiratory                    No chest congestion                    2015                

 

 Respiratory                    No cough                    2015         
       

 

 Gastrointestinal                    No abdominal pain                    2015                

 

 Gastrointestinal                    No constipation                    2015                

 

 Gastrointestinal                    No diarrhea                    2015 
               

 

 Gastrointestinal                    No vomiting                    2015 
               

 

 Gastrointestinal                    No nausea                    2015   
             

 

 Genitourinary/Nephrology                    No dysuria                                    

 

 Genitourinary/Nephrology                    pelvic pain                    2015                

 

 Musculoskeletal                    No joint complaint                    2015                

 

 Dermatologic                    No rash                    2015         
       

 

 Neurologic                    No alteration of consciousness                  
  2015                

 

 Neurologic                    seizure                    2015           
     

 

 Psychiatric                    anxiety                    2015          
      

 

 Psychiatric                    depression                    2015       
         

 

 Endocrine                    No dry or coarse skin                    2015                

 

 Hematologic/Lymphatic                    abnormal bleeding and bruising       
             2015                



                                                                    



Physical Exam

                      





 Exam Name                    System Name                    Item Name         
           Status                    Result                    Effective Dates 
                   Notes                

 

 Full Exam - General                     Constitutional                     
general appearance                    Development:                    well 
developed                    10/18/2017                    None                

 

 Full Exam - General                     Constitutional                     
general appearance                    Development:                    appears 
stated age                    10/18/2017                    None                

 

 Full Exam - General                     Constitutional                     
general appearance                    Overall:                    well 
developed                    10/18/2017                    None                

 

 Full Exam - General                     Constitutional                     
general appearance                    Overall:                    in no acute 
distress                    10/18/2017                    None                

 

 Full Exam - General                     Constitutional                     
general appearance                    Overall:                    well 
nourished                    10/18/2017                    None                

 

 Full Exam - General                     Constitutional                     
general appearance                    Hygiene/Attention to Grooming:           
         good hygiene                    10/18/2017                    None    
            

 

 Full Exam - General                     Eyes                    conjunctiva
/eyelids                    Overall:                    conjunctiva clear      
              10/18/2017                    None                

 

 Full Exam - General                     Eyes                    conjunctiva
/eyelids                    Overall:                    cornea clear           
         10/18/2017                    None                

 

 Full Exam - General                     Eyes                    conjunctiva
/eyelids                    Overall:                    eyelids normal         
           10/18/2017                    None                

 

 Full Exam - General                     Eyes                    pupils and 
irises                    Overall:                    pupils equal, round, 
reactive to light and accomodation                    10/18/2017               
     None                

 

 Full Exam - General                     Ears/Nose/Throat                  
  otoscopic exam                    Overall:                    external 
auditory canals clear                    10/18/2017                    None    
            

 

 Full Exam - General                     Ears/Nose/Throat                  
  otoscopic exam                    Overall:                    tympanic 
membranes clear                    10/18/2017                    None          
      

 

 Full Exam - General                     Ears/Nose/Throat                  
  lips/teeth/gingiva                    Overall:                    benign lips
                    10/18/2017                    None                

 

 Full Exam - General                     Ears/Nose/Throat                  
  lips/teeth/gingiva                    Overall:                    normal 
dentition                    10/18/2017                    None                

 

 Full Exam - General                     Ears/Nose/Throat                  
  oral cavity/pharynx/larynx                     Overall:                    
oral mucosa clear                    10/18/2017                    None        
        

 

 Full Exam - General                     Ears/Nose/Throat                  
  oral cavity/pharynx/larynx                     Overall:                    
oropharyngeal mucosa clear                    10/18/2017                    
None                

 

 Full Exam - General                     Ears/Nose/Throat                  
  oral cavity/pharynx/larynx                     Overall:                    
hypopharynx benign                    10/18/2017                    None       
         

 

 Full Exam - General                     Ears/Nose/Throat                  
  oral cavity/pharynx/larynx                     Overall:                    no 
masses                    10/18/2017                    None                

 

 Full Exam - General                     Respiratory                    
auscultation                    Overall:                    breath sounds clear 
bilaterally                    10/18/2017                    None              
  

 

 Full Exam - General                     Respiratory                    
respiratory effort/rhythm                    Overall:                    no 
retractions                    10/18/2017                    None              
  

 

 Full Exam - General                     Respiratory                    
respiratory effort/rhythm                    Overall:                    normal 
rate                    10/18/2017                    None                

 

 Full Exam - General                     Cardiovascular                    
extremities                    Overall:                    no clubbing         
           10/18/2017                    None                

 

 Full Exam - General                     Cardiovascular                    
auscultation of heart                    Overall:                    regular 
rate                    10/18/2017                    None                

 

 Full Exam - General                     Cardiovascular                    
auscultation of heart                    Overall:                    normal 
heart sounds                    10/18/2017                    None             
   

 

 Full Exam - General                     Abdomen                    
abdominal exam                    Overall:                    no tenderness    
                10/18/2017                    None                

 

 Full Exam - General                     Abdomen                    
abdominal exam                    Overall:                    normal bowel 
sounds                    10/18/2017                    None                

 

 Full Exam - General                     Musculoskeletal                    
lower extremity                    Palpation -  knee:                    no 
effusion                    10/18/2017                    None                

 

 Full Exam - General                     Musculoskeletal                    
lower extremity                    Inspection -  lower leg:                    
normal appearance                    10/18/2017                    None        
        

 

 Full Exam - General                     Musculoskeletal                    
lower extremity                    Palpation -  lower leg:                    
normal on palpation                    10/18/2017                    None      
          

 

 Full Exam - General                     Musculoskeletal                    
spine, ribs and pelvis                    Posture:                    kyphosis 
                   10/18/2017                    None                

 

 Full Exam - General                     Musculoskeletal                    
spine, ribs and pelvis                    Spine:                    tender @ 
cervical spine                    10/18/2017                    None           
     

 

 Full Exam - General                     Musculoskeletal                    
gait and station                    Overall:                    normal gait    
                10/18/2017                    None                

 

 Full Exam - General                     Musculoskeletal                    
gait and station                    Overall:                    normal station 
                   10/18/2017                    None                

 

 Full Exam - General                     Integument                    
inspection of skin                    Overall:                    few scattered 
moles, no gross abnormalities                    10/18/2017                    
None                

 

 Full Exam - General                     Neurologic                    deep 
tendon reflexes                    Overall:                    deep tendon 
reflexes intact                    10/18/2017                    None          
      

 

 Full Exam - General                     Neurologic                    
cranial nerves                    Overall:                    crainial nerves 2 
- 12 grossly intact                    10/18/2017                    None      
          

 

 Full Exam - General                     Psychiatric                    
orientation/consciousness                    Overall:                    
oriented to person, place and time                    10/18/2017               
     None                

 

 Full Exam - General                     Psychiatric                    
mood and affect                    Overall:                    normal mood and 
affect                    10/18/2017                    None                

 

 Full Exam - General                     Constitutional                     
general appearance                    Development:                    well 
developed                    2017                    None                

 

 Full Exam - General                     Constitutional                     
general appearance                    Development:                    appears 
stated age                    2017                    None                

 

 Full Exam - General                     Constitutional                     
general appearance                    Overall:                    well 
developed                    2017                    None                

 

 Full Exam - General                     Constitutional                     
general appearance                    Overall:                    in no acute 
distress                    2017                    None                

 

 Full Exam - General                     Constitutional                     
general appearance                    Overall:                    well 
nourished                    2017                    None                

 

 Full Exam - General                     Constitutional                     
general appearance                    Hygiene/Attention to Grooming:           
         good hygiene                    2017                    None    
            

 

 Full Exam - General                     Eyes                    conjunctiva
/eyelids                    Overall:                    conjunctiva clear      
              2017                    None                

 

 Full Exam - General                     Eyes                    conjunctiva
/eyelids                    Overall:                    cornea clear           
         2017                    None                

 

 Full Exam - General                     Eyes                    conjunctiva
/eyelids                    Overall:                    eyelids normal         
           2017                    None                

 

 Full Exam - General                     Eyes                    pupils and 
irises                    Overall:                    pupils equal, round, 
reactive to light and accomodation                    2017               
     None                

 

 Full Exam - General                     Ears/Nose/Throat                  
  otoscopic exam                    Overall:                    external 
auditory canals clear                    2017                    None    
            

 

 Full Exam - General                     Ears/Nose/Throat                  
  otoscopic exam                    Overall:                    tympanic 
membranes clear                    2017                    None          
      

 

 Full Exam - General                     Ears/Nose/Throat                  
  lips/teeth/gingiva                    Overall:                    benign lips
                    2017                    None                

 

 Full Exam - General                     Ears/Nose/Throat                  
  lips/teeth/gingiva                    Overall:                    normal 
dentition                    2017                    None                

 

 Full Exam - General                     Ears/Nose/Throat                  
  oral cavity/pharynx/larynx                     Overall:                    
oral mucosa clear                    2017                    None        
        

 

 Full Exam - General                     Ears/Nose/Throat                  
  oral cavity/pharynx/larynx                     Overall:                    
oropharyngeal mucosa clear                    2017                    
None                

 

 Full Exam - General                     Ears/Nose/Throat                  
  oral cavity/pharynx/larynx                     Overall:                    
hypopharynx benign                    2017                    None       
         

 

 Full Exam - General                     Ears/Nose/Throat                  
  oral cavity/pharynx/larynx                     Overall:                    no 
masses                    2017                    None                

 

 Full Exam - General                     Respiratory                    
auscultation                    Overall:                    breath sounds clear 
bilaterally                    2017                    None              
  

 

 Full Exam - General                     Respiratory                    
respiratory effort/rhythm                    Overall:                    no 
retractions                    2017                    None              
  

 

 Full Exam - General                     Respiratory                    
respiratory effort/rhythm                    Overall:                    normal 
rate                    2017                    None                

 

 Full Exam - General                     Cardiovascular                    
extremities                    Overall:                    no clubbing         
           2017                    None                

 

 Full Exam - General                     Cardiovascular                    
auscultation of heart                    Overall:                    regular 
rate                    2017                    None                

 

 Full Exam - General                     Cardiovascular                    
auscultation of heart                    Overall:                    normal 
heart sounds                    2017                    None             
   

 

 Full Exam - General                     Abdomen                    
abdominal exam                    Overall:                    no tenderness    
                2017                    None                

 

 Full Exam - General                     Abdomen                    
abdominal exam                    Overall:                    normal bowel 
sounds                    2017                    None                

 

 Full Exam - General                     Neurologic                    
cranial nerves                    Overall:                    crainial nerves 2 
- 12 grossly intact                    2017                    None      
          

 

 Full Exam - General                     Psychiatric                    
orientation/consciousness                    Overall:                    
oriented to person, place and time                    2017               
     None                

 

 Full Exam - General                     Psychiatric                    
mood and affect                    Mood:                    flat               
     2017                    None                

 

 Full Exam - General                     Psychiatric                    
mood and affect                    Affect:                    flat             
       2017                    None                

 

 Full Exam - General                     Musculoskeletal                    
spine, ribs and pelvis                    Spine:                    tender @ 
thoracic spine                    2017                    None           
     

 

 Full Exam - General                     Musculoskeletal                    
spine, ribs and pelvis                    Spine:                    tender @ 
lumbar spine                    2017                    None             
   

 

 Full Exam - General                     Musculoskeletal                    
spine, ribs and pelvis                    Spine:                    decreased 
extension                    2017                    None                

 

 Full Exam - General                     Constitutional                     
general appearance                    Development:                    well 
developed                    2017                    None                

 

 Full Exam - General                     Constitutional                     
general appearance                    Development:                    appears 
stated age                    2017                    None                

 

 Full Exam - General                     Constitutional                     
general appearance                    Overall:                    well 
developed                    2017                    None                

 

 Full Exam - General                     Constitutional                     
general appearance                    Overall:                    in no acute 
distress                    2017                    None                

 

 Full Exam - General                     Constitutional                     
general appearance                    Overall:                    well 
nourished                    2017                    None                

 

 Full Exam - General                     Constitutional                     
general appearance                    Hygiene/Attention to Grooming:           
         good hygiene                    2017                    None    
            

 

 Full Exam - General                     Eyes                    conjunctiva
/eyelids                    Overall:                    conjunctiva clear      
              2017                    None                

 

 Full Exam - General                     Eyes                    conjunctiva
/eyelids                    Overall:                    cornea clear           
         2017                    None                

 

 Full Exam - General                     Eyes                    conjunctiva
/eyelids                    Overall:                    eyelids normal         
           2017                    None                

 

 Full Exam - General                     Eyes                    pupils and 
irises                    Overall:                    pupils equal, round, 
reactive to light and accomodation                    2017               
     None                

 

 Full Exam - General                     Ears/Nose/Throat                  
  otoscopic exam                    Overall:                    external 
auditory canals clear                    2017                    None    
            

 

 Full Exam - General                     Ears/Nose/Throat                  
  otoscopic exam                    Overall:                    tympanic 
membranes clear                    2017                    None          
      

 

 Full Exam - General                     Ears/Nose/Throat                  
  lips/teeth/gingiva                    Overall:                    benign lips
                    2017                    None                

 

 Full Exam - General 1995                    Ears/Nose/Throat                  
  lips/teeth/gingiva                    Overall:                    normal 
dentition                    2017                    None                

 

 Full Exam - General                     Ears/Nose/Throat                  
  oral cavity/pharynx/larynx                     Overall:                    
oral mucosa clear                    2017                    None        
        

 

 Full Exam - General                     Ears/Nose/Throat                  
  oral cavity/pharynx/larynx                     Overall:                    
oropharyngeal mucosa clear                    2017                    
None                

 

 Full Exam - General                     Ears/Nose/Throat                  
  oral cavity/pharynx/larynx                     Overall:                    
hypopharynx benign                    2017                    None       
         

 

 Full Exam - General                     Ears/Nose/Throat                  
  oral cavity/pharynx/larynx                     Overall:                    no 
masses                    2017                    None                

 

 Full Exam - General                     Respiratory                    
auscultation                    Overall:                    breath sounds clear 
bilaterally                    2017                    None              
  

 

 Full Exam - General                     Respiratory                    
respiratory effort/rhythm                    Overall:                    no 
retractions                    2017                    None              
  

 

 Full Exam - General                     Respiratory                    
respiratory effort/rhythm                    Overall:                    normal 
rate                    2017                    None                

 

 Full Exam - General                     Cardiovascular                    
extremities                    Overall:                    no clubbing         
           2017                    None                

 

 Full Exam - General                     Cardiovascular                    
auscultation of heart                    Overall:                    regular 
rate                    2017                    None                

 

 Full Exam - General                     Cardiovascular                    
auscultation of heart                    Overall:                    normal 
heart sounds                    2017                    None             
   

 

 Full Exam - General                     Abdomen                    
abdominal exam                    Overall:                    no tenderness    
                2017                    None                

 

 Full Exam - General                     Abdomen                    
abdominal exam                    Overall:                    normal bowel 
sounds                    2017                    None                

 

 Full Exam - General                     Neurologic                    
cranial nerves                    Overall:                    crainial nerves 2 
- 12 grossly intact                    2017                    None      
          

 

 Full Exam - General                     Psychiatric                    
orientation/consciousness                    Overall:                    
oriented to person, place and time                    2017               
     None                

 

 Full Exam - General                     Psychiatric                    
mood and affect                    Mood:                    flat               
     2017                    None                

 

 Full Exam - General                     Psychiatric                    
mood and affect                    Affect:                    flat             
       2017                    None                

 

 Full Exam - General                     Constitutional                     
general appearance                    Development:                    well 
developed                    2016                    None                

 

 Full Exam - General                     Constitutional                     
general appearance                    Development:                    appears 
stated age                    2016                    None                

 

 Full Exam - General                     Constitutional                     
general appearance                    Overall:                    well 
developed                    2016                    None                

 

 Full Exam - General                     Constitutional                     
general appearance                    Overall:                    in no acute 
distress                    2016                    None                

 

 Full Exam - General                     Constitutional                     
general appearance                    Overall:                    well 
nourished                    2016                    None                

 

 Full Exam - General                     Constitutional                     
general appearance                    Hygiene/Attention to Grooming:           
         good hygiene                    2016                    None    
            

 

 Full Exam - General                     Eyes                    conjunctiva
/eyelids                    Overall:                    conjunctiva clear      
              2016                    None                

 

 Full Exam - General                     Eyes                    conjunctiva
/eyelids                    Overall:                    cornea clear           
         2016                    None                

 

 Full Exam - General                     Eyes                    conjunctiva
/eyelids                    Overall:                    eyelids normal         
           2016                    None                

 

 Full Exam - General                     Eyes                    pupils and 
irises                    Overall:                    pupils equal, round, 
reactive to light and accomodation                    2016               
     None                

 

 Full Exam - General                     Ears/Nose/Throat                  
  otoscopic exam                    Overall:                    external 
auditory canals clear                    2016                    None    
            

 

 Full Exam - General                     Ears/Nose/Throat                  
  otoscopic exam                    Overall:                    tympanic 
membranes clear                    2016                    None          
      

 

 Full Exam - General                     Ears/Nose/Throat                  
  lips/teeth/gingiva                    Overall:                    benign lips
                    2016                    None                

 

 Full Exam - General                     Ears/Nose/Throat                  
  lips/teeth/gingiva                    Overall:                    normal 
dentition                    2016                    None                

 

 Full Exam - General                     Ears/Nose/Throat                  
  oral cavity/pharynx/larynx                     Overall:                    
oral mucosa clear                    2016                    None        
        

 

 Full Exam - General                     Ears/Nose/Throat                  
  oral cavity/pharynx/larynx                     Overall:                    
oropharyngeal mucosa clear                    2016                    
None                

 

 Full Exam - General                     Ears/Nose/Throat                  
  oral cavity/pharynx/larynx                     Overall:                    
hypopharynx benign                    2016                    None       
         

 

 Full Exam - General                     Ears/Nose/Throat                  
  oral cavity/pharynx/larynx                     Overall:                    no 
masses                    2016                    None                

 

 Full Exam - General                     Respiratory                    
auscultation                    Overall:                    breath sounds clear 
bilaterally                    2016                    None              
  

 

 Full Exam - General                     Respiratory                    
respiratory effort/rhythm                    Overall:                    no 
retractions                    2016                    None              
  

 

 Full Exam - General                     Respiratory                    
respiratory effort/rhythm                    Overall:                    normal 
rate                    2016                    None                

 

 Full Exam - General                     Cardiovascular                    
extremities                    Overall:                    no clubbing         
           2016                    None                

 

 Full Exam - General                     Cardiovascular                    
auscultation of heart                    Overall:                    regular 
rate                    2016                    None                

 

 Full Exam - General                     Cardiovascular                    
auscultation of heart                    Overall:                    normal 
heart sounds                    2016                    None             
   

 

 Full Exam - General                     Abdomen                    
abdominal exam                    Overall:                    no tenderness    
                2016                    None                

 

 Full Exam - General                     Abdomen                    
abdominal exam                    Overall:                    normal bowel 
sounds                    2016                    None                

 

 Full Exam - General                     Neurologic                    
cranial nerves                    Overall:                    crainial nerves 2 
- 12 grossly intact                    2016                    None      
          

 

 Full Exam - General                     Psychiatric                    
orientation/consciousness                    Overall:                    
oriented to person, place and time                    2016               
     None                

 

 Full Exam - General                     Psychiatric                    
mood and affect                    Mood:                    flat               
     2016                    None                

 

 Full Exam - General                     Psychiatric                    
mood and affect                    Affect:                    flat             
       2016                    None                

 

 Full Exam - General                     Constitutional                     
general appearance                    Evidence of Distress:                    
in acute distress                    2016                    None        
        

 

 Full Exam - General                     Constitutional                     
general appearance                    Evidence of Distress:                    
agitated                    2016                    None                

 

 Full Exam - General                     Constitutional                     
general appearance                    Evidence of Distress:                    
anxious                    2016                    None                

 

 Full Exam - General                     Neurologic                    gait
                    Overall:                    no ataxia, no unsteadiness     
               2016                    None                

 

 Full Exam - General                     Psychiatric                    
orientation/consciousness                    Overall:                    
oriented to person, place and time                    2016               
     None                

 

 Full Exam - General                     Psychiatric                    
behavior/psychomotor activity                    Behavior:                    
agitation/restlessness                    2016                    None   
             

 

 Full Exam - General                     Psychiatric                    
mood and affect                    Mood:                    irritable          
          2016                    None                

 

 Full Exam - General                     Psychiatric                    
mood and affect                    Mood:                    angry              
      2016                    None                

 

 Full Exam - General                     Psychiatric                    
mood and affect                    Mood:                    labile mood        
            2016                    None                

 

 Full Exam - General                     Psychiatric                    
mood and affect                    Appropriateness:                    
inappropriate emotional responses                    2016                
    None                

 

 Full Exam - General                     Constitutional                     
general appearance                    Development:                    well 
developed                    2016                    None                

 

 Full Exam - General                     Constitutional                     
general appearance                    Development:                    appears 
stated age                    2016                    None                

 

 Full Exam - General                     Constitutional                     
general appearance                    Overall:                    well 
developed                    2016                    None                

 

 Full Exam - General                     Constitutional                     
general appearance                    Overall:                    in no acute 
distress                    2016                    None                

 

 Full Exam - General                     Constitutional                     
general appearance                    Overall:                    well 
nourished                    2016                    None                

 

 Full Exam - General                     Constitutional                     
general appearance                    Hygiene/Attention to Grooming:           
         good hygiene                    2016                    None    
            

 

 Full Exam - General                     Eyes                    conjunctiva
/eyelids                    Overall:                    conjunctiva clear      
              2016                    None                

 

 Full Exam - General                     Eyes                    conjunctiva
/eyelids                    Overall:                    cornea clear           
         2016                    None                

 

 Full Exam - General                     Eyes                    conjunctiva
/eyelids                    Overall:                    eyelids normal         
           2016                    None                

 

 Full Exam - General                     Eyes                    pupils and 
irises                    Overall:                    pupils equal, round, 
reactive to light and accomodation                    2016               
     None                

 

 Full Exam - General                     Ears/Nose/Throat                  
  otoscopic exam                    Overall:                    external 
auditory canals clear                    2016                    None    
            

 

 Full Exam - General                     Ears/Nose/Throat                  
  otoscopic exam                    Overall:                    tympanic 
membranes clear                    2016                    None          
      

 

 Full Exam - General                     Ears/Nose/Throat                  
  lips/teeth/gingiva                    Overall:                    benign lips
                    2016                    None                

 

 Full Exam - General                     Ears/Nose/Throat                  
  lips/teeth/gingiva                    Overall:                    normal 
dentition                    2016                    None                

 

 Full Exam - General                     Ears/Nose/Throat                  
  oral cavity/pharynx/larynx                     Overall:                    
oral mucosa clear                    2016                    None        
        

 

 Full Exam - General                     Ears/Nose/Throat                  
  oral cavity/pharynx/larynx                     Overall:                    
oropharyngeal mucosa clear                    2016                    
None                

 

 Full Exam - General                     Ears/Nose/Throat                  
  oral cavity/pharynx/larynx                     Overall:                    
hypopharynx benign                    2016                    None       
         

 

 Full Exam - General                     Ears/Nose/Throat                  
  oral cavity/pharynx/larynx                     Overall:                    no 
masses                    2016                    None                

 

 Full Exam - General                     Respiratory                    
auscultation                    Overall:                    breath sounds clear 
bilaterally                    2016                    None              
  

 

 Full Exam - General                     Respiratory                    
respiratory effort/rhythm                    Overall:                    no 
retractions                    2016                    None              
  

 

 Full Exam - General                     Respiratory                    
respiratory effort/rhythm                    Overall:                    normal 
rate                    2016                    None                

 

 Full Exam - General                     Cardiovascular                    
extremities                    Overall:                    no clubbing         
           2016                    None                

 

 Full Exam - General                     Cardiovascular                    
auscultation of heart                    Overall:                    regular 
rate                    2016                    None                

 

 Full Exam - General                     Cardiovascular                    
auscultation of heart                    Overall:                    normal 
heart sounds                    2016                    None             
   

 

 Full Exam - General                     Abdomen                    
abdominal exam                    Overall:                    no tenderness    
                2016                    None                

 

 Full Exam - General                     Abdomen                    
abdominal exam                    Overall:                    normal bowel 
sounds                    2016                    None                

 

 Full Exam - General                     Musculoskeletal                    
spine, ribs and pelvis                    Posture:                    kyphosis 
                   2016                    None                

 

 Full Exam - General                     Musculoskeletal                    
gait and station                    Overall:                    normal gait    
                2016                    None                

 

 Full Exam - General                     Musculoskeletal                    
gait and station                    Overall:                    normal station 
                   2016                    None                

 

 Full Exam - General                     Neurologic                    
cranial nerves                    Overall:                    crainial nerves 2 
- 12 grossly intact                    2016                    None      
          

 

 Full Exam - General                     Psychiatric                    
orientation/consciousness                    Overall:                    
oriented to person, place and time                    2016               
     None                

 

 Full Exam - General                     Psychiatric                    
mood and affect                    Overall:                    normal mood and 
affect                    2016                    None                

 

 Full Exam - General                     Constitutional                     
general appearance                    Overall:                    well 
developed                    10/26/2016                    None                

 

 Full Exam - General                     Constitutional                     
general appearance                    Overall:                    well 
nourished                    10/26/2016                    None                

 

 Full Exam - General                     Eyes                    conjunctiva
/eyelids                    Overall:                    conjunctiva clear      
              10/26/2016                    None                

 

 Full Exam - General                     Eyes                    conjunctiva
/eyelids                    Overall:                    cornea clear           
         10/26/2016                    None                

 

 Full Exam - General                     Eyes                    conjunctiva
/eyelids                    Overall:                    eyelids normal         
           10/26/2016                    None                

 

 Full Exam - General                     Ears/Nose/Throat                  
  lips/teeth/gingiva                    Overall:                    benign lips
                    10/26/2016                    None                

 

 Full Exam - General                     Ears/Nose/Throat                  
  lips/teeth/gingiva                    Overall:                    normal 
dentition                    10/26/2016                    None                

 

 Full Exam - General                     Respiratory                    
auscultation                    Overall:                    breath sounds clear 
bilaterally                    10/26/2016                    None              
  

 

 Full Exam - General                     Respiratory                    
respiratory effort/rhythm                    Overall:                    no 
retractions                    10/26/2016                    None              
  

 

 Full Exam - General                     Respiratory                    
respiratory effort/rhythm                    Overall:                    normal 
rate                    10/26/2016                    None                

 

 Full Exam - General                     Cardiovascular                    
extremities                    Overall:                    no clubbing         
           10/26/2016                    None                

 

 Full Exam - General                     Cardiovascular                    
auscultation of heart                    Overall:                    regular 
rate                    10/26/2016                    None                

 

 Full Exam - General                     Cardiovascular                    
auscultation of heart                    Overall:                    normal 
heart sounds                    10/26/2016                    None             
   

 

 Full Exam - General                     Musculoskeletal                    
spine, ribs and pelvis                    Posture:                    kyphosis 
                   10/26/2016                    None                

 

 Full Exam - General                     Neurologic                    
cranial nerves                    Overall:                    crainial nerves 2 
- 12 grossly intact                    10/26/2016                    None      
          

 

 Full Exam - General                     Psychiatric                    
orientation/consciousness                    Overall:                    
oriented to person, place and time                    10/26/2016               
     None                

 

 Full Exam - General                     Psychiatric                    
mood and affect                    Overall:                    normal mood and 
affect                    10/26/2016                    None                

 

 Full Exam - General                     Respiratory                    
auscultation                    Diffuse:                    diminished         
           10/26/2016                    None                

 

 Full Exam - General                     Constitutional                     
general appearance                    Development:                    well 
developed                    2016                    None                

 

 Full Exam - General                     Constitutional                     
general appearance                    Development:                    appears 
stated age                    2016                    None                

 

 Full Exam - General                     Constitutional                     
general appearance                    Overall:                    well 
developed                    2016                    None                

 

 Full Exam - General                     Constitutional                     
general appearance                    Overall:                    in no acute 
distress                    2016                    None                

 

 Full Exam - General                     Constitutional                     
general appearance                    Overall:                    well 
nourished                    2016                    None                

 

 Full Exam - General                     Constitutional                     
general appearance                    Hygiene/Attention to Grooming:           
         good hygiene                    2016                    None    
            

 

 Full Exam - General                     Eyes                    conjunctiva
/eyelids                    Overall:                    conjunctiva clear      
              2016                    None                

 

 Full Exam - General                     Eyes                    conjunctiva
/eyelids                    Overall:                    cornea clear           
         2016                    None                

 

 Full Exam - General                     Eyes                    conjunctiva
/eyelids                    Overall:                    eyelids normal         
           2016                    None                

 

 Full Exam - General                     Eyes                    pupils and 
irises                    Overall:                    pupils equal, round, 
reactive to light and accomodation                    2016               
     None                

 

 Full Exam - General                     Ears/Nose/Throat                  
  otoscopic exam                    Overall:                    external 
auditory canals clear                    2016                    None    
            

 

 Full Exam - General                     Ears/Nose/Throat                  
  otoscopic exam                    Overall:                    tympanic 
membranes clear                    2016                    None          
      

 

 Full Exam - General                     Ears/Nose/Throat                  
  lips/teeth/gingiva                    Overall:                    benign lips
                    2016                    None                

 

 Full Exam - General                     Ears/Nose/Throat                  
  lips/teeth/gingiva                    Overall:                    normal 
dentition                    2016                    None                

 

 Full Exam - General                     Ears/Nose/Throat                  
  oral cavity/pharynx/larynx                     Overall:                    
oral mucosa clear                    2016                    None        
        

 

 Full Exam - General                     Ears/Nose/Throat                  
  oral cavity/pharynx/larynx                     Overall:                    
oropharyngeal mucosa clear                    2016                    
None                

 

 Full Exam - General                     Ears/Nose/Throat                  
  oral cavity/pharynx/larynx                     Overall:                    
hypopharynx benign                    2016                    None       
         

 

 Full Exam - General                     Ears/Nose/Throat                  
  oral cavity/pharynx/larynx                     Overall:                    no 
masses                    2016                    None                

 

 Full Exam - General                     Respiratory                    
auscultation                    Overall:                    breath sounds clear 
bilaterally                    2016                    None              
  

 

 Full Exam - General                     Respiratory                    
respiratory effort/rhythm                    Overall:                    no 
retractions                    2016                    None              
  

 

 Full Exam - General                     Respiratory                    
respiratory effort/rhythm                    Overall:                    normal 
rate                    2016                    None                

 

 Full Exam - General                     Cardiovascular                    
extremities                    Overall:                    no clubbing         
           2016                    None                

 

 Full Exam - General                     Cardiovascular                    
auscultation of heart                    Overall:                    regular 
rate                    2016                    None                

 

 Full Exam - General                     Cardiovascular                    
auscultation of heart                    Overall:                    normal 
heart sounds                    2016                    None             
   

 

 Full Exam - General                     Abdomen                    
abdominal exam                    Overall:                    no tenderness    
                2016                    None                

 

 Full Exam - General                     Abdomen                    
abdominal exam                    Overall:                    normal bowel 
sounds                    2016                    None                

 

 Full Exam - General                     Musculoskeletal                    
lower extremity                    Palpation -  knee:                    no 
effusion                    2016                    None                

 

 Full Exam - General                     Musculoskeletal                    
lower extremity                    Inspection -  lower leg:                    
normal appearance                    2016                    None        
        

 

 Full Exam - General                     Musculoskeletal                    
lower extremity                    Palpation -  lower leg:                    
normal on palpation                    2016                    None      
          

 

 Full Exam - General                     Musculoskeletal                    
spine, ribs and pelvis                    Posture:                    kyphosis 
                   2016                    None                

 

 Full Exam - General                     Musculoskeletal                    
spine, ribs and pelvis                    Spine:                    tender @ 
cervical spine                    2016                    None           
     

 

 Full Exam - General                     Musculoskeletal                    
gait and station                    Overall:                    normal gait    
                2016                    None                

 

 Full Exam - General                     Musculoskeletal                    
gait and station                    Overall:                    normal station 
                   2016                    None                

 

 Full Exam - General                     Integument                    
inspection of skin                    Overall:                    few scattered 
moles, no gross abnormalities                    2016                    
None                

 

 Full Exam - General                     Neurologic                    deep 
tendon reflexes                    Overall:                    deep tendon 
reflexes intact                    2016                    None          
      

 

 Full Exam - General                     Neurologic                    
cranial nerves                    Overall:                    crainial nerves 2 
- 12 grossly intact                    2016                    None      
          

 

 Full Exam - General                     Psychiatric                    
orientation/consciousness                    Overall:                    
oriented to person, place and time                    2016               
     None                

 

 Full Exam - General                     Psychiatric                    
mood and affect                    Overall:                    normal mood and 
affect                    2016                    None                

 

 Full Exam - General                     Constitutional                     
general appearance                    Overall:                    well 
developed                    2016                    None                

 

 Full Exam - General                     Constitutional                     
general appearance                    Overall:                    in no acute 
distress                    2016                    None                

 

 Full Exam - General                     Constitutional                     
general appearance                    Overall:                    well 
nourished                    2016                    None                

 

 Full Exam - General                     Constitutional                     
general appearance                    Hygiene/Attention to Grooming:           
         good hygiene                    2016                    None    
            

 

 Full Exam - General                     Eyes                    conjunctiva
/eyelids                    Overall:                    conjunctiva clear      
              2016                    None                

 

 Full Exam - General                     Eyes                    conjunctiva
/eyelids                    Overall:                    cornea clear           
         2016                    None                

 

 Full Exam - General                     Eyes                    conjunctiva
/eyelids                    Overall:                    eyelids normal         
           2016                    None                

 

 Full Exam - General                     Eyes                    pupils and 
irises                    Overall:                    pupils equal, round, 
reactive to light and accomodation                    2016               
     None                

 

 Full Exam - General                     Ears/Nose/Throat                  
  lips/teeth/gingiva                    Overall:                    benign lips
                    2016                    None                

 

 Full Exam - General                     Ears/Nose/Throat                  
  lips/teeth/gingiva                    Overall:                    normal 
dentition                    2016                    None                

 

 Full Exam - General                     Ears/Nose/Throat                  
  oral cavity/pharynx/larynx                     Overall:                    
oral mucosa clear                    2016                    None        
        

 

 Full Exam - General                     Respiratory                    
auscultation                    Overall:                    breath sounds clear 
bilaterally                    2016                    None              
  

 

 Full Exam - General                     Respiratory                    
respiratory effort/rhythm                    Overall:                    no 
retractions                    2016                    None              
  

 

 Full Exam - General                     Respiratory                    
respiratory effort/rhythm                    Overall:                    normal 
rate                    2016                    None                

 

 Full Exam - General                     Cardiovascular                    
extremities                    Overall:                    no clubbing         
           2016                    None                

 

 Full Exam - General                     Cardiovascular                    
auscultation of heart                    Overall:                    regular 
rate                    2016                    None                

 

 Full Exam - General                     Cardiovascular                    
auscultation of heart                    Overall:                    normal 
heart sounds                    2016                    None             
   

 

 Full Exam - General                     Musculoskeletal                    
spine, ribs and pelvis                    Posture:                    kyphosis 
                   2016                    None                

 

 Full Exam - General                     Musculoskeletal                    
gait and station                    Overall:                    normal gait    
                2016                    None                

 

 Full Exam - General                     Musculoskeletal                    
gait and station                    Overall:                    normal station 
                   2016                    None                

 

 Full Exam - General                     Neurologic                    
cranial nerves                    Overall:                    crainial nerves 2 
- 12 grossly intact                    2016                    None      
          

 

 Full Exam - General                     Psychiatric                    
orientation/consciousness                    Overall:                    
oriented to person, place and time                    2016               
     None                

 

 Full Exam - General                     Psychiatric                    
mood and affect                    Overall:                    normal mood and 
affect                    2016                    None                

 

 Full Exam - General                     Constitutional                     
general appearance                    Development:                    well 
developed                    2016                    None                

 

 Full Exam - General                     Constitutional                     
general appearance                    Development:                    appears 
stated age                    2016                    None                

 

 Full Exam - General                     Constitutional                     
general appearance                    Overall:                    well 
developed                    2016                    None                

 

 Full Exam - General                     Constitutional                     
general appearance                    Overall:                    in no acute 
distress                    2016                    None                

 

 Full Exam - General                     Constitutional                     
general appearance                    Overall:                    well 
nourished                    2016                    None                

 

 Full Exam - General                     Constitutional                     
general appearance                    Hygiene/Attention to Grooming:           
         good hygiene                    2016                    None    
            

 

 Full Exam - General                     Eyes                    conjunctiva
/eyelids                    Overall:                    conjunctiva clear      
              2016                    None                

 

 Full Exam - General                     Eyes                    conjunctiva
/eyelids                    Overall:                    cornea clear           
         2016                    None                

 

 Full Exam - General                     Eyes                    conjunctiva
/eyelids                    Overall:                    eyelids normal         
           2016                    None                

 

 Full Exam - General                     Eyes                    pupils and 
irises                    Overall:                    pupils equal, round, 
reactive to light and accomodation                    2016               
     None                

 

 Full Exam - General                     Ears/Nose/Throat                  
  otoscopic exam                    Overall:                    external 
auditory canals clear                    2016                    None    
            

 

 Full Exam - General                     Ears/Nose/Throat                  
  otoscopic exam                    Overall:                    tympanic 
membranes clear                    2016                    None          
      

 

 Full Exam - General                     Ears/Nose/Throat                  
  lips/teeth/gingiva                    Overall:                    benign lips
                    2016                    None                

 

 Full Exam - General                     Ears/Nose/Throat                  
  lips/teeth/gingiva                    Overall:                    normal 
dentition                    2016                    None                

 

 Full Exam - General                     Ears/Nose/Throat                  
  oral cavity/pharynx/larynx                     Overall:                    
oral mucosa clear                    2016                    None        
        

 

 Full Exam - General                     Ears/Nose/Throat                  
  oral cavity/pharynx/larynx                     Overall:                    
oropharyngeal mucosa clear                    2016                    
None                

 

 Full Exam - General                     Ears/Nose/Throat                  
  oral cavity/pharynx/larynx                     Overall:                    
hypopharynx benign                    2016                    None       
         

 

 Full Exam - General                     Ears/Nose/Throat                  
  oral cavity/pharynx/larynx                     Overall:                    no 
masses                    2016                    None                

 

 Full Exam - General                     Respiratory                    
auscultation                    Overall:                    breath sounds clear 
bilaterally                    2016                    None              
  

 

 Full Exam - General                     Respiratory                    
respiratory effort/rhythm                    Overall:                    no 
retractions                    2016                    None              
  

 

 Full Exam - General                     Respiratory                    
respiratory effort/rhythm                    Overall:                    normal 
rate                    2016                    None                

 

 Full Exam - General                     Cardiovascular                    
extremities                    Overall:                    no clubbing         
           2016                    None                

 

 Full Exam - General                     Cardiovascular                    
auscultation of heart                    Overall:                    regular 
rate                    2016                    None                

 

 Full Exam - General                     Cardiovascular                    
auscultation of heart                    Overall:                    normal 
heart sounds                    2016                    None             
   

 

 Full Exam - General                     Abdomen                    
abdominal exam                    Overall:                    no tenderness    
                2016                    None                

 

 Full Exam - General                     Abdomen                    
abdominal exam                    Overall:                    normal bowel 
sounds                    2016                    None                

 

 Full Exam - General                     Musculoskeletal                    
lower extremity                    Palpation -  knee:                    no 
effusion                    2016                    None                

 

 Full Exam - General                     Musculoskeletal                    
lower extremity                    Inspection -  lower leg:                    
normal appearance                    2016                    None        
        

 

 Full Exam - General                     Musculoskeletal                    
lower extremity                    Palpation -  lower leg:                    
normal on palpation                    2016                    None      
          

 

 Full Exam - General                     Musculoskeletal                    
spine, ribs and pelvis                    Posture:                    kyphosis 
                   2016                    None                

 

 Full Exam - General                     Musculoskeletal                    
spine, ribs and pelvis                    Spine:                    tender @ 
cervical spine                    2016                    None           
     

 

 Full Exam - General                     Musculoskeletal                    
gait and station                    Overall:                    normal gait    
                2016                    None                

 

 Full Exam - General                     Musculoskeletal                    
gait and station                    Overall:                    normal station 
                   2016                    None                

 

 Full Exam - General                     Integument                    
inspection of skin                    Overall:                    few scattered 
moles, no gross abnormalities                    2016                    
None                

 

 Full Exam - General                     Neurologic                    deep 
tendon reflexes                    Overall:                    deep tendon 
reflexes intact                    2016                    None          
      

 

 Full Exam - General                     Neurologic                    
cranial nerves                    Overall:                    crainial nerves 2 
- 12 grossly intact                    2016                    None      
          

 

 Full Exam - General                     Psychiatric                    
orientation/consciousness                    Overall:                    
oriented to person, place and time                    2016               
     None                

 

 Full Exam - General                     Psychiatric                    
mood and affect                    Overall:                    normal mood and 
affect                    2016                    None                

 

 Full Exam - General                     Constitutional                     
general appearance                    Development:                    well 
developed                    2016                    None                

 

 Full Exam - General                     Constitutional                     
general appearance                    Development:                    appears 
stated age                    2016                    None                

 

 Full Exam - General                     Constitutional                     
general appearance                    Overall:                    well 
developed                    2016                    None                

 

 Full Exam - General                     Constitutional                     
general appearance                    Overall:                    in no acute 
distress                    2016                    None                

 

 Full Exam - General                     Constitutional                     
general appearance                    Overall:                    well 
nourished                    2016                    None                

 

 Full Exam - General                     Constitutional                     
general appearance                    Hygiene/Attention to Grooming:           
         good hygiene                    2016                    None    
            

 

 Full Exam - General                     Eyes                    conjunctiva
/eyelids                    Overall:                    conjunctiva clear      
              2016                    None                

 

 Full Exam - General                     Eyes                    conjunctiva
/eyelids                    Overall:                    cornea clear           
         2016                    None                

 

 Full Exam - General                     Eyes                    conjunctiva
/eyelids                    Overall:                    eyelids normal         
           2016                    None                

 

 Full Exam - General                     Eyes                    pupils and 
irises                    Overall:                    pupils equal, round, 
reactive to light and accomodation                    2016               
     None                

 

 Full Exam - General                     Ears/Nose/Throat                  
  otoscopic exam                    Overall:                    external 
auditory canals clear                    2016                    None    
            

 

 Full Exam - General                     Ears/Nose/Throat                  
  otoscopic exam                    Overall:                    tympanic 
membranes clear                    2016                    None          
      

 

 Full Exam - General                     Ears/Nose/Throat                  
  lips/teeth/gingiva                    Overall:                    benign lips
                    2016                    None                

 

 Full Exam - General                     Ears/Nose/Throat                  
  lips/teeth/gingiva                    Overall:                    normal 
dentition                    2016                    None                

 

 Full Exam - General                     Ears/Nose/Throat                  
  oral cavity/pharynx/larynx                     Overall:                    
oral mucosa clear                    2016                    None        
        

 

 Full Exam - General                     Ears/Nose/Throat                  
  oral cavity/pharynx/larynx                     Overall:                    
oropharyngeal mucosa clear                    2016                    
None                

 

 Full Exam - General                     Ears/Nose/Throat                  
  oral cavity/pharynx/larynx                     Overall:                    
hypopharynx benign                    2016                    None       
         

 

 Full Exam - General                     Ears/Nose/Throat                  
  oral cavity/pharynx/larynx                     Overall:                    no 
masses                    2016                    None                

 

 Full Exam - General                     Respiratory                    
auscultation                    Overall:                    breath sounds clear 
bilaterally                    2016                    None              
  

 

 Full Exam - General                     Respiratory                    
respiratory effort/rhythm                    Overall:                    no 
retractions                    2016                    None              
  

 

 Full Exam - General                     Respiratory                    
respiratory effort/rhythm                    Overall:                    normal 
rate                    2016                    None                

 

 Full Exam - General                     Cardiovascular                    
extremities                    Overall:                    no clubbing         
           2016                    None                

 

 Full Exam - General                     Cardiovascular                    
auscultation of heart                    Overall:                    regular 
rate                    2016                    None                

 

 Full Exam - General                     Cardiovascular                    
auscultation of heart                    Overall:                    normal 
heart sounds                    2016                    None             
   

 

 Full Exam - General                     Abdomen                    
abdominal exam                    Overall:                    no tenderness    
                2016                    None                

 

 Full Exam - General                     Abdomen                    
abdominal exam                    Overall:                    normal bowel 
sounds                    2016                    None                

 

 Full Exam - General                     Musculoskeletal                    
lower extremity                    Palpation -  knee:                    no 
effusion                    2016                    None                

 

 Full Exam - General                     Musculoskeletal                    
lower extremity                    Inspection -  lower leg:                    
normal appearance                    2016                    None        
        

 

 Full Exam - General                     Musculoskeletal                    
lower extremity                    Palpation -  lower leg:                    
normal on palpation                    2016                    None      
          

 

 Full Exam - General                     Musculoskeletal                    
spine, ribs and pelvis                    Posture:                    kyphosis 
                   2016                    None                

 

 Full Exam - General                     Musculoskeletal                    
spine, ribs and pelvis                    Spine:                    tender @ 
cervical spine                    2016                    None           
     

 

 Full Exam - General                     Musculoskeletal                    
gait and station                    Overall:                    normal gait    
                2016                    None                

 

 Full Exam - General                     Musculoskeletal                    
gait and station                    Overall:                    normal station 
                   2016                    None                

 

 Full Exam - General                     Integument                    
inspection of skin                    Overall:                    few scattered 
moles, no gross abnormalities                    2016                    
None                

 

 Full Exam - General                     Neurologic                    deep 
tendon reflexes                    Overall:                    deep tendon 
reflexes intact                    2016                    None          
      

 

 Full Exam - General                     Neurologic                    
cranial nerves                    Overall:                    crainial nerves 2 
- 12 grossly intact                    2016                    None      
          

 

 Full Exam - General                     Psychiatric                    
orientation/consciousness                    Overall:                    
oriented to person, place and time                    2016               
     None                

 

 Full Exam - General                     Psychiatric                    
mood and affect                    Overall:                    normal mood and 
affect                    2016                    None                

 

 Full Exam - General                     Constitutional                     
general appearance                    Overall:                    well 
developed                    2016                    None                

 

 Full Exam - General                     Constitutional                     
general appearance                    Overall:                    in no acute 
distress                    2016                    None                

 

 Full Exam - General                     Constitutional                     
general appearance                    Overall:                    well 
nourished                    2016                    None                

 

 Full Exam - General                     Eyes                    conjunctiva
/eyelids                    Overall:                    conjunctiva clear      
              2016                    None                

 

 Full Exam - General                     Eyes                    pupils and 
irises                    Overall:                    pupils equal, round, 
reactive to light and accomodation                    2016               
     None                

 

 Full Exam - General                     Ears/Nose/Throat                  
  otoscopic exam                    Overall:                    external 
auditory canals clear                    2016                    None    
            

 

 Full Exam - General                     Ears/Nose/Throat                  
  otoscopic exam                    Overall:                    tympanic 
membranes clear                    2016                    None          
      

 

 Full Exam - General                     Ears/Nose/Throat                  
  oral cavity/pharynx/larynx                     Overall:                    
oral mucosa clear                    2016                    None        
        

 

 Full Exam - General                     Respiratory                    
auscultation                    Overall:                    breath sounds clear 
bilaterally                    2016                    None              
  

 

 Full Exam - General                     Respiratory                    
respiratory effort/rhythm                    Overall:                    no 
retractions                    2016                    None              
  

 

 Full Exam - General                     Respiratory                    
respiratory effort/rhythm                    Overall:                    normal 
rate                    2016                    None                

 

 Full Exam - General                     Cardiovascular                    
extremities                    Overall:                    no clubbing         
           2016                    None                

 

 Full Exam - General                     Cardiovascular                    
auscultation of heart                    Overall:                    regular 
rate                    2016                    None                

 

 Full Exam - General                     Cardiovascular                    
auscultation of heart                    Overall:                    normal 
heart sounds                    2016                    None             
   

 

 Full Exam - General                     Cardiovascular                    
auscultation of heart                    Overall:                    no murmurs
                    2016                    None                

 

 Full Exam - General                     Abdomen                    
abdominal exam                    Overall:                    no tenderness    
                2016                    None                

 

 Full Exam - General                     Abdomen                    
abdominal exam                    Overall:                    normal bowel 
sounds                    2016                    None                

 

 Full Exam - General                     Lymphatic                    neck 
nodes                    Overall:                    anterior cervical chain 
benign                    2016                    None                

 

 Full Exam - General                     Lymphatic                    neck 
nodes                    Overall:                    posterior cervical chain 
benign                    2016                    None                

 

 Full Exam - General                     Musculoskeletal                    
gait and station                    Overall:                    normal gait    
                2016                    None                

 

 Full Exam - General                     Musculoskeletal                    
gait and station                    Overall:                    normal station 
                   2016                    None                

 

 Full Exam - General                     Integument                    
inspection of skin                    Overall:                    no rash, 
lesions                    2016                    None                

 

 Full Exam - General                     Neurologic                    
cranial nerves                    Overall:                    crainial nerves 2 
- 12 grossly intact                    2016                    None      
          

 

 Full Exam - General                     Psychiatric                    
orientation/consciousness                    Overall:                    
oriented to person, place and time                    2016               
     None                

 

 Full Exam - General                     Psychiatric                    
mood and affect                    Mood:                    anxious            
        2016                    tearful                

 

 Full Exam - General                     Constitutional                     
general appearance                    Overall:                    well 
developed                    2015                    None                

 

 Full Exam - General                     Constitutional                     
general appearance                    Overall:                    in no acute 
distress                    2015                    None                

 

 Full Exam - General                     Constitutional                     
general appearance                    Overall:                    well 
nourished                    2015                    None                

 

 Full Exam - General                     Respiratory                    
auscultation                    Overall:                    breath sounds clear 
bilaterally                    2015                    None              
  

 

 Full Exam - General                     Respiratory                    
respiratory effort/rhythm                    Overall:                    no 
retractions                    2015                    None              
  

 

 Full Exam - General                     Respiratory                    
respiratory effort/rhythm                    Overall:                    normal 
rate                    2015                    None                

 

 Full Exam - General                     Cardiovascular                    
auscultation of heart                    Overall:                    regular 
rate                    2015                    None                

 

 Full Exam - General                     Cardiovascular                    
auscultation of heart                    Overall:                    normal 
heart sounds                    2015                    None             
   

 

 Full Exam - General                     Musculoskeletal                    
lower extremity                    Palpation -  knee:                    no 
effusion                    2015                    None                

 

 Full Exam - General                     Musculoskeletal                    
lower extremity                    Inspection -  lower leg:                    
normal appearance                    2015                    None        
        

 

 Full Exam - General                     Musculoskeletal                    
lower extremity                    Palpation -  lower leg:                    
normal on palpation                    2015                    None      
          

 

 Full Exam - General                     Musculoskeletal                    
spine, ribs and pelvis                    Posture:                    kyphosis 
                   2015                    None                

 

 Full Exam - General                     Musculoskeletal                    
spine, ribs and pelvis                    Spine:                    tender @ 
cervical spine                    2015                    None           
     

 

 Full Exam - General                     Musculoskeletal                    
gait and station                    Overall:                    normal gait    
                2015                    None                

 

 Full Exam - General                     Musculoskeletal                    
gait and station                    Overall:                    normal station 
                   2015                    None                

 

 Full Exam - General                     Psychiatric                    
orientation/consciousness                    Overall:                    
oriented to person, place and time                    2015               
     None                

 

 Full Exam - General                     Constitutional                     
general appearance                    Development:                    appears 
stated age                    2015                    None                

 

 Full Exam - General                     Constitutional                     
general appearance                    Development:                    well 
developed                    2015                    None                

 

 Full Exam - General                     Constitutional                     
general appearance                    Hygiene/Attention to Grooming:           
         good hygiene                    2015                    None    
            

 

 Full Exam - General                     Eyes                    conjunctiva
/eyelids                    Overall:                    conjunctiva clear      
              2015                    None                

 

 Full Exam - General                     Eyes                    conjunctiva
/eyelids                    Overall:                    cornea clear           
         2015                    None                

 

 Full Exam - General                     Eyes                    conjunctiva
/eyelids                    Overall:                    eyelids normal         
           2015                    None                

 

 Full Exam - General                     Eyes                    pupils and 
irises                    Overall:                    pupils equal, round, 
reactive to light and accomodation                    2015               
     None                

 

 Full Exam - General                     Ears/Nose/Throat                  
  otoscopic exam                    Overall:                    external 
auditory canals clear                    2015                    None    
            

 

 Full Exam - General                     Ears/Nose/Throat                  
  otoscopic exam                    Overall:                    tympanic 
membranes clear                    2015                    None          
      

 

 Full Exam - General                     Ears/Nose/Throat                  
  lips/teeth/gingiva                    Overall:                    benign lips
                    2015                    None                

 

 Full Exam - General                     Ears/Nose/Throat                  
  lips/teeth/gingiva                    Overall:                    normal 
dentition                    2015                    None                

 

 Full Exam - General                     Ears/Nose/Throat                  
  oral cavity/pharynx/larynx                     Overall:                    
hypopharynx benign                    2015                    None       
         

 

 Full Exam - General                     Ears/Nose/Throat                  
  oral cavity/pharynx/larynx                     Overall:                    no 
masses                    2015                    None                

 

 Full Exam - General                     Ears/Nose/Throat                  
  oral cavity/pharynx/larynx                     Overall:                    
oral mucosa clear                    2015                    None        
        

 

 Full Exam - General                     Ears/Nose/Throat                  
  oral cavity/pharynx/larynx                     Overall:                    
oropharyngeal mucosa clear                    2015                    
None                

 

 Full Exam - General                     Cardiovascular                    
extremities                    Overall:                    no clubbing         
           2015                    None                

 

 Full Exam - General                     Abdomen                    
abdominal exam                    Overall:                    no tenderness    
                2015                    None                

 

 Full Exam - General                     Abdomen                    
abdominal exam                    Overall:                    normal bowel 
sounds                    2015                    None                

 

 Full Exam - General                     Integument                    
inspection of skin                    Overall:                    few scattered 
moles, no gross abnormalities                    2015                    
None                

 

 Full Exam - General                     Neurologic                    deep 
tendon reflexes                    Overall:                    deep tendon 
reflexes intact                    2015                    None          
      

 

 Full Exam - General                     Neurologic                    
cranial nerves                    Overall:                    crainial nerves 2 
- 12 grossly intact                    2015                    None      
          

 

 Full Exam - General                     Psychiatric                    
mood and affect                    Overall:                    normal mood and 
affect                    2015                    None                

 

 Full Exam - General                     Constitutional                     
general appearance                    Overall:                    well 
developed                    2015                    None                

 

 Full Exam - General                     Constitutional                     
general appearance                    Overall:                    in no acute 
distress                    2015                    None                

 

 Full Exam - General                     Constitutional                     
general appearance                    Overall:                    well 
nourished                    2015                    None                

 

 Full Exam - General                     Respiratory                    
auscultation                    Overall:                    breath sounds clear 
bilaterally                    2015                    None              
  

 

 Full Exam - General                     Respiratory                    
respiratory effort/rhythm                    Overall:                    no 
retractions                    2015                    None              
  

 

 Full Exam - General                     Respiratory                    
respiratory effort/rhythm                    Overall:                    normal 
rate                    2015                    None                

 

 Full Exam - General                     Cardiovascular                    
auscultation of heart                    Overall:                    regular 
rate                    2015                    None                

 

 Full Exam - General                     Cardiovascular                    
auscultation of heart                    Overall:                    normal 
heart sounds                    2015                    None             
   

 

 Full Exam - General                     Musculoskeletal                    
lower extremity                    Palpation -  knee:                    no 
effusion                    2015                    None                

 

 Full Exam - General                     Musculoskeletal                    
lower extremity                    Inspection -  lower leg:                    
normal appearance                    2015                    None        
        

 

 Full Exam - General                     Musculoskeletal                    
lower extremity                    Palpation -  lower leg:                    
normal on palpation                    2015                    None      
          

 

 Full Exam - General                     Musculoskeletal                    
gait and station                    Overall:                    normal gait    
                2015                    None                

 

 Full Exam - General                     Musculoskeletal                    
gait and station                    Overall:                    normal station 
                   2015                    None                

 

 Full Exam - General                     Psychiatric                    
orientation/consciousness                    Overall:                    
oriented to person, place and time                    2015               
     None                

 

 Full Exam - General                     Musculoskeletal                    
spine, ribs and pelvis                    Posture:                    kyphosis 
                   2015                    None                

 

 Full Exam - General                     Musculoskeletal                    
spine, ribs and pelvis                    Spine:                    tender @ 
cervical spine                    2015                    None           
     

 

 Full Exam - General                     Constitutional                     
general appearance                    Overall:                    well 
developed                    2015                    None                

 

 Full Exam - General                     Constitutional                     
general appearance                    Overall:                    in no acute 
distress                    2015                    None                

 

 Full Exam - General                     Constitutional                     
general appearance                    Overall:                    well 
nourished                    2015                    None                

 

 Full Exam - General                     Psychiatric                    
orientation/consciousness                    Overall:                    
oriented to person, place and time                    2015               
     None                

 

 Full Exam - General                     Respiratory                    
auscultation                    Overall:                    breath sounds clear 
bilaterally                    2015                    None              
  

 

 Full Exam - General                     Respiratory                    
respiratory effort/rhythm                    Overall:                    no 
retractions                    2015                    None              
  

 

 Full Exam - General                     Respiratory                    
respiratory effort/rhythm                    Overall:                    normal 
rate                    2015                    None                

 

 Full Exam - General                     Cardiovascular                    
auscultation of heart                    Overall:                    regular 
rate                    2015                    None                

 

 Full Exam - General                     Cardiovascular                    
auscultation of heart                    Overall:                    normal 
heart sounds                    2015                    None             
   

 

 Full Exam - General                     Musculoskeletal                    
spine, ribs and pelvis                    Overall:                    spine 
benign                    2015                    None                

 

 Full Exam - General                     Musculoskeletal                    
gait and station                    Overall:                    normal gait    
                2015                    None                

 

 Full Exam - General                     Musculoskeletal                    
gait and station                    Overall:                    normal station 
                   2015                    None                

 

 Full Exam - General                     Musculoskeletal                    
lower extremity                    Palpation -  knee:                    no 
effusion                    2015                    None                

 

 Full Exam - General                     Musculoskeletal                    
lower extremity                    Inspection -  lower leg:                    
normal appearance                    2015                    None        
        

 

 Full Exam - General                     Musculoskeletal                    
lower extremity                    Palpation -  lower leg:                    
normal on palpation                    2015                    None      
          

 

 Full Exam - General                     Musculoskeletal                    
lower extremity                    Palpation -  lower leg:                    
tenderness at the calcaneus                    2015                    
None                

 

 Full Exam - General                     Musculoskeletal                    
lower extremity                    Inspection -  foot:                    
callus                    2015                    None                

 

 Full Exam - General                     Constitutional                     
general appearance                    Overall:                    in no acute 
distress                    2015                    None                

 

 Full Exam - General                     Constitutional                     
general appearance                    Overall:                    well 
developed                    2015                    None                

 

 Full Exam - General                     Constitutional                     
general appearance                    Overall:                    well 
nourished                    2015                    None                

 

 Full Exam - General                     Psychiatric                    
orientation/consciousness                    Overall:                    
oriented to person, place and time                    2015               
     None                

 

 Full Exam - General                     Psychiatric                    
mood and affect                    Mood:                    anxious            
        2015                    tearful                

 

 Full Exam - General                     Neurologic                    
cranial nerves                    Overall:                    crainial nerves 2 
- 12 grossly intact                    2015                    None      
          

 

 Full Exam - General                     Integument                    
inspection of skin                    Overall:                    no rash, 
lesions                    2015                    None                

 

 Full Exam - General                     Musculoskeletal                    
gait and station                    Overall:                    normal station 
                   2015                    None                

 

 Full Exam - General                     Musculoskeletal                    
gait and station                    Overall:                    normal gait    
                2015                    None                

 

 Full Exam - General                     Lymphatic                    neck 
nodes                    Overall:                    anterior cervical chain 
benign                    2015                    None                

 

 Full Exam - General                     Lymphatic                    neck 
nodes                    Overall:                    posterior cervical chain 
benign                    2015                    None                

 

 Full Exam - General                     Abdomen                    
abdominal exam                    Overall:                    no tenderness    
                2015                    None                

 

 Full Exam - General                     Abdomen                    
abdominal exam                    Overall:                    normal bowel 
sounds                    2015                    None                

 

 Full Exam - General                     Cardiovascular                    
auscultation of heart                    Overall:                    regular 
rate                    2015                    None                

 

 Full Exam - General                     Cardiovascular                    
auscultation of heart                    Overall:                    normal 
heart sounds                    2015                    None             
   

 

 Full Exam - General                     Cardiovascular                    
auscultation of heart                    Overall:                    no murmurs
                    2015                    None                

 

 Full Exam - General                     Cardiovascular                    
extremities                    Overall:                    no clubbing         
           2015                    None                

 

 Full Exam - General                     Respiratory                    
auscultation                    Overall:                    breath sounds clear 
bilaterally                    2015                    None              
  

 

 Full Exam - General                     Respiratory                    
respiratory effort/rhythm                    Overall:                    normal 
rate                    2015                    None                

 

 Full Exam - General                     Respiratory                    
respiratory effort/rhythm                    Overall:                    no 
retractions                    2015                    None              
  

 

 Full Exam - General                     Ears/Nose/Throat                  
  otoscopic exam                    Overall:                    external 
auditory canals clear                    2015                    None    
            

 

 Full Exam - General                     Ears/Nose/Throat                  
  otoscopic exam                    Overall:                    tympanic 
membranes clear                    2015                    None          
      

 

 Full Exam - General                     Ears/Nose/Throat                  
  oral cavity/pharynx/larynx                     Overall:                    
oral mucosa clear                    2015                    None        
        

 

 Full Exam - General                     Eyes                    conjunctiva
/eyelids                    Overall:                    conjunctiva clear      
              2015                    None                

 

 Full Exam - General                     Eyes                    pupils and 
irises                    Overall:                    pupils equal, round, 
reactive to light and accomodation                    2015               
     None                



                                                                              



Procedures

                      





 Procedure                    Codes                    Date                

 

                     FLU VAC NO PRSV 4 DILLAN 3 YRS+                              
        CPT-4: 71673                    10/18/2017                

 

                     ADMIN INFLUENZA VIRUS VAC                                 
     CPT-4:                     10/18/2017                

 

                     TRIAMCINOLONE ACET INJ NOS                                
      CPT-4:                     2016                

 

                     THER/PROPH/DIAG INJ SC/IM                                 
     CPT-4: 71641                    2016                



                                                                               
                             



Vital Signs

                      





 Date                    Vital                









 10/18/2017                                        Blood Pressure 1: 110/66 Code
: 8480-6                                                          BMI: 25.7 Code
: 42411-3                                                          Heart Rate 1
: 81 bpm                                                          Height: 5'3" 
                                                         SpO2: 97%             
                                             Weight: 145 lbs                   
                

 

 2017                                        Blood Pressure 1: 124/70 Code
: 8480-6                                                          BMI: 25.2 Code
: 15576-4                                                          Heart Rate 1
: 87 bpm                                                          Height: 5'3" 
                                                         SpO2: 97%             
                                             Weight: 142 lbs                   
                









 2017                                        Blood Pressure 1: 160/94 Code
: 8480-6                                                          Blood 
Pressure 1: 136/74 Code: 8480-6                                                
          BMI: 25.5 Code: 80292-3                                              
            Heart Rate 1: 80 bpm                                               
           Height: 5'3"                                                        
  SpO2: 98%                                                          Weight: 
144 lbs                                   









 2016                                        Blood Pressure 1: 122/78 Code
: 8480-6                                                          BMI: 25.9 Code
: 90661-5                                                          Heart Rate 1
: 107 bpm                                                          Height: 5'3"
                                                          SpO2: 98%            
                                              Weight: 146 lbs                  
                 









 2016                                        Height:                     
                                      Weight:                                   









 2016                                        Blood Pressure 1: 112/68 Code
: 8480-6                                                          BMI: 26.7 Code
: 79341-4                                                          Heart Rate 1
: 62 bpm                                                          Height: 5'3" 
                                                         SpO2: 94%             
                                             Weight: 151 lbs                   
                









 10/26/2016                                        Blood Pressure 1: 120/64 Code
: 8480-6                                                          BMI: 26.7 Code
: 53200-4                                                          Heart Rate 1
: 77 bpm                                                          Height: 5'3" 
                                                         SpO2: 97%             
                                             Temperature: 36.7 (C) / 98.1 (F)  
                                                        Weight: 151 lbs        
                           









 2016                                        Blood Pressure 1: 118/80 Code
: 8480-6                                                          BMI: 27.8 Code
: 30909-2                                                          Heart Rate 1
: 80 bpm                                                          Height: 5'3" 
                                                         SpO2: 98%             
                                             Weight: 157 lbs                   
                

 

 2016                                        Blood Pressure 1: 180/106 
Code: 8480-6                                                          BMI: 27.1 
Code: 01361-8                                                          Heart 
Rate 1: 98 bpm                                                          Height: 
5'3"                                                          SpO2: 98%        
                                                  Weight: 153 lbs              
                     

 

 2016                                        Blood Pressure 1: 132/88 Code
: 8480-6                                                          BMI: 31.0 Code
: 49265-1                                                          Heart Rate 1
: 75 bpm                                                          Height: 5'   
                                                       SpO2: 97%               
                                           Weight: 158 lbs 8 oz                
                  

 

 2016                                        Blood Pressure 1: 136/80 Code
: 8480-6                                                          BMI: 32.1 Code
: 28753-5                                                          Heart Rate 1
: 78 bpm                                                          Height: 5'   
                                                       SpO2: 97%               
                                           Weight: 164 lbs 8 oz                
                  

 

 2016                                        Blood Pressure 1: 142/94 Code
: 8480-6                                                          BMI: 32.4 Code
: 58593-4                                                          Heart Rate 1
: 100 bpm                                                          Height: 5'  
                                                        SpO2: 98%              
                                            Weight: 166 lbs                    
               

 

 2015                                        Blood Pressure 1: 120/80 Code
: 8480-6                                                          BMI: 34.2 Code
: 55979-8                                                          Heart Rate 1
: 68 bpm                                                          Height: 5'   
                                                       SpO2: 98%               
                                           Weight: 175 lbs                     
              

 

 2015                                        Blood Pressure 1: 138/70 Code
: 8480-6                                                          BMI: 34.4 Code
: 92962-3                                                          Heart Rate 1
: 61 bpm                                                          Height: 5'   
                                                       SpO2: 97%               
                                           Weight: 176 lbs                     
              

 

 2015                                        Blood Pressure 1: 122/82 Code
: 8480-6                                                          BMI: 34.6 Code
: 36578-8                                                          Heart Rate 1
: 62 bpm                                                          Height: 5'   
                                                       SpO2: 96%               
                                           Weight: 177 lbs                     
              

 

 2015                                        Blood Pressure 1: 132/88 Code
: 8480-6                                                          BMI: 32.1 Code
: 32439-4                                                          Heart Rate 1
: 68 bpm                                                          Height: 5'3" 
                                                         SpO2: 97%             
                                             Weight: 181 lbs                   
                



                                                                               
                                                                               
                                                                                



Functional Status

          No Functional Status data                                            
              



History of Present Illness

                      





 Symptom Name                    Status                    Result              
      Effective Date                    Notes                

 

 back pain                    Location                    thoracic spine       
             10/18/2017                    None                

 

 back pain                    Location                    Cervical spine       
             10/18/2017                    as the day goes on                 

 

 back pain                    Onset and Resolution                    ongoing  
                  10/18/2017                    None                

 

 back pain                    Onset of Symptom                     years ago   
                 10/18/2017                    None                

 

 back pain                    Limitation on Activities                    
moderately limits activities                    10/18/2017                    
None                

 

 back pain                    Severity                    severe               
     10/18/2017                    None                

 

 back pain                    Pertinent Findings                    extremity 
numbness                    10/18/2017                    None                

 

 back pain                    Pertinent Findings                    extremity 
weakness                    10/18/2017                    None                

 

 back pain                    Pertinent Findings                    sleep 
disturbance                    10/18/2017                    None              
  

 

 back pain                    Pertinent Findings                    weakness   
                 10/18/2017                    None                

 

 hypertension                    Onset and Resolution                    
ongoing                    10/18/2017                    None                

 

 hypertension                    Onset of Symptom                    during 
adulthood                    10/18/2017                    None                

 

 hypertension                    Alleviating Factors                    
medication                    10/18/2017                    None                

 

 hypertension                    Pertinent Findings                    Denies 
dizziness                    10/18/2017                    None                

 

 hypertension                    Pertinent Findings                    Denies 
dyspnea                    10/18/2017                    None                

 

 hypertension                    Pertinent Findings                    edema   
                 10/18/2017                    if she eats salty foods         
        

 

 hypertension                    Quality                    primary 
hypertension                    10/18/2017                    None             
   

 

 hypertension                    Blood Pressure Values                    not 
checking blood pressure at home                    10/18/2017                  
  None                

 

 back pain                    Quality                    constant              
      10/18/2017                    None                

 

 hypertension                    Quality                    stable             
       10/18/2017                    None                

 

 back pain                    Location                    thoracic spine       
             2017                    None                

 

 back pain                    Location                    Cervical spine       
             2017                    None                

 

 back pain                    Quality                    intermittent          
          2017                    None                

 

 back pain                    Onset and Resolution                    ongoing  
                  2017                    None                

 

 back pain                    Onset of Symptom                     years ago   
                 2017                    None                

 

 back pain                    Limitation on Activities                    
moderately limits activities                    2017                    
None                

 

 back pain                    Triggers                    no known associated 
factors                    2017                    None                

 

 back pain                    Severity                    severe               
     2017                    None                

 

 back pain                    Pertinent Findings                    extremity 
numbness                    2017                    None                

 

 back pain                    Pertinent Findings                    extremity 
weakness                    2017                    None                

 

 back pain                    Pertinent Findings                    sleep 
disturbance                    2017                    None              
  

 

 back pain                    Pertinent Findings                    weakness   
                 2017                    None                

 

 hypertension                    Onset and Resolution                    
ongoing                    2017                    None                

 

 hypertension                    Onset of Symptom                    during 
adulthood                    2017                    None                

 

 hypertension                    Blood Pressure Values                    
patient checking blood pressure at home - did not bring in readings            
        2017                    None                

 

 hypertension                    Alleviating Factors                    
medication                    2017                    None                

 

 hypertension                    Pertinent Findings                    
dizziness                    2017                    intermittently      
          

 

 hypertension                    Pertinent Findings                    Denies 
dyspnea                    2017                    None                

 

 hypertension                    Pertinent Findings                    Denies 
edema                    2017                    None                

 

 medication follow up                    Location                    oral 
intake                    2017                    None                

 

 medication follow up                    Additional Comments                    
medication use                    2017                    None           
     

 

 back pain                    Location                    thoracic spine       
             2016                    None                

 

 back pain                    Location                    Cervical spine       
             2016                    None                

 

 back pain                    Quality                    intermittent          
          2016                    None                

 

 back pain                    Onset and Resolution                    ongoing  
                  2016                    None                

 

 back pain                    Onset of Symptom                     years ago   
                 2016                    None                

 

 back pain                    Limitation on Activities                    
moderately limits activities                    2016                    
None                

 

 back pain                    Triggers                    no known associated 
factors                    2016                    None                

 

 back pain                    Severity                    severe               
     2016                    None                

 

 back pain                    Pertinent Findings                    extremity 
numbness                    2016                    None                

 

 back pain                    Pertinent Findings                    extremity 
weakness                    2016                    None                

 

 back pain                    Pertinent Findings                    sleep 
disturbance                    2016                    None              
  

 

 back pain                    Pertinent Findings                    weakness   
                 2016                    None                

 

 hypertension                    Onset and Resolution                    
ongoing                    2016                    None                

 

 hypertension                    Onset of Symptom                    during 
adulthood                    2016                    None                

 

 hypertension                    Alleviating Factors                    
medication                    2016                    None                

 

 hypertension                    Pertinent Findings                    
dizziness                    2016                    intermittently      
          

 

 hypertension                    Pertinent Findings                    Denies 
dyspnea                    2016                    None                

 

 hypertension                    Blood Pressure Values                    
patient checking blood pressure at home - did not bring in readings            
        2016                    None                

 

 hypertension                    Pertinent Findings                    Denies 
edema                    2016                    None                

 

 Hospital Follow Up                    Quality                    acute        
            2016                    None                

 

 anxiety                    Quality                    acute                    
2016                    None                

 

 anxiety                    Quality                    agitation               
     2016                    None                

 

 anxiety                    Quality                    worsening               
     2016                    None                

 

 anxiety                    Onset and Resolution                    sudden in 
onset                    2016                    None                

 

 anxiety                    Limitation on Activities                    is 
incapacitating                    2016                    None           
     

 

 anxiety                    Exacerbating Factors                    medication 
                   2016                    None                

 

 Hospital Follow Up                    _                    pneumonia          
          2016                    None                

 

 Hospital Follow Up                    Frequency of Episodes                    
1 weeks                    2016                    None                

 

 Hospital Follow Up                    Alleviating Factors                    
rest                    2016                    None                

 

 Hospital Follow Up                    Alleviating Factors                    
medication                    2016                    None                

 

 Hospital Follow Up                    Pertinent Findings                    
Denies fever                    2016                    None             
   

 

 chest pain/pressure                    Location                    stabbing   
                 10/26/2016                    None                

 

 chest pain/pressure                    Location                    on the left 
side of on the chest                    10/26/2016                    None     
           

 

 chest pain/pressure                    Quality                    aching      
              10/26/2016                    None                

 

 chest pain/pressure                    Quality                    constant    
                10/26/2016                    None                

 

 chest pain/pressure                    Onset and Resolution                    
sudden in onset                    10/26/2016                    None          
      

 

 chest pain/pressure                    Onset of Symptom                    5 
days ago                    10/26/2016                    None                

 

 cough                    Location                    in the throat            
        10/26/2016                    None                

 

 cough                    Quality                    dry                    10/
                    None                

 

 cough                    Quality                    constant                  
  10/26/2016                    None                

 

 cough                    Onset and Resolution                    sudden in 
onset                    10/26/2016                    None                

 

 cough                    Onset of Symptom                    5 days ago       
             10/26/2016                    None                

 

 hypertension                    Quality                    constant           
         2016                    None                

 

 hypertension                    Onset and Resolution                    
ongoing                    2016                    None                

 

 hypertension                    Blood Pressure Values                    
patient checking blood pressure at home - did not bring in readings            
        2016                    None                

 

 hypertension                    Pertinent Findings                    Denies 
dizziness                    2016                    None                

 

 hypertension                    Pertinent Findings                    Denies 
dyspnea                    2016                    None                

 

 hypertension                    Pertinent Findings                    Denies 
edema                    2016                    None                

 

 Hospital Follow Up                    _                    Other: hypertention
                     2016                    None                

 

 Hospital Follow Up                    Quality                    chronic 
illness                    2016                    None                

 

 hypertension                    Quality                    constant           
         2016                    None                

 

 hypertension                    Onset and Resolution                    
ongoing                    2016                    None                

 

 back pain                    Location                    thoracic spine       
             2016                    None                

 

 back pain                    Location                    Cervical spine       
             2016                    None                

 

 back pain                    Onset and Resolution                    ongoing  
                  2016                    None                

 

 back pain                    Onset of Symptom                     years ago   
                 2016                    None                

 

 back pain                    Limitation on Activities                    
moderately limits activities                    2016                    
None                

 

 back pain                    Triggers                    no known associated 
factors                    2016                    None                

 

 back pain                    Severity                    severe               
     2016                    None                

 

 back pain                    Pertinent Findings                    extremity 
numbness                    2016                    None                

 

 back pain                    Pertinent Findings                    extremity 
weakness                    2016                    None                

 

 back pain                    Pertinent Findings                    sleep 
disturbance                    2016                    None              
  

 

 back pain                    Pertinent Findings                    weakness   
                 2016                    None                

 

 hypertension                    Onset and Resolution                    
ongoing                    2016                    None                

 

 hypertension                    Onset of Symptom                    during 
adulthood                    2016                    None                

 

 hypertension                    Blood Pressure Values                    not 
checking blood pressure at home                    2016                  
  None                

 

 hypertension                    Pertinent Findings                    Denies 
dizziness                    2016                    None                

 

 hypertension                    Pertinent Findings                    Denies 
dyspnea                    2016                    None                

 

 back pain                    Quality                    intermittent          
          2016                    None                

 

 back pain                    Frequency of Episodes                    
decreasing                    2016                    None                

 

 hypertension                    Alleviating Factors                    
medication                    2016                    None                

 

 rash                    Location-[facility]                    in a generalized 
area                    2016                    None                

 

 rash                    Location-[facility]                    on the upper body   
                 2016                    None                

 

 rash                    Location-[facility]                    on the lower body   
                 2016                    None                

 

 rash                    Quality                    acute                                        None                

 

 rash                    Quality                    new                                        None                

 

 rash                    Quality                    worsening                  
  2016                    None                

 

 rash                    Quality                    pruritic                    
2016                    None                

 

 rash                    Color                    erythematous                 
   2016                    None                

 

 rash                    Onset and Resolution                    sudden in 
onset                    2016                    None                

 

 rash                    Onset of Symptom                    2.5 weeks ago     
               2016                    None                

 

 rash                    Triggers                    no known triggers         
           2016                    None                

 

 pelvic pain                    Location                    on both sides      
              2016                    None                

 

 pelvic pain                    Quality                    aching              
      2016                    None                

 

 pelvic pain                    Quality                    dull                
    2016                    None                

 

 pelvic pain                    Quality                    sharp               
     2016                    None                

 

 pelvic pain                    Onset and Resolution                    sudden 
in onset                    2016                    None                

 

 pelvic pain                    Frequency of Episodes                    daily 
                   2016                    None                

 

 pelvic pain                    Onset and Resolution                    
resolved                    2016                    None                

 

 pelvic pain                    Location                    on both sides      
              2016                    None                

 

 pelvic pain                    Quality                    dull                
    2016                    None                

 

 pelvic pain                    Quality                    aching              
      2016                    None                

 

 pelvic pain                    Quality                    sharp               
     2016                    None                

 

 pelvic pain                    Onset and Resolution                    sudden 
in onset                    2016                    None                

 

 pelvic pain                    Onset of Symptom                    1 weeks ago
                    2016                    None                

 

 pelvic pain                    Limitation on Activities                    
moderately limits activities                    2016                    
None                

 

 pelvic pain                    Frequency of Episodes                    daily 
                   2016                    None                

 

 pelvic pain                    Pertinent Findings                    bladder 
pain                    2016                    None                

 

 pelvic pain                    Pertinent Findings                    back pain
                    2016                    None                

 

 back pain                    Location                    thoracic spine       
             2015                    None                

 

 back pain                    Quality                    constant              
      2015                    None                

 

 back pain                    Quality                    worsening             
       2015                    None                

 

 back pain                    Onset and Resolution                    ongoing  
                  2015                    None                

 

 back pain                    Onset of Symptom                    _ months ago 
                   2015                    None                

 

 back pain                    Onset of Symptom                    _ years ago  
                  2015                    None                

 

 back pain                    Limitation on Activities                    
moderately limits activities                    2015                    
None                

 

 back pain                    Frequency of Episodes                    
increasing                    2015                    None                

 

 back pain                    Triggers                    no known associated 
factors                    2015                    None                

 

 back pain                    Pertinent Findings                    extremity 
numbness                    2015                    None                

 

 back pain                    Pertinent Findings                    extremity 
weakness                    2015                    None                

 

 back pain                    Pertinent Findings                    sleep 
disturbance                    2015                    None              
  

 

 back pain                    Pertinent Findings                    weakness   
                 2015                    None                

 

 back pain                    Radiating                    down the right arm  
                  2015                    None                

 

 back pain                    Severity                    severe               
     2015                    None                

 

 back pain                    Location                    Cervical spine       
             2015                    None                

 

 back pain                    Location                    thoracic spine       
             2015                    None                

 

 back pain                    Quality                    worsening             
       2015                    None                

 

 back pain                    Quality                    constant              
      2015                    None                

 

 back pain                    Onset and Resolution                    ongoing  
                  2015                    None                

 

 back pain                    Pertinent Findings                    extremity 
numbness                    2015                    None                

 

 back pain                    Pertinent Findings                    extremity 
weakness                    2015                    None                

 

 back pain                    Pertinent Findings                    sleep 
disturbance                    2015                    None              
  

 

 back pain                    Pertinent Findings                    weakness   
                 2015                    None                

 

 back pain                    Onset of Symptom                    _ months ago 
                   2015                    None                

 

 back pain                    Onset of Symptom                    _ years ago  
                  2015                    None                

 

 back pain                    Limitation on Activities                    
moderately limits activities                    2015                    
None                

 

 back pain                    Frequency of Episodes                    
increasing                    2015                    None                

 

 back pain                    Triggers                    no known associated 
factors                    2015                    None                

 

 back pain                    Radiating                    does not radiate    
                2015                    None                

 

 foot pain                    Location                    on the right         
           2015                    None                

 

 foot pain                    Quality                    tingling              
      2015                    None                

 

 foot pain                    Quality                    numbness              
      2015                    None                

 

 foot pain                    Quality                    sharp pain            
        2015                    None                

 

 foot pain                    Quality                    intermittent          
          2015                    None                

 

 foot pain                    Timing of Episodes                    in the 
morning                    2015                    None                

 

 foot pain                    Frequency of Episodes                    daily   
                 2015                    None                

 

 foot pain                    Onset of Symptom                    2 months ago 
                   2015                    None                

 

 foot pain                    Mechanism of injury                    unknown   
                 2015                    None                

 

 foot pain                    Pertinent Findings                    tingling   
                 2015                    None                

 

 foot pain                    Pertinent Findings                    limping    
                2015                    None                

 

 foot pain                    Pertinent Findings                    pain with 
movement                    2015                    None                

 

 knee pain                    Location                    on the right         
           2015                    None                

 

 knee pain                    Quality                    dull pain             
       2015                    None                

 

 knee pain                    Quality                    tenderness            
        2015                    None                

 

 knee pain                    Quality                    throbbing             
       2015                    None                

 

 knee pain                    Pertinent Findings                    limping    
                2015                    None                

 

 knee pain                    Pertinent Findings                    tingling   
                 2015                    None                

 

 seizure                    Quality                    multiple event          
          2015                    last seizure in October around 
[facility]. Seizures started as a teenager-[employer] in highschool               
  

 

 seizure                    Onset and Resolution                    ongoing    
                2015                    None                

 

 seizure                    Onset of Symptom                    _ years ago    
                2015                    None                

 

 seizure                    Frequency of Episodes                    unchanged 
                   2015                    None                

 

 seizure                    Pertinent Findings                    anxiety      
              2015                    None                

 

 seizure                    Pertinent Findings                    dizziness    
                2015                    occ                

 

 seizure                    Pertinent Findings                    Denies 
dyspnea                    2015                    None                

 

 anxiety                    Quality                    intermittent            
        2015                    None                

 

 anxiety                    Onset and Resolution                    ongoing    
                2015                    None                

 

 anxiety                    Frequency of Episodes                    weekly    
                2015                    None                

 

 anxiety                    Frequency of Episodes                    daily     
               2015                    None                

 

 anxiety                    Pertinent Findings                    Denies 
dyspnea                    2015                    None                

 

 anxiety                    Pertinent Findings                    Denies 
insomnia                    2015                    None                

 

 anxiety                    Pertinent Findings                    syncope      
              2015                    only with seizures                 

 

 depression                    Quality                    intermittent         
           2015                    None                

 

 depression                    Onset and Resolution                    ongoing 
                   2015                    None                

 

 depression                    Pertinent Findings                    anxiety   
                 2015                    None                

 

 depression                    Pertinent Findings                    Denies self
-harm                    2015                    None                

 

 hypertension                    Quality                    constant           
         2015                    None                

 

 hypertension                    Onset and Resolution                    
ongoing                    2015                    None                

 

 hypertension                    Blood Pressure Values                    not 
checking blood pressure at home                    2015                  
  has a machine if needed                 

 

 hypertension                    Pertinent Findings                    anxiety 
                   2015                    None                

 

 hypertension                    Pertinent Findings                    
dizziness                    2015                    only occ            
     

 

 hypertension                    Pertinent Findings                    Denies 
dyspnea                    2015                    None                

 

 depression                    Onset of Symptom                    during 
adulthood                    2015                    None                

 

 depression                    Limitation on Activities                    does 
not limit activities                    2015                    None     
           

 

 depression                    Frequency of Episodes                    
unchanged                    2015                    None                

 

 depression                    Significant Medical Conditions                  
  anxiety disorder                    2015                    None       
         

 

 depression                    Triggers                    no known associated 
factors                    2015                    None                

 

 depression                    Alleviating Factors                    
medication                    2015                    None                

 

 anxiety                    Significant Family History                    
anxiety disorder                    2015                    None         
       

 

 anxiety                    Significant Family History                    
depression                    2015                    None                

 

 anxiety                    Triggers                    stress                 
   2015                    None                

 

 anxiety                    Onset of Symptom                    during 
childhood                    2015                    None                

 

 anxiety                    Alleviating Factors                    medication  
                  2015                    None                

 

 seizure                    Significant Medical Conditions                    
known seizure disorder                    2015                    None   
             

 

 seizure                    Significant Medications                    
antiepileptic medications                    2015                    
dilantin                 

 

 seizure                    Triggers                    no known associated 
factors                    2015                    None                

 

 seizure                    Alleviating Factors                    medication  
                  2015                    None                



                                                                    



Advance Directives

          No Advance Directive data                                            
                        



Encounters

                      





 Encounter                    Performer                    Location            
        Codes                    Date                

 

                     [id number]) [phone] EST. PATIENT, LEVEL IV

Diagnosis: Essential (primary) hypertension[ICD10: I10]

Diagnosis: Generalized anxiety disorder[ICD10: F41.1]

Diagnosis: Low back pain[ICD10: M54.5]

Diagnosis: Other generalized epilepsy and epileptic syndromes, not intractable, 
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

Diagnosis: Other depressive episodes[ICD10: F32.8]

Diagnosis: Encounter for immunization[ICD10: Z23]                              
        Lynne Hurt MD, [facility]               
     CPT-4: 51333                    10/18/2017                

 

                     [id number]) [phone] EST. PATIENT, LEVEL IV

Diagnosis: Essential (primary) hypertension[ICD10: I10]

Diagnosis: Generalized anxiety disorder[ICD10: F41.1]

Diagnosis: Low back pain[ICD10: M54.5]

Diagnosis: Pain in thoracic spine[ICD10: M54.6]

Diagnosis: Encounter for screening mammogram for malignant neoplasm of breast[
ICD10: Z12.31]                                      Lynne Hurt MD, [facility]                    CPT-4: 55167                  
  2017                

 

                     [id number]) 30213 EST. PATIENT, LEVEL III

Diagnosis: Essential (primary) hypertension[ICD10: I10]

Diagnosis: Generalized anxiety disorder[ICD10: F41.1]                          
            Lynne Hurt MD, [facility]           
         CPT-4: 61838                    2017                

 

                     [id number] 40076 EST. PATIENT, LEVEL IV

Diagnosis: Essential (primary) hypertension[ICD10: I10]

Diagnosis: Other depressive episodes[ICD10: F32.8]

Diagnosis: Other generalized epilepsy and epileptic syndromes, not intractable, 
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

Diagnosis: Generalized anxiety disorder[ICD10: F41.1]

Diagnosis: Low back pain[ICD10: M54.5]                                      
Lynne Hurt MD, [facility]                    CPT-
4: 56201                    2016                

 

                     [id number] EST. PATIENT, LEVEL IV

Diagnosis: Generalized anxiety disorder[ICD10: F41.1]

Diagnosis: Other depressive episodes[ICD10: F32.8]

Diagnosis: Restlessness and agitation[ICD10: R45.1]                            
          Mary Hurt MD [facility]               
     CPT-4: 05023                    2016                

 

                     [phone]) [id number] EST. PATIENT, LEVEL III

Diagnosis: Localized swelling, mass and lump, trunk[ICD10: R22.2]              
                        Lynne Hurt MD 
[facility]                    CPT-4: 28003                    2016              
  

 

                     [id number] EST. PATIENT, LEVEL III

Diagnosis: Pleurisy[ICD10: R09.1]

Diagnosis: Cough[ICD10: R05]                                      Mary Hurt MD, [facility]                    CPT-4: 01119      
              10/26/2016                

 

                     [phone] [phone] EST. PATIENT, LEVEL IV

Diagnosis: Essential (primary) hypertension[ICD10: I10]

Diagnosis: Cervicalgia[ICD10: M54.2]

Diagnosis: Radiculopathy, cervicothoracic region[ICD10: M54.13]                
                      Lynne Hurt MD, [facility] 
                   CPT-4: 71771                    2016                

 

                     [id number] EST. PATIENT, LEVEL III

Diagnosis: Essential (primary) hypertension[ICD10: I10]                        
              Mary Hurt MD [facility]           
         CPT-4: 13964                    2016                

 

                     [id number]) [phone] EST. PATIENT, LEVEL III

Diagnosis: Essential (primary) hypertension[ICD10: I10]                        
              Lynne Hurt MD, [facility]         
           CPT-4: 96449                    2016                

 

                     [phone]) [phone] EST. PATIENT, LEVEL IV

Diagnosis: Essential (primary) hypertension[ICD10: I10]

Diagnosis: Low back pain[ICD10: M54.5]

Diagnosis: Other depressive episodes[ICD10: F32.8]                             
         Lynne Hurt MD [facility]              
      CPT-4: 17653                    2016                

 

                     [id number]) [id number] EST. PATIENT, LEVEL IV

Diagnosis: Generalized abdominal pain[ICD10: R10.84]

Diagnosis: Gross hematuria[ICD10: R31.0]

Diagnosis: Long term (current) use of antithrombotics/antiplatelets[ICD10: 
Z79.02]

Diagnosis: Urinary tract infection, site not specified[ICD10: N39.0]           
                           Jordyn Hurt MD
, [facility]                    CPT-4: 59508                    2016            
    

 

                     [phone]) [id number] EST. PATIENT, LEVEL IV

Diagnosis: Cervicalgia[ICD10: M54.2]

Diagnosis: Radiculopathy, cervicothoracic region[ICD10: M54.13]

Diagnosis: Pain in thoracic spine[ICD10: M54.6]                                
      Lynne Hurt MD, [facility]                 
   CPT-4: 86943                    2015                

 

                     [id number]) 68106 EST. PATIENT, LEVEL III

Diagnosis: Cervicalgia[ICD10: M54.2]

Diagnosis: Pain in thoracic spine[ICD10: M54.6]

Diagnosis: Radiculopathy, cervicothoracic region[ICD10: M54.13]

Diagnosis: Low back pain[ICD10: M54.5]                                      
Jordyn Hurt MD, [facility]                    
CPT-4: 36608                    2015                

 

                     [id number]) [id number] EST. PATIENT, LEVEL III

Diagnosis: Plantar fasciitis of right foot[ICD9: 728.71]

Diagnosis: Lumbar spine pain[ICD9: 724.2]

Diagnosis: Right knee pain[ICD9: 719.46]                                      
Jordyn Hurt MD, [facility]                    
CPT-4: 34098                    2015                

 

                     ([id number]) OFFICE  VISIT, NEW - LEVEL 4

Diagnosis: ESSENTIAL HYPERTENSION[ICD9: 401.9]

Diagnosis: Seizure disorder[ICD9: 345.90]

Diagnosis: Factor 5 Leiden mutation, heterozygous[ICD9: 289.81]

Diagnosis: Anxiety[ICD9: 300.00]

Diagnosis: Depression[ICD9: 311]                                      Jordyn Hurt MD, [facility]                    CPT-4: 
80024                    2015                



                                                                               
                                                                               
                                                                               
                               



Plan of Care

                      





 Planned Activity                    Notes                    Codes            
        Status                    Date                

 

 Visit Plan:                     Hypertension - well controlled - continue with 
current medications, continue with no added salt diet. Pt has been encouraged 
to exercise daily. The pt has been advised to call the office if there are any 
acute concerns about change in blood pressure readings at home. Pain - chronic 
of low back - I have recommended a referral to Dr. Artis - for injections. 
Refilled muscle relaxers. Chronic Depression and anxiety - the pt has symptoms 
of chronic anxiety and depression that have been fairly well controlled since 
the last office visit. The pt has expected periods of exacerbation with 
abatement of the symptoms with change in situational exposure. No change in 
current medications. flu shot today

                                                             10/18/2017        
        

 

 Appointment: Lynne Hurt 

WPtel:[phone]

 [facility]

                                                             (15 min) 
Moderate                    10/18/2017                

 

 Patient Education: Patient Medication Summary                                 
                            Completed                    10/18/2017            
    

 

 Patient Education: Obesity                                                    
         Completed                    10/18/2017                

 

 Care Plan: Referral Order                                        SNOMED-CT : 
[id number]

                    Pending                    10/18/2017                

 

 Visit Plan:                     Hypertension - well controlled - continue with 
current medications, continue with no added salt diet. Pt has been encouraged 
to exercise daily. The pt has been advised to call the office if there are any 
acute concerns about change in blood pressure readings at home. Back pain, 
Thoracic back pain, - referral to [facility] - at via [facility] - see if pt 
can get in to be seen for therapy on her upper and lower back. Chronic 
Depression and anxiety - the pt has symptoms of chronic anxiety and depression 
that have been fairly well controlled since the last office visit. The pt has 
expected periods of exacerbation with abatement of the symptoms with change in 
situational exposure. No change in current medications.

                                                             2017        
        

 

 Appointment: Lynne Hurt 

WPtel:[phone]

 [facility]

                                                             (15 min) 
Moderate                    2017                

 

 Patient Education: Patient Medication Summary                                 
                            Completed                    2017            
    

 

 Patient Education: Obesity                                                    
         Completed                    2017                

 

 Care Plan: SCREENINGMAMMOGRAPHYDIGITAL                                        
LOINC : [id number]

                    Pending                    2017                

 

 Appointment: Lynne Hurt 

WPtel:[phone]

 [facility]

                                                             (30 min) Complex
                    2017                

 

 Visit Plan:                     Hypertension - well controlled - continue with 
current medications, continue with no added salt diet. Pt has been encouraged 
to exercise daily. The pt has been advised to call the office if there are any 
acute concerns about change in blood pressure readings at home. Seizure 
disorder - Depression and Anxiety - continue with current medications - 
symptoms are significantly improved per patient report

                                                             2017        
        

 

 Appointment: Lynne Hurt 

WPtel:[phone]

 [facility]

                                                             (30 min) Complex
                    2017                

 

 Patient Education: Patient Medication Summary                                 
                            Completed                    2017            
    

 

 Patient Education: Obesity                                                    
         Completed                    2017                

 

 Visit Plan:                     Hypertension - well controlled - continue with 
current medications, continue with no added salt diet. Pt has been encouraged 
to exercise daily. The pt has been advised to call the office if there are any 
acute concerns about change in blood pressure readings at home. Depression and 
anxiety - Erin reports that her depression episode related to the keppra seems 
to be resolved - she feels much better on the Lamictal. I have discussed her 
case with the patient and her mom - she had previously seen a psychiatrist -but 
with a shortage of any local psychiatrists, I have recommended that she needs 
to be seen by a psychiatrist at [facility] and we are making a referral to 
outpatient psychiatry. They understand that it will probably be several months 
before we can get Erin in to be seen. Hx of seizure disorder - I have also 
recommended that with her history of seizures we need for her to have a 
relationship with a neurologist - they are okay with staying in the area for a 
neurologist and would like to be seen in [place]. I have recommended Dr. Ugarte at [place] for further evaluation and to establish care. They 
understand that it will most-likely be several months before she is seen by 
neurology.

                                                             2016        
        

 

 Appointment: Lynne Hurt 

WPtel:[phone]

 [address] [place], [place] [address]

                                                             (15 min) 
Moderate                    2016                

 

 Patient Education: Patient Medication Summary                                 
                            Completed                    2016            
    

 

 Patient Education: Obesity                                                    
         Completed                    2016                

 

 Patient Education: Hypertension                                               
              Completed                    2016                

 

 Visit Plan:                     The pt had recently been in the hospital with 
an INR of over 10. Dr. Cueva manages her coumadin. Dr. Cueva switched her to 
eliquis, therefore her dilantin was changed to keppra due to medication 
interactions. Pt's mother called the clinic this morning stating that Erin was 
very agitated, anxious, and restless - spoke with Dr. Hurt on the phone and 
pt was to stop the keppra and start trokendi 50mg daily. Pt and her mother 
picked up samples and spoke to the pharmacy who told her not to take the 
trokendi because she had metabolic acidosis reaction in the past to topamax. 
Per other staff report the pt and her mother came to the office and demanded to 
speak to a nurse and did not wait in the lobby for a nurse to take her to the 
back but came back to the nurses desk and asked the nurses if they knew who she 
was. Pt was then taken to a room for a walk in appointment. Upon entering the 
room the pt was very agitated and upset stating that everyone in this office is 
an "imbecile and an idiot who is trying to kill her." I explained to her that 
the topamax was listed as an "other reaction" and that I was very sorry that it 
was sent. The patient wanted to know why Dr. Hurt ordered that medication, 
which I explained that it was an alternative to the keppra. I told her that I 
had called Dr. Hurt on her cell phone and that she was not able to access 
her chart. The pt stated that "doctors can pull records up on their phones". 
The pt and her mother were also very upset that she had been released from the 
hospital after adjusting her medications instead of being monitored. The pt's 
mother stated that Dr. Hutr had asked the patient why she was depressed 
which made them both very upset. Erin said that when Dr. Hurt asked her 
about her depression she wanted to "slap her in the face". The pt's mother 
wanted her to be admitted today. I explained that medically she was stable and 
did not need to be admitted. The pt was very upset stating that "it is 
obviously all about money". Pt stated that she should just "stop her medication 
and die so her mother could isabel for medical malpractice". The pt stated that 
she would "go to the ER to get another doctor." The pt's mother stated that 
Erin has not slept and has been getting increasingly agitated. They agreed to 
the new orders to start Lamictal per Dr. Hurt.

                                                             2016        
        

 

 Patient Education: Patient Medication Summary                                 
                            Completed                    2016            
    

 

 Visit Plan:                     Abnormal CT scan - planning on repeat scan to 
see if area on previous scan was truly a mass or just an atypical pneumonia. 
Finish out antibiotics as previously directed. call if having trouble pt to 
have PT/INR continued to be monitored by Dr. Cueva

                                                             2016        
        

 

 Appointment: Lynne Hurt 

WPtel:[phone]

 [address] [facility]

                                                             (15 min) 
Moderate                    2016                

 

 Patient Education: Patient Medication Summary                                 
                            Completed                    2016            
    

 

 Patient Education: Obesity                                                    
         Completed                    2016                

 

 Visit Plan:                     Chest pain, cough - lung sounds clear, but 
diminished, will check chest x-ray - will treat as indicated. Pt is to notify 
clinic if symptoms do not improve, if they worsen, or with any questions or 
concerns.

                                                             10/26/2016        
        

 

 Appointment: Jordyn Rosenberg 

WPtel:[phone] [facility] [facility]

[place]                                                             (30 min) Complex
                    10/26/2016                

 

 Patient Education: Patient Medication Summary                                 
                            Completed                    10/26/2016            
    

 

 Patient Education: Obesity                                                    
         Completed                    10/26/2016                

 

 Visit Plan:                     Hypertension - well controlled - continue with 
current medications, continue with no added salt diet. Pt has been encouraged 
to exercise daily. The pt has been advised to call the office if there are any 
acute concerns about change in blood pressure readings at home. Chronic Pain 
Syndrome - pt has chronic pain - has been maintained on current medications, 
has not sought out other medications, only uses PRN pain medications as directed
, and understands the consequences of over-medication. Chronic Depression and 
anxiety - the pt has symptoms of chronic anxiety and depression that have been 
fairly well controlled since the last office visit. The pt has expected periods 
of exacerbation with abatement of the symptoms with change in situational 
exposure. No change in current medications.

                                                             2016        
        

 

 Patient Education: Patient Medication Summary                                 
                            Completed                    2016            
    

 

 Patient Education: Obesity                                                    
         Completed                    2016                

 

 Patient Education: .Cervicalgia Neck Pain                                     
                        Completed                    2016                

 

 Visit Plan:                     Hypertension - uncontrolled - the patient's 
medications have been modified as documented in the visit note. The patient has 
been counseled to cut back on salt in diet for a no added salt diet, low fat 
diet, start an exercise program with low weight bearing exercises and higher 
aerobic activity for heart health. The patient is to check blood pressure 
readings as an outpatient and either fax, call, or email the readings to the 
office next week for practitioner to review. The pt is to call for acute 
concerns.

                                                             2016        
        

 

 Appointment: Mary Bond 

WPtel:[phone] [address] [facility]

[place]                                                             (30 min) Complex
                    2016                

 

 Patient Education: Patient Medication Summary                                 
                            Completed                    2016            
    

 

 Patient Education: Obesity                                                    
         Completed                    2016                

 

 Visit Plan:                     Hypertension - well controlled - continue with 
current medications, continue with no added salt diet. Pt has been encouraged 
to exercise daily. The pt has been advised to call the office if there are any 
acute concerns about change in blood pressure readings at home. Rash - RX for 
injection - kenalog shot

                                                             2016        
        

 

 Patient Education: Patient Medication Summary                                 
                            Completed                    2016            
    

 

 Patient Education: Obesity                                                    
         Completed                    2016                

 

 Visit Plan:                     Hypertension - well controlled - continue with 
current medications, continue with no added salt diet. Pt has been encouraged 
to exercise daily. The pt has been advised to call the office if there are any 
acute concerns about change in blood pressure readings at home. Chronic 
Depression and anxiety - the pt has symptoms of chronic anxiety and depression 
that have been fairly well controlled since the last office visit. The pt has 
expected periods of exacerbation with abatement of the symptoms with change in 
situational exposure. No change in current medications. Chronic Pain Syndrome - 
pt has chronic pain - has been maintained on current medications, has not 
sought out other medications, only uses PRN pain medications as directed, and 
understands the consequences of over-medication.

                                                             2016        
        

 

 Patient Education: Patient Medication Summary                                 
                            Completed                    2016            
    

 

 Patient Education: Hypertension                                               
              Completed                    2016                

 

 Visit Plan:                     Abdominal pain-mild and improved-likely due to 
constipation-check labs today-cld advance to bland as tolerated-ER over the 
weekend if pain worsens-continue adequate fluids Urinary Tract Infection-
discussed natural and expected course of this diagnosis and to alert me if 
symptoms do not follow expected course, or if any worse. UA positive for 
infection today in the office-plan to send for culture and will call patient 
with results. RX sent to patient's pharmacy. Avoid tub baths, restrictive 
underwear, etc. Recommend patient start on probiotic while taking the 
antibiotic to prevent diarrhea. Patient verbalized understanding of plan. 
Chronic Anticoagulant use - Pt has been counseled about the anticoagulant, need 
for serial monitoring, and need for the pt to alert the physician as to any new 
bruising, or acute bleeding. Therapeutic goal for INR is between 2.0 and 3.5.

                                                             2016        
        

 

 Appointment:                                                              (15 
min) Moderate                    2016                

 

 Patient Education: Patient Medication Summary                                 
                            Completed                    2016            
    

 

 Referral: External, Ordering Provider                     Referral            
                             Completed                    2015           
     

 

 Visit Plan:                     Chronic Back pain - the patient was counseled 
to always first attempt to use modalities other than pain medication for 
alleviation of the muscle spasms and pain. The patient was also encouraged to 
continue with back exercises as previously directed. Pt is to use pain 
medication as directed. If pain medications are used inappropriately or early 
refills are requested, the patient understands that is a breech of trust/
contract and could result in the patient's termination from this medical 
practice. Referral to physical therapy/manipulation of her neck/upper back.

                                                             2015        
        

 

 Appointment: Lynne Hurt 

WPtel:[phone]

 [address] [place], [place] [address]

                                                             (15 min) 
Moderate                    2015                

 

 Patient Education: Patient Medication Summary                                 
                            Completed                    2015            
    

 

 Patient Education: .Cervicalgia Neck Pain                                     
                        Completed                    2015                

 

 Care Plan: Referral Order                                        SNOMED-CT : 
[id number]

                    Ordered                    2015                

 

 Visit Plan:                     Cervical, thoracic, and lumbar back pain-
worsening-numbness and tingling down right arm-will xray cervical and thoracic 
spine-plan for MRI of cervical, thoracic and lumbar spine if indicated and 
refer to spine specialist-patient wants to see Dr Miranda if referred.

                                                             2015        
        

 

 Patient Education: Patient Medication Summary                                 
                            Completed                    2015            
    

 

 Patient Education: .Cervicalgia Neck Pain                                     
                        Completed                    2015                

 

 Visit Plan:                     Plantar fasciitis- pt was educated that this 
is an inflammatory process, need to stretch the foot and reduce inflammation by 
using a frozen bottle of water or a tennis ball on the bottom of the foot at 
least three times a day until the pain is improved. Right knee pain-low back 
pain-xray low back and knee to evaluate for acute abnormality-samples of 
pennsaid provided and instructed on use-consider referral for PT or MRI lumbar 
spine if symptoms do not improve-

                                                             2015        
        

 

 Appointment:                                                              (30 
min) Complex                    2015                

 

 Patient Education: Patient Medication Summary                                 
                            Completed                    2015            
    

 

 Visit Plan:                     Hypertension - well controlled - continue with 
current medications, continue with no added salt diet. Pt has been encouraged 
to exercise daily. The pt has been advised to call the office if there are any 
acute concerns about change in blood pressure readings at home. History of 
seizures-check dilantin level Factor V Leiden-on coumadin-managed by Dr Cueva 
Chronic Depression and anxiety - the pt has symptoms of chronic anxiety and 
depression that have been fairly well controlled since the last office visit. 
The pt has expected periods of exacerbation with abatement of the symptoms with 
change in situational exposure. No change in current medications.

                                                             2015        
        

 

 Visit Plan:                     Hypertension - well controlled - continue with 
current medications, continue with no added salt diet. Pt has been encouraged 
to exercise daily. The pt has been advised to call the office if there are any 
acute concerns about change in blood pressure readings at home. History of 
seizures-check dilantin level Factor V Leiden-on coumadin-managed by Dr Cueva 
Chronic Depression and anxiety - the pt has symptoms of chronic anxiety and 
depression that have been fairly well controlled since the last office visit. 
The pt has expected periods of exacerbation with abatement of the symptoms with 
change in situational exposure. No change in current medications.

                                                             2015        
        

 

 Appointment:                                                              (S) 
New Patient                    2015                

 

 Patient Education: Patient Medication Summary                                 
                            Completed                    2015            
    

 

 Patient Education: Hypertension                                               
              Completed                    2015                

 

 Care Plan: COMPLETE CBC AUTOMATED                                        LOINC 
: [id number]

                    Ordered                    2015                

 

 Referral: [facility]                    Referral info faxed. They will 
call the patient to set up date/time.                                         
Completed                                    

 

 Referral: External, Ordering Provider                     Referral            
                             Appointment Requested                             
       

 

 Referral: [facility]                    Referral                         
                Appointment Requested                                    



                                                                               
                                                                               
                                                                               
                                                                               
                                                                               
                                                                               
                                                                               
                                                                               
      



Instructions

                      





 Comment                

 

                     talk to Dr. Kong about an endometrial ablation - 

 . Hypertension - well controlled - continue with current medications, continue 
with no added salt diet.  Pt has been encouraged to exercise daily.

The pt has been advised to call the office if there are any acute concerns 
about change in blood pressure readings at home.



Chronic Pain Syndrome - pt has chronic pain - has been maintained on current 
medications, has not sought out other medications, only uses PRN pain 
medications as directed, and understands the consequences of over-medication.



Chronic Depression and anxiety - the pt has symptoms of chronic anxiety and 
depression that have been fairly well controlled since the last office visit.  
The pt has expected periods of exacerbation with abatement of the symptoms with 
change in situational exposure.  No change in current medications.

                                  

 

                     . Hypertension - well controlled - continue with current 
medications, continue with no added salt diet.  Pt has been encouraged to 
exercise daily.

The pt has been advised to call the office if there are any acute concerns 
about change in blood pressure readings at home.



Depression and anxiety - Erin reports that her depression episode related to 
the keppra seems to be resolved - she feels much better on the Lamictal.  I 
have discussed her case with the patient and her mom - she had previously seen 
a psychiatrist -but with a shortage of any local psychiatrists, I have 
recommended that she needs to be seen by a psychiatrist at [facility] and we 
are making a referral to outpatient psychiatry.  They understand that it will 
probably be several months before we can get Erin in to be seen.



Hx of seizure disorder - I have also recommended that with her history of 
seizures we need for her to have a relationship with a neurologist - they are 
okay with staying in the area for a neurologist and would like to be seen in 
[place].  I have recommended Dr. Ugarte at [facility] for further evaluation and 
to establish care.  They understand that it will most-likely be several months 
before she is seen by neurology.

                                  

 

                     . Hypertension - uncontrolled - the patient's medications 
have been modified as documented in the visit note.  The patient has been 
counseled to cut back on salt in diet for a no added salt diet, low fat diet, 
start an exercise program with low weight bearing exercises and higher aerobic 
activity for heart health.  

The patient is to check blood pressure readings as an outpatient and either fax
, call, or email the readings to the office next week for practitioner to 
review.

The pt is to call for acute concerns.

                                  

 

                     . Hypertension - well controlled - continue with current 
medications, continue with no added salt diet.  Pt has been encouraged to 
exercise daily.

The pt has been advised to call the office if there are any acute concerns 
about change in blood pressure readings at home.



Back pain, Thoracic back pain, - referral to [facility] - solo via [facility] 
- see if pt can get in to be seen for therapy on her upper and lower back.





Chronic Depression and anxiety - the pt has symptoms of chronic anxiety and 
depression that have been fairly well controlled since the last office visit.  
The pt has expected periods of exacerbation with abatement of the symptoms with 
change in situational exposure.  No change in current medications.

                                  

 

                     XRAY RIGHT KNEE AND FOOT, LUMBAR SPINE 



 . Plantar fasciitis- pt was educated that this is an inflammatory process, 
need to stretch the foot and reduce inflammation by using a frozen bottle of 
water or a tennis ball on the bottom of the foot at least three times a day 
until the pain is improved.



Right knee pain-low back pain-xray low back and knee to evaluate for acute 
abnormality-samples of pennsaid provided and instructed on use-consider 
referral for PT or  MRI lumbar spine if symptoms do not improve-

                                  

 

                     . Chronic Back pain - the patient was counseled to always 
first attempt to use modalities other than pain medication for alleviation of 
the muscle spasms and pain.  The patient was also encouraged to continue with 
back exercises as previously directed.  Pt is to use pain medication as 
directed.  If pain medications are used inappropriately or early refills are 
requested, the patient understands that  is a breech of trust/contract and 
could result in the patient's termination from this medical practice.

Referral to physical therapy/manipulation of her neck/upper back.              
                    

 

                     . Abdominal pain-mild and improved-likely due to 
constipation-check labs today-cld advance to bland as tolerated-ER over the 
weekend if pain worsens-continue adequate fluids 



Urinary Tract Infection-discussed natural and expected course of this diagnosis 
and to alert me if symptoms do not follow expected course, or if any worse. UA 
positive for infection today in the office-plan to send for culture and will 
call patient with results. RX sent to patient's pharmacy. Avoid tub baths, 
restrictive underwear, etc. Recommend patient start on probiotic while taking 
the antibiotic to prevent diarrhea. Patient verbalized understanding of plan. 



Chronic Anticoagulant use - Pt has been counseled about the anticoagulant, need 
for serial monitoring, and need for the pt to alert the physician as to any new 
bruising, or acute bleeding.  Therapeutic goal for INR is between 2.0 and 3.5.

                                  

 

                     . Hypertension - well controlled - continue with current 
medications, continue with no added salt diet.  Pt has been encouraged to 
exercise daily.

The pt has been advised to call the office if there are any acute concerns 
about change in blood pressure readings at home.



Chronic Depression and anxiety - the pt has symptoms of chronic anxiety and 
depression that have been fairly well controlled since the last office visit.  
The pt has expected periods of exacerbation with abatement of the symptoms with 
change in situational exposure.  No change in current medications.



Chronic Pain Syndrome - pt has chronic pain - has been maintained on current 
medications, has not sought out other medications, only uses PRN pain 
medications as directed, and understands the consequences of over-medication.

                                  

 

                     . Cervical, thoracic, and lumbar back pain-worsening-
numbness and tingling down right arm-will xray cervical and thoracic spine-plan 
for MRI of cervical, thoracic and lumbar spine if indicated and refer to spine 
specialist-patient wants to see Dr Miranda if referred. 

                                  

 

                     . Hypertension - well controlled - continue with current 
medications, continue with no added salt diet.  Pt has been encouraged to 
exercise daily.

The pt has been advised to call the office if there are any acute concerns 
about change in blood pressure readings at home.



Seizure disorder - Depression and Anxiety - continue with current medications - 
symptoms are significantly improved per patient report                         
         

 

                     . Hypertension - well controlled - continue with current 
medications, continue with no added salt diet.  Pt has been encouraged to 
exercise daily.

The pt has been advised to call the office if there are any acute concerns 
about change in blood pressure readings at home.



Rash - RX for injection - kenalog shot

                                  

 

                     . Hypertension - well controlled - continue with current 
medications, continue with no added salt diet.  Pt has been encouraged to 
exercise daily.

The pt has been advised to call the office if there are any acute concerns 
about change in blood pressure readings at home.



Pain - chronic of low back - I have recommended a referral to Dr. Artis - 
for injections.

Refilled muscle relaxers.



Chronic Depression and anxiety - the pt has symptoms of chronic anxiety and 
depression that have been fairly well controlled since the last office visit.  
The pt has expected periods of exacerbation with abatement of the symptoms with 
change in situational exposure.  No change in current medications.



flu shot today

                                  

 

                     . Chest pain, cough - lung sounds clear, but diminished, 
will check chest x-ray - will treat as indicated.  Pt is to notify clinic if 
symptoms do not improve, if they worsen, or with any questions or concerns.    
                               

 

                     . The pt had recently been in the hospital with an INR of 
over 10.  Dr. Cueva manages her coumadin.  Dr. Cueva switched her to eliquis, 
therefore her dilantin was changed to keppra due to medication interactions.  Pt
's mother called the clinic this morning stating that Erin was very agitated, 
anxious, and restless - spoke with Dr. Hurt on the phone and pt was to stop 
the keppra and start trokendi 50mg daily.  Pt and her mother picked up samples 
and spoke to the pharmacy who told her not to take the trokendi because she had 
metabolic acidosis reaction in the past to topamax.  Per other staff report the 
pt and her mother came to the office and demanded to speak to a nurse and did 
not wait in the lobby for a nurse to take her to the back but came back to the 
nurses desk and asked the nurses if they knew who she was.  Pt was then taken 
to a room for a walk in appointment.  Upon entering the room the pt was very 
agitated and upset stating that everyone in this office is an "imbecile and an 
idiot who is trying to kill her." I explained to her that the topamax was 
listed as an "other reaction" and that I was very sorry that it was sent.  The 
patient wanted to know why Dr. Hurt ordered that medication, which I 
explained that it was an alternative to the keppra.  I told her that I had 
called Dr. Hurt on her cell phone and that she was not able to access her 
chart. The pt stated that "doctors can pull records up on their phones". The pt 
and her mother were also very upset that she had been released from the 
hospital after adjusting her medications instead of being monitored.  The pt's 
mother stated that Dr. Hurt had asked the patient why she was depressed 
which made them both very upset.  Erin said that when Dr. Hurt asked her 
about her depression she wanted to "slap her in the face".  The pt's mother 
wanted her to be admitted today.   I explained that medically she was stable 
and did not need to be admitted.  The pt was very upset stating that "it is 
obviously all about money".  Pt stated that she should just "stop her 
medication and die so her mother could isabel for medical malpractice".  The pt 
stated that she would "go to the ER to get another doctor." The pt's mother 
stated that Erin has not slept and has been getting increasingly agitated.  
They agreed to the new orders to start Lamictal per Dr. Hurt.  

                                  

 

                     . Abnormal CT scan - planning on repeat scan to see if 
area on previous scan was truly a mass or just an atypical pneumonia.

Finish out antibiotics as previously directed.

call if having trouble

pt to have PT/INR continued to be monitored by Dr. Cueva                       
           

 

                     . Hypertension - well controlled - continue with current 
medications, continue with no added salt diet.  Pt has been encouraged to 
exercise daily.

The pt has been advised to call the office if there are any acute concerns 
about change in blood pressure readings at home.



History of seizures-check dilantin level 



Factor V Leiden-on coumadin-managed by Dr Cueva 



Chronic Depression and anxiety - the pt has symptoms of chronic anxiety and 
depression that have been fairly well controlled since the last office visit.  
The pt has expected periods of exacerbation with abatement of the symptoms with 
change in situational exposure.  No change in current medications.

                                  

 

                     . Hypertension - well controlled - continue with current 
medications, continue with no added salt diet.  Pt has been encouraged to 
exercise daily.

The pt has been advised to call the office if there are any acute concerns 
about change in blood pressure readings at home.



History of seizures-check dilantin level 



Factor V Leiden-on coumadin-managed by Dr Cueva 



Chronic Depression and anxiety - the pt has symptoms of chronic anxiety and 
depression that have been fairly well controlled since the last office visit.  
The pt has expected periods of exacerbation with abatement of the symptoms with 
change in situational exposure.  No change in current medications.

## 2018-07-20 NOTE — ANESTHESIA-GENERAL POST-OP
General


Post Op Complications


Complications


None





Follow Up Care/Instructions


Patient Instructions


None needed.





Anesthesia/Patient Condition


Patient Condition


Patient is doing well, no complaints, stable vital signs, no apparent adverse 

anesthesia problems.   


No complications reported per nursing.











DONIS ROJO CRNA Jul 20, 2018 14:38

## 2018-07-22 ENCOUNTER — HOSPITAL ENCOUNTER (EMERGENCY)
Dept: HOSPITAL 75 - ER | Age: 43
LOS: 1 days | Discharge: HOME | End: 2018-07-23
Payer: MEDICARE

## 2018-07-22 DIAGNOSIS — Z86.718: ICD-10-CM

## 2018-07-22 DIAGNOSIS — Z90.710: ICD-10-CM

## 2018-07-22 DIAGNOSIS — G40.909: ICD-10-CM

## 2018-07-22 DIAGNOSIS — K21.9: ICD-10-CM

## 2018-07-22 DIAGNOSIS — F41.9: ICD-10-CM

## 2018-07-22 DIAGNOSIS — Z90.89: ICD-10-CM

## 2018-07-22 DIAGNOSIS — G89.18: Primary | ICD-10-CM

## 2018-07-22 DIAGNOSIS — Z87.01: ICD-10-CM

## 2018-07-22 DIAGNOSIS — N39.0: ICD-10-CM

## 2018-07-22 DIAGNOSIS — Z87.448: ICD-10-CM

## 2018-07-22 DIAGNOSIS — Z87.19: ICD-10-CM

## 2018-07-22 DIAGNOSIS — I25.10: ICD-10-CM

## 2018-07-22 DIAGNOSIS — F32.9: ICD-10-CM

## 2018-07-22 DIAGNOSIS — Z88.8: ICD-10-CM

## 2018-07-22 DIAGNOSIS — Z79.01: ICD-10-CM

## 2018-07-22 DIAGNOSIS — I10: ICD-10-CM

## 2018-07-22 DIAGNOSIS — I25.2: ICD-10-CM

## 2018-07-22 DIAGNOSIS — Z82.49: ICD-10-CM

## 2018-07-22 DIAGNOSIS — Z77.22: ICD-10-CM

## 2018-07-22 DIAGNOSIS — E78.00: ICD-10-CM

## 2018-07-22 LAB
ALBUMIN SERPL-MCNC: 3.9 GM/DL (ref 3.2–4.5)
ALP SERPL-CCNC: 51 U/L (ref 40–136)
ALT SERPL-CCNC: 6 U/L (ref 0–55)
BASOPHILS # BLD AUTO: 0 10^3/UL (ref 0–0.1)
BASOPHILS NFR BLD AUTO: 0 % (ref 0–10)
BILIRUB SERPL-MCNC: 1.1 MG/DL (ref 0.1–1)
BUN/CREAT SERPL: 15
CALCIUM SERPL-MCNC: 9.2 MG/DL (ref 8.5–10.1)
CHLORIDE SERPL-SCNC: 105 MMOL/L (ref 98–107)
CO2 SERPL-SCNC: 20 MMOL/L (ref 21–32)
CREAT SERPL-MCNC: 0.82 MG/DL (ref 0.6–1.3)
EOSINOPHIL # BLD AUTO: 0.1 10^3/UL (ref 0–0.3)
EOSINOPHIL NFR BLD AUTO: 1 % (ref 0–10)
ERYTHROCYTE [DISTWIDTH] IN BLOOD BY AUTOMATED COUNT: 11.4 % (ref 10–14.5)
GFR SERPLBLD BASED ON 1.73 SQ M-ARVRAT: > 60 ML/MIN
GLUCOSE SERPL-MCNC: 94 MG/DL (ref 70–105)
HCT VFR BLD CALC: 38 % (ref 35–52)
HGB BLD-MCNC: 13.8 G/DL (ref 11.5–16)
LYMPHOCYTES # BLD AUTO: 1.8 X 10^3 (ref 1–4)
LYMPHOCYTES NFR BLD AUTO: 16 % (ref 12–44)
MANUAL DIFFERENTIAL PERFORMED BLD QL: NO
MCH RBC QN AUTO: 35 PG (ref 25–34)
MCHC RBC AUTO-ENTMCNC: 37 G/DL (ref 32–36)
MCV RBC AUTO: 96 FL (ref 80–99)
MONOCYTES # BLD AUTO: 1.1 X 10^3 (ref 0–1)
MONOCYTES NFR BLD AUTO: 10 % (ref 0–12)
NEUTROPHILS # BLD AUTO: 7.9 X 10^3 (ref 1.8–7.8)
NEUTROPHILS NFR BLD AUTO: 73 % (ref 42–75)
PLATELET # BLD: 209 10^3/UL (ref 130–400)
PMV BLD AUTO: 10.3 FL (ref 7.4–10.4)
POTASSIUM SERPL-SCNC: 3.8 MMOL/L (ref 3.6–5)
PROT SERPL-MCNC: 6.7 GM/DL (ref 6.4–8.2)
RBC # BLD AUTO: 3.93 10^6/UL (ref 4.35–5.85)
SODIUM SERPL-SCNC: 136 MMOL/L (ref 135–145)
WBC # BLD AUTO: 10.8 10^3/UL (ref 4.3–11)

## 2018-07-22 PROCEDURE — 96361 HYDRATE IV INFUSION ADD-ON: CPT

## 2018-07-22 PROCEDURE — 85025 COMPLETE CBC W/AUTO DIFF WBC: CPT

## 2018-07-22 PROCEDURE — 81000 URINALYSIS NONAUTO W/SCOPE: CPT

## 2018-07-22 PROCEDURE — 74177 CT ABD & PELVIS W/CONTRAST: CPT

## 2018-07-22 PROCEDURE — 87088 URINE BACTERIA CULTURE: CPT

## 2018-07-22 PROCEDURE — 36415 COLL VENOUS BLD VENIPUNCTURE: CPT

## 2018-07-22 PROCEDURE — 96374 THER/PROPH/DIAG INJ IV PUSH: CPT

## 2018-07-22 PROCEDURE — 80053 COMPREHEN METABOLIC PANEL: CPT

## 2018-07-22 PROCEDURE — 96375 TX/PRO/DX INJ NEW DRUG ADDON: CPT

## 2018-07-22 NOTE — XMS REPORT
Clinical Summary

 Created on: 2018



Erin Richmond

External Reference #: [id number]

: 1975

Sex: Female



Demographics







 Address  [address] [place]  [address]

 

 Home Phone  [phone]

 

 Preferred Language  English

 

 Marital Status  Unknown

 

 Mandaeism Affiliation  CAT

 

 Race  White

 

 Ethnic Group  Not  or 





Author







 Author  [facility]

 

 Organization  [facility]

 

 Address  Unknown

 

 Phone  Unavailable







Support







 Name  Relationship  Address  Phone

 

 Kelsy Blake  ANGEL  [place]    [phone]







Care Team Providers







 Care Team Member Name  Role  Phone

 

 Milena Nixon MD  PCP  [phone]

 

 Debbie Lovett RN  Unavailable  Unavailable

 

 Jorge Rodriguez MD  Unavailable  Unavailable







Source Comments

Some departments are not documenting in the electronic medical record.  If you 
do not see the information that you expected, contact Release of Information in 
the Health Information Management department at [phone] for further 
assistance in locating additional records.[facility]



Allergies







    



  Active Allergy   Reactions   Severity   Noted Date   Comments

 

    



  Aripiprazole     2008   Panic attacks

 

    



  Prochlorperazine   SHORTNESS OF BREATH,    2008 



  Edisylate   SWELLING   







Current Medications







      



  Prescription   Sig.   Disp.   Refills   Start   End Date   Status



      Date  

 

      



  COUMADIN PO      20    Active



      08  

 

      



  METOPROLOL TARTRATE PO      20    Active



      08  

 

      



  LOVASTATIN PO      20    Active



      08  

 

      



  LEXAPRO PO      20    Active



      08  

 

      



  PEPCID PO      20    Active



      08  

 

      



  aspirin 81 mg chew tablet      20    Active



      08  

 

      



  FOLIC ACID PO      20    Active



      08  

 

      



  M-VIT PO      20    Active



      08  

 

      



  escitalopram (LEXAPRO) 10   Take 1 Tab by mouth   30   0   20    Active



  mg tablet   Daily.     08  

 

      



  escitalopram (LEXAPRO) 10   Take 1 Tab by mouth   30   0   20    Active



  mg tablet   Daily.     08  

 

      



  mirtazapine (REMERON) 15   Take 1 Tab by mouth At   30   0   20    
Active



  mg tablet   Bedtime Daily.     08  







Active Problems







 



  Problem   Noted Date

 

 



  Generalized anxiety disorder   2008







Family History







   



  Medical History   Relation   Name   Comments

 

   



  Depression   Maternal  



   Grandfather  

 

   



  Depression   Mother  

 

   



  Depression   Sister  









   



  Relation   Name   Status   Comments

 

   



  Maternal Grandfather   

 

   



  Mother   

 

   



  Sister   







Social History







    



  Tobacco Use   Types   Packs/Day   Years Used   Date

 

    



  Never Smoker    









   



  Alcohol Use   Drinks/Week   oz/Week   Comments

 

   



  Yes     rare









 



  Sex Assigned at Birth   Date Recorded

 

 



  Not on file 







Last Filed Vital Signs







  



  Vital Sign   Reading   Time Taken

 

  



  Blood Pressure   121/71   2008 11:00 AM CST

 

  



  Pulse   54   2008 11:00 AM CST

 

  



  Temperature   36.6 C (97.9 F)   2008 11:00 AM CST

 

  



  Respiratory Rate   -   -

 

  



  Oxygen Saturation   97%   2008  6:15 PM CST

 

  



  Inhaled Oxygen   -   -



  Concentration  

 

  



  Weight   77 kg (169 lb 12.1 oz)   2008 11:00 PM CST

 

  



  Height   162 cm (5' 3.78")   2008 11:00 PM CST

 

  



  Body Mass Index   29.34   2008 11:00 PM CST







Plan of Treatment







   



  Health Maintenance   Due Date   Last Done   Comments

 

   



  PHYSICAL (COMPREHENSIVE)   1984  



  EXAM   

 

   



  PERTUSSIS VACCINE   1988  

 

   



  HIV SCREENING   1992  

 

   



  TETANUS VACCINE   1994  

 

   



  CERVICAL CANCER SCREENING   2007  

 

   



  BREAST CANCER SCREENING   2017  

 

   



  INFLUENZA VACCINE   10/01/2018  







Results

Not on filefrom Last 3 Months

## 2018-07-22 NOTE — XMS REPORT
Continuity of Care Document

 Created on: 2018



CHIARA AGUIRRE

External Reference #: [id number]

: 1975

Sex: Female



Demographics







 Address  [address] [place]  [address]

 

 Home Phone  [phone] x

 

 Preferred Language  Unknown

 

 Marital Status  Unknown

 

 Advent Affiliation  Unknown

 

 Race  Unknown

 

 Ethnic Group  Unknown





Author







 Author  [facility]

 

 Address  Unknown

 

 Phone  Unavailable



              



Allergies

      





 Active            Description            Code            Type            
Severity            Reaction            Onset            Reported/Identified   
         Relationship to Patient            Clinical Status        

 

 Yes            prochlorperazine            [id number]            Drug Allergy 
           Severe            TONGUE SWELLS                         2007  
                                

 

 Yes            metoclopramide            [id number]            Drug Allergy   
         Unknown            N/A                         2009             
                     

 

 Yes            Topamax                         Drug Allergy            N/A    
        N/A                         2011                                  

 

 Yes            Topamax                         Drug Allergy                   
                                2011                                  

 

 Yes            Compazine                         Drug Allergy            N/A  
          N/A                         2012                               
   

 

 Yes            Compazine                         Drug Allergy                 
                                  2012                                  

 

 Yes            Geodon                         Drug Allergy            N/A     
       N/A                         2014                                  

 

 Yes            topiramate            [id number]            Drug Allergy       
     Unknown            N/A                         2016                 
                 



                                



Medications

      



There is no data.                  



Problems

      





 Date Dx Coded            Attending            Type            Code            
Diagnosis            Diagnosed By        

 

 1208            STACY CAMARGO MD            Ot            M25.511   
         PAIN IN RIGHT SHOULDER                     

 

 1208            STACY CAMARGO MD            Ot            M25.611   
         STIFFNESS OF RIGHT SHOULDER, NOT ELSEWHE                     

 

 1208            STACY CAMARGO MD            Ot            M54.2     
       CERVICALGIA                     

 

 1208            STACY CAMARGO MD            Ot            M54.6     
       PAIN IN THORACIC SPINE                     

 

 1622            STACY CAMARGO MD            Ot            M25.512   
         PAIN IN LEFT SHOULDER                     

 

 2008            ALEJANDRA SALAZAR DO                         296.30      
      MAJOR DEPRESSIVE AFFECTIVE DISORDER RECURRENT EPISODE UNSPECIFIED DEGREE 
                    

 

 2008            ALEJANDRA SALAZAR DO                         300.00      
      AN ANXIETY UNSPEC                     

 

 2008            ALEJANDRA SALAZAR DO                         296.30      
      MAJOR DEPRESSIVE AFFECTIVE DISORDER RECURRENT EPISODE UNSPECIFIED DEGREE 
                    

 

 2008            ALEJANDRA SALAZAR DO                         300.00      
      AN ANXIETY UNSPEC                     

 

 2008            MARIN STROUD                         296.30 
           MAJOR DEPRESSIVE AFFECTIVE DISORDER RECURRENT EPISODE UNSPECIFIED 
DEGREE                     

 

 2008            MARIN STROUD                         300.00 
           AN ANXIETY UNSPEC                     

 

 2008            CHAY BUSTAMANTE MARIN MONTEIRO                         296.30 
           MAJOR DEPRESSIVE AFFECTIVE DISORDER RECURRENT EPISODE UNSPECIFIED 
DEGREE                     

 

 2008            CHAY BUSTAMANTE MARIN MONTEIRO                         300.00 
           AN ANXIETY UNSPEC                     

 

 2008            DAVID MCCAIN DO K                         296.30         
   MAJOR DEPRESSIVE AFFECTIVE DISORDER RECURRENT EPISODE UNSPECIFIED DEGREE    
                 

 

 2008            DAIVD MCCAIN DO K                         300.00         
   AN ANXIETY UNSPEC                     

 

 2008            ALEJANDRA SALAZAR DO                         301.82      
      AVOIDANT PERSONALITY DISORDER                     

 

 2008            ALEJANDRA SALAZAR DO                         307.47      
      SI DYSSOMNIA NOS                     

 

 2008            ALEJANDRA SALAZAR DO                         301.82      
      AVOIDANT PERSONALITY DISORDER                     

 

 2008            ALEJANDRA SALAZAR DO                         307.47      
      SI DYSSOMNIA NOS                     

 

 2008            CHAY BUSTAMANTE MARIN HARRELLH                         301.82 
           AVOIDANT PERSONALITY DISORDER                     

 

 2008            CHAY BUSTAMANTE MARIN MONTEIRO                         307.47 
           SI DYSSOMNIA NOS                     

 

 2008            CHAY BUSTAMANTE MARIN CAYETANO                         301.82 
           AVOIDANT PERSONALITY DISORDER                     

 

 2008            CHAY BUSTAMANTE MARIN MONTEIRO                         307.47 
           SI DYSSOMNIA NOS                     

 

 2008            DAVID MCCAIN DO                         301.82         
   AVOIDANT PERSONALITY DISORDER                     

 

 2008            DAVID MCCAIN DO K                         307.47         
   SI DYSSOMNIA NOS                     

 

 10/28/2008            ALEJANDRA SALAZAR DO                         V58.69      
      MEDICATION HIGH RISK                     

 

 10/28/2008            ALEJANDRA SALAZAR DO                         V58.69      
      MEDICATION HIGH RISK                     

 

 10/28/2008            CHAY BUSTAMANTE MARIN CAYETANO                         V58.69 
           MEDICATION HIGH RISK                     

 

 10/28/2008            CHAY BUSTAMANTE MARIN CAYETANO                         V58.69 
           MEDICATION HIGH RISK                     

 

 10/28/2008            DAVID MCCAIN DO                         V58.69         
   MEDICATION HIGH RISK                     

 

 2008            ALEJANDRA SALAZAR DO F                         300.01      
      AN PANIC DIS W/O AGORA                     

 

 2008            ALEJANDRA SALAZAR DO F                         300.23      
      AN SOCIAL PHOBIA                     

 

 2008            ALEJANDRA SALAZAR DO F                         300.01      
      AN PANIC DIS W/O AGORA                     

 

 2008            ALEJANDRA SALAZAR DO F                         300.23      
      AN SOCIAL PHOBIA                     

 

 2008            MARIN STROUD                         300.01 
           AN PANIC DIS W/O AGORA                     

 

 2008            CARTWRIGHT APRMATMARIN                         300.23 
           AN SOCIAL PHOBIA                     

 

 2008            CARTWRIGHT APRMAT MARIN MONTEIRO                         300.01 
           AN PANIC DIS W/O AGORA                     

 

 2008            CARTWRIGHT APRMAT MARIN MONTEIRO                         300.23 
           AN SOCIAL PHOBIA                     

 

 2008            DAVID MCCAIN DO                         300.01         
   AN PANIC DIS W/O AGORA                     

 

 2008            DAVID MCCAIN DO                         300.23         
   AN SOCIAL PHOBIA                     

 

 2009            ALEJANDRA SALAZAR DO                         300.4       
     MO DYSTHYMIC DISORDER                     

 

 2009            ALEJANDRA SALAZAR DO                         300.4       
     MO DYSTHYMIC DISORDER                     

 

 2009            CARTWRIGHT APRMAT MARIN MONTEIRO                         300.4  
          MO DYSTHYMIC DISORDER                     

 

 2009            CARTWRIGHT APRMAT MARIN MONTEIRO                         300.4  
          MO DYSTHYMIC DISORDER                     

 

 2009            DAVID MCCAIN DO                         300.4          
  MO DYSTHYMIC DISORDER                     

 

 2009            ALEJANDRA SALAZAR DO                         301.6       
     PD DEPENDENT                     

 

 2009            ALEJANDRA SALAZAR DO                         301.6       
     PD DEPENDENT                     

 

 2009            CARTWRIGHT APRMAT MARIN MONTEIRO                         301.6  
          PD DEPENDENT                     

 

 2009            CARTWRIGHT APRMAT MARIN MONTEIRO                         301.6  
          PD DEPENDENT                     

 

 2009            DAVID MCCAIN DO                         301.6          
  PD DEPENDENT                     

 

 2010                         Ot            V58.61                       
           

 

 2010                         Ot            V58.69                       
           

 

 2010                         Ot            V58.83                       
           

 

 2010            ALEJANDRA SALAZAR DO                         296.32      
      MO DEPRESSIVE RECURRENT MODERATE                     

 

 2010            ALEJANDRA SALAZAR DO                         296.32      
      MO DEPRESSIVE RECURRENT MODERATE                     

 

 2010            CARTWRIGHT APRMAT MARIN MONTEIRO                         296.32 
           MO DEPRESSIVE RECURRENT MODERATE                     

 

 2010            CARTWRIGHT DIANNA MARIN MONTEIRO                         296.32 
           MO DEPRESSIVE RECURRENT MODERATE                     

 

 2010            DAVID MCCAIN DO                         296.32         
   MO DEPRESSIVE RECURRENT MODERATE                     

 

 2010                         Ot            V58.61                       
           

 

 2010                         Ot            V58.83                       
           

 

 2010                         Ot            780.39                       
           

 

 2010                         Ot            V58.61                       
           

 

 2010                         Ot            V58.69                       
           

 

 2010                         Ot            V71.4                        
          

 

 2010                         Ot            296.33                       
           

 

 2010                         Ot            V58.61                       
           

 

 2010                         Ot            V58.69                       
           

 

 2010                         Ot            V58.83                       
           

 

 2011                         Ot            296.33            RECUR DEPR 
DISOR-SEVERE                     

 

 2011                         Ot            V58.61            
ANTICOAGULANTS,LT,CURRENT USE                     

 

 2011                         Ot            V58.69            OTH MED,LT,
CURRENT USE                     

 

 2011                         Ot            V58.83            ENCOUNTER 
FOR THERAPEUTIC DRUG MONITORIN                     

 

 2011                         Ot            272.4            
HYPERLIPIDEMIA NEC/NOS                     

 

 2011                         Ot            276.2            ACIDOSIS    
                 

 

 2011                         Ot            288.60            LEUKOCYTOSIS
, UNSPECIFIED                     

 

 2011                         Ot            305.1            TOBACCO USE 
DISORDER                     

 

 2011                         Ot            311            DEPRESSIVE 
DISORDER NEC                     

 

 2011                         Ot            412            OLD MYOCARDIAL 
INFARCT                     

 

 2011                         Ot            414.00            CORON 
ATHEROSCLER NOS TYPE VESSEL, NATIV                     

 

 2011                         Ot            511.0            PLEURISY W/O 
EFFUS OR TB                     

 

 2011                         Ot            786.52            PAINFUL 
RESPIRATION                     

 

 2011                         Ot            V58.61            
ANTICOAGULANTS,LT,CURRENT USE                     

 

 2011                         Ot            V58.69            OTH MED,LT,
CURRENT USE                     

 

 2011                         Ot            300.4            DYSTHYMIC 
DISORDER                     

 

 2011                         Ot            305.1            TOBACCO USE 
DISORDER                     

 

 2011                         Ot            414.00            CORON 
ATHEROSCLER NOS TYPE VESSEL, NATIV                     

 

 2011                         Ot            786.09            RESPIRATORY 
ABNORM NEC                     

 

 2011                         Ot            786.59            CHEST PAIN 
NEC                     

 

 2011                         Ot            V17.49            FAMILY 
HISTORY OF OTHER CARDIOVASCULAR D                     

 

 2011                         Ot            V58.66            LONG-TERM (
CURRENT) USE OF ASPIRIN                     

 

 2011                         Ot            V58.69            OTH MED,LT,
CURRENT USE                     

 

 2011                         Ot            296.33            RECUR DEPR 
DISOR-SEVERE                     

 

 2011                         Ot            V58.61            
ANTICOAGULANTS,LT,CURRENT USE                     

 

 2011                         Ot            V58.69            OTH MED,LT,
CURRENT USE                     

 

 2011                         Ot            V58.83            ENCOUNTER 
FOR THERAPEUTIC DRUG MONITORIN                     

 

 10/21/2011                         Ot            327.23            OBSTRUCTIVE 
SLEEP APNEA (ADULT) (PEDIATR                     

 

 2011                         Ot            296.33            RECUR DEPR 
DISOR-SEVERE                     

 

 2011                         Ot            V58.61            
ANTICOAGULANTS,LT,CURRENT USE                     

 

 2011                         Ot            V58.69            OTH MED,LT,
CURRENT USE                     

 

 2011                         Ot            V58.83            ENCOUNTER 
FOR THERAPEUTIC DRUG MONITORIN                     

 

 2012                         Ot            296.33            RECUR DEPR 
DISOR-SEVERE                     

 

 2012                         Ot            V58.61            
ANTICOAGULANTS,LT,CURRENT USE                     

 

 2012                         Ot            V58.69            OTH MED,LT,
CURRENT USE                     

 

 2012                         Ot            V58.83            ENCOUNTER 
FOR THERAPEUTIC DRUG MONITORIN                     

 

 2013                         Ot            V58.61            
ANTICOAGULANTS,LT,CURRENT USE                     

 

 2013                         Ot            V58.83            ENCOUNTER 
FOR THERAPEUTIC DRUG MONITORIN                     

 

 2013            YONATHAN SORIANO MD            Ot            V58.61      
      ANTICOAGULANTS,LT,CURRENT USE                     

 

 2013            YONATHAN SORIANO MD            Ot            V58.69      
      OTH MED,LT,CURRENT USE                     

 

 2013            YONATHAN SORIANO MD            Ot            V58.83      
      ENCOUNTER FOR THERAPEUTIC DRUG MONITORIN                     

 

 10/22/2013            YONATHAN SORIANO MD            Ot            V58.61      
      ANTICOAGULANTS,LT,CURRENT USE                     

 

 10/22/2013            YONATHAN SORIANO MD            Ot            V58.69      
      OTH MED,LT,CURRENT USE                     

 

 10/22/2013            YONATHAN SORIANO MD            Ot            V58.83      
      ENCOUNTER FOR THERAPEUTIC DRUG MONITORIN                     

 

 2013            BLANCA DOUGLAS MD            Ot            272.4  
          HYPERLIPIDEMIA NEC/NOS                     

 

 2013            BLANCA DOUGLAS MD            Ot            276.8  
          HYPOPOTASSEMIA                     

 

 2013            BLANCA DOUGLAS MD            Ot            286.3  
          BECK DEF CLOT FACTOR NEC                     

 

 2013            BLANCA DOUGLAS MD            Ot            305.1  
          TOBACCO USE DISORDER                     

 

 2013            BLANCA DOUGLAS MD            Ot            311    
        DEPRESSIVE DISORDER NEC                     

 

 2013            BLANCA DOUGLAS MD            Ot            345.90 
           EPILEPSY UNSPEC W/O MENTION INTRACTABLE                      

 

 2013            BLANCA DOUGLAS MD            Ot            412    
        OLD MYOCARDIAL INFARCT                     

 

 2013            BLANCA DOUGLAS MD            Ot            414.01 
           CORONARY ATHEROSCLEROSIS OF NATIVE CORON                     

 

 2013            BLANCA DOUGLAS MD            Ot            620.1  
          CORPUS LUTEUM CYST                     

 

 2013            BLANCA DOUGLAS MD            Ot            625.8  
          FEM GENITAL SYMPTOMS NEC                     

 

 2013            BLANCA DOUGLAS MD            Ot            789.03 
           ABDOMINAL PAIN, RIGHT LOWER QUADRANT                     

 

 2013            BLANCA DOUGLAS MD            Ot            795.39 
           OTHER NONSPEC POS CULTURE FINDINGS                     

 

 2013            BLANCA DOGULAS MD            Ot            V03.82 
           PROPHYLACTIC VACC AGAINST STREPTOCOCCUS                      

 

 2013            BLANCA DOUGLAS MD            Ot            V04.81 
           ND FOR PROPHYLACTIC VACCIN AND INOCULATI                     

 

 2014            YONATHAN SORIANO MD            Ot            V58.61      
      ANTICOAGULANTS,LT,CURRENT USE                     

 

 2014            YONATHAN SORIANO MD            Ot            V58.69      
      OTH MED,LT,CURRENT USE                     

 

 2014            YONATHAN SORIANO MD            Ot            V58.83      
      ENCOUNTER FOR THERAPEUTIC DRUG MONITORIN                     

 

 2014            YONATHAN SORIANO MD            Ot            V58.61      
      ANTICOAGULANTS,LT,CURRENT USE                     

 

 2014            YONATHAN SORIANO MD            Ot            V58.69      
      OTH MED,LT,CURRENT USE                     

 

 2014            YONATHAN SORIANO MD            Ot            V58.83      
      ENCOUNTER FOR THERAPEUTIC DRUG MONITORIN                     

 

 2014            BLANCA DOUGLAS MD            Ot            789.03 
                                 

 

 2015            DAVID MCCAIN DO                         V04.81         
   FLU SHOT                     

 

 2015            DAVID MCCAIN DO                         V06.1          
  TDAP DX                     

 

 2015                         Ot            V58.69                       
           

 

 2015                         Ot            V58.83                       
           

 

 2015                         Ot            272.4                        
          

 

 2015                         Ot            401.9                        
          

 

 2015                         Ot            414.01                       
           

 

 2015                         Ot            272.4                        
          

 

 2015                         Ot            296.33                       
           

 

 2015                         Ot            V58.69                       
           

 

 2015                         Ot            780.2                        
          

 

 2015                         Ot            V58.61                       
           

 

 2015                         Ot            V58.69                       
           

 

 2015                         Ot            414.9                        
          

 

 2015                         Ot            V58.61                       
           

 

 2015                         Ot            V58.66                       
           

 

 2015                         Ot            V58.69                       
           

 

 2015                         Ot            397.0                        
          

 

 2015                         Ot            424.0                        
          

 

 2015                         Ot            786.50                       
           

 

 2015                         Ot            272.4                        
          

 

 2015                         Ot            286.3                        
          

 

 2015                         Ot            401.9                        
          

 

 2015                         Ot            414.00                       
           

 

 2015                         Ot            780.79                       
           

 

 2015                         Ot            786.05                       
           

 

 2015                         Ot            794.39                       
           

 

 2015                         Ot            V58.61                       
           

 

 2015                         Ot            V58.69                       
           

 

 2015                         Ot            V72.63                       
           

 

 2015                         Ot            V72.81                       
           

 

 2015                         Ot            786.05                       
           

 

 2015                         Ot            V58.61                       
           

 

 2015                         Ot            V58.83                       
           

 

 2015                         Ot            V58.61                       
           

 

 2015                         Ot            V58.69                       
           

 

 2015                         Ot            V58.83                       
           

 

 2015            STELLA SOMMER, BLANCA NIEVES            Ot            272.4  
                                

 

 2015            STELLA SOMMER, BLANCA NIEVES            Ot            401.9  
                                

 

 2015            MALINA TORRES            Ot            
272.4                                  

 

 2015            STELLA SOMMER, BLANCA NIEVES            Ot            789.00 
                                 

 

 2015            STELLA SOMMER, BLANCA NIEVES            Ot            789.03 
                                 

 

 2015            BO SOMMER, YONATHAN LINDQUIST            Ot            V58.61      
                            

 

 2015            BO SOMMER, YONATHAN LINDQUIST            Ot            V58.69      
                            

 

 2015            BO SOMMER, YONATHAN LINDQUIST            Ot            V58.83      
                            

 

 2015            BO SOMMER, YONATHAN LINDQUIST            Ot            V58.61      
                            

 

 2015            BO SOMMER, YONATHAN J            Ot            V58.69      
                            

 

 2015            BO SOMMER, YONATHAN J            Ot            V58.83      
                            

 

 2015            BO SOMMER, YONATHAN LINDQUIST            Ot            V58.61      
                            

 

 2015            BO SOMMER, YONATHAN LINDQUIST            Ot            V58.69      
                            

 

 2015            BO SOMMER, YONATHAN LINDQUIST            Ot            V58.83      
                            

 

 2015            BO SOMMER, YONATHAN LINDQUIST            Ot            V58.61      
                            

 

 2015            BO SOMMER, YONATHAN J            Ot            V58.69      
                            

 

 2015            BO SOMMER, YONATHAN J            Ot            V58.83      
                            

 

 2015            BO SOMMER, YONATHAN J            Ot            V58.61      
                            

 

 2015            BO SOMMER, YONATHAN J            Ot            V58.69      
                            

 

 2015            BO SOMMER, YONATHAN LINDQUIST            Ot            V58.83      
                            

 

 2015            BO SOMMER, YONATHAN LINDQUIST            Ot            V58.61      
                            

 

 2015            BO SOMMER, YONATHAN LINDQUIST            Ot            V58.69      
                            

 

 2015            BO SOMMER, YONATHAN J            Ot            V58.83      
                            

 

 2015            BO SOMMER, YONATHAN LINDQUIST            Ot            V58.61      
                            

 

 2015            YONATHAN SORIANO MD            Ot            V58.69      
                            

 

 2015            YONATHAN SORIANO MD            Ot            V58.83      
                            

 

 2015            YONATHAN SORIANO MD            Ot            V58.61      
                            

 

 2015            YONATHAN SORIANO MD            Ot            V58.69      
                            

 

 2015            YONATHAN SORIANO MD            Ot            V58.83      
                            

 

 2015            YONATHAN SORIANO MD            Ot            V58.61      
      ANTICOAGULANTS,LT,CURRENT USE                     

 

 2015            YONATHAN SORIANO MD            Ot            V58.69      
      OTH MED,LT,CURRENT USE                     

 

 2015            YONATHAN SORIANO MD            Ot            V58.83      
      ENCOUNTER FOR THERAPEUTIC DRUG MONITORIN                     

 

 2015                         Ot            272.4                        
          

 

 2015                         Ot            296.33                       
           

 

 2015                         Ot            V58.69                       
           

 

 2015                         Ot            780.2                        
          

 

 2015                         Ot            V58.61                       
           

 

 2015                         Ot            V58.69                       
           

 

 2015                         Ot            414.9                        
          

 

 2015                         Ot            V58.61                       
           

 

 2015                         Ot            V58.66                       
           

 

 2015                         Ot            V58.69                       
           

 

 2015                         Ot            397.0                        
          

 

 2015                         Ot            424.0                        
          

 

 2015                         Ot            786.50                       
           

 

 2015                         Ot            272.4                        
          

 

 2015                         Ot            286.3                        
          

 

 2015                         Ot            401.9                        
          

 

 2015                         Ot            414.00                       
           

 

 2015                         Ot            780.79                       
           

 

 2015                         Ot            786.05                       
           

 

 2015                         Ot            794.39                       
           

 

 2015                         Ot            V58.61                       
           

 

 2015                         Ot            V58.69                       
           

 

 2015                         Ot            V72.63                       
           

 

 2015                         Ot            V72.81                       
           

 

 2015                         Ot            786.05                       
           

 

 2015                         Ot            V58.61                       
           

 

 2015                         Ot            V58.83                       
           

 

 2015                         Ot            V58.61                       
           

 

 2015                         Ot            V58.69                       
           

 

 2015                         Ot            V58.83                       
           

 

 2015            BLANCA DOUGLAS MD            Ot            272.4  
                                

 

 2015            BLANCA DOUGLAS MD            Ot            401.9  
                                

 

 2015            MALINA TORRES            Ot            
272.4                                  

 

 2015            BLANCA DOUGLAS MD            Ot            789.00 
                                 

 

 2015            STELLA SOMMER, BLANCA NIEVES            Ot            789.03 
                                 

 

 2015            BO SOMMER, YONATHAN LINDQUIST            Ot            V58.61      
                            

 

 2015            BO SOMMER, YONATHAN LINDQUIST            Ot            V58.69      
                            

 

 2015            YONATHAN SORIANO MD            Ot            V58.83      
                            

 

 2015            BO SOMMER, YONATHAN LINDQUIST            Ot            V58.61      
                            

 

 2015            BO SOMMER, YONATHAN LINDQUIST            Ot            V58.69      
                            

 

 2015            BO SOMMER, YONATHAN LINDQUIST            Ot            V58.83      
                            

 

 2015            BO SOMMER, YONATHAN LINDQUIST            Ot            V58.61      
                            

 

 2015            BO SOMMER, YONATHAN LINDQUIST            Ot            V58.69      
                            

 

 2015            BO SOMMER, YONATHAN LINDQUIST            Ot            V58.83      
                            

 

 2015            YONATHAN SORIANO MD            Ot            V58.61      
                            

 

 2015            BO SOMMER, YONATHAN LINDQUIST            Ot            V58.69      
                            

 

 2015            BO SOMMER, YONATHAN LINDQUIST            Ot            V58.83      
                            

 

 2015            STACY CAMARGO MD            Ot            719.46    
                              

 

 2015            STACY CAMARGO MD            Ot            724.5     
                             

 

 2015            STACY CAMARGO MD            Ot            729.5     
                             

 

 2015            YONATHAN SORIANO MD            Ot            V58.61      
      ANTICOAGULANTS,LT,CURRENT USE                     

 

 2015            YONATHAN SORIANO MD            Ot            V58.69      
      OTH MED,LT,CURRENT USE                     

 

 2015            YONATHAN SORIANO MD            Ot            V58.83      
      ENCOUNTER FOR THERAPEUTIC DRUG MONITORIN                     

 

 10/06/2015            STACY CAMARGO MD            Ot            719.46    
                              

 

 10/06/2015            STACY CAMARGO MD            Ot            724.5     
                             

 

 10/06/2015            STACY CAMARGO MD            Ot            729.5     
                             

 

 2015            DARRIAN FARMER ARNP            Ot            M54.2 
                                 

 

 2015            DARRIAN FARMER ARNP            Ot            M54.6 
                                 

 

 2015            DARRIAN FARMER ARNP            Ot            M50.20
                                  

 

 2015            DARRIAN FARMER ARNP            Ot            M51.24
                                  

 

 2015                         Ot            272.4                        
          

 

 2015                         Ot            296.33                       
           

 

 2015                         Ot            V58.69                       
           

 

 2015                         Ot            780.2                        
          

 

 2015                         Ot            V58.61                       
           

 

 2015                         Ot            V58.69                       
           

 

 2015                         Ot            414.9                        
          

 

 2015                         Ot            V58.61                       
           

 

 2015                         Ot            V58.66                       
           

 

 2015                         Ot            V58.69                       
           

 

 2015                         Ot            397.0                        
          

 

 2015                         Ot            424.0                        
          

 

 2015                         Ot            786.50                       
           

 

 2015                         Ot            272.4                        
          

 

 2015                         Ot            286.3                        
          

 

 2015                         Ot            401.9                        
          

 

 2015                         Ot            414.00                       
           

 

 2015                         Ot            780.79                       
           

 

 2015                         Ot            786.05                       
           

 

 2015                         Ot            794.39                       
           

 

 2015                         Ot            V58.61                       
           

 

 2015                         Ot            V58.69                       
           

 

 2015                         Ot            V72.63                       
           

 

 2015                         Ot            V72.81                       
           

 

 2015                         Ot            786.05                       
           

 

 2015                         Ot            V58.61                       
           

 

 2015                         Ot            V58.83                       
           

 

 2015                         Ot            V58.61                       
           

 

 2015                         Ot            V58.69                       
           

 

 2015                         Ot            V58.83                       
           

 

 2015            STELLA SOMMER, BLANCA NIEVES            Ot            272.4  
                                

 

 2015            STELLA SOMMER, BLANCA NIEVES            Ot            401.9  
                                

 

 2015            MALINA TORRES            Ot            
272.4                                  

 

 2015            STELLA SOMMER, BLANCA NIEVES            Ot            789.00 
                                 

 

 2015            STELLA SOMMER, BLANCA NIEVES            Ot            789.03 
                                 

 

 2015            MARY JO SOMMER, STACY DOVE            Ot            719.46    
                              

 

 2015            MARY JO SOMMER, STACY DOVE            Ot            724.5     
                             

 

 2015            MARY JO SOMMER, STACY DOVE            Ot            729.5     
                             

 

 2015            YONATHAN SORIANO MD            Ot            V58.61      
                            

 

 2015            YONATHAN SORIANO MD            Ot            V58.69      
                            

 

 2015            YONATHAN SORIANO MD            Ot            V58.83      
                            

 

 2015            YONATHAN SORIANO MD            Ot            E78.2       
                           

 

 2015            YONATHAN SORIANO MD            Ot            I10         
                         

 

 2015            YONATHAN SORIANO MD            Ot            I25.10      
                            

 

 2015            YONATHAN SORIANO MD            Ot            I65.23      
                            

 

 2015            DARRIAN FARMERP            Ot            M54.2 
                                 

 

 2015            DARRIAN FARMERP            Ot            M54.6 
                                 

 

 2015            DARRIAN FARMER ARNP            Ot            M50.20
                                  

 

 2015            DARRIAN FARMERP            Ot            M51.24
                                  

 

 2015            YONATHAN SORIANO MD            Ot            E78.2       
                           

 

 2015            YONATHAN SORIANO MD            Ot            I10         
                         

 

 2015            YONATHAN SORIANO MD            Ot            I25.10      
                            

 

 2015            YONATHAN SORIANO MD            Ot            I65.23      
                            

 

 2015            DARRIAN FARMERP            Ot            M54.2 
                                 

 

 2015            DARRIAN FARMERP            Ot            M54.6 
                                 

 

 2015            DARRIAN FARMERP            Ot            M50.20
                                  

 

 2015            DARRIAN FARMERP            Ot            M51.24
                                  

 

 2015            YONATHAN SORIANO MD            Ot            E78.2       
                           

 

 2015            YONATHAN SORIANO MD            Ot            I10         
                         

 

 2015            YONATHAN SORIANO MD            Ot            I25.10      
                            

 

 2015            YONATHAN SORIANO MD            Ot            I65.23      
                            

 

 2015                         Ot            272.4                        
          

 

 2015                         Ot            296.33                       
           

 

 2015                         Ot            V58.69                       
           

 

 2015                         Ot            780.2                        
          

 

 2015                         Ot            V58.61                       
           

 

 2015                         Ot            V58.69                       
           

 

 2015                         Ot            414.9                        
          

 

 2015                         Ot            V58.61                       
           

 

 2015                         Ot            V58.66                       
           

 

 2015                         Ot            V58.69                       
           

 

 2015                         Ot            397.0                        
          

 

 2015                         Ot            424.0                        
          

 

 2015                         Ot            786.50                       
           

 

 2015                         Ot            272.4                        
          

 

 2015                         Ot            286.3                        
          

 

 2015                         Ot            401.9                        
          

 

 2015                         Ot            414.00                       
           

 

 2015                         Ot            780.79                       
           

 

 2015                         Ot            786.05                       
           

 

 2015                         Ot            794.39                       
           

 

 2015                         Ot            V58.61                       
           

 

 2015                         Ot            V58.69                       
           

 

 2015                         Ot            V72.63                       
           

 

 2015                         Ot            V72.81                       
           

 

 2015                         Ot            786.05                       
           

 

 2015                         Ot            V58.61                       
           

 

 2015                         Ot            V58.83                       
           

 

 2015                         Ot            V58.61                       
           

 

 2015                         Ot            V58.69                       
           

 

 2015                         Ot            V58.83                       
           

 

 2015            STELLA SOMMER, BLANCA NIEVES            Ot            272.4  
                                

 

 2015            STELLA SOMMER, BLANCA NIEVES            Ot            401.9  
                                

 

 2015            RUDY CHERY MALINA K            Ot            
272.4                                  

 

 2015            STELLA SOMMER, BLANCA NIEVES            Ot            789.00 
                                 

 

 2015            STELLA SOMMER, BLANCA NIEVES            Ot            789.03 
                                 

 

 2015            MARY JO SOMMER, STACY DOVE            Ot            719.46    
                              

 

 2015            MARY JO SOMMER, STACY DOVE            Ot            724.5     
                             

 

 2015            MARY JO SOMMER, STACY DOVE            Ot            729.5     
                             

 

 2015            BO SOMMER, YONATHAN LINDQUIST            Ot            V58.61      
                            

 

 2015            BO SOMMER, YONATHAN LINDQUIST            Ot            V58.69      
                            

 

 2015            BO SOMMER, YONATHAN J            Ot            V58.83      
                            

 

 2015            BO SOMMER, YONATHAN LINDQUIST            Ot            E78.2       
                           

 

 2015            BO SOMMER, YONATHAN LINDQUIST            Ot            I10         
                         

 

 2015            BO SOMMER, YONATHAN LINDQUIST            Ot            I25.10      
                            

 

 2015            BO SOMMER, YONATHAN LINDQUIST            Ot            I65.23      
                            

 

 2015            DARRIAN FARMER ARNP            Ot            M54.2 
                                 

 

 2015            DARRIAN FARMER ARNP            Ot            M54.6 
                                 

 

 2015            DARRIAN FARMER ARNP            Ot            M50.20
                                  

 

 2015            DARRIAN FARMER ARNP            Ot            M51.24
                                  

 

 2015            MARY JO SOMMER, STACY DOVE            Ot            M40.204   
                               

 

 2015            MARY JO SOMMER, STACY DOVE            Ot            M54.10    
                              

 

 2015            STACY CAMARGO MD            Ot            M54.2     
                             

 

 2015            STACY CAMARGO MD            Ot            M54.6     
                             

 

 2016            STACY CAMARGO MD            Ot            M40.204   
                               

 

 2016            STACY CAMARGO MD            Ot            M54.10    
                              

 

 2016            MARY JO SOMMER, STACY DOVE            Ot            M54.2     
                             

 

 2016            MARY JO SOMMER, STACY DOVE            Ot            M54.6     
                             

 

 2016                         Ot            R31.9                        
          

 

 2016            MARY JO SOMMER, STACY DOVE            Ot            M40.204   
                               

 

 2016            MARY JO SOMMER, STACY DOVE            Ot            M54.10    
                              

 

 2016            MAR YJO SOMMER, STACY DOVE            Ot            M54.2     
                             

 

 2016            MARY JO SOMMER, STACY DOVE            Ot            M54.6     
                             

 

 2016                         Ot            R31.9                        
          

 

 2016                         Ot            272.4                        
          

 

 2016                         Ot            296.33                       
           

 

 2016                         Ot            V58.69                       
           

 

 2016                         Ot            780.2                        
          

 

 2016                         Ot            V58.61                       
           

 

 2016                         Ot            V58.69                       
           

 

 2016                         Ot            414.9                        
          

 

 2016                         Ot            V58.61                       
           

 

 2016                         Ot            V58.66                       
           

 

 2016                         Ot            V58.69                       
           

 

 2016                         Ot            397.0                        
          

 

 2016                         Ot            424.0                        
          

 

 2016                         Ot            786.50                       
           

 

 2016                         Ot            272.4                        
          

 

 2016                         Ot            286.3                        
          

 

 2016                         Ot            401.9                        
          

 

 2016                         Ot            414.00                       
           

 

 2016                         Ot            780.79                       
           

 

 2016                         Ot            786.05                       
           

 

 2016                         Ot            794.39                       
           

 

 2016                         Ot            V58.61                       
           

 

 2016                         Ot            V58.69                       
           

 

 2016                         Ot            V72.63                       
           

 

 2016                         Ot            V72.81                       
           

 

 2016                         Ot            786.05                       
           

 

 2016                         Ot            V58.61                       
           

 

 2016                         Ot            V58.83                       
           

 

 2016                         Ot            V58.61                       
           

 

 2016                         Ot            V58.69                       
           

 

 2016                         Ot            V58.83                       
           

 

 2016            STELLA SOMMER, BLANCA NIEVES            Ot            272.4  
                                

 

 2016            STELLA SOMMER, BLANCA NIEVES            Ot            401.9  
                                

 

 2016            MALINA TORRES            Ot            
272.4                                  

 

 2016            STELLA SOMMER, BLANCA NIEVES            Ot            789.00 
                                 

 

 2016            STELLA SOMMER, BLANCA M            Ot            789.03 
                                 

 

 2016            MARY JO SOMMER, STACY DOVE            Ot            719.46    
                              

 

 2016            MARY JO SOMMER, STACY DOVE            Ot            724.5     
                             

 

 2016            MARY JO SOMMER, STACY DOVE            Ot            729.5     
                             

 

 2016            BO SOMMER, YONATHAN LINDQUIST            Ot            V58.61      
                            

 

 2016            BO SOMMER, YONATHAN LINDQUIST            Ot            V58.69      
                            

 

 2016            BO SOMMER, YONATHAN LINDQUIST            Ot            V58.83      
                            

 

 2016            BO SOMMER, YONATHAN LINDQUIST            Ot            E78.2       
                           

 

 2016            BO SOMMER, YONATHAN LINDQUIST            Ot            I10         
                         

 

 2016            BO SOMMER, YONATHAN LINDQUIST            Ot            I25.10      
                            

 

 2016            BO SOMMER, YONATHAN LINDQUIST            Ot            I65.23      
                            

 

 2016            DARRIAN FARMER ARNP            Ot            M54.2 
                                 

 

 2016            DARRIAN FARMER ARNP            Ot            M54.6 
                                 

 

 2016            DARRIAN FARMER ARNP            Ot            M50.20
                                  

 

 2016            ADRRIAN FARMER ARNP            Ot            M51.24
                                  

 

 2016            MARY JO SOMMER, STACY DOVE            Ot            M40.204   
                               

 

 2016            MARY JO SOMMER, STACY DOVE            Ot            M54.10    
                              

 

 2016            MARY JO SOMMER, STACY DOVE            Ot            M54.2     
                             

 

 2016            MARY JO SOMMER, STACY DOVE            Ot            M54.6     
                             

 

 2016                         Ot            R31.9                        
          

 

 2016            BO SOMMER, YONATHAN LINDQUIST            Ot            V58.61      
                            

 

 2016            BO SOMMER, YONATHAN LINDQUIST            Ot            V58.69      
                            

 

 2016            BO SOMMER, YONATHAN LINDQUIST            Ot            V58.83      
                            

 

 2016            MARY JO SOMMER, STACY DOVE            Ot            M40.204   
         UNSPECIFIED KYPHOSIS, THORACIC REGION                     

 

 2016            MARY JO SOMMER, STACY DOVE            Ot            M54.10    
        RADICULOPATHY, SITE UNSPECIFIED                     

 

 2016            MARY JO SOMMER, STACY DOVE            Ot            M54.2     
       CERVICALGIA                     

 

 2016            STACY CAMARGO MD            Ot            M54.6     
       PAIN IN THORACIC SPINE                     

 

 2016            YONATHAN SORIANO MD            Ot            V58.61      
                            

 

 2016            YONATHAN SORIANO MD            Ot            V58.69      
                            

 

 2016            YONATHAN SORIANO MD, Ot            V58.83      
                            

 

 2016            ENDER WEEMSUMA S            Ot            N94.9     
                             

 

 2016            KATIEUMA BARRETT DO            Ot            N94.9     
                             

 

 2016            YONATHAN SORIANO MD, Ot            D68.2       
     HEREDITARY DEFICIENCY OF OTHER CLOTTING                      

 

 2016            YONATHAN SORIANO MD, Ot            Z79.01      
      LONG TERM (CURRENT) USE OF ANTICOAGULANT                     

 

 2016            YONATHAN SORIANO MD, Ot            Z79.899     
       OTHER LONG TERM (CURRENT) DRUG THERAPY                     

 

 2016            YONATHAN SORIANO MD, Ot            D68.2       
     HEREDITARY DEFICIENCY OF OTHER CLOTTING                      

 

 2016            YONATHAN SORIANO MD, Ot            Z79.01      
      LONG TERM (CURRENT) USE OF ANTICOAGULANT                     

 

 2016            YONATHAN SORIANO MD, Ot            Z79.899     
       OTHER LONG TERM (CURRENT) DRUG THERAPY                     

 

 2016            YONATHAN SORIANO MD, Ot            D68.2       
     HEREDITARY DEFICIENCY OF OTHER CLOTTING                      

 

 2016            YONATHAN SORIANO MD, Ot            Z79.01      
      LONG TERM (CURRENT) USE OF ANTICOAGULANT                     

 

 2016            YONATHAN SORIANO MD, Ot            Z79.899     
       OTHER LONG TERM (CURRENT) DRUG THERAPY                     

 

 2016            MALINA TORRES            Ot            
272.4            HYPERLIPIDEMIA NEC/NOS                     

 

 2016            YONATHAN SORIANO MD, Ot            D68.2       
     HEREDITARY DEFICIENCY OF OTHER CLOTTING                      

 

 2016            YONATHAN SORIANO MD, Ot            Z79.01      
      LONG TERM (CURRENT) USE OF ANTICOAGULANT                     

 

 2016            YONATHAN SORIANO MD, Ot            Z79.899     
       OTHER LONG TERM (CURRENT) DRUG THERAPY                     

 

 2016                         Ot            V58.61            
ANTICOAGULANTS,LT,CURRENT USE                     

 

 2016                         Ot            V58.83            ENCOUNTER 
FOR THERAPEUTIC DRUG MONITORIN                     

 

 2016                         Ot            V58.61            
ANTICOAGULANTS,LT,CURRENT USE                     

 

 2016                         Ot            V58.69            OTH MED,LT,
CURRENT USE                     

 

 2016                         Ot            V58.83            ENCOUNTER 
FOR THERAPEUTIC DRUG MONITORIN                     

 

 2016            YONATHAN SORIANO MD, Ot            D68.2       
     HEREDITARY DEFICIENCY OF OTHER CLOTTING                      

 

 2016            YONATHAN SORIANO MD, Ot            Z79.01      
      LONG TERM (CURRENT) USE OF ANTICOAGULANT                     

 

 2016            YONATHAN SORIANO MD, Ot            Z79.899     
       OTHER LONG TERM (CURRENT) DRUG THERAPY                     

 

 2016            JUDD ANDERSON MD            Ot            F17.210 
           NICOTINE DEPENDENCE, CIGARETTES, UNCOMPL                     

 

 2016            JUDD ANDERSON MD            Ot            I10     
       ESSENTIAL (PRIMARY) HYPERTENSION                     

 

 2016            JUDD ANDERSON MD            Ot            R11.2   
         NAUSEA WITH VOMITING, UNSPECIFIED                     

 

 2016            JUDD ANDERSON MD            Ot            R51     
       HEADACHE                     

 

 2016            JUDD ANDERSON MD            Ot            I10     
       ESSENTIAL (PRIMARY) HYPERTENSION                     

 

 2016            JUDD ANDERSON MD            Ot            R11.2   
         NAUSEA WITH VOMITING, UNSPECIFIED                     

 

 2016            JUDD ANDERSON MD            Ot            R51     
       HEADACHE                     

 

 2016            JUDD ANDERSON MD            Ot            F17.210 
           NICOTINE DEPENDENCE, CIGARETTES, UNCOMPL                     

 

 2016            JUDD ANDERSON MD            Ot            I10     
       ESSENTIAL (PRIMARY) HYPERTENSION                     

 

 2016            JUDD ANDERSON MD            Ot            R11.2   
         NAUSEA WITH VOMITING, UNSPECIFIED                     

 

 2016            JUDD ANDERSON MD            Ot            R51     
       HEADACHE                     

 

 2016            YONATHAN SORIANO MD            Ot            D68.2       
     HEREDITARY DEFICIENCY OF OTHER CLOTTING                      

 

 2016            YONATHAN SORIANO MD            Ot            Z79.01      
      LONG TERM (CURRENT) USE OF ANTICOAGULANT                     

 

 2016            YONATHAN SORIANO MD            Ot            Z79.899     
       OTHER LONG TERM (CURRENT) DRUG THERAPY                     

 

 2016            REINA LUO            Ot            R63.4     
       ABNORMAL WEIGHT LOSS                     

 

 2016            YONATHAN SORIANO MD            Ot            D68.2       
     HEREDITARY DEFICIENCY OF OTHER CLOTTING                      

 

 2016            YONATHAN SORIANO MD            Ot            Z79.01      
      LONG TERM (CURRENT) USE OF ANTICOAGULANT                     

 

 2016            YONATHAN SORIANO MD            Ot            Z79.899     
       OTHER LONG TERM (CURRENT) DRUG THERAPY                     

 

 2016            YONATHAN SORIANO MD            Ot            D68.2       
     HEREDITARY DEFICIENCY OF OTHER CLOTTING                      

 

 2016            YONATHAN SORIANO MD            Ot            Z79.01      
      LONG TERM (CURRENT) USE OF ANTICOAGULANT                     

 

 2016            YONATHAN SORIANO MD            Ot            Z79.899     
       OTHER LONG TERM (CURRENT) DRUG THERAPY                     

 

 2016            VALERIO, REINA M APRN            Ot            R07.9     
       CHEST PAIN, UNSPECIFIED                     

 

 2016            REINA LUO APRN            Ot            R63.4     
       ABNORMAL WEIGHT LOSS                     

 

 2016            REINA LUO APRN            Ot            R07.9     
       CHEST PAIN, UNSPECIFIED                     

 

 2016            REINA LUO APRN            Ot            R63.4     
       ABNORMAL WEIGHT LOSS                     

 

 2016            REINA LUO APRN            Ot            R07.9     
       CHEST PAIN, UNSPECIFIED                     

 

 2016            REINA LUO APRN            Ot            R63.4     
       ABNORMAL WEIGHT LOSS                     

 

 2016                         Ot            D68.51            ACTIVATED 
PROTEIN C RESISTANCE                     

 

 2016                         Ot            E78.2            MIXED 
HYPERLIPIDEMIA                     

 

 2016                         Ot            I10            ESSENTIAL (
PRIMARY) HYPERTENSION                     

 

 2016                         Ot            I25.10            ATHSCL 
HEART DISEASE OF NATIVE CORONARY                      

 

 2016                         Ot            I65.23            OCCLUSION 
AND STENOSIS OF BILATERAL [place]                     

 

 2016                         Ot            414.9            CHR ISCHEMIC 
HRT DIS NOS                     

 

 2016                         Ot            V58.61            
ANTICOAGULANTS,LT,CURRENT USE                     

 

 2016                         Ot            V58.66            LONG-TERM (
CURRENT) USE OF ASPIRIN                     

 

 2016                         Ot            V58.69            OTH MED,LT,
CURRENT USE                     

 

 2016                         Ot            397.0            TRICUSPID 
VALVE DISEASE                     

 

 2016                         Ot            424.0            MITRAL VALVE 
DISORDER                     

 

 2016                         Ot            786.50            CHEST PAIN 
NOS                     

 

 2016                         Ot            272.4            
HYPERLIPIDEMIA NEC/NOS                     

 

 2016                         Ot            286.3            BECK DEF 
CLOT FACTOR NEC                     

 

 2016                         Ot            401.9            HYPERTENSION 
NOS                     

 

 2016                         Ot            414.00            CORON 
ATHEROSCLER NOS TYPE VESSEL, NATIV                     

 

 2016                         Ot            780.79            OTH MALAISE 
FATIGUE                     

 

 2016                         Ot            786.05            SHORTNESS 
OF BREATH                     

 

 2016                         Ot            794.39            ABN 
CARDIOVASC STUDY NEC                     

 

 2016                         Ot            V58.61            
ANTICOAGULANTS,LT,CURRENT USE                     

 

 2016                         Ot            V58.69            OTH MED,LT,
CURRENT USE                     

 

 2016                         Ot            V72.63            PRE-
PROCEDURAL LABORATORY EXAMINATION                     

 

 2016                         Ot            V72.81            EXAM-PRE-
OPERATIVE CARDIOVASCULAR                     

 

 2016                         Ot            786.05            SHORTNESS 
OF BREATH                     

 

 2016                         Ot            V58.61            
ANTICOAGULANTS,LT,CURRENT USE                     

 

 2016                         Ot            V58.83            ENCOUNTER 
FOR THERAPEUTIC DRUG MONITORIN                     

 

 2016                         Ot            V58.61            
ANTICOAGULANTS,LT,CURRENT USE                     

 

 2016                         Ot            V58.69            OTH MED,LT,
CURRENT USE                     

 

 2016                         Ot            V58.83            ENCOUNTER 
FOR THERAPEUTIC DRUG MONITORIN                     

 

 2016            BLANCA DOUGLAS MD            Ot            272.4  
          HYPERLIPIDEMIA NEC/NOS                     

 

 2016            BLANCA DOUGLAS MD            Ot            401.9  
          HYPERTENSION NOS                     

 

 2016            MALINA TORRES            Ot            
272.4            HYPERLIPIDEMIA NEC/NOS                     

 

 2016            BLANCA DOUGLAS MD            Ot            789.00 
           ABDOMINAL PAIN, UNSPECIFIED SITE                     

 

 2016            BLANCA DOUGLAS MD            Ot            789.03 
           ABDOMINAL PAIN, RIGHT LOWER QUADRANT                     

 

 2016            MARY JO SOMMER, STACY DOVE            Ot            719.46    
        JOINT PAIN-L/LEG                     

 

 2016            STACY CAMARGO MD            Ot            724.5     
       BACKACHE NOS                     

 

 2016            STACY CAMARGO MD            Ot            729.5     
       PAIN IN LIMB                     

 

 2016            YONATHAN SORIANO MD            Ot            E78.2       
     MIXED HYPERLIPIDEMIA                     

 

 2016            YONATHAN SORIANO MD            Ot            I10         
   ESSENTIAL (PRIMARY) HYPERTENSION                     

 

 2016            YONATHAN SORIANO MD            Ot            I25.10      
      ATHSCL HEART DISEASE OF NATIVE CORONARY                      

 

 2016            YONATHAN SORIANO MD            Ot            I65.23      
      OCCLUSION AND STENOSIS OF BILATERAL [place]                     

 

 2016            DARRIAN FARMERP            Ot            M54.2 
           CERVICALGIA                     

 

 2016            DARRIAN FARMER            Ot            M54.6 
           PAIN IN THORACIC SPINE                     

 

 2016            DARRIAN FARMERP            Ot            M50.20
            OTHER CERVICAL DISC DISPLACEMENT, UNSP C                     

 

 2016            DARRIAN FARMERP            Ot            M51.24
            OTHER INTERVERTEBRAL DISC DISPLACEMENT,                      

 

 2016                         Ot            R31.9            HEMATURIA, 
UNSPECIFIED                     

 

 2016            UMA HANDLEY DO            Ot            N94.9     
       UNSP COND ASSOC W FEMALE GENITAL ORGANS                      

 

 2016            YONATHAN SORIANO MD            Ot            D68.2       
     HEREDITARY DEFICIENCY OF OTHER CLOTTING                      

 

 2016            YONATHAN SORIANO MD            Ot            Z79.01      
      LONG TERM (CURRENT) USE OF ANTICOAGULANT                     

 

 2016            YONATHAN SORIANO MD, Ot            Z79.899     
       OTHER LONG TERM (CURRENT) DRUG THERAPY                     

 

 2016            REINA LUO            Ot            R07.9     
       CHEST PAIN, UNSPECIFIED                     

 

 2016            REINA LUO            Ot            R63.4     
       ABNORMAL WEIGHT LOSS                     

 

 2016                         Ot            D68.51            ACTIVATED 
PROTEIN C RESISTANCE                     

 

 2016                         Ot            E78.2            MIXED 
HYPERLIPIDEMIA                     

 

 2016                         Ot            I10            ESSENTIAL (
PRIMARY) HYPERTENSION                     

 

 2016                         Ot            I25.10            ATHSCL 
HEART DISEASE OF NAT[place] CORONARY                      

 

 2016                         Ot            I65.23            OCCLUSION 
AND STENOSIS OF BILATERAL [place]                     

 

 2016            STACY CAMARGO MD            Ot            M25.511   
         PAIN IN RIGHT SHOULDER                     

 

 2016            STACY CAMARGO MD            Ot            M25.611   
         STIFFNESS OF RIGHT SHOULDER, NOT ELSEWHE                     

 

 2016            STACY CAMARGO MD            Ot            M54.2     
       CERVICALGIA                     

 

 2016            STACY CAMARGO MD            Ot            M54.6     
       PAIN IN THORACIC SPINE                     

 

 2016            YONATHAN SORIANO MD, Ot            D68.2       
     HEREDITARY DEFICIENCY OF OTHER CLOTTING                      

 

 2016            YONATHAN SORIANO MD            Ot            Z79.01      
      LONG TERM (CURRENT) USE OF ANTICOAGULANT                     

 

 2016            YONATHAN SORIANO MD, Ot            Z79.899     
       OTHER LONG TERM (CURRENT) DRUG THERAPY                     

 

 2016            YONATHAN SORIANO MD, Ot            D68.2       
     HEREDITARY DEFICIENCY OF OTHER CLOTTING                      

 

 2016            YONATHAN SORIANO MD, Ot            Z79.01      
      LONG TERM (CURRENT) USE OF ANTICOAGULANT                     

 

 2016            YONATHAN SORIANO MD, Ot            Z79.899     
       OTHER LONG TERM (CURRENT) DRUG THERAPY                     

 

 10/03/2016                         Ot            D68.51            ACTIVATED 
PROTEIN C RESISTANCE                     

 

 10/03/2016                         Ot            E78.2            MIXED 
HYPERLIPIDEMIA                     

 

 10/03/2016                         Ot            I10            ESSENTIAL (
PRIMARY) HYPERTENSION                     

 

 10/03/2016                         Ot            I25.10            ATHSCL 
HEART DISEASE OF [place] CORONARY                      

 

 10/03/2016                         Ot            I65.23            OCCLUSION 
AND STENOSIS OF BILATERAL [place]                     

 

 10/04/2016            YONATHAN SORIANO MD, Ot            D68.2       
     HEREDITARY DEFICIENCY OF OTHER CLOTTING                      

 

 10/04/2016            YONATHAN SORIANO MD, Ot            Z79.01      
      LONG TERM (CURRENT) USE OF ANTICOAGULANT                     

 

 10/04/2016            YONATHAN SORIANO MD, Ot            Z79.899     
       OTHER LONG TERM (CURRENT) DRUG THERAPY                     

 

 10/14/2016                         Ot            D68.51            ACTIVATED 
PROTEIN C RESISTANCE                     

 

 10/14/2016                         Ot            E78.2            MIXED 
HYPERLIPIDEMIA                     

 

 10/14/2016                         Ot            I10            ESSENTIAL (
PRIMARY) HYPERTENSION                     

 

 10/14/2016                         Ot            I25.10            ATHSCL 
HEART DISEASE OF [place] CORONARY                      

 

 10/14/2016                         Ot            I65.23            OCCLUSION 
AND STENOSIS OF BILATERAL [place]                     

 

 10/27/2016                         Ot            414.9            CHR ISCHEMIC 
HRT DIS NOS                     

 

 10/27/2016                         Ot            V58.61            
ANTICOAGULANTS,LT,CURRENT USE                     

 

 10/27/2016                         Ot            V58.66            LONG-TERM (
CURRENT) USE OF ASPIRIN                     

 

 10/27/2016                         Ot            V58.69            OTH MED,LT,
CURRENT USE                     

 

 10/27/2016                         Ot            397.0            TRICUSPID 
VALVE DISEASE                     

 

 10/27/2016                         Ot            424.0            MITRAL VALVE 
DISORDER                     

 

 10/27/2016                         Ot            786.50            CHEST PAIN 
NOS                     

 

 10/27/2016                         Ot            272.4            
HYPERLIPIDEMIA NEC/NOS                     

 

 10/27/2016                         Ot            286.3            BECK DEF 
CLOT FACTOR NEC                     

 

 10/27/2016                         Ot            401.9            HYPERTENSION 
NOS                     

 

 10/27/2016                         Ot            414.00            CORON 
ATHEROSCLER NOS TYPE VESSEL, NATIV                     

 

 10/27/2016                         Ot            780.79            OTH MALAISE 
FATIGUE                     

 

 10/27/2016                         Ot            786.05            SHORTNESS 
OF BREATH                     

 

 10/27/2016                         Ot            794.39            ABN 
CARDIOVASC STUDY NEC                     

 

 10/27/2016                         Ot            V58.61            
ANTICOAGULANTS,LT,CURRENT USE                     

 

 10/27/2016                         Ot            V58.69            OTH MED,LT,
CURRENT USE                     

 

 10/27/2016                         Ot            V72.63            PRE-
PROCEDURAL LABORATORY EXAMINATION                     

 

 10/27/2016                         Ot            V72.81            EXAM-PRE-
OPERATIVE CARDIOVASCULAR                     

 

 10/27/2016                         Ot            786.05            SHORTNESS 
OF BREATH                     

 

 10/27/2016                         Ot            V58.61            
ANTICOAGULANTS,LT,CURRENT USE                     

 

 10/27/2016                         Ot            V58.83            ENCOUNTER 
FOR THERAPEUTIC DRUG MONITORIN                     

 

 10/27/2016                         Ot            V58.61            
ANTICOAGULANTS,LT,CURRENT USE                     

 

 10/27/2016                         Ot            V58.69            OTH MED,LT,
CURRENT USE                     

 

 10/27/2016                         Ot            V58.83            ENCOUNTER 
FOR THERAPEUTIC DRUG MONITORIN                     

 

 10/27/2016            STELLA SOMMER, BLANCA NIEVES            Ot            272.4  
          HYPERLIPIDEMIA NEC/NOS                     

 

 10/27/2016            BLANCA DOUGLAS MD            Ot            401.9  
          HYPERTENSION NOS                     

 

 10/27/2016            MONTGOMERYMALINA TITUS            Ot            
272.4            HYPERLIPIDEMIA NEC/NOS                     

 

 10/27/2016            STELLA SOMMRE, BLANCA NIEVES            Ot            789.00 
           ABDOMINAL PAIN, UNSPECIFIED SITE                     

 

 10/27/2016            STELLA SOMMER, BLANCA NIEVES            Ot            789.03 
           ABDOMINAL PAIN, RIGHT LOWER QUADRANT                     

 

 10/27/2016            STACY CAMARGO MD            Ot            719.46    
        JOINT PAIN-L/LEG                     

 

 10/27/2016            STACY CAMARGO MD            Ot            724.5     
       BACKACHE NOS                     

 

 10/27/2016            STACY CAMARGO MD            Ot            729.5     
       PAIN IN LIMB                     

 

 10/27/2016            YONATHAN SORIANO MD            Ot            E78.2       
     MIXED HYPERLIPIDEMIA                     

 

 10/27/2016            YONATHAN SORIANO MD            Ot            I10         
   ESSENTIAL (PRIMARY) HYPERTENSION                     

 

 10/27/2016            YONATHAN SORIANO MD            Ot            I25.10      
      ATHSCL HEART DISEASE OF NATIVE CORONARY                      

 

 10/27/2016            YONATHAN SORIANO MD            Ot            I65.23      
      OCCLUSION AND STENOSIS OF BILATERAL WILKERSON                     

 

 10/27/2016            DRARIAN FARMER ARNP            Ot            M54.2 
           CERVICALGIA                     

 

 10/27/2016            DARRIAN FARMER ARNP            Ot            M54.6 
           PAIN IN THORACIC SPINE                     

 

 10/27/2016            DARRIAN FARMER ARNP            Ot            M50.20
            OTHER CERVICAL DISC DISPLACEMENT, UNSP C                     

 

 10/27/2016            DARRIAN FARMER ARNP            Ot            M51.24
            OTHER INTERVERTEBRAL DISC DISPLACEMENT,                      

 

 10/27/2016                         Ot            R31.9            HEMATURIA, 
UNSPECIFIED                     

 

 10/27/2016            FENARNOLD DO, UMA S            Ot            N94.9     
       UNSP COND ASSOC W FEMALE GENITAL ORGANS                      

 

 10/27/2016            REINA LUO APRN            Ot            R07.9     
       CHEST PAIN, UNSPECIFIED                     

 

 10/27/2016            REINA LUO APRN            Ot            R63.4     
       ABNORMAL WEIGHT LOSS                     

 

 10/27/2016                         Ot            D68.51            ACTIVATED 
PROTEIN C RESISTANCE                     

 

 10/27/2016                         Ot            E78.2            MIXED 
HYPERLIPIDEMIA                     

 

 10/27/2016                         Ot            I10            ESSENTIAL (
PRIMARY) HYPERTENSION                     

 

 10/27/2016                         Ot            I25.10            ATHSCL 
HEART DISEASE OF [place] CORONARY                      

 

 10/27/2016                         Ot            I65.23            OCCLUSION 
AND STENOSIS OF BILATERAL [place]KERSON                     

 

 10/27/2016            YONATHAN SORIANO MD            Ot            D68.2       
     HEREDITARY DEFICIENCY OF OTHER CLOTTING                      

 

 10/27/2016            YONATHAN SORIANO MD            Ot            Z79.01      
      LONG TERM (CURRENT) USE OF ANTICOAGULANT                     

 

 10/27/2016            YONATHAN SORIANO MD, Ot            Z79.899     
       OTHER LONG TERM (CURRENT) DRUG THERAPY                     

 

 10/27/2016            STACY CAMARGO MD            Ot            R05       
     COUGH                     

 

 10/27/2016            STACY CAMARGO MD            Ot            R06.02    
        SHORTNESS OF BREATH                     

 

 10/28/2016            STACY CAMARGO MD            Ot            D68.2     
       HEREDITARY DEFICIENCY OF OTHER CLOTTING                      

 

 10/28/2016            STACY CAMARGO MD            Ot            F17.210   
         NICOTINE DEPENDENCE, CIGARETTES, UNCOMPL                     

 

 10/28/2016            STACY CAMARGO MD            Ot            F32.9     
       MAJOR DEPRESSIVE DISORDER, SINGLE EPISOD                     

 

 10/28/2016            STACY CAMARGO MD            Ot            F41.9     
       ANXIETY DISORDER, UNSPECIFIED                     

 

 10/28/2016            STACY CAMARGO MD, Ot            G40.909   
         EPILEPSY, UNSP, NOT INTRACTABLE, WITHOUT                     

 

 10/28/2016            STACY CAMARGO MD            Ot            I10       
     ESSENTIAL (PRIMARY) HYPERTENSION                     

 

 10/28/2016            STACY CAMARGO MD            Ot            I25.2     
       OLD MYOCARDIAL INFARCTION                     

 

 10/28/2016            STACY CAMARGO MD            Ot            J18.9     
       PNEUMONIA, UNSPECIFIED ORGANISM                     

 

 10/28/2016            STACY CAMARGO MD            Ot            R07.2     
       PRECORDIAL PAIN                     

 

 10/28/2016            STACY CAMARGO MD            Ot            R10.13    
        EPIGASTRIC PAIN                     

 

 10/28/2016            STACY CAMARGO MD            Ot            R91.8     
       OTHER NONSPECIFIC ABNORMAL FINDING OF NIHARIKA                     

 

 10/28/2016            STACY CAMARGO MD            Ot            Z86.718   
         PERSONAL HISTORY OF OTHER VENOUS THROMBO                     

 

 2016            YONATHAN SORIANO MD, Ot            D68.2       
     HEREDITARY DEFICIENCY OF OTHER CLOTTING                      

 

 2016            YONATHAN SORIANO MD, Ot            Z79.01      
      LONG TERM (CURRENT) USE OF ANTICOAGULANT                     

 

 2016            YONATHAN SORIANO MD, Ot            Z79.899     
       OTHER LONG TERM (CURRENT) DRUG THERAPY                     

 

 2016            YONATHAN SORIANO MD, Ot            D68.2       
     HEREDITARY DEFICIENCY OF OTHER CLOTTING                      

 

 2016            YONATHAN SORIANO MD, Ot            Z79.01      
      LONG TERM (CURRENT) USE OF ANTICOAGULANT                     

 

 2016            YONATHAN SORIANO MD, Ot            Z79.899     
       OTHER LONG TERM (CURRENT) DRUG THERAPY                     

 

 2016            YONATHAN SORIANO MD, Ot            D68.2       
     HEREDITARY DEFICIENCY OF OTHER CLOTTING                      

 

 2016            YONATHAN SORIANO MD            Ot            Z79.01      
      LONG TERM (CURRENT) USE OF ANTICOAGULANT                     

 

 2016            YONATHAN SORIANO MD, Ot            Z79.899     
       OTHER LONG TERM (CURRENT) DRUG THERAPY                     

 

 11/15/2016            STACY CAMARGO MD, Ot            R05       
     COUGH                     

 

 11/15/2016            STACY CAMARGO MD, Ot            R06.02    
        SHORTNESS OF BREATH                     

 

 2016            STACY CAMARGO MD            Ot            R91.8     
       OTHER NONSPECIFIC ABNORMAL FINDING OF NIHARIKA                     

 

 2016            STACY CAMARGO MD, Ot            D68.2     
       HEREDITARY DEFICIENCY OF OTHER CLOTTING                      

 

 2016            STACY CAMARGO MD, Ot            D68.32    
        HEMORRHAGIC DISORD D/T EXTRINSIC CIRCULA                     

 

 2016            STACY CAMARGO MD, Ot            E28.2     
       POLYCYSTIC OVARIAN SYNDROME                     

 

 2016            STACY CAMARGO MD, Ot            F41.9     
       ANXIETY DISORDER, UNSPECIFIED                     

 

 2016            STACY CAMARGO MD            Ot            G40.909   
         EPILEPSY, UNSP, NOT INTRACTABLE, WITHOUT                     

 

 2016            STACY CAMARGO MD            Ot            I25.2     
       OLD MYOCARDIAL INFARCTION                     

 

 2016            STACY CAMARGO MD            Ot            I95.9     
       HYPOTENSION, UNSPECIFIED                     

 

 2016            STACY CAMARGO MD, Ot            K21.9     
       GASTRO-ESOPHAGEAL REFLUX DISEASE WITHOUT                     

 

 2016            STACY CAMARGO MD            Ot            R10.31    
        RIGHT LOWER QUADRANT PAIN                     

 

 2016            STACY CAMARGO MD, Ot            R10.32    
        LEFT LOWER QUADRANT PAIN                     

 

 2016            STACY CAMARGO MD, Ot            R11.2     
       NAUSEA WITH VOMITING, UNSPECIFIED                     

 

 2016            STACY CAMARGO MD, Ot            T45.515A  
          ADVERSE EFFECT OF ANTICOAGULANTS, INITIA                     

 

 2016            STACY CAMARGO MD            Ot            Z86.718   
         PERSONAL HISTORY OF OTHER VENOUS THROMBO                     

 

 2016            STACY CAMARGO MD, Ot            Z87.01    
        PERSONAL HISTORY OF PNEUMONIA (RECURRENT                     

 

 2016            CHE BEARD DO            Ot            D68.51         
   ACTIVATED PROTEIN C RESISTANCE                     

 

 2016            CHE BEARD DO            Ot            F17.210        
    NICOTINE DEPENDENCE, CIGARETTES, UNCOMPL                     

 

 2016            CHE BEARD DO            Ot            R20.2          
  PARESTHESIA OF SKIN                     

 

 2016            CHE BEARD DO            Ot            Z79.899        
    OTHER LONG TERM (CURRENT) DRUG THERAPY                     

 

 2016            STACY CAMARGO MD            Ot            R05       
     COUGH                     

 

 2016            STACY CAMARGO MD            Ot            R06.02    
        SHORTNESS OF BREATH                     

 

 2016            STACY CAMARGO MD            Ot            D64.9     
       ANEMIA, UNSPECIFIED                     

 

 2016            YONATHAN SORIANO MD, Ot            D68.2       
     HEREDITARY DEFICIENCY OF OTHER CLOTTING                      

 

 2016            YONATHAN SORIANO MD            Ot            Z79.01      
      LONG TERM (CURRENT) USE OF ANTICOAGULANT                     

 

 2016            YONATHAN SORIANO MD, Ot            Z79.899     
       OTHER LONG TERM (CURRENT) DRUG THERAPY                     

 

 2016            STACY CAMARGO MD, Ot            D64.9     
       ANEMIA, UNSPECIFIED                     

 

 2016            YONATHAN SORIANO MD, Ot            D68.2       
     HEREDITARY DEFICIENCY OF OTHER CLOTTING                      

 

 2016            YONATHAN SORIANO MD, Ot            Z79.01      
      LONG TERM (CURRENT) USE OF ANTICOAGULANT                     

 

 2016            YONATHAN SORIANO MD, Ot            Z79.899     
       OTHER LONG TERM (CURRENT) DRUG THERAPY                     

 

 2016            STACY CAMARGO MD            Ot            R91.8     
       OTHER NONSPECIFIC ABNORMAL FINDING OF NIHARIKA                     

 

 2016            STACY CAMARGO MD            Ot            D64.9     
       ANEMIA, UNSPECIFIED                     

 

 2016            STACY CAMARGO MD            Ot            R91.8     
       OTHER NONSPECIFIC ABNORMAL FINDING OF NIHARIKA                     

 

 2016            STACY CAMARGO MD            Ot            D64.9     
       ANEMIA, UNSPECIFIED                     

 

 2017            YONATHAN SORIANO MD, Ot            D68.2       
     HEREDITARY DEFICIENCY OF OTHER CLOTTING                      

 

 2017            YONATHAN SORIANO MD, Ot            Z79.01      
      LONG TERM (CURRENT) USE OF ANTICOAGULANT                     

 

 2017            YONATHAN SORIANO MD, Ot            Z79.899     
       OTHER LONG TERM (CURRENT) DRUG THERAPY                     

 

 2017            CHE BEARD DO            Ot            F17.210        
    NICOTINE DEPENDENCE, CIGARETTES, UNCOMPL                     

 

 2017            CHE BEARD DO            Ot            I10            
ESSENTIAL (PRIMARY) HYPERTENSION                     

 

 2017            CHE BEARD DO            Ot            I25.10         
   ATHSCL HEART DISEASE OF [place] CORONARY                      

 

 2017            CHE BEARD DO            Ot            I25.2          
  OLD MYOCARDIAL INFARCTION                     

 

 2017            KISHA DO CHE K            Ot            R11.2          
  NAUSEA WITH VOMITING, UNSPECIFIED                     

 

 2017            KISHA DO, CHE K            Ot            R51            
HEADACHE                     

 

 2017            KISHA SREEKANTH WEEMSA K            Ot            Z79.899        
    OTHER LONG TERM (CURRENT) DRUG THERAPY                     

 

 2017            KISHA SREEKANTH WEEMSA K            Ot            F17.210        
    NICOTINE DEPENDENCE, CIGARETTES, UNCOMPL                     

 

 2017            KISHA DO, CHE K            Ot            I10            
ESSENTIAL (PRIMARY) HYPERTENSION                     

 

 2017            KISHA DO, CHE K            Ot            I25.10         
   ATHSCL HEART DISEASE OF [place] CORONARY                      

 

 2017            KISHA DO CHE K            Ot            I25.2          
  OLD MYOCARDIAL INFARCTION                     

 

 2017            KISHA SREEKANTH WEEMSA K            Ot            R11.2          
  NAUSEA WITH VOMITING, UNSPECIFIED                     

 

 2017            KISHA SREEKANTH WEEMSA K            Ot            R51            
HEADACHE                     

 

 2017            KISHA SREEKANTH WEEMSA K            Ot            Z79.899        
    OTHER LONG TERM (CURRENT) DRUG THERAPY                     

 

 2017            YONATHAN SORIANO MD            Ot            D68.51      
      ACTIVATED PROTEIN C RESISTANCE                     

 

 2017            YONATHAN SORIANO MD            Ot            E78.2       
     MIXED HYPERLIPIDEMIA                     

 

 2017            YONATHAN SORIANO MD            Ot            I10         
   ESSENTIAL (PRIMARY) HYPERTENSION                     

 

 2017            YONATHAN SORIANO MD            Ot            I25.10      
      ATHSCL HEART DISEASE OF [place] CORONARY                      

 

 2017            YONATHAN SORIANO MD            Ot            I65.23      
      OCCLUSION AND STENOSIS OF BILATERAL [place]                     

 

 2017            YONATHAN SORIANO MD            Ot            D68.51      
      ACTIVATED PROTEIN C RESISTANCE                     

 

 2017            YONATHAN SORIANO MD            Ot            E78.2       
     MIXED HYPERLIPIDEMIA                     

 

 2017            YONATHAN SORIANO MD            Ot            I10         
   ESSENTIAL (PRIMARY) HYPERTENSION                     

 

 2017            YONATHAN SORIANO MD            Ot            I25.10      
      ATHSCL HEART DISEASE OF [place] CORONARY                      

 

 2017            YONATHAN SORIANO MD            Ot            I65.23      
      OCCLUSION AND STENOSIS OF BILATERAL [place]                     

 

 2017            YONATHAN SORIANO MD            Ot            D68.51      
      ACTIVATED PROTEIN C RESISTANCE                     

 

 2017            YONATHAN SORIANO MD            Ot            E78.2       
     MIXED HYPERLIPIDEMIA                     

 

 2017            YONATHAN SORIANO MD            Ot            I10         
   ESSENTIAL (PRIMARY) HYPERTENSION                     

 

 2017            YONATHAN SORIANO MD, Ot            I25.10      
      ATHSCL HEART DISEASE OF [place] CORONARY                      

 

 2017            YONATHAN SORIANO MD            Ot            I65.23      
      OCCLUSION AND STENOSIS OF BILATERAL [place]                     

 

 2017            YONATHAN SORIANO MD            Ot            D68.51      
      ACTIVATED PROTEIN C RESISTANCE                     

 

 2017            YONATHAN SORIANO MD            Ot            E78.2       
     MIXED HYPERLIPIDEMIA                     

 

 2017            YONATHAN SORIANO MD            Ot            I10         
   ESSENTIAL (PRIMARY) HYPERTENSION                     

 

 2017            YONATHAN SORIANO MD            Ot            I25.10      
      ATHSCL HEART DISEASE OF [place] CORONARY                      

 

 2017            YONATHAN SORIANO MD            Ot            I65.23      
      OCCLUSION AND STENOSIS OF BILATERAL [place]                     

 

 2017            MARY JO SOMMER, STACY DOVE            Ot            M54.5     
       LOW BACK PAIN                     

 

 2017            MARY JO SOMMER, STACY DOVE            Ot            M54.6     
       PAIN IN THORACIC SPINE                     

 

 2017            MARY JO SOMMER, STACY DOVE            Ot            M54.5     
       LOW BACK PAIN                     

 

 2017            MARY JO SOMMER, STACY DOVE            Ot            M54.6     
       PAIN IN THORACIC SPINE                     

 

 2017            MARY JO SOMMER, STACY A            Ot            M54.5     
       LOW BACK PAIN                     

 

 2017            MARY JO SOMMER, STACY DOVE            Ot            M54.6     
       PAIN IN THORACIC SPINE                     

 

 2017            MARY JO SOMMER, STACY DOVE            Ot            M54.5     
       LOW BACK PAIN                     

 

 2017            MARY JO SOMMER, STACY DOVE            Ot            M54.6     
       PAIN IN THORACIC SPINE                     

 

 2017                         Ot            R07.89            OTHER CHEST 
PAIN                     

 

 2017                         Ot            R07.89            OTHER CHEST 
PAIN                     

 

 10/06/2017                         Ot            R07.89            OTHER CHEST 
PAIN                     

 

 2017            ZAFAR NATARAJAN            Ot            F32.9      
      MAJOR DEPRESSIVE DISORDER, SINGLE EPISOD                     

 

 2017            ZAFAR NATARAJAN            Ot            F41.9      
      ANXIETY DISORDER, UNSPECIFIED                     

 

 2017            ZAFAR NATARAJAN            Ot            G40.909    
        EPILEPSY, UNSP, NOT INTRACTABLE, WITHOUT                     

 

 2017            ZAFAR NATARAJAN            Ot            I10        
    ESSENTIAL (PRIMARY) HYPERTENSION                     

 

 2017            ZAFAR NATARAJAN            Ot            I25.10     
       ATHSCL HEART DISEASE OF [place] CORONARY                      

 

 2017            ZAFAR NATARAJAN            Ot            I25.2      
      OLD MYOCARDIAL INFARCTION                     

 

 2017            ZAFAR NATARAJAN            Ot            K21.9      
      GASTRO-ESOPHAGEAL REFLUX DISEASE WITHOUT                     

 

 2017            ZAFAR NATARAJAN            Ot            M41.20     
       OTHER IDIOPATHIC SCOLIOSIS, SITE UNSPECI                     

 

 2017            ZAFAR NATARAJAN            Ot            N39.0      
      URINARY TRACT INFECTION, SITE NOT SPECIF                     

 

 2017            ZAFAR NATARAJAN            Ot            R10.12     
       LEFT UPPER QUADRANT PAIN                     

 

 2017            ZAFAR NATARAJAN            Ot            Z77.22     
       CNTCT W AND EXPSR TO ENVIRON TOBACCO SMO                     

 

 2017            ZAFAR NATARAJAN            Ot            Z79.01     
       LONG TERM (CURRENT) USE OF ANTICOAGULANT                     

 

 2017            ZAFAR NATARAJAN            Ot            Z82.49     
       FAMILY HX OF ISCHEM HEART DIS AND OTH DI                     

 

 2017            ZAFAR NATARAJAN            Ot            Z86.718    
        PERSONAL HISTORY OF OTHER VENOUS THROMBO                     

 

 2017            AZFAR NATARAJAN            Ot            Z87.01     
       PERSONAL HISTORY OF PNEUMONIA (RECURRENT                     

 

 2017            ZAFAR NATARAJAN            Ot            Z87.19     
       PERSONAL HISTORY OF OTHER DISEASES OF TH                     

 

 2017            ZAFAR NATARAJAN            Ot            Z87.448    
        PERSONAL HISTORY OF OTHER DISEASES OF UR                     

 

 2017            ZAFAR NATARAJAN            Ot            Z90.89     
       ACQUIRED ABSENCE OF OTHER ORGANS                     

 

 2017            ZAFAR NATARAJAN            Ot            F32.9      
      MAJOR DEPRESSIVE DISORDER, SINGLE EPISOD                     

 

 2017            ZAFAR NATARAJAN            Ot            F41.9      
      ANXIETY DISORDER, UNSPECIFIED                     

 

 2017            ZAFAR NATARAJAN            Ot            G40.909    
        EPILEPSY, UNSP, NOT INTRACTABLE, WITHOUT                     

 

 2017            ZAFAR NATARAJAN            Ot            I10        
    ESSENTIAL (PRIMARY) HYPERTENSION                     

 

 2017            ZAFAR NATARAJAN            Ot            I25.10     
       ATHSCL HEART DISEASE OF [place] CORONARY                      

 

 2017            ZAFAR NATARAJAN            Ot            I25.2      
      OLD MYOCARDIAL INFARCTION                     

 

 2017            ZAFAR NATARAJAN            Ot            K21.9      
      GASTRO-ESOPHAGEAL REFLUX DISEASE WITHOUT                     

 

 2017            ZAFAR NATARAJAN            Ot            M41.20     
       OTHER IDIOPATHIC SCOLIOSIS, SITE UNSPECI                     

 

 2017            ZAFAR NATARAJAN            Ot            N39.0      
      URINARY TRACT INFECTION, SITE NOT SPECIF                     

 

 2017            ZAFAR NATARAJAN            Ot            R10.12     
       LEFT UPPER QUADRANT PAIN                     

 

 2017            ZAFAR NATARAJAN            Ot            Z77.22     
       CNTCT W AND EXPSR TO ENVIRON TOBACCO SMO                     

 

 2017            ZAFAR NATARAJAN            Ot            Z79.01     
       LONG TERM (CURRENT) USE OF ANTICOAGULANT                     

 

 2017            ZAFAR NATARAJAN            Ot            Z82.49     
       FAMILY HX OF ISCHEM HEART DIS AND OTH DI                     

 

 2017            ZAFAR NATARAJAN            Ot            Z86.718    
        PERSONAL HISTORY OF OTHER VENOUS THROMBO                     

 

 2017            ZAFAR NATARAJAN APRMAT            Ot            Z87.01     
       PERSONAL HISTORY OF PNEUMONIA (RECURRENT                     

 

 2017            ZAFAR NATARAJAN            Ot            Z87.19     
       PERSONAL HISTORY OF OTHER DISEASES OF TH                     

 

 2017            ZAFAR NATARAJAN            Ot            Z87.448    
        PERSONAL HISTORY OF OTHER DISEASES OF UR                     

 

 2017            ZAFAR NATARAJAN            Ot            Z90.89     
       ACQUIRED ABSENCE OF OTHER ORGANS                     

 

 11/15/2017            STACY CAMARGO MD            Ot            I10       
     ESSENTIAL (PRIMARY) HYPERTENSION                     

 

 11/15/2017            STACY CAMARGO MD            Ot            Z79.899   
         OTHER LONG TERM (CURRENT) DRUG THERAPY                     

 

 2017            STACY CAMARGO MD            Ot            I10       
     ESSENTIAL (PRIMARY) HYPERTENSION                     

 

 2017            STACY CAMARGO MD            Ot            Z79.899   
         OTHER LONG TERM (CURRENT) DRUG THERAPY                     

 

 2018            STACY CAMARGO MD            Ot            M25.512   
         PAIN IN LEFT SHOULDER                     

 

 2018            UMA HANDLEY DO S            Ot            Z12.31    
        ENCNTR SCREEN MAMMOGRAM FOR MALIGNANT NE                     

 

 2018            UMA HANDLEY DO S            Ot            Z12.31    
        ENCNTR SCREEN MAMMOGRAM FOR MALIGNANT NE                     

 

 2018            UMA HANDLEY DO            Ot            N92.1     
       EXCESSIVE AND FREQUENT MENSTRUATION WITH                     

 

 2018            UMA HANDLEY DO S            Ot            Z12.31    
        ENCNTR SCREEN MAMMOGRAM FOR MALIGNANT NE                     

 

 2018            UMA HANDLEY DO S            Ot            N92.1     
       EXCESSIVE AND FREQUENT MENSTRUATION WITH                     

 

 2018            UMA HANDLEY DO            Ot            Z12.31    
        ENCNTR SCREEN MAMMOGRAM FOR MALIGNANT NE                     

 

 2018            UMA HANDLEY DO S            Ot            N63.11    
        UNSPECIFIED LUMP IN THE RIGHT BREAST, UP                     

 

 2018            UMA HANDLEY DO            Ot            N92.1     
       EXCESSIVE AND FREQUENT MENSTRUATION WITH                     

 

 2018            UMA HANDLEY DO            Ot            Z12.31    
        ENCNTR SCREEN MAMMOGRAM FOR MALIGNANT NE                     

 

 2018            UMA HANDLEY DO            Ot            N63.11    
        UNSPECIFIED LUMP IN THE RIGHT BREAST, UP                     

 

 06/15/2018            UMA HANDLEY DO            Ot            N63.11    
        UNSPECIFIED LUMP IN THE RIGHT BREAST, UP                     

 

 2018            UMA HANDLEY DO            Ot            Z01.818   
         ENCOUNTER FOR OTHER PREPROCEDURAL EXAMIN                     

 

 2018            UMA HANDLEY DO            Ot            Z01.818   
         ENCOUNTER FOR OTHER PREPROCEDURAL EXAMIN                     



                                                                               
                                                                               
                                                                               
                                                                               
                                                                               
                                                                               
                                                                               
                                                                               
                                                                               
                                                                               
                                                                               
                                                                               
                                                                               
                                                                               
                                                                               
                                                                               
                                                                               
                                                                               
                                                                               
                                                                               
                    



Procedures

      





 Code            Description            Performed By            Performed On   
     

 

             65.41                                                             
        2013        



                  



Results

      





 Test            Result            Range        









 Complete blood count (CBC) with automated white blood cell (WBC) differential 
- 10/27/16 06:35         









 Blood leukocytes automated count (number/volume)            10.1 10*3/uL      
      4.3-11.0        

 

 Blood erythrocytes automated count (number/volume)            4.05 10*6/uL    
        4.35-5.85        

 

 Venous blood hemoglobin measurement (mass/volume)            14.0 g/dL        
    11.5-16.0        

 

 Blood hematocrit (volume fraction)            39 %            35-52        

 

 Automated erythrocyte mean corpuscular volume            96 [foz_us]          
  80-99        

 

 Automated erythrocyte mean corpuscular hemoglobin (mass per erythrocyte)      
      35 pg            25-34        

 

 Automated erythrocyte mean corpuscular hemoglobin concentration measurement (
mass/volume)            36 g/dL            32-36        

 

 Automated erythrocyte distribution width ratio            12.1 %            
10.0-14.5        

 

 Automated blood platelet count (count/volume)            268 10*3/uL          
  130-400        

 

 Automated blood platelet mean volume measurement            10.0 [foz_us]     
       7.4-10.4        

 

 Automated blood neutrophils/100 leukocytes            68 %            42-75   
     

 

 Automated blood lymphocytes/100 leukocytes            20 %            12-44   
     

 

 Blood monocytes/100 leukocytes            9 %            0-12        

 

 Automated blood eosinophils/100 leukocytes            3 %            0-10     
   

 

 Automated blood basophils/100 leukocytes            0 %            0-10        

 

 Blood neutrophils automated count (number/volume)            6.9 10*3         
   1.8-7.8        

 

 Blood lymphocytes automated count (number/volume)            2.1 10*3         
   1.0-4.0        

 

 Blood monocytes automated count (number/volume)            0.9 10*3            
0.0-1.0        

 

 Automated eosinophil count            0.3 10*3/uL            0.0-0.3        

 

 Automated blood basophil count (count/volume)            0.0 10*3/uL          
  0.0-0.1        









 PT panel in platelet poor plasma by coagulation assay - 10/27/16 06:35         









 Prothrombin time (PT) in platelet poor plasma by coagulation assay            
27.6 s            12.2-14.7        

 

 INR in platelet poor plasma or blood by coagulation assay            2.6      
       0.8-1.4        









 Activated partial thromboplastin time (aPTT) in platelet poor plasma 
bycoagulation assay - 10/27/16 06:35         









 Activated partial thromboplastin time (aPTT) in platelet poor plasma 
bycoagulation assay            62 s            24-35        









 Comprehensive metabolic panel - 10/27/16 06:35         









 Serum or plasma sodium measurement (moles/volume)            138 mmol/L       
     135-145        

 

 Serum or plasma potassium measurement (moles/volume)            3.9 mmol/L    
        3.6-5.0        

 

 Serum or plasma chloride measurement (moles/volume)            107 mmol/L     
               

 

 Carbon dioxide            22 mmol/L            21-32        

 

 Serum or plasma anion gap determination (moles/volume)            9 mmol/L    
        5-14        

 

 Serum or plasma urea nitrogen measurement (mass/volume)            8 mg/dL    
        7-18        

 

 Serum or plasma creatinine measurement (mass/volume)            0.65 mg/dL    
        0.60-1.30        

 

 Serum or plasma urea nitrogen/creatinine mass ratio            12             
NRG        

 

 Serum or plasma creatinine measurement with calculation of estimated 
glomerular filtration rate            >             NRG        

 

 Serum or plasma glucose measurement (mass/volume)            96 mg/dL         
           

 

 Serum or plasma calcium measurement (mass/volume)            8.6 mg/dL        
    8.5-10.1        

 

 Serum or plasma total bilirubin measurement (mass/volume)            0.5 mg/dL
            0.1-1.0        

 

 Serum or plasma alkaline phosphatase measurement (enzymatic activity/volume)  
          91 U/L                    

 

 Serum or plasma aspartate aminotransferase measurement (enzymatic activity/
volume)            18 U/L            5-34        

 

 Serum or plasma alanine aminotransferase measurement (enzymatic activity/volume
)            25 U/L            0-55        

 

 Serum or plasma protein measurement (mass/volume)            6.8 g/dL         
   6.4-8.2        

 

 Serum or plasma albumin measurement (mass/volume)            3.9 g/dL         
   3.2-4.5        









 Magnesium - 10/27/16 06:35         









 Magnesium            2.1 mg/dL            1.8-2.4        









 Serum or plasma troponin i.cardiac measurement (mass/volume) - 10/27/16 06:35 
        









 Serum or plasma troponin i.cardiac measurement (mass/volume)            < ng/
mL            <0.30        









 Myoglobin, serum - 10/27/16 06:35         









 Myoglobin, serum            27.2 ng/mL            10.0-92.0        









 Serum or plasma amylase measurement (enzymatic activity/volume) - 10/27/16 06:
35         









 Serum or plasma amylase measurement (enzymatic activity/volume)            39 U
/L                    









 Lipase - 10/27/16 06:35         









 Lipase            23 U/L            8-78        









 Serum or plasma phenytoin measurement (mass/volume) - 10/27/16 06:35         









 Serum or plasma phenytoin measurement (mass/volume)            5.2 ug/mL      
      10.0-20.0        









 Bacterial blood culture - 10/27/16 08:40         









 Bacterial blood culture            NG             NRG        









 Bacterial blood culture - 10/27/16 08:54         









 Bacterial blood culture            NG             NRG        









 Complete blood count (CBC) with automated white blood cell (WBC) differential 
- 10/28/16 08:10         









 Blood leukocytes automated count (number/volume)            7.0 10*3/uL       
     4.3-11.0        

 

 Blood erythrocytes automated count (number/volume)            3.56 10*6/uL    
        4.35-5.85        

 

 Venous blood hemoglobin measurement (mass/volume)            12.2 g/dL        
    11.5-16.0        

 

 Blood hematocrit (volume fraction)            34 %            35-52        

 

 Automated erythrocyte mean corpuscular volume            96 [foz_us]          
  80-99        

 

 Automated erythrocyte mean corpuscular hemoglobin (mass per erythrocyte)      
      34 pg            25-34        

 

 Automated erythrocyte mean corpuscular hemoglobin concentration measurement (
mass/volume)            36 g/dL            32-36        

 

 Automated erythrocyte distribution width ratio            11.9 %            
10.0-14.5        

 

 Automated blood platelet count (count/volume)            247 10*3/uL          
  130-400        

 

 Automated blood platelet mean volume measurement            9.8 [foz_us]      
      7.4-10.4        

 

 Automated blood neutrophils/100 leukocytes            61 %            42-75   
     

 

 Automated blood lymphocytes/100 leukocytes            27 %            12-44   
     

 

 Blood monocytes/100 leukocytes            9 %            0-12        

 

 Automated blood eosinophils/100 leukocytes            3 %            0-10     
   

 

 Automated blood basophils/100 leukocytes            0 %            0-10        

 

 Blood neutrophils automated count (number/volume)            4.3 10*3         
   1.8-7.8        

 

 Blood lymphocytes automated count (number/volume)            1.9 10*3         
   1.0-4.0        

 

 Blood monocytes automated count (number/volume)            0.6 10*3            
0.0-1.0        

 

 Automated eosinophil count            0.2 10*3/uL            0.0-0.3        

 

 Automated blood basophil count (count/volume)            0.0 10*3/uL          
  0.0-0.1        









 Lipid 1996 panel - 10/28/16 08:10         









 Serum or plasma triglyceride measurement (mass/volume)            72 mg/dL    
        <150        

 

 Serum or plasma cholesterol measurement (mass/volume)            114 mg/dL    
        < 200        

 

 Serum or plasma cholesterol in HDL measurement (mass/volume)            28 mg/
dL            40-60        

 

 Cholesterol in LDL [mass/volume] in serum or plasma by direct assay            
60 mg/dL            1-129        

 

 Serum or plasma cholesterol in VLDL measurement (mass/volume)            14 mg/
dL            5-40        









 Comprehensive metabolic panel - 10/28/16 08:10         









 Serum or plasma sodium measurement (moles/volume)            138 mmol/L       
     135-145        

 

 Serum or plasma potassium measurement (moles/volume)            3.8 mmol/L    
        3.6-5.0        

 

 Serum or plasma chloride measurement (moles/volume)            110 mmol/L     
               

 

 Carbon dioxide            20 mmol/L            21-32        

 

 Serum or plasma anion gap determination (moles/volume)            8 mmol/L    
        5-14        

 

 Serum or plasma urea nitrogen measurement (mass/volume)            4 mg/dL    
        7-18        

 

 Serum or plasma creatinine measurement (mass/volume)            0.58 mg/dL    
        0.60-1.30        

 

 Serum or plasma urea nitrogen/creatinine mass ratio            7             
NRG        

 

 Serum or plasma creatinine measurement with calculation of estimated 
glomerular filtration rate            >             NRG        

 

 Serum or plasma glucose measurement (mass/volume)            93 mg/dL         
           

 

 Serum or plasma calcium measurement (mass/volume)            8.2 mg/dL        
    8.5-10.1        

 

 Serum or plasma total bilirubin measurement (mass/volume)            0.2 mg/dL
            0.1-1.0        

 

 Serum or plasma alkaline phosphatase measurement (enzymatic activity/volume)  
          78 U/L                    

 

 Serum or plasma aspartate aminotransferase measurement (enzymatic activity/
volume)            17 U/L            5-34        

 

 Serum or plasma alanine aminotransferase measurement (enzymatic activity/volume
)            22 U/L            0-55        

 

 Serum or plasma protein measurement (mass/volume)            6.1 g/dL         
   6.4-8.2        

 

 Serum or plasma albumin measurement (mass/volume)            3.3 g/dL         
   3.2-4.5        









 PT panel in platelet poor plasma by coagulation assay - 10/28/16 08:10         









 Prothrombin time (PT) in platelet poor plasma by coagulation assay            
31.7 s            12.2-14.7        

 

 INR in platelet poor plasma or blood by coagulation assay            3.1      
       0.8-1.4        









 Complete blood count (CBC) with automated white blood cell (WBC) differential 
- 16 12:23         









 Blood leukocytes automated count (number/volume)            18.3 10*3/uL      
      4.3-11.0        

 

 Blood erythrocytes automated count (number/volume)            4.96 10*6/uL    
        4.35-5.85        

 

 Venous blood hemoglobin measurement (mass/volume)            16.7 g/dL        
    11.5-16.0        

 

 Blood hematocrit (volume fraction)            46 %            35-52        

 

 Automated erythrocyte mean corpuscular volume            93 [foz_us]          
  80-99        

 

 Automated erythrocyte mean corpuscular hemoglobin (mass per erythrocyte)      
      34 pg            25-34        

 

 Automated erythrocyte mean corpuscular hemoglobin concentration measurement (
mass/volume)            36 g/dL            32-36        

 

 Automated erythrocyte distribution width ratio            11.9 %            
10.0-14.5        

 

 Automated blood platelet count (count/volume)            281 10*3/uL          
  130-400        

 

 Automated blood platelet mean volume measurement            11.1 [foz_us]     
       7.4-10.4        

 

 Automated blood neutrophils/100 leukocytes            77 %            42-75   
     

 

 Automated blood lymphocytes/100 leukocytes            14 %            12-44   
     

 

 Blood monocytes/100 leukocytes            8 %            0-12        

 

 Automated blood eosinophils/100 leukocytes            0 %            0-10     
   

 

 Automated blood basophils/100 leukocytes            0 %            0-10        

 

 Blood neutrophils automated count (number/volume)            14.2 10*3        
    1.8-7.8        

 

 Blood lymphocytes automated count (number/volume)            2.6 10*3         
   1.0-4.0        

 

 Blood monocytes automated count (number/volume)            1.5 10*3            
0.0-1.0        

 

 Automated eosinophil count            0.0 10*3/uL            0.0-0.3        

 

 Automated blood basophil count (count/volume)            0.0 10*3/uL          
  0.0-0.1        









 Whole blood basic metabolic panel - 16 12:23         









 Serum or plasma sodium measurement (moles/volume)            134 mmol/L       
     135-145        

 

 Serum or plasma potassium measurement (moles/volume)            3.7 mmol/L    
        3.6-5.0        

 

 Serum or plasma chloride measurement (moles/volume)            101 mmol/L     
               

 

 Carbon dioxide            17 mmol/L            21-32        

 

 Serum or plasma anion gap determination (moles/volume)            16 mmol/L   
         5-14        

 

 Serum or plasma urea nitrogen measurement (mass/volume)            11 mg/dL   
         7-18        

 

 Serum or plasma creatinine measurement (mass/volume)            0.76 mg/dL    
        0.60-1.30        

 

 Serum or plasma urea nitrogen/creatinine mass ratio            14             
NRG        

 

 Serum or plasma creatinine measurement with calculation of estimated 
glomerular filtration rate            >             NRG        

 

 Serum or plasma glucose measurement (mass/volume)            150 mg/dL        
            

 

 Serum or plasma calcium measurement (mass/volume)            9.7 mg/dL        
    8.5-10.1        









 Blood manual differential performed detection - 16 12:23         









 Blood monocytes/100 leukocytes            5 %            NRG        

 

 Manual blood segmented neutrophils/100 leukocytes            81 %            
NRG        

 

 Manual blood lymphocytes/100 leukocytes            12 %            NRG        

 

 Manual eosinophils/100 leukocytes in nose            2 %            NRG        

 

 Blood erythrocyte morphology finding identification            NORMAL         
    NRG        









 PT panel in platelet poor plasma by coagulation assay - 16 12:23         









 Prothrombin time (PT) in platelet poor plasma by coagulation assay            
85.2 s            12.2-14.7        

 

 INR in platelet poor plasma or blood by coagulation assay            10.6     
        0.8-1.4        









 Activated partial thromboplastin time (aPTT) in platelet poor plasma 
bycoagulation assay - 16 12:23         









 Activated partial thromboplastin time (aPTT) in platelet poor plasma 
bycoagulation assay            178 s            24-35        









 Blood lactic acid measurement (moles/volume) - 16 13:05         









 Blood lactic acid measurement (moles/volume)            1.6 mmol/L            
0.5-2.0        









 Serum or plasma C reactive protein measurement (mass/volume) - 16 13:05 
        









 Serum or plasma C reactive protein measurement (mass/volume)            10.16 
mg/dL            0.00-0.50        









 Bacterial blood culture - 16 13:05         









 Bacterial blood culture            NG             NRG        









 Bacterial blood culture - 16 13:10         









 Bacterial blood culture            NG             NRG        









 Urine beta human chorionic gonadotropin (hCG) measurement - 16 13:19    
     









 Urine beta human chorionic gonadotropin (hCG) measurement            NEGATIVE 
            NEGATIVE        









 Complete urinalysis with reflex to culture - 16 13:19         









 Urine color determination            RED             NRG        

 

 Urine clarity determination            BLOODY             NRG        

 

 Urine pH measurement by test strip            6.5             5-9        

 

 Specific gravity of urine by test strip            1.015             1.016-
1.022        

 

 Urine protein assay by test strip, semi-quantitative            4+             
NEGATIVE        

 

 Urine glucose detection by automated test strip            NEGATIVE           
  NEGATIVE        

 

 Erythrocytes detection in urine sediment by light microscopy            5+    
         NEGATIVE        

 

 Urine ketones detection by automated test strip            3+             
NEGATIVE        

 

 Urine nitrite detection by test strip            NEGATIVE             NEGATIVE
        

 

 Urine total bilirubin detection by test strip            NEGATIVE             
NEGATIVE        

 

 Urine urobilinogen measurement by automated test strip (mass/volume)          
  NORMAL             NORMAL        

 

 Urine leukocyte esterase detection by dipstick            NEGATIVE             
NEGATIVE        

 

 Automated urine sediment erythrocyte count by microscopy (number/high power 
field)            TNTC             NRG        

 

 Automated urine sediment leukocyte count by microscopy (number/high power field
)             [HPF]            NRG        

 

 Bacteria detection in urine sediment by light microscopy            NEGATIVE  
           NRG        

 

 Squamous epithelial cells detection in urine sediment by light microscopy     
       10-25             NRG        

 

 Crystals detection in urine sediment by light microscopy            NONE      
       NRG        

 

 Casts detection in urine sediment by light microscopy            NONE         
    NRG        

 

 Mucus detection in urine sediment by light microscopy            NEGATIVE     
        NRG        

 

 Complete urinalysis with reflex to culture            NO             NRG      
  









 FRESH FROZEN PLASMA - 16 13:54         









 FRESH FROZEN PLASMA                           PRSMD [id number]    [id number]    
           NRG        









 Blood type T Indirect antibody screen panel - 16 13:54         









 ABO+Rh group            AP             NRG        

 

 Transfusion band number            [id number]             NRG        

 

 Blood group antibody screen            NEGATIVE             NRG        









 Whole blood hemoglobin and hematocrit panel - 16 19:33         









 Venous blood hemoglobin measurement (mass/volume)            13.4 g/dL        
    11.5-16.0        

 

 Blood hematocrit (volume fraction)            37 %            35-52        









 Whole blood hemoglobin and hematocrit panel - 16 00:20         









 Venous blood hemoglobin measurement (mass/volume)            13.1 g/dL        
    11.5-16.0        

 

 Blood hematocrit (volume fraction)            37 %            35-52        









 Complete blood count (CBC) with automated white blood cell (WBC) differential 
- 16 04:40         









 Blood leukocytes automated count (number/volume)            9.2 10*3/uL       
     4.3-11.0        

 

 Blood erythrocytes automated count (number/volume)            3.63 10*6/uL    
        4.35-5.85        

 

 Venous blood hemoglobin measurement (mass/volume)            12.3 g/dL        
    11.5-16.0        

 

 Blood hematocrit (volume fraction)            35 %            35-52        

 

 Automated erythrocyte mean corpuscular volume            96 [foz_us]          
  80-99        

 

 Automated erythrocyte mean corpuscular hemoglobin (mass per erythrocyte)      
      34 pg            25-34        

 

 Automated erythrocyte mean corpuscular hemoglobin concentration measurement (
mass/volume)            35 g/dL            32-36        

 

 Automated erythrocyte distribution width ratio            11.4 %            
10.0-14.5        

 

 Automated blood platelet count (count/volume)            170 10*3/uL          
  130-400        

 

 Automated blood platelet mean volume measurement            10.7 [foz_us]     
       7.4-10.4        

 

 Automated blood neutrophils/100 leukocytes            53 %            42-75   
     

 

 Automated blood lymphocytes/100 leukocytes            36 %            12-44   
     

 

 Blood monocytes/100 leukocytes            10 %            0-12        

 

 Automated blood eosinophils/100 leukocytes            1 %            0-10     
   

 

 Automated blood basophils/100 leukocytes            0 %            0-10        

 

 Blood neutrophils automated count (number/volume)            4.8 10*3         
   1.8-7.8        

 

 Blood lymphocytes automated count (number/volume)            3.3 10*3         
   1.0-4.0        

 

 Blood monocytes automated count (number/volume)            0.9 10*3            
0.0-1.0        

 

 Automated eosinophil count            0.1 10*3/uL            0.0-0.3        

 

 Automated blood basophil count (count/volume)            0.0 10*3/uL          
  0.0-0.1        









 PT panel in platelet poor plasma by coagulation assay - 16 04:40         









 Prothrombin time (PT) in platelet poor plasma by coagulation assay            
13.7 s            12.2-14.7        

 

 INR in platelet poor plasma or blood by coagulation assay            1.1      
       0.8-1.4        









 Comprehensive metabolic panel - 16 04:40         









 Serum or plasma sodium measurement (moles/volume)            136 mmol/L       
     135-145        

 

 Serum or plasma potassium measurement (moles/volume)            3.7 mmol/L    
        3.6-5.0        

 

 Serum or plasma chloride measurement (moles/volume)            105 mmol/L     
               

 

 Carbon dioxide            21 mmol/L            21-32        

 

 Serum or plasma anion gap determination (moles/volume)            10 mmol/L   
         5-14        

 

 Serum or plasma urea nitrogen measurement (mass/volume)            14 mg/dL   
         7-18        

 

 Serum or plasma creatinine measurement (mass/volume)            0.65 mg/dL    
        0.60-1.30        

 

 Serum or plasma urea nitrogen/creatinine mass ratio            22             
NRG        

 

 Serum or plasma creatinine measurement with calculation of estimated 
glomerular filtration rate            >             NRG        

 

 Serum or plasma glucose measurement (mass/volume)            82 mg/dL         
           

 

 Serum or plasma calcium measurement (mass/volume)            8.4 mg/dL        
    8.5-10.1        

 

 Serum or plasma total bilirubin measurement (mass/volume)            1.2 mg/dL
            0.1-1.0        

 

 Serum or plasma alkaline phosphatase measurement (enzymatic activity/volume)  
          70 U/L                    

 

 Serum or plasma aspartate aminotransferase measurement (enzymatic activity/
volume)            25 U/L            5-34        

 

 Serum or plasma alanine aminotransferase measurement (enzymatic activity/volume
)            21 U/L            0-55        

 

 Serum or plasma protein measurement (mass/volume)            6.2 g/dL         
   6.4-8.2        

 

 Serum or plasma albumin measurement (mass/volume)            3.6 g/dL         
   3.2-4.5        









 Serum or plasma phosphate measurement (mass/volume) - 16 04:40         









 Serum or plasma phosphate measurement (mass/volume)            3.2 mg/dL      
      2.3-4.7        









 Magnesium - 16 04:40         









 Magnesium            2.0 mg/dL            1.8-2.4        









 Whole blood hemoglobin and hematocrit panel - 16 08:25         









 Venous blood hemoglobin measurement (mass/volume)            11.1 g/dL        
    11.5-16.0        

 

 Blood hematocrit (volume fraction)            31 %            35-52        









 Methicillin resistant Staphylococcus aureus (MRSA) screening culture -  09:00         









 Methicillin resistant Staphylococcus aureus (MRSA) screening culture          
  NEG             NRG        









 Whole blood hemoglobin and hematocrit panel - 16 16:00         









 Venous blood hemoglobin measurement (mass/volume)            10.8 g/dL        
    11.5-16.0        

 

 Blood hematocrit (volume fraction)            31 %            35-52        









 Automated blood complete blood count (hemogram) panel - 16 05:05         









 Blood leukocytes automated count (number/volume)            6.6 10*3/uL       
     4.3-11.0        

 

 Blood erythrocytes automated count (number/volume)            3.29 10*6/uL    
        4.35-5.85        

 

 Venous blood hemoglobin measurement (mass/volume)            11.2 g/dL        
    11.5-16.0        

 

 Blood hematocrit (volume fraction)            32 %            35-52        

 

 Automated erythrocyte mean corpuscular volume            96 [foz_us]          
  80-99        

 

 Automated erythrocyte mean corpuscular hemoglobin (mass per erythrocyte)      
      34 pg            25-34        

 

 Automated erythrocyte mean corpuscular hemoglobin concentration measurement (
mass/volume)            35 g/dL            32-36        

 

 Automated erythrocyte distribution width ratio            11.4 %            
10.0-14.5        

 

 Automated blood platelet count (count/volume)            145 10*3/uL          
  130-400        

 

 Automated blood platelet mean volume measurement            10.2 [foz_us]     
       7.4-10.4        









 Comprehensive metabolic panel - 16 05:05         









 Serum or plasma sodium measurement (moles/volume)            138 mmol/L       
     135-145        

 

 Serum or plasma potassium measurement (moles/volume)            3.5 mmol/L    
        3.6-5.0        

 

 Serum or plasma chloride measurement (moles/volume)            108 mmol/L     
               

 

 Carbon dioxide            21 mmol/L            21-32        

 

 Serum or plasma anion gap determination (moles/volume)            9 mmol/L    
        5-14        

 

 Serum or plasma urea nitrogen measurement (mass/volume)            11 mg/dL   
         7-18        

 

 Serum or plasma creatinine measurement (mass/volume)            0.62 mg/dL    
        0.60-1.30        

 

 Serum or plasma urea nitrogen/creatinine mass ratio            18             
NRG        

 

 Serum or plasma creatinine measurement with calculation of estimated 
glomerular filtration rate            >             NRG        

 

 Serum or plasma glucose measurement (mass/volume)            94 mg/dL         
           

 

 Serum or plasma calcium measurement (mass/volume)            8.2 mg/dL        
    8.5-10.1        

 

 Serum or plasma total bilirubin measurement (mass/volume)            0.3 mg/dL
            0.1-1.0        

 

 Serum or plasma alkaline phosphatase measurement (enzymatic activity/volume)  
          67 U/L                    

 

 Serum or plasma aspartate aminotransferase measurement (enzymatic activity/
volume)            22 U/L            5-34        

 

 Serum or plasma alanine aminotransferase measurement (enzymatic activity/volume
)            18 U/L            0-55        

 

 Serum or plasma protein measurement (mass/volume)            5.6 g/dL         
   6.4-8.2        

 

 Serum or plasma albumin measurement (mass/volume)            3.2 g/dL         
   3.2-4.5        









 Complete blood count (CBC) with automated white blood cell (WBC) differential 
- 16 21:26         









 Blood leukocytes automated count (number/volume)            7.8 10*3/uL       
     4.3-11.0        

 

 Blood erythrocytes automated count (number/volume)            3.16 10*6/uL    
        4.35-5.85        

 

 Venous blood hemoglobin measurement (mass/volume)            10.8 g/dL        
    11.5-16.0        

 

 Blood hematocrit (volume fraction)            30 %            35-52        

 

 Automated erythrocyte mean corpuscular volume            96 [foz_us]          
  80-99        

 

 Automated erythrocyte mean corpuscular hemoglobin (mass per erythrocyte)      
      34 pg            25-34        

 

 Automated erythrocyte mean corpuscular hemoglobin concentration measurement (
mass/volume)            36 g/dL            32-36        

 

 Automated erythrocyte distribution width ratio            11.2 %            
10.0-14.5        

 

 Automated blood platelet count (count/volume)            169 10*3/uL          
  130-400        

 

 Automated blood platelet mean volume measurement            10.6 [foz_us]     
       7.4-10.4        

 

 Automated blood neutrophils/100 leukocytes            54 %            42-75   
     

 

 Automated blood lymphocytes/100 leukocytes            35 %            12-44   
     

 

 Blood monocytes/100 leukocytes            7 %            0-12        

 

 Automated blood eosinophils/100 leukocytes            4 %            0-10     
   

 

 Automated blood basophils/100 leukocytes            0 %            0-10        

 

 Blood neutrophils automated count (number/volume)            4.2 10*3         
   1.8-7.8        

 

 Blood lymphocytes automated count (number/volume)            2.7 10*3         
   1.0-4.0        

 

 Blood monocytes automated count (number/volume)            0.6 10*3            
0.0-1.0        

 

 Automated eosinophil count            0.3 10*3/uL            0.0-0.3        

 

 Automated blood basophil count (count/volume)            0.0 10*3/uL          
  0.0-0.1        









 PT panel in platelet poor plasma by coagulation assay - 16 21:26         









 Prothrombin time (PT) in platelet poor plasma by coagulation assay            
13.2 s            12.2-14.7        

 

 INR in platelet poor plasma or blood by coagulation assay            1.0      
       0.8-1.4        









 Activated partial thromboplastin time (aPTT) in platelet poor plasma 
bycoagulation assay - 16 21:26         









 Activated partial thromboplastin time (aPTT) in platelet poor plasma 
bycoagulation assay            26 s            24-35        









 Serum or plasma choriogonadotropin (pregnancy test) detection - 16 21:26
         









 Serum or plasma choriogonadotropin (pregnancy test) detection            
NEGATIVE             NEGATIVE        









 Comprehensive metabolic panel - 16 21:26         









 Serum or plasma sodium measurement (moles/volume)            140 mmol/L       
     135-145        

 

 Serum or plasma potassium measurement (moles/volume)            3.7 mmol/L    
        3.6-5.0        

 

 Serum or plasma chloride measurement (moles/volume)            109 mmol/L     
               

 

 Carbon dioxide            22 mmol/L            21-32        

 

 Serum or plasma anion gap determination (moles/volume)            9 mmol/L    
        5-14        

 

 Serum or plasma urea nitrogen measurement (mass/volume)            9 mg/dL    
        7-18        

 

 Serum or plasma creatinine measurement (mass/volume)            0.65 mg/dL    
        0.60-1.30        

 

 Serum or plasma urea nitrogen/creatinine mass ratio            14             
NRG        

 

 Serum or plasma creatinine measurement with calculation of estimated 
glomerular filtration rate            >             NRG        

 

 Serum or plasma glucose measurement (mass/volume)            93 mg/dL         
           

 

 Serum or plasma calcium measurement (mass/volume)            8.4 mg/dL        
    8.5-10.1        

 

 Serum or plasma total bilirubin measurement (mass/volume)            0.3 mg/dL
            0.1-1.0        

 

 Serum or plasma alkaline phosphatase measurement (enzymatic activity/volume)  
          69 U/L                    

 

 Serum or plasma aspartate aminotransferase measurement (enzymatic activity/
volume)            20 U/L            5-34        

 

 Serum or plasma alanine aminotransferase measurement (enzymatic activity/volume
)            19 U/L            0-55        

 

 Serum or plasma protein measurement (mass/volume)            5.8 g/dL         
   6.4-8.2        

 

 Serum or plasma albumin measurement (mass/volume)            3.4 g/dL         
   3.2-4.5        









 Magnesium - 16 21:26         









 Magnesium            2.2 mg/dL            1.8-2.4        









 Serum or plasma thyrotropin measurement by detection limit <=0.05 miu/l (units/
volume) - 16 21:26         









 Serum or plasma thyrotropin measurement by detection limit <=0.05 miu/l (units/
volume)            1.94 u[iU]/mL            0.35-4.94        









 Complete blood count (CBC) with automated white blood cell (WBC) differential 
- 16 15:59         









 Blood leukocytes automated count (number/volume)            7.0 10*3/uL       
     4.3-11.0        

 

 Blood erythrocytes automated count (number/volume)            3.57 10*6/uL    
        4.35-5.85        

 

 Venous blood hemoglobin measurement (mass/volume)            12.1 g/dL        
    11.5-16.0        

 

 Blood hematocrit (volume fraction)            34 %            35-52        

 

 Automated erythrocyte mean corpuscular volume            94 [foz_us]          
  80-99        

 

 Automated erythrocyte mean corpuscular hemoglobin (mass per erythrocyte)      
      34 pg            25-34        

 

 Automated erythrocyte mean corpuscular hemoglobin concentration measurement (
mass/volume)            36 g/dL            32-36        

 

 Automated erythrocyte distribution width ratio            11.2 %            
10.0-14.5        

 

 Automated blood platelet count (count/volume)            217 10*3/uL          
  130-400        

 

 Automated blood platelet mean volume measurement            10.6 [foz_us]     
       7.4-10.4        

 

 Automated blood neutrophils/100 leukocytes            52 %            42-75   
     

 

 Automated blood lymphocytes/100 leukocytes            39 %            12-44   
     

 

 Blood monocytes/100 leukocytes            7 %            0-12        

 

 Automated blood eosinophils/100 leukocytes            3 %            0-10     
   

 

 Automated blood basophils/100 leukocytes            0 %            0-10        

 

 Blood neutrophils automated count (number/volume)            3.6 10*3         
   1.8-7.8        

 

 Blood lymphocytes automated count (number/volume)            2.7 10*3         
   1.0-4.0        

 

 Blood monocytes automated count (number/volume)            0.5 10*3            
0.0-1.0        

 

 Automated eosinophil count            0.2 10*3/uL            0.0-0.3        

 

 Automated blood basophil count (count/volume)            0.0 10*3/uL          
  0.0-0.1        









 Complete blood count (CBC) with automated white blood cell (WBC) differential 
- 17 10:15         









 Blood leukocytes automated count (number/volume)            10.4 10*3/uL      
      4.3-11.0        

 

 Blood erythrocytes automated count (number/volume)            4.73 10*6/uL    
        4.35-5.85        

 

 Venous blood hemoglobin measurement (mass/volume)            15.4 g/dL        
    11.5-16.0        

 

 Blood hematocrit (volume fraction)            45 %            35-52        

 

 Automated erythrocyte mean corpuscular volume            94 [foz_us]          
  80-99        

 

 Automated erythrocyte mean corpuscular hemoglobin (mass per erythrocyte)      
      33 pg            25-34        

 

 Automated erythrocyte mean corpuscular hemoglobin concentration measurement (
mass/volume)            35 g/dL            32-36        

 

 Automated erythrocyte distribution width ratio            12.6 %            
10.0-14.5        

 

 Automated blood platelet count (count/volume)            217 10*3/uL          
  130-400        

 

 Automated blood platelet mean volume measurement            10.9 [foz_us]     
       7.4-10.4        

 

 Automated blood neutrophils/100 leukocytes            76 %            42-75   
     

 

 Automated blood lymphocytes/100 leukocytes            18 %            12-44   
     

 

 Blood monocytes/100 leukocytes            5 %            0-12        

 

 Automated blood eosinophils/100 leukocytes            1 %            0-10     
   

 

 Automated blood basophils/100 leukocytes            0 %            0-10        

 

 Blood neutrophils automated count (number/volume)            7.9 10*3         
   1.8-7.8        

 

 Blood lymphocytes automated count (number/volume)            1.9 10*3         
   1.0-4.0        

 

 Blood monocytes automated count (number/volume)            0.5 10*3            
0.0-1.0        

 

 Automated eosinophil count            0.1 10*3/uL            0.0-0.3        

 

 Automated blood basophil count (count/volume)            0.0 10*3/uL          
  0.0-0.1        









 Serum or plasma choriogonadotropin (pregnancy test) detection - 17 10:15
         









 Serum or plasma choriogonadotropin (pregnancy test) detection            
NEGATIVE             NEGATIVE        









 PT panel in platelet poor plasma by coagulation assay - 17 10:15         









 Prothrombin time (PT) in platelet poor plasma by coagulation assay            
12.4 s            12.2-14.7        

 

 INR in platelet poor plasma or blood by coagulation assay            1.0      
       0.8-1.4        









 Activated partial thromboplastin time (aPTT) in platelet poor plasma 
bycoagulation assay - 17 10:15         









 Activated partial thromboplastin time (aPTT) in platelet poor plasma 
bycoagulation assay            < s            24-35        









 Comprehensive metabolic panel - 17 10:15         









 Serum or plasma sodium measurement (moles/volume)            142 mmol/L       
     135-145        

 

 Serum or plasma potassium measurement (moles/volume)            3.7 mmol/L    
        3.6-5.0        

 

 Serum or plasma chloride measurement (moles/volume)            112 mmol/L     
               

 

 Carbon dioxide            16 mmol/L            21-32        

 

 Serum or plasma anion gap determination (moles/volume)            14 mmol/L   
         5-14        

 

 Serum or plasma urea nitrogen measurement (mass/volume)            10 mg/dL   
         7-18        

 

 Serum or plasma creatinine measurement (mass/volume)            0.81 mg/dL    
        0.60-1.30        

 

 Serum or plasma urea nitrogen/creatinine mass ratio            12             
NRG        

 

 Serum or plasma creatinine measurement with calculation of estimated 
glomerular filtration rate            >             NRG        

 

 Serum or plasma glucose measurement (mass/volume)            137 mg/dL        
            

 

 Serum or plasma calcium measurement (mass/volume)            9.0 mg/dL        
    8.5-10.1        

 

 Serum or plasma total bilirubin measurement (mass/volume)            0.6 mg/dL
            0.1-1.0        

 

 Serum or plasma alkaline phosphatase measurement (enzymatic activity/volume)  
          69 U/L                    

 

 Serum or plasma aspartate aminotransferase measurement (enzymatic activity/
volume)            17 U/L            5-34        

 

 Serum or plasma alanine aminotransferase measurement (enzymatic activity/volume
)            14 U/L            0-55        

 

 Serum or plasma protein measurement (mass/volume)            7.1 g/dL         
   6.4-8.2        

 

 Serum or plasma albumin measurement (mass/volume)            4.0 g/dL         
   3.2-4.5        









 Magnesium - 17 10:15         









 Magnesium            1.7 mg/dL            1.8-2.4        









 Serum or plasma troponin i.cardiac measurement (mass/volume) - 17 10:15 
        









 Serum or plasma troponin i.cardiac measurement (mass/volume)            < ng/
mL            <0.30        









 Serum or plasma amylase measurement (enzymatic activity/volume) - 17 10:
15         









 Serum or plasma amylase measurement (enzymatic activity/volume)            47 U
/L                    









 Lipase - 17 10:15         









 Lipase            35 U/L            8-78        









 Complete blood count (CBC) with automated white blood cell (WBC) differential 
- 10/25/17 10:20         









 Blood leukocytes automated count (number/volume)            7.8 10*3/uL       
     4.3-11.0        

 

 Blood erythrocytes automated count (number/volume)            4.58 10*6/uL    
        4.35-5.85        

 

 Venous blood hemoglobin measurement (mass/volume)            15.6 g/dL        
    11.5-16.0        

 

 Blood hematocrit (volume fraction)            45 %            35-52        

 

 Automated erythrocyte mean corpuscular volume            97 [foz_us]          
  80-99        

 

 Automated erythrocyte mean corpuscular hemoglobin (mass per erythrocyte)      
      34 pg            25-34        

 

 Automated erythrocyte mean corpuscular hemoglobin concentration measurement (
mass/volume)            35 g/dL            32-36        

 

 Automated erythrocyte distribution width ratio            12.5 %            
10.0-14.5        

 

 Automated blood platelet count (count/volume)            246 10*3/uL          
  130-400        

 

 Automated blood platelet mean volume measurement            10.2 [foz_us]     
       7.4-10.4        

 

 Automated blood neutrophils/100 leukocytes            60 %            42-75   
     

 

 Automated blood lymphocytes/100 leukocytes            29 %            12-44   
     

 

 Blood monocytes/100 leukocytes            8 %            0-12        

 

 Automated blood eosinophils/100 leukocytes            2 %            0-10     
   

 

 Automated blood basophils/100 leukocytes            1 %            0-10        

 

 Blood neutrophils automated count (number/volume)            4.7 10*3         
   1.8-7.8        

 

 Blood lymphocytes automated count (number/volume)            2.3 10*3         
   1.0-4.0        

 

 Blood monocytes automated count (number/volume)            0.6 10*3            
0.0-1.0        

 

 Automated eosinophil count            0.1 10*3/uL            0.0-0.3        

 

 Automated blood basophil count (count/volume)            0.0 10*3/uL          
  0.0-0.1        









 Comprehensive metabolic panel - 10/25/17 10:20         









 Serum or plasma sodium measurement (moles/volume)            138 mmol/L       
     135-145        

 

 Serum or plasma potassium measurement (moles/volume)            4.2 mmol/L    
        3.6-5.0        

 

 Serum or plasma chloride measurement (moles/volume)            110 mmol/L     
               

 

 Carbon dioxide            19 mmol/L            21-32        

 

 Serum or plasma anion gap determination (moles/volume)            9 mmol/L    
        5-14        

 

 Serum or plasma urea nitrogen measurement (mass/volume)            8 mg/dL    
        7-18        

 

 Serum or plasma creatinine measurement (mass/volume)            0.80 mg/dL    
        0.60-1.30        

 

 Serum or plasma urea nitrogen/creatinine mass ratio            10             
NRG        

 

 Serum or plasma creatinine measurement with calculation of estimated 
glomerular filtration rate            >             NRG        

 

 Serum or plasma glucose measurement (mass/volume)            100 mg/dL        
            

 

 Serum or plasma calcium measurement (mass/volume)            9.4 mg/dL        
    8.5-10.1        

 

 Serum or plasma total bilirubin measurement (mass/volume)            1.1 mg/dL
            0.1-1.0        

 

 Serum or plasma alkaline phosphatase measurement (enzymatic activity/volume)  
          56 U/L                    

 

 Serum or plasma aspartate aminotransferase measurement (enzymatic activity/
volume)            18 U/L            5-34        

 

 Serum or plasma alanine aminotransferase measurement (enzymatic activity/volume
)            12 U/L            0-55        

 

 Serum or plasma protein measurement (mass/volume)            8.1 g/dL         
   6.4-8.2        

 

 Serum or plasma albumin measurement (mass/volume)            4.4 g/dL         
   3.2-4.5        









 Lipid 1996 panel - 10/25/17 10:20         









 Serum or plasma triglyceride measurement (mass/volume)            118 mg/dL   
         <150        

 

 Serum or plasma cholesterol measurement (mass/volume)            139 mg/dL    
        < 200        

 

 Serum or plasma cholesterol in HDL measurement (mass/volume)            43 mg/
dL            40-60        

 

 Cholesterol in LDL [mass/volume] in serum or plasma by direct assay            
73 mg/dL            1-129        

 

 Serum or plasma cholesterol in VLDL measurement (mass/volume)            24 mg/
dL            5-40        









 THYROID STIMULATING HORMONE - 10/25/17 10:20         









 THYROID STIMULATING HORMONE            0.80 u[iU]/mL            0.35-4.94     
   









 Complete blood count (CBC) with automated white blood cell (WBC) differential 
- 17 13:25         









 Blood leukocytes automated count (number/volume)            4.9 10*3/uL       
     4.3-11.0        

 

 Blood erythrocytes automated count (number/volume)            4.51 10*6/uL    
        4.35-5.85        

 

 Venous blood hemoglobin measurement (mass/volume)            15.2 g/dL        
    11.5-16.0        

 

 Blood hematocrit (volume fraction)            43 %            35-52        

 

 Automated erythrocyte mean corpuscular volume            95 [foz_us]          
  80-99        

 

 Automated erythrocyte mean corpuscular hemoglobin (mass per erythrocyte)      
      34 pg            25-34        

 

 Automated erythrocyte mean corpuscular hemoglobin concentration measurement (
mass/volume)            36 g/dL            32-36        

 

 Automated erythrocyte distribution width ratio            11.7 %            
10.0-14.5        

 

 Automated blood platelet count (count/volume)            182 10*3/uL          
  130-400        

 

 Automated blood platelet mean volume measurement            10.1 [foz_us]     
       7.4-10.4        

 

 Automated blood neutrophils/100 leukocytes            46 %            42-75   
     

 

 Automated blood lymphocytes/100 leukocytes            37 %            12-44   
     

 

 Blood monocytes/100 leukocytes            12 %            0-12        

 

 Automated blood eosinophils/100 leukocytes            3 %            0-10     
   

 

 Automated blood basophils/100 leukocytes            3 %            0-10        

 

 Blood neutrophils automated count (number/volume)            2.2 10*3         
   1.8-7.8        

 

 Blood lymphocytes automated count (number/volume)            1.8 10*3         
   1.0-4.0        

 

 Blood monocytes automated count (number/volume)            0.6 10*3            
0.0-1.0        

 

 Automated eosinophil count            0.2 10*3/uL            0.0-0.3        

 

 Automated blood basophil count (count/volume)            0.1 10*3/uL          
  0.0-0.1        









 Comprehensive metabolic panel - 17 13:25         









 Serum or plasma sodium measurement (moles/volume)            138 mmol/L       
     135-145        

 

 Serum or plasma potassium measurement (moles/volume)            4.2 mmol/L    
        3.6-5.0        

 

 Serum or plasma chloride measurement (moles/volume)            106 mmol/L     
               

 

 Carbon dioxide            20 mmol/L            21-32        

 

 Serum or plasma anion gap determination (moles/volume)            12 mmol/L   
         5-14        

 

 Serum or plasma urea nitrogen measurement (mass/volume)            10 mg/dL   
         7-18        

 

 Serum or plasma creatinine measurement (mass/volume)            0.72 mg/dL    
        0.60-1.30        

 

 Serum or plasma urea nitrogen/creatinine mass ratio            14             
NRG        

 

 Serum or plasma creatinine measurement with calculation of estimated 
glomerular filtration rate            >             NRG        

 

 Serum or plasma glucose measurement (mass/volume)            93 mg/dL         
           

 

 Serum or plasma calcium measurement (mass/volume)            9.3 mg/dL        
    8.5-10.1        

 

 Serum or plasma total bilirubin measurement (mass/volume)            0.6 mg/dL
            0.1-1.0        

 

 Serum or plasma alkaline phosphatase measurement (enzymatic activity/volume)  
          61 U/L                    

 

 Serum or plasma aspartate aminotransferase measurement (enzymatic activity/
volume)            26 U/L            5-34        

 

 Serum or plasma alanine aminotransferase measurement (enzymatic activity/volume
)            17 U/L            0-55        

 

 Serum or plasma protein measurement (mass/volume)            8.3 g/dL         
   6.4-8.2        

 

 Serum or plasma albumin measurement (mass/volume)            4.2 g/dL         
   3.2-4.5        









 Serum or plasma troponin i.cardiac measurement (mass/volume) - 17 13:25 
        









 Serum or plasma troponin i.cardiac measurement (mass/volume)            < ng/
mL            <0.30        









 Lipase - 17 13:25         









 Lipase            30 U/L            8-78        









 Complete urinalysis with reflex to culture - 17 14:07         









 Urine color determination            YELLOW             NRG        

 

 Urine clarity determination            CLEAR             NRG        

 

 Urine pH measurement by test strip            6.5             5-9        

 

 Specific gravity of urine by test strip            1.015             1.016-
1.022        

 

 Urine protein assay by test strip, semi-quantitative            2+             
NEGATIVE        

 

 Urine glucose detection by automated test strip            NEGATIVE           
  NEGATIVE        

 

 Erythrocytes detection in urine sediment by light microscopy            1+    
         NEGATIVE        

 

 Urine ketones detection by automated test strip            3+             
NEGATIVE        

 

 Urine nitrite detection by test strip            NEGATIVE             NEGATIVE
        

 

 Urine total bilirubin detection by test strip            1+             
NEGATIVE        

 

 Urine urobilinogen measurement by automated test strip (mass/volume)          
  4 mg/dL            NORMAL        

 

 Urine leukocyte esterase detection by dipstick            1+             
NEGATIVE        

 

 Automated urine sediment erythrocyte count by microscopy (number/high power 
field)            NONE             NRG        

 

 Automated urine sediment leukocyte count by microscopy (number/high power field
)             [HPF]            NRG        

 

 Bacteria detection in urine sediment by light microscopy            TRACE     
        NRG        

 

 Squamous epithelial cells detection in urine sediment by light microscopy     
       5-10             NRG        

 

 Crystals detection in urine sediment by light microscopy            NONE      
       NRG        

 

 Casts detection in urine sediment by light microscopy            NONE         
    NRG        

 

 Mucus detection in urine sediment by light microscopy            SMALL        
     NRG        

 

 Complete urinalysis with reflex to culture            NO             NRG      
  









 Methicillin resistant Staphylococcus aureus (MRSA) screening culture - 07/10/
18 09:35         









 Methicillin resistant Staphylococcus aureus (MRSA) screening culture          
  NEG             NRG        









 Urine beta human chorionic gonadotropin (hCG) measurement - 18 06:10    
     









 Urine beta human chorionic gonadotropin (hCG) measurement            NEGATIVE 
            NEGATIVE        









 Complete blood count (CBC) with automated white blood cell (WBC) differential 
- 18 06:30         









 Blood leukocytes automated count (number/volume)            7.3 10*3/uL       
     4.3-11.0        

 

 Blood erythrocytes automated count (number/volume)            3.95 10*6/uL    
        4.35-5.85        

 

 Venous blood hemoglobin measurement (mass/volume)            13.5 g/dL        
    11.5-16.0        

 

 Blood hematocrit (volume fraction)            39 %            35-52        

 

 Automated erythrocyte mean corpuscular volume            98 [foz_us]          
  80-99        

 

 Automated erythrocyte mean corpuscular hemoglobin (mass per erythrocyte)      
      34 pg            25-34        

 

 Automated erythrocyte mean corpuscular hemoglobin concentration measurement (
mass/volume)            35 g/dL            32-36        

 

 Automated erythrocyte distribution width ratio            11.8 %            
10.0-14.5        

 

 Automated blood platelet count (count/volume)            250 10*3/uL          
  130-400        

 

 Automated blood platelet mean volume measurement            9.9 [foz_us]      
      7.4-10.4        

 

 Automated blood neutrophils/100 leukocytes            56 %            42-75   
     

 

 Automated blood lymphocytes/100 leukocytes            33 %            12-44   
     

 

 Blood monocytes/100 leukocytes            9 %            0-12        

 

 Automated blood eosinophils/100 leukocytes            2 %            0-10     
   

 

 Automated blood basophils/100 leukocytes            0 %            0-10        

 

 Blood neutrophils automated count (number/volume)            4.1 10*3         
   1.8-7.8        

 

 Blood lymphocytes automated count (number/volume)            2.4 10*3         
   1.0-4.0        

 

 Blood monocytes automated count (number/volume)            0.6 10*3            
0.0-1.0        

 

 Automated eosinophil count            0.2 10*3/uL            0.0-0.3        

 

 Automated blood basophil count (count/volume)            0.0 10*3/uL          
  0.0-0.1        









 Blood type T Indirect antibody screen panel - 18 06:30         









 ABO+Rh group            AP             NRG        

 

 Transfusion band number            [id number]             NRG        

 

 Blood group antibody screen            NEGATIVE             NRG        



                                                                               
                                                                             



Encounters

      





 ACCT No.            Visit Date/Time            Discharge            Status    
        Pt. Type            Provider            Facility            Loc./Unit  
          Complaint        

 

 [id number]            2015 09:18:00            2015 23:59:59          
  [facility]            Outpatient            MCC[facility] DAVID WEEMS MARY                        
                       

 

 [id number]            2014 14:55:00            2014 23:59:59          
  [facility]            Outpatient            CHAY BUSTAMANTE MRAIN MONTEIRO                
                               

 

 [id number]            2014 11:44:00            2014 23:59:59          
  [facility]            Outpatient            CHAY BUSTAMANTEMARIN                
                               

 

 [id number]            2013 16:02:00            2013 23:59:59          
  [facility]            Outpatient            ALEJANDRA SALAZAR DO                     
                          

 

 [id number]            10/04/2012 13:57:00            10/04/2012 23:59:59          
  [facility]            Outpatient            ALEJANDRA SALAZAR DO                     
                          

 

 [id number]            11/15/2017 14:51:17            11/15/2017 23:59:59            
[facility]            Outpatient                                                      
      

 

 [id number]            2015 04:44:40                         [place]            
Document Registration                                                          
  

 

 [id number]            2018 07:30:00            2018 23:59:59    
        [facility]            Preadmit            UMA HANDLEY DO            [facility] 
[facility]            CHRONIC PELVIC PAIN,
FACTOR V LEIDEN        

 

 [id number]            07/10/2018 09:05:00            07/10/2018 10:00:00    
        DIS            Outpatient            UMA HANDLEY DO            
[facility]            PREOP            CHRONIC PELVIC PAIN
,FACTOR V LEIDEN        

 

 [id number]            2018 14:09:00            2018 23:59:59    
        [facility]            Outpatient            UMA HANDLEY DO            
[facility]            RAD            R92.8 ABN MAMMO     
   

 

 [id number]            2018 14:27:00            2018 23:59:59    
        [facility]            Outpatient            UMA HANDLEY DO            
[facility]            RAD            SCREENING        

 

 [id number]            2018 14:15:00            2018 16:23:00    
        DIS            Outpatient            STACY CAMARGO MD            
[facility]            REHAB            L SHOULDER PAIN 
WITH ROTATOR CUFF INFLAMMATION        

 

 [id number]            2017 13:16:00            2017 15:42:00    
        DIS            Emergency            DELGADOZAFAR APRN            [facility] 
[facility]            ER            L SIDE ABD PAIN        

 

 [id number]            10/25/2017 09:59:00            10/25/2017 23:59:59    
        [facility]            Outpatient            STACY CAMARGO MD            
[facility]            LAB            I10, Z79.899        

 

 [id number]            2017 11:15:00            2017 11:59:00    
        DIS            Outpatient            STACY CAMARGO MD            
[facility]            REHAB            LBP; THORACIC PAIN
        

 

 [id number]            2017 11:49:00            2017 23:59:59    
        [facility]            Preadmit            STACY CAMARGO MD            [facility] 
[facility]            RAD            SCREENING Z12.31        

 

 [id number]            2017 08:35:00            2017 23:59:59    
        [facility]            Outpatient            YONATHAN SORIANO MD            [facility] 
[facility]            CARD            CAD,CAROTID STENOSIS   
     

 

 [id number]            2017 09:40:00            2017 11:56:00    
        DIS            Emergency            CHE BEARD DO            [facility] 
[facility]            ER            HEADACHE/NAUSEA        

 

 [id number]            2017 00:09:00            2017 23:59:59    
        [facility]            Preadmit            YONATHAN SORIANO MD            [facility]            LAB            COUMIDIN,THERAPY,MED 
MGMT        

 

 [id number]            10/31/2016 10:02:00            2017 00:01:00    
        DIS            Outpatient            YONATHAN SORIANO MD            [facility]            LAB            COUMIDIN,THERAPY,MED 
MGMT        

 

 [id number]            2016 15:27:00            2016 23:59:59    
        [facility]            Outpatient            STACY CAMARGO MD            
[facility]            LAB            ANEMIA        

 

 [id number]            2016 20:47:00            2016 22:28:00    
        DIS            Emergency            CHE BEARD DO            [facility]            ER            FACIAL NUMBNESS        

 

 [id number]            2016 16:25:00            2016 09:20:00    
        DIS            Inpatient            STACY CAMARGO MD            [facility]            ICU            SUPRATHERAPRUTIC INR; 
HEMATURIA; HEMATEMESIS        

 

 [id number]            2016 08:51:00            2016 23:59:59    
        [facility]            Outpatient            STACY CAMARGO MD            
[facility]            RAD            LUNG MASS        

 

 [id number]            10/27/2016 08:51:00            10/28/2016 10:40:00    
        DIS            Inpatient            STACY CAMARGO MD            [facility]            4TH            POST OBSTRUCTIVE PNA LLL
        

 

 [id number]            10/26/2016 09:36:00            10/26/2016 23:59:59    
        [facility]            Outpatient            STACY CAMARGO MD            
[facility]            RAD            SOB,COUGH        

 

 [id number]            2016 10:51:00            2016 00:01:00    
        DIS            Outpatient            YONATHAN SORIANO MD            [facility]            LAB            COUMIDIN,THERAPY,MED 
MGMT        

 

 [id number]            2016 11:16:00            2016 12:09:00    
        DIS            Outpatient            STACY CAMARGO MD            
[facility]            REHAB            MID BACK/NECK/
SHOULDER PAIN AND STIFFNESS        

 

 [id number]            2016 13:32:00            2016 23:59:59    
        CL[facility]            Outpatient            REINA LUO            
[facility]            LAB            CHEST PAIN, ABN 
WEIGHT LOSS        

 

 [id number]            2016 20:25:00            2016 22:38:00    
        DIS            Emergency            JUDD ANDERSON MD            
[facility]            ER            HEADACHE/ELEVATED BP/
LOW HEART RATE        

 

 [id number]            2016 12:20:00            2016 00:01:00    
        DIS            Outpatient            YONATHAN SORIANO MD            [facility]            LAB            COUMIDIN,THERAPY,MED 
MGMT        

 

 [id number]            2016 08:59:00            2016 10:05:00    
        DIS            Outpatient            STACY CAMARGO MD            
[facility]            REHAB            NECK AND UPPER 
BACK PAIN,KYPHOSIS,RADICULOPATHY        

 

 [id number]            2016 11:41:00            2016 23:59:59    
        CLS            Outpatient            UMA HANDLEY DO S            
[facility]            RAD            ACUTE PELVIC PAIN   
     

 

 [id number]            2015 07:47:00            2015 23:59:59    
        [facility]            Outpatient            YONATHAN SORIANO MD            [facility]            CARD            CAD,CELESTE,HLP,HTN        

 

 [id number]            2015 14:31:00            2015 23:59:59    
        [facility]            Outpatient            DARRIAN FARMER ARNP          
  [facility]            RAD            NECK THORACIC 
LUMBAR BACK PAIN        

 

 [id number]            2015 15:06:00            2015 23:59:59    
        [facility]            Outpatient            DARRIAN FARMER          
  [facility]            RAD            NECK PAIN,THORACIC 
BACK PAIN        

 

 [id number]            2015 10:17:00            2015 00:01:00    
        DIS            Outpatient            YONATHAN SORIANO MD            [facility]            LAB            COUMIDIN,THERAPY,MED 
MGMT        

 

 [id number]            2015 10:22:00            2015 23:59:59    
        CL[facility]            Outpatient            STACY CAMARGO MD            
[facility]            RAD            KNEE PAIN,BACK PAIN,
HEEL PAIN        

 

 [id number]            2015 09:14:00            2015 00:01:00    
        DIS            Outpatient            YONATHAN SORIANO MD            [facility]            LAB            COUMIDIN,THERAPY,MED 
MGMT        

 

 [id number]            10/28/2014 09:51:00            2014 00:01:00    
        DIS            Outpatient            YONATHAN SORIANO MD            [facility]            LAB            COUMIDIN,THERAPY,MED 
MGMT        

 

 [id number]            2014 12:56:00            2014 23:59:59    
        CL[facility]            Outpatient            BLANCA DOUGLAS MD            
[facility]            RAD            RLQ PAIN        

 

 [id number]            2014 11:41:00            2014 23:59:59    
        [facility]            Outpatient            BLANCA DOUGLAS MD            
[facility]            RAD            ABD PAIN        

 

 [id number]            2014 09:10:00            2014 00:01:00    
        DIS            Outpatient            YONATHAN SORIANO MD            [facility]            LAB            COUMIDIN,THERAPY,MED 
MGMT        

 

 [id number]            2013 10:39:00            2013 12:00:00    
        DIS            Inpatient            BLANCA DOUGLAS MD            
[facility]            SURGICAL            ABD PAIN        

 

 [id number]            10/21/2013 08:42:00            10/22/2013 00:01:00    
        DIS            Outpatient            YONATHAN SORIANO MD            [facility]            LAB            COUMIDIN,THERAPY,MED 
MGMT        

 

 [id number]            10/21/2013 08:38:00            10/21/2013 23:59:59    
        [facility]            Outpatient            MALINA TORRES    
        [facility]            LAB            
HYPERLIPIDEMIA        

 

 [id number]            10/21/2013 08:32:00            10/21/2013 23:59:59    
        [facility]            Outpatient            BLANCA DOUGLAS MD            
[facility]            LAB                     

 

 [id number]            2013 10:10:00            2013 00:01:00    
        DIS            Outpatient            YONATHAN SORIANO MD            [facility]            LAB            MED MANGEMENT        

 

 [id number]            2017 16:55:00                                   
   Document Registration                                                       
     

 

 [id number]            2016 07:48:00                                   
   Document Registration                                                       
     

 

 [id number]            2016 16:15:00                                   
   Document Registration                                                       
     

 

 [id number]            2015 08:43:00                                   
   Document Registration                                                       
     

 

 [id number]            2015 08:42:00                                   
   Document Registration                                                       
     

 

 [id number]            2015 08:42:00                                   
   Document Registration                                                       
     

 

 [id number]            2015 08:42:00                                   
   Document Registration                                                       
     

 

 [id number]            2015 08:42:00                                   
   Document Registration                                                       
     

 

 [id number]            2015 08:42:00                                   
   Document Registration                                                       
     

 

 [id number]            2013 00:00:00                                   
   Document Registration                                                       
     

 

 [id number]            2012 14:28:00                                   
   Document Registration                                                       
     

 

 [id number]            2012 12:16:00                                   
   Document Registration                                                       
     

 

 [id number]            10/20/2011 21:26:00                                   
   Document Registration                                                       
     

 

 [id number]            2011 13:07:00                                   
   Document Registration                                                       
     

 

 [id number]            2011 14:34:00                                   
   Document Registration                                                       
     

 

 [id number]            2011 07:00:00                                   
   Document Registration                                                       
     

 

 [id number]            2011 08:57:00                                   
   Document Registration                                                       
     

 

 [id number]            2011 06:20:00                                   
   Document Registration                                                       
     

 

 [id number]            2011 09:10:00                                   
   Document Registration                                                       
     

 

 [id number]            2011 20:45:00                                   
   Document Registration                                                       
     

 

 [id number]            02/15/2011 13:30:00                                   
   Document Registration                                                       
     

 

 [id number]            10/20/2010 06:22:00                                   
   Document Registration                                                       
     

 

 [id number]            10/20/2010 06:16:00                                   
   Document Registration                                                       
     

 

 [id number]            10/20/2010 06:12:00                                   
   Document Registration                                                       
     

 

 [id number]            2010 13:51:00                                   
   Document Registration                                                       
     

 

 [id number]            2010 13:32:00                                   
   Document Registration                                                       
     

 

 [id number]            2010 08:12:00                                   
   Document Registration

## 2018-07-23 VITALS — DIASTOLIC BLOOD PRESSURE: 74 MMHG | SYSTOLIC BLOOD PRESSURE: 122 MMHG

## 2018-07-23 LAB
APTT PPP: YELLOW S
BACTERIA #/AREA URNS HPF: (no result) /HPF
BILIRUB UR QL STRIP: NEGATIVE
FIBRINOGEN PPP-MCNC: CLEAR MG/DL
GLUCOSE UR STRIP-MCNC: NEGATIVE MG/DL
KETONES UR QL STRIP: NEGATIVE
LEUKOCYTE ESTERASE UR QL STRIP: (no result)
NITRITE UR QL STRIP: NEGATIVE
PH UR STRIP: 7 [PH] (ref 5–9)
PROT UR QL STRIP: (no result)
RBC #/AREA URNS HPF: (no result) /HPF
SP GR UR STRIP: 1.01 (ref 1.02–1.02)
SQUAMOUS #/AREA URNS HPF: (no result) /HPF
UROBILINOGEN UR-MCNC: NORMAL MG/DL
WBC #/AREA URNS HPF: (no result) /HPF

## 2018-07-23 NOTE — DIAGNOSTIC IMAGING REPORT
PROCEDURE: CT abdomen and pelvis with contrast.



TECHNIQUE: Multiple contiguous axial images were obtained through

the abdomen and pelvis after administration of intravenous

contrast. 



INDICATION:  Abdominal pain, recent hysterectomy. 



Study compared 11/12/2017.



There is a moderate amount of intra- and extraperitoneal

extraluminal gas within the abdomen and pelvis as well as

dissecting into the anterior lower thoracic mediastinum.  No

basilar pneumothorax. This is not an unexpected finding given the

recent surgery. Trace amount of low-density pelvic free fluid

without evidence for its loculation. There is fluid within the

lumen of the large bowel without substantial large bowel gas.

This can be seen in diarrhea or other hypermotile state. No

loculated collection. Mild hepatic steatosis. The spleen,

adrenals and pancreas unremarkable.  The gallbladder

unremarkable.  The kidneys unobstructed. No arterial obstruction.

 Urinary bladder had an unremarkable appearance. 



IMPRESSION:  Extraluminal gas, not unexpected given the surgical

time course. Increased fluid load in the large bowel may reflect

a diarrheal or hypermotile state. Trace free fluid without

contrast extravasation or obvious complexity. No loculated

collection or abscess and no convincing evidence for substantial

hemorrhage. Intact urinary bladder



I agree with preliminary.



Dictated by: 



  Dictated on workstation # [id number]

## 2018-07-23 NOTE — ED ABDOMINAL PAIN
General


Chief Complaint:  -Female


Stated Complaint:  FEVER,HYSTERECTOMY THURSDAY,PAIN,BLOOD THINNERS


Source of Information:  Patient


Exam Limitations:  No Limitations





History of Present Illness


Date Seen by Provider:  Jul 22, 2018


Time Seen by Provider:  22:10


Initial Comments


PT ARRIVES VIA POV FROM HOME


PT HAD ROBOTIC-ASSISTED VAGINAL HYSTERECTOMY BY DR. HANDLEY ON THURSDAY 07/19/ 18. WENT HOME THE SAME DAY


PT HAS HAD INCREASED PAIN SINCE YESTERDAY AFTERNOON 


VOMITED X 1 THIS MORNING DUE TO PAIN 


PAIN IS ALL ACROSS MID AND LOWER ABDOMEN


PT HAD 1 PERCOCET 7.5/325 AT 1300 AND TOOK 2 PERCOCET 1 1/2 HOURS AGO--HAS NOT 

TAKEN ANYTHING ELSE FOR PAIN TODAY


PT STATES SHE HAS BEEN IN BED ASLEEP ALL DAY--WOKE UP 1 1/2 HOURS AGO IN PAIN, 

AND THEN CAME HERE BECAUSE PAIN IS NOT BETTER


PT HAS NOT HAD ANYTHING TO EAT OR DRINK AT ALL TODAY


PT HAS BEEN VOIDING NORMAL AMOUNTS, LAST VOID WAS AT 2030 TONIGHT AND VOIDED ON 

ARRIVAL, WHILE STILL IN LOBBY


PT STATES SHE DOES HAVE MILD PAIN AT END OF VOIDING


PT IS PASSING GAS AND STOOL--STOOL WAS FORMED AND LAST ONE WAS LIQUID--HAS BEEN 

TAKING STOOL SOFTENERS SINCE SURGERY


PT FELT WARM THIS EVENING AND TEMP WAS 99.7 AT HOME PRIOR TO ARRIVAL


C/O CHILLS


C/O DIZZINESS





NO VAGINAL BLEEDING OR INCISIONAL BLEEDING


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





HAS NOT ATTEMPTED TO CONTACT DR. HANDLEY OR ON CALL  FOR THIS PROBLEM


HAS FOLLOW UP APPOINTMENT THIS WEEK





PCP: DR. CAMARGO


CARDIOLOGIST: DR. SORIANO


GYN: DR. HANDLEY





Allergies and Home Medications


Allergies


Coded Allergies:  


     prochlorperazine (Unverified  Allergy, Severe, TONGUE SWELLS, 2/16/07)


     metoclopramide (Verified  Allergy, Unknown, 9/7/09)


     topiramate (Unverified  Allergy, Unknown, 6/26/16)


 


 causes metabolic acidosis


     aripiprazole (Unverified  Adverse Reaction, Unknown, 7/19/18)


     levetiracetam (Unverified  Adverse Reaction, Unknown, 7/19/18)





Home Medications


Amlodipine Besylate 5 Mg Tablet, 5 MG PO DAILY, (Reported)


Apixaban 2.5 Mg Tablet, 2.5 MG PO BID, (Reported)


Atorvastatin Calcium 20 Mg Tablet, 20 MG PO HS, (Reported)


Docusate Sodium 100 Mg Capsule, 100 MG PO BID PRN for CONSTIPATION-1ST LINE


   Prescribed by: UMA HANDLEY on 7/19/18 0839


Escitalopram Oxalate 20 Mg Tablet, 20 MG PO DAILY, (Reported)


Famotidine 20 Mg Tablet, 20 MG PO HS, (Reported)


Hydrocodone Bit/Acetaminophen 1 Ea Tablet, 2 EA PO Q6H PRN for Pain-See 

Instructions


   Prescribed by: UMA HANDLEY on 7/19/18 0839


Ibuprofen 600 Mg Tablet, 600 MG PO Q6H PRN for PAIN-MODERATE


   Prescribed by: UMA HANDLEY on 7/19/18 0839


Lamotrigine 100 Mg Tab.er.24, 100 MG PO HS, (Reported)


Lisinopril 10 Mg Tablet, 10 MG PO DAILY, (Reported)


Lorazepam 2 Mg Tablet, 2-4 MG PO TID PRN for ANXIETY, (Reported)


   TAKE 1-2 (2MG) TABS 


Nitrofurantoin Monohyd/M-Cryst 100 Mg Capsule, 100 MG PO BID


   Prescribed by: CHE BEARD on 7/23/18 0107


Phenazopyridine HCl 200 Mg Tablet, 1 TAB PO TID


   Prescribed by: CHE BEARD on 7/23/18 0107


Simethicone 80 Mg Tab.chew, 40 MG PO TID PRN for INDIGESTION 2ND LINE


   Prescribed by: UMA HADNLEY on 7/19/18 0839





Patient Home Medication List


Home Medication List Reviewed:  Yes





Review of Systems


Constitutional:  see HPI, chills, dizziness, fever, malaise, weakness


EENTM:  No Symptoms Reported


Respiratory:  No Symptoms Reported


Cardiovascular:  No Symptoms Reported


Gastrointestinal:  See HPI, Abdominal Pain; Denies Constipated; Diarrhea, Nausea

, Poor Appetite, Poor Fluid Intake, Vomiting


Genitourinary:  See HPI


Musculoskeletal:  no symptoms reported


Skin:  no symptoms reported


Psychiatric/Neurological:  No Symptoms Reported


Endocrine:  No Symptoms Reported


Hematologic/Lymphatic:  See HPI





Past Medical-Social-Family Hx


Patient Social History


Alcohol Use:  Denies Use


Recreational Drug Use:  No


Drug of Choice:  MARIJUANA


Type Used:  Cigarettes


2nd Hand Smoke Exposure:  Yes


Recent Foreign Travel:  No


Contact w/Someone Who Travel:  No


Recent Hopitalizations:  No


Physical Abuse:  No


Sexual Abuse:  No





Immunizations Up To Date


Tetanus Booster (TDap):  Less than 5yrs





Seasonal Allergies


Seasonal Allergies:  No





Past Medical History


Surgeries:  Yes (CARDIAC CATH--NO INTERVENTION; LEFT  SALPINGO-OOPHORECTOMY 5 

YEARS AGO FOR TORSION; ROBOTIC ASSIST LAPAROSCOPIC VAGINAL HYST WITH REMOVAL OF 

RIGHT OVARY 07/19/18 BY DR. HANDLEY. )


Abdominal, Adenoidectomy, Cardiac, Hysterectomy, Oophorectomy, Tonsillectomy


Respiratory:  Yes


Pneumonia


Currently Using CPAP:  No


Currently Using BIPAP:  No


Cardiac:  Yes (hx blood clots; MI X 2-PER PT-DUE TO FACTOR 5 LEIDEN MUTATION. 

CARDIAC CATHS X2 --NO INTERVENTION)


Coronary Artery Disease, Deep Vein Thrombosis, Heart Attack, High Cholesterol, 

Hypertension


Neurological:  Yes ("NON-EPILEPTIC SEIZURES" PER PT)


Seizure Disorder


Reproductive Disorders:  Yes (HPV; OVARIAN TORSION/LEFT S.O.)


Female Reproductive Disorders:  Menstrual Problems, Ovarian Cyst, Polycystic 

Ovarian Dis


GYN History:  Hysterectomy


Sexually Transmitted Disease:  Yes (HPV)


Genitourinary:  Yes


Bladder Infection


Gastrointestinal:  Yes (GI BLEED 11/20/16,  ALL OTHER DISORDERS AS A CHILD)


Abdominal Hernia, Gastroesophageal Reflux, Gastrointestinal Bleed, Hepatitis


Musculoskeletal:  Yes (STENOSIS)


Scoliosis, Chronic Back Pain


Endocrine:  No


Loss of Vision:  Denies


Hearing Impairment:  Denies


Cancer:  No


Psychosocial:  Yes


Anxiety, Depression


Nursing Suicide Risk Score:  0


Integumentary:  No (BRUISING)


Blood Disorders:  Yes (DVT; FACTOR 5 LEIDEN)





Family Medical History





Family history: Cardiovascular disease


  03 FATHER


Family history: Coronary thrombosis


  03 FATHER


Family history: Diabetes mellitus


  03 MOTHER


Heart disease


  03 FATHER


History of - disorder


  03 FATHER (FACTOR FIVE LEIDEN)


Psychotic disorder


  03 MOTHER (DEPRESSION)


Heart Disease, Cancer, Hypertension





Physical Exam


Vital Signs





Vital Signs - First Documented








 7/23/18





 01:22


 


Pulse 86


 


Resp 14


 


B/P (MAP) 122/74


 


Pulse Ox 98


 


O2 Delivery Room Air





Capillary Refill :


Height/Weight/BMI


Height: 5'2.00"


Weight: 136lbs. 0.0oz. 61.457568oa; 24.9 BMI


Method:Stated


General Appearance:  WD/WN, no apparent distress


Neck:  normal inspection


Respiratory:  normal breath sounds, no respiratory distress, no accessory 

muscle use


Cardiovascular:  regular rate, rhythm, no murmur


Gastrointestinal:  normal bowel sounds, no pulsatile mass, tenderness (DIFFUSE 

TENDERNESS, BUT MOST TENDER IN LOWER ABDOMEN/SUPRAPUBIC AREA. ), other (

SLIGHTLY FIRM AND SLIGHTLY DISTENDED)


Extremities:  normal inspection, no pedal edema, normal capillary refill


Back:  normal inspection, no CVA tenderness


Neurologic/Psychiatric:  CNs II-XII nml as tested, no motor/sensory deficits, 

alert, normal mood/affect, oriented x 3


Skin:  normal color, warm/dry





Progress/Results/Core Measures


Results/Orders


Lab Results





Laboratory Tests








Test


 7/22/18


22:00 7/23/18


00:29 Range/Units


 


 


White Blood Count


 10.8 


 


 4.3-11.0


10^3/uL


 


Red Blood Count


 3.93 L


 


 4.35-5.85


10^6/uL


 


Hemoglobin 13.8   11.5-16.0  G/DL


 


Hematocrit 38   35-52  %


 


Mean Corpuscular Volume 96   80-99  FL


 


Mean Corpuscular Hemoglobin 35 H  25-34  PG


 


Mean Corpuscular Hemoglobin


Concent 37 H


 


 32-36  G/DL





 


Red Cell Distribution Width 11.4   10.0-14.5  %


 


Platelet Count


 209 


 


 130-400


10^3/uL


 


Mean Platelet Volume 10.3   7.4-10.4  FL


 


Neutrophils (%) (Auto) 73   42-75  %


 


Lymphocytes (%) (Auto) 16   12-44  %


 


Monocytes (%) (Auto) 10   0-12  %


 


Eosinophils (%) (Auto) 1   0-10  %


 


Basophils (%) (Auto) 0   0-10  %


 


Neutrophils # (Auto) 7.9 H  1.8-7.8  X 10^3


 


Lymphocytes # (Auto) 1.8   1.0-4.0  X 10^3


 


Monocytes # (Auto) 1.1 H  0.0-1.0  X 10^3


 


Eosinophils # (Auto)


 0.1 


 


 0.0-0.3


10^3/uL


 


Basophils # (Auto)


 0.0 


 


 0.0-0.1


10^3/uL


 


Sodium Level 136   135-145  MMOL/L


 


Potassium Level 3.8   3.6-5.0  MMOL/L


 


Chloride Level 105     MMOL/L


 


Carbon Dioxide Level 20 L  21-32  MMOL/L


 


Anion Gap 11   5-14  MMOL/L


 


Blood Urea Nitrogen 12   7-18  MG/DL


 


Creatinine


 0.82 


 


 0.60-1.30


MG/DL


 


Estimat Glomerular Filtration


Rate > 60 


 


  





 


BUN/Creatinine Ratio 15    


 


Glucose Level 94     MG/DL


 


Calcium Level 9.2   8.5-10.1  MG/DL


 


Total Bilirubin 1.1 H  0.1-1.0  MG/DL


 


Aspartate Amino Transf


(AST/SGOT) 11 


 


 5-34  U/L





 


Alanine Aminotransferase


(ALT/SGPT) 6 


 


 0-55  U/L





 


Alkaline Phosphatase 51     U/L


 


Total Protein 6.7   6.4-8.2  GM/DL


 


Albumin 3.9   3.2-4.5  GM/DL


 


Urine Color  YELLOW   


 


Urine Clarity  CLEAR   


 


Urine pH  7  5-9  


 


Urine Specific Gravity  1.010 L 1.016-1.022  


 


Urine Protein  2+ H NEGATIVE  


 


Urine Glucose (UA)  NEGATIVE  NEGATIVE  


 


Urine Ketones  NEGATIVE  NEGATIVE  


 


Urine Nitrite  NEGATIVE  NEGATIVE  


 


Urine Bilirubin  NEGATIVE  NEGATIVE  


 


Urine Urobilinogen  NORMAL  NORMAL  MG/DL


 


Urine Leukocyte Esterase  1+ H NEGATIVE  


 


Urine RBC (Auto)  2+ H NEGATIVE  


 


Urine RBC  2-5 H  /HPF


 


Urine WBC  RARE   /HPF


 


Urine Squamous Epithelial


Cells 


 2-5 


  /HPF





 


Urine Crystals  NONE   /LPF


 


Urine Bacteria  TRACE   /HPF


 


Urine Casts  NONE   /LPF


 


Urine Mucus  NEGATIVE   /LPF


 


Urine Culture Indicated  NO   








My Orders





Orders - CHE BEARD DO


Cbc With Automated Diff (7/22/18 22:10)


Comprehensive Metabolic Panel (7/22/18 22:10)


Lactic Acid Analyzer (7/22/18 22:10)


Ua Culture If Indicated (7/22/18 22:10)


Blood Culture (7/22/18 22:10)


Saline Lock/Iv-Start (7/22/18 22:35)


Lactated Ringers (Lr 1000 Ml Iv Solution (7/22/18 22:35)


Ketorolac Injection (Toradol Injection) (7/22/18 22:35)


Ct Abdomen/Pelvis W (7/22/18 22:53)


Iohexol Injection (Omnipaque 350 Mg/Ml 1 (7/22/18 23:45)


Ns (Ivpb) (Sodium Chloride 0.9%) (7/22/18 23:45)


Fentanyl  Injection (Sublimaze Injection (7/23/18 00:31)


Urine Culture (7/23/18 01:03)


Rx-Nitrofurantoin Mono (Rx-Macrobid) (7/23/18 01:04)


Phenazopyridine Tablet (Pyridium Tablet) (7/23/18 01:15)





Medications Given in ED





Current Medications








 Medications  Dose


 Ordered  Sig/Elizabeth


 Route  Start Time


 Stop Time Status Last Admin


Dose Admin


 


 Iohexol  100 ml  ONCE  ONCE


 IV  7/22/18 23:45


 7/22/18 23:46 DC 7/22/18 23:39


100 ML


 


 Lactated Ringer's  1,000 ml @ 


 0 mls/hr  Q0M ONCE


 IV  7/22/18 22:35


 7/22/18 22:37 DC 7/22/18 22:44


0 MLS/HR


 


 Phenazopyridine


 HCl  200 mg  ONCE  ONCE


 PO  7/23/18 01:15


 7/23/18 01:16 DC 7/23/18 01:17


200 MG


 


 Sodium Chloride  250 ml  ONCE  ONCE


 IV  7/22/18 23:45


 7/22/18 23:46 DC 7/22/18 23:39


80 ML








Vital Signs/I&O











 7/23/18





 01:22


 


Pulse 86


 


Resp 14


 


B/P (MAP) 122/74


 


Pulse Ox 98


 


O2 Delivery Room Air











Progress


Progress Note :  


Progress Note


PAIN IMPROVED AT DISMISSAL





Diagnostic Imaging





Comments


CT ABDOMEN/PELVIS--NORMAL POST OP FREE AIR. NO ABNORMAL FLUID COLLECTION AND 

OTHERWISE NO ACUTE PROCESS--PER [facility] VIA FAX @ 5184


   Reviewed:  Reviewed by Me





Departure


Impression





 Primary Impression:  


 Post-op pain


 Additional Impression:  


 UTI (urinary tract infection)


Disposition:  01 HOME, SELF-CARE


Condition:  Improved





Departure-Patient Inst.


Referrals:  


STACY CAMARGO MD (PCP/Family)


Primary Care Physician








UMA HANDLEY DO


Patient Instructions:  Postoperative Pain (DC), Urinary Tract Infection, Adult (

DC)





Add. Discharge Instructions:  


LOTS OF CLEAR LIQUIDS





TAKE YOUR PAIN MEDICATION AS PRESCRIBED


TAKE ZOFRAN AS NEEDED FOR NAUSEA





AVOID CONSTIPATION





FOLLOW UP WITH DR. HANDLEY THIS WEEK AS SCHEDULED


RETURN TO ER IF WORSE





All discharge instructions reviewed with patient and/or family. Voiced 

understanding.


Scripts


Phenazopyridine HCl (Pyridium) 200 Mg Tablet


1 TAB PO TID for BLADDER DISCOMFORT, #15 TAB


   Prov: CHE BEARD DO         7/23/18 


Nitrofurantoin Monohyd/M-Cryst (Macrobid 100 mg Capsule) 100 Mg Capsule


100 MG PO BID, #20 CAP


   Prov: SREEKANTH BEARDA K DO         7/23/18











CHE BEARD DO Jul 23, 2018 01:07

## 2018-08-13 ENCOUNTER — HOSPITAL ENCOUNTER (OUTPATIENT)
Dept: HOSPITAL 75 - RAD | Age: 43
End: 2018-08-13
Attending: NURSE PRACTITIONER
Payer: MEDICARE

## 2018-08-13 DIAGNOSIS — K59.00: Primary | ICD-10-CM

## 2018-08-13 PROCEDURE — 74018 RADEX ABDOMEN 1 VIEW: CPT

## 2018-08-13 NOTE — DIAGNOSTIC IMAGING REPORT
INDICATION: Constipation



KUB at 4:17 PM



FINDINGS:  

There is some stool in the rectum and scattered stool in the

colon. The volume still does not appear to be excessive. Bowel

gas pattern is normal. There are no pathologic masses or

opacifications.



IMPRESSION:

Mild fecal stasis.



Dictated by: 



  Dictated on workstation # [id number]

## 2018-09-07 ENCOUNTER — HOSPITAL ENCOUNTER (OUTPATIENT)
Dept: HOSPITAL 75 - RAD | Age: 43
End: 2018-09-07
Attending: NURSE PRACTITIONER
Payer: MEDICARE

## 2018-09-07 DIAGNOSIS — R41.3: ICD-10-CM

## 2018-09-07 DIAGNOSIS — R27.0: ICD-10-CM

## 2018-09-07 DIAGNOSIS — Z86.73: ICD-10-CM

## 2018-09-07 DIAGNOSIS — R41.0: Primary | ICD-10-CM

## 2018-09-07 PROCEDURE — 70551 MRI BRAIN STEM W/O DYE: CPT

## 2018-09-07 NOTE — DIAGNOSTIC IMAGING REPORT
PROCEDURE: MR imaging of the brain without contrast.



TECHNIQUE: Multiplanar, multisequence MR imaging of the brain was

performed without contrast.



INDICATION: 43-year-old female with severe confusion, severe

short term memory loss, and ataxia.



COMPARISONS: CT head 03/12/2017.



FINDINGS:



Midline structures are not displaced. The lateral, third, and

fourth ventricles are normal in size, shape, and anatomic

position. There is no evidence of mass, mass effect,

hydrocephalus, or hemorrhage. Gray-white differentiation is

normal. There is no sulcal effacement. A few scattered

nonspecific white matter changes are seen. There are no areas of

diffusion restriction or diffusion signal abnormalities to

suggest an acute or subacute ischemic injury. There are no

abnormal extra-axial fluid collections or hemorrhage. Petrous

apices as well as the seventh and eighth nerve complexes are

grossly normal. The semicircular canals and cochlea show normal

signal. Visualized vascular flow voids are unremarkable.

Craniovertebral junction is normal. The sellar and suprasellar

regions are normal. Sinuses, orbits, and mastoid air cells are

unremarkable.



IMPRESSION:



Essentially unremarkable nonenhanced MRI brain.



Dictated by: 



  Dictated on workstation # [id number]

## 2018-09-13 ENCOUNTER — HOSPITAL ENCOUNTER (EMERGENCY)
Dept: HOSPITAL 75 - ER | Age: 43
Discharge: HOME | End: 2018-09-13
Payer: MEDICARE

## 2018-09-13 VITALS — DIASTOLIC BLOOD PRESSURE: 71 MMHG | SYSTOLIC BLOOD PRESSURE: 128 MMHG

## 2018-09-13 VITALS — HEIGHT: 65 IN | BODY MASS INDEX: 29.16 KG/M2 | WEIGHT: 175 LBS

## 2018-09-13 DIAGNOSIS — G40.909: ICD-10-CM

## 2018-09-13 DIAGNOSIS — Z88.8: ICD-10-CM

## 2018-09-13 DIAGNOSIS — Z90.710: ICD-10-CM

## 2018-09-13 DIAGNOSIS — E78.00: ICD-10-CM

## 2018-09-13 DIAGNOSIS — Z79.01: ICD-10-CM

## 2018-09-13 DIAGNOSIS — F17.210: ICD-10-CM

## 2018-09-13 DIAGNOSIS — Z87.01: ICD-10-CM

## 2018-09-13 DIAGNOSIS — F41.9: ICD-10-CM

## 2018-09-13 DIAGNOSIS — Z86.718: ICD-10-CM

## 2018-09-13 DIAGNOSIS — Z87.448: ICD-10-CM

## 2018-09-13 DIAGNOSIS — F32.9: ICD-10-CM

## 2018-09-13 DIAGNOSIS — Z82.49: ICD-10-CM

## 2018-09-13 DIAGNOSIS — F12.10: ICD-10-CM

## 2018-09-13 DIAGNOSIS — R55: Primary | ICD-10-CM

## 2018-09-13 DIAGNOSIS — Z86.19: ICD-10-CM

## 2018-09-13 DIAGNOSIS — I10: ICD-10-CM

## 2018-09-13 DIAGNOSIS — Z90.89: ICD-10-CM

## 2018-09-13 DIAGNOSIS — I25.10: ICD-10-CM

## 2018-09-13 DIAGNOSIS — Z87.19: ICD-10-CM

## 2018-09-13 DIAGNOSIS — I25.2: ICD-10-CM

## 2018-09-13 LAB
ALBUMIN SERPL-MCNC: 4 GM/DL (ref 3.2–4.5)
ALP SERPL-CCNC: 56 U/L (ref 40–136)
ALT SERPL-CCNC: 17 U/L (ref 0–55)
APTT PPP: YELLOW S
BACTERIA #/AREA URNS HPF: NEGATIVE /HPF
BARBITURATES UR QL: NEGATIVE
BASOPHILS # BLD AUTO: 0 10^3/UL (ref 0–0.1)
BASOPHILS NFR BLD AUTO: 0 % (ref 0–10)
BENZODIAZ UR QL SCN: POSITIVE
BILIRUB SERPL-MCNC: 0.6 MG/DL (ref 0.1–1)
BILIRUB UR QL STRIP: NEGATIVE
BUN/CREAT SERPL: 8
CALCIUM SERPL-MCNC: 9 MG/DL (ref 8.5–10.1)
CHLORIDE SERPL-SCNC: 107 MMOL/L (ref 98–107)
CO2 SERPL-SCNC: 20 MMOL/L (ref 21–32)
COCAINE UR QL: NEGATIVE
CREAT SERPL-MCNC: 0.72 MG/DL (ref 0.6–1.3)
EOSINOPHIL # BLD AUTO: 0.1 10^3/UL (ref 0–0.3)
EOSINOPHIL NFR BLD AUTO: 2 % (ref 0–10)
ERYTHROCYTE [DISTWIDTH] IN BLOOD BY AUTOMATED COUNT: 12.1 % (ref 10–14.5)
FIBRINOGEN PPP-MCNC: CLEAR MG/DL
GFR SERPLBLD BASED ON 1.73 SQ M-ARVRAT: > 60 ML/MIN
GLUCOSE SERPL-MCNC: 77 MG/DL (ref 70–105)
GLUCOSE UR STRIP-MCNC: NEGATIVE MG/DL
HCT VFR BLD CALC: 38 % (ref 35–52)
HGB BLD-MCNC: 13.6 G/DL (ref 11.5–16)
KETONES UR QL STRIP: NEGATIVE
LEUKOCYTE ESTERASE UR QL STRIP: NEGATIVE
LYMPHOCYTES # BLD AUTO: 2.6 X 10^3 (ref 1–4)
LYMPHOCYTES NFR BLD AUTO: 32 % (ref 12–44)
MANUAL DIFFERENTIAL PERFORMED BLD QL: NO
MCH RBC QN AUTO: 34 PG (ref 25–34)
MCHC RBC AUTO-ENTMCNC: 35 G/DL (ref 32–36)
MCV RBC AUTO: 95 FL (ref 80–99)
METHADONE UR QL SCN: NEGATIVE
METHAMPHETAMINE SCREEN URINE S: NEGATIVE
MONOCYTES # BLD AUTO: 0.7 X 10^3 (ref 0–1)
MONOCYTES NFR BLD AUTO: 9 % (ref 0–12)
NEUTROPHILS # BLD AUTO: 4.7 X 10^3 (ref 1.8–7.8)
NEUTROPHILS NFR BLD AUTO: 58 % (ref 42–75)
NITRITE UR QL STRIP: NEGATIVE
OPIATES UR QL SCN: NEGATIVE
OXYCODONE UR QL: NEGATIVE
PH UR STRIP: 8 [PH] (ref 5–9)
PLATELET # BLD: 272 10^3/UL (ref 130–400)
PMV BLD AUTO: 10.3 FL (ref 7.4–10.4)
POTASSIUM SERPL-SCNC: 4.5 MMOL/L (ref 3.6–5)
PROPOXYPH UR QL: NEGATIVE
PROT SERPL-MCNC: 6.5 GM/DL (ref 6.4–8.2)
PROT UR QL STRIP: NEGATIVE
RBC # BLD AUTO: 4.05 10^6/UL (ref 4.35–5.85)
RBC #/AREA URNS HPF: (no result) /HPF
SODIUM SERPL-SCNC: 136 MMOL/L (ref 135–145)
SP GR UR STRIP: 1.01 (ref 1.02–1.02)
SQUAMOUS #/AREA URNS HPF: (no result) /HPF
TRICYCLICS UR QL SCN: NEGATIVE
UROBILINOGEN UR-MCNC: NORMAL MG/DL
WBC # BLD AUTO: 8.1 10^3/UL (ref 4.3–11)
WBC #/AREA URNS HPF: (no result) /HPF

## 2018-09-13 PROCEDURE — 80053 COMPREHEN METABOLIC PANEL: CPT

## 2018-09-13 PROCEDURE — 85025 COMPLETE CBC W/AUTO DIFF WBC: CPT

## 2018-09-13 PROCEDURE — 36415 COLL VENOUS BLD VENIPUNCTURE: CPT

## 2018-09-13 PROCEDURE — 93005 ELECTROCARDIOGRAM TRACING: CPT

## 2018-09-13 PROCEDURE — 84484 ASSAY OF TROPONIN QUANT: CPT

## 2018-09-13 PROCEDURE — 81000 URINALYSIS NONAUTO W/SCOPE: CPT

## 2018-09-13 PROCEDURE — 71045 X-RAY EXAM CHEST 1 VIEW: CPT

## 2018-09-13 PROCEDURE — 80306 DRUG TEST PRSMV INSTRMNT: CPT

## 2018-09-13 NOTE — DIAGNOSTIC IMAGING REPORT
INDICATION: Hypotension and syncope and dizziness.



Frontal chest obtained at 12:32 p.m.



Heart and mediastinal silhouette are normal in appearance. The

lungs are clear. There is no pneumothorax or pleural fluid.



IMPRESSION: Negative chest.



Dictated by: 



  Dictated on workstation # [id number]

## 2018-09-13 NOTE — ED GENERAL
General


Chief Complaint:  Dizziness/Syncope


Stated Complaint:  LOW BP/DIZZY


Source of Information:  Patient, Family


Exam Limitations:  No Limitations





History of Present Illness


Date Seen by Provider:  Sep 13, 2018


Time Seen by Provider:  12:39


Initial Comments


To ER per EMS to come in by family with reports of near syncope, 

lightheadedness and general fatigue. This began last night, worse this morning. 

Heart rate dropped to 52 and she states that her blood pressure was very low. 

She couldn't stay awake. She denied chest pain or shortness of breath. She's 

had about 6 of these episodes over the past 3 months and has been increasingly 

forgetful since she had been placed on a combination of Luvox and Lexapro. The 

Luvox was stopped and the Lexapro was stopped suddenly on Monday of this week (

today being Thursday). She also had her Lamictal dose increased last night. At 

this point she states she is feeling better.


Timing/Duration:  1-2 Days


Severity:  Moderate


Associated Systoms:  No Chest Pain, No Cough, No Diaphoresis, No Fever/Chills, 

No Nausea/Vomiting





Allergies and Home Medications


Allergies


Coded Allergies:  


     prochlorperazine (Unverified  Allergy, Severe, TONGUE SWELLS, 2/16/07)


     metoclopramide (Verified  Allergy, Unknown, 9/7/09)


     topiramate (Unverified  Allergy, Unknown, 6/26/16)


 


 causes metabolic acidosis


     aripiprazole (Unverified  Adverse Reaction, Unknown, 7/19/18)


     levetiracetam (Unverified  Adverse Reaction, Unknown, 7/19/18)





Home Medications


Amlodipine Besylate 5 Mg Tablet, 5 MG PO DAILY, (Reported)


Apixaban 2.5 Mg Tablet, 2.5 MG PO BID, (Reported)


Atorvastatin Calcium 20 Mg Tablet, 20 MG PO HS, (Reported)


Docusate Sodium 100 Mg Capsule, 100 MG PO BID PRN for CONSTIPATION-1ST LINE


   Prescribed by: UMA HANDLEY on 7/19/18 0839


Escitalopram Oxalate 20 Mg Tablet, 20 MG PO DAILY, (Reported)


Famotidine 20 Mg Tablet, 20 MG PO HS, (Reported)


Hydrocodone Bit/Acetaminophen 1 Ea Tablet, 2 EA PO Q6H PRN for Pain-See 

Instructions


   Prescribed by: UMA HANDLEY on 7/19/18 0839


Ibuprofen 600 Mg Tablet, 600 MG PO Q6H PRN for PAIN-MODERATE


   Prescribed by: UMA HANDLEY on 7/19/18 0839


Lamotrigine 100 Mg Tab.er.24, 100 MG PO HS, (Reported)


Lisinopril 10 Mg Tablet, 10 MG PO DAILY, (Reported)


Lorazepam 2 Mg Tablet, 2-4 MG PO TID PRN for ANXIETY, (Reported)


   TAKE 1-2 (2MG) TABS 


Nitrofurantoin Monohyd/M-Cryst 100 Mg Capsule, 100 MG PO BID


   Prescribed by: CHE BEARD on 7/23/18 0107


Phenazopyridine HCl 200 Mg Tablet, 1 TAB PO TID


   Prescribed by: CHE BEARD on 7/23/18 0107


Simethicone 80 Mg Tab.chew, 40 MG PO TID PRN for INDIGESTION 2ND LINE


   Prescribed by: UMA HANDLEY on 7/19/18 0839





Patient Home Medication List


Home Medication List Reviewed:  Yes





Review of Systems


Review of Systems


Constitutional:  see HPI


EENTM:  see HPI


Respiratory:  see HPI; No cough, No phlegm, No short of breath


Cardiovascular:  no symptoms reported


Genitourinary:  no symptoms reported


Musculoskeletal:  no symptoms reported


Skin:  no symptoms reported


Psychiatric/Neurological:  No Symptoms Reported





Past Medical-Social-Family Hx


Patient Social History


Alcohol Use:  Occasionally Uses


Recreational Drug Use:  No


Drug of Choice:  MARIJUANA


Smoking Status:  Current Everyday Smoker


Type Used:  Cigarettes


2nd Hand Smoke Exposure:  Yes


Recent Hopitalizations:  No





Immunizations Up To Date


Tetanus Booster (TDap):  Less than 5yrs





Seasonal Allergies


Seasonal Allergies:  No





Past Medical History


Surgeries:  Yes


Abdominal, Adenoidectomy, Cardiac, Hysterectomy, Oophorectomy, Tonsillectomy


Respiratory:  Yes


Pneumonia


Currently Using CPAP:  No


Currently Using BIPAP:  No


Cardiac:  Yes


Coronary Artery Disease, Deep Vein Thrombosis, Heart Attack, High Cholesterol, 

Hypertension


Neurological:  Yes ("NON-EPILEPTIC SEIZURES" PER PT)


Seizure Disorder


Reproductive Disorders:  Yes (HPV; OVARIAN TORSION/LEFT S.O.)


Female Reproductive Disorders:  Menstrual Problems, Ovarian Cyst, Polycystic 

Ovarian Dis


GYN History:  Hysterectomy


Sexually Transmitted Disease:  Yes (HPV)


Genitourinary:  Yes


Bladder Infection


Gastrointestinal:  Yes (GI BLEED 11/20/16,  ALL OTHER DISORDERS AS A CHILD)


Abdominal Hernia, Gastroesophageal Reflux, Gastrointestinal Bleed, Hepatitis


Musculoskeletal:  Yes (STENOSIS)


Scoliosis, Chronic Back Pain


Endocrine:  No


Loss of Vision:  Denies


Hearing Impairment:  Denies


Cancer:  No


Psychosocial:  Yes


Anxiety, Depression


Integumentary:  No (BRUISING)


Blood Disorders:  Yes (DVT; FACTOR 5 LEIDEN)





Family Medical History





Family history: Cardiovascular disease


  03 FATHER


Family history: Coronary thrombosis


  03 FATHER


Family history: Diabetes mellitus


  03 MOTHER


Heart disease


  03 FATHER


History of - disorder


  03 FATHER (FACTOR FIVE LEIDEN)


Psychotic disorder


  03 MOTHER (DEPRESSION)


Heart Disease, Cancer, Hypertension





Physical Exam


Vital Signs





Vital Signs - First Documented








 9/13/18





 12:19


 


Temp 96.7


 


Pulse 61


 


Resp 18


 


B/P (MAP) 127/74 (91)


 


Pulse Ox 99


 


O2 Delivery Room Air





Capillary Refill :


Height, Weight, BMI


Height: 5'2.00"


Weight: 136lbs. 0.0oz. [id number]; 24.9 BMI


Method:Stated


General Appearance:  No Apparent Distress, WD/WN, Other (alert and oriented GCS 

15.)


HEENT:  PERRL/EOMI, TMs Normal


Neck:  Full Range of Motion, Normal Inspection


Respiratory:  Normal Breath Sounds, No Accessory Muscle Use, No Respiratory 

Distress


Cardiovascular:  Normal Peripheral Pulses, Other (bradycardia with a rate of 50-

58 sinus no ectopy normal intervals)


Gastrointestinal:  Normal Bowel Sounds, Non Tender, Soft


Extremity:  Normal Capillary Refill, Normal Inspection


Neurologic/Psychiatric:  Alert, Oriented x3, No Motor/Sensory Deficits


Skin:  Normal Color, Warm/Dry





Progress/Results/Core Measures


Suspected Sepsis


SIRS


Temperature: 


Pulse:  


Respiratory Rate: 


 


Laboratory Tests


9/13/18 12:00: White Blood Count 8.1


Blood Pressure  / 


Mean: 


 


Laboratory Tests


9/13/18 12:00: 


Creatinine 0.72, Platelet Count 272, Total Bilirubin 0.6








Results/Orders


Lab Results





Laboratory Tests








Test


 9/13/18


12:00 9/13/18


12:29 Range/Units


 


 


White Blood Count


 8.1 


 


 4.3-11.0


10^3/uL


 


Red Blood Count


 4.05 L


 


 4.35-5.85


10^6/uL


 


Hemoglobin 13.6   11.5-16.0  G/DL


 


Hematocrit 38   35-52  %


 


Mean Corpuscular Volume 95   80-99  FL


 


Mean Corpuscular Hemoglobin 34   25-34  PG


 


Mean Corpuscular Hemoglobin


Concent 35 


 


 32-36  G/DL





 


Red Cell Distribution Width 12.1   10.0-14.5  %


 


Platelet Count


 272 


 


 130-400


10^3/uL


 


Mean Platelet Volume 10.3   7.4-10.4  FL


 


Neutrophils (%) (Auto) 58   42-75  %


 


Lymphocytes (%) (Auto) 32   12-44  %


 


Monocytes (%) (Auto) 9   0-12  %


 


Eosinophils (%) (Auto) 2   0-10  %


 


Basophils (%) (Auto) 0   0-10  %


 


Neutrophils # (Auto) 4.7   1.8-7.8  X 10^3


 


Lymphocytes # (Auto) 2.6   1.0-4.0  X 10^3


 


Monocytes # (Auto) 0.7   0.0-1.0  X 10^3


 


Eosinophils # (Auto)


 0.1 


 


 0.0-0.3


10^3/uL


 


Basophils # (Auto)


 0.0 


 


 0.0-0.1


10^3/uL


 


Sodium Level 136   135-145  MMOL/L


 


Potassium Level 4.5   3.6-5.0  MMOL/L


 


Chloride Level 107     MMOL/L


 


Carbon Dioxide Level 20 L  21-32  MMOL/L


 


Anion Gap 9   5-14  MMOL/L


 


Blood Urea Nitrogen 6 L  7-18  MG/DL


 


Creatinine


 0.72 


 


 0.60-1.30


MG/DL


 


Estimat Glomerular Filtration


Rate > 60 


 


  





 


BUN/Creatinine Ratio 8    


 


Glucose Level 77     MG/DL


 


Calcium Level 9.0   8.5-10.1  MG/DL


 


Corrected Calcium 9.0   8.5-10.1  MG/DL


 


Total Bilirubin 0.6   0.1-1.0  MG/DL


 


Aspartate Amino Transf


(AST/SGOT) 26 


 


 5-34  U/L





 


Alanine Aminotransferase


(ALT/SGPT) 17 


 


 0-55  U/L





 


Alkaline Phosphatase 56     U/L


 


Troponin I < 0.30   <0.30  NG/ML


 


Total Protein 6.5   6.4-8.2  GM/DL


 


Albumin 4.0   3.2-4.5  GM/DL


 


Urine Color  YELLOW   


 


Urine Clarity  CLEAR   


 


Urine pH  8  5-9  


 


Urine Specific Gravity  1.010 L 1.016-1.022  


 


Urine Protein  NEGATIVE  NEGATIVE  


 


Urine Glucose (UA)  NEGATIVE  NEGATIVE  


 


Urine Ketones  NEGATIVE  NEGATIVE  


 


Urine Nitrite  NEGATIVE  NEGATIVE  


 


Urine Bilirubin  NEGATIVE  NEGATIVE  


 


Urine Urobilinogen  NORMAL  NORMAL  MG/DL


 


Urine Leukocyte Esterase  NEGATIVE  NEGATIVE  


 


Urine RBC (Auto)  NEGATIVE  NEGATIVE  


 


Urine RBC  NONE   /HPF


 


Urine WBC  NONE   /HPF


 


Urine Squamous Epithelial


Cells 


 NONE 


  /HPF





 


Urine Crystals  NONE   /LPF


 


Urine Bacteria  NEGATIVE   /HPF


 


Urine Casts  NONE   /LPF


 


Urine Mucus  NEGATIVE   /LPF


 


Urine Culture Indicated  NO   


 


Urine Opiates Screen  NEGATIVE  NEGATIVE  


 


Urine Oxycodone Screen  NEGATIVE  NEGATIVE  


 


Urine Methadone Screen  NEGATIVE  NEGATIVE  


 


Urine Propoxyphene Screen  NEGATIVE  NEGATIVE  


 


Urine Barbiturates Screen  NEGATIVE  NEGATIVE  


 


Ur Tricyclic Antidepressants


Screen 


 NEGATIVE 


 NEGATIVE  





 


Urine Phencyclidine Screen  NEGATIVE  NEGATIVE  


 


Urine Amphetamines Screen  NEGATIVE  NEGATIVE  


 


Urine Methamphetamines Screen  NEGATIVE  NEGATIVE  


 


Urine Benzodiazepines Screen  POSITIVE H NEGATIVE  


 


Urine Cocaine Screen  NEGATIVE  NEGATIVE  


 


Urine Cannabinoids Screen  POSITIVE H NEGATIVE  








My Orders





Orders - ZAFAR NATARAJAN


Cbc With Automated Diff (9/13/18 12:05)


Comprehensive Metabolic Panel (9/13/18 12:05)


Ua Culture If Indicated (9/13/18 12:05)


Iv Heplock-Insert (Order) (9/13/18 12:05)


Ekg Tracing (9/13/18 12:05)


Troponin I (9/13/18 12:05)


Chest 1 View, Ap/Pa Only (9/13/18 12:05)


Urine Pregnancy Bedside (9/13/18 12:05)


Drug Screen Stat (Urine) (9/13/18 12:27)


Ct Head Wo (9/13/18 12:42)


Ns Iv 500 Ml (Sodium Chloride 0.9%) (9/13/18 13:30)





Vital Signs/I&O











 9/13/18





 12:19


 


Temp 96.7


 


Pulse 61


 


Resp 18


 


B/P (MAP) 127/74 (91)


 


Pulse Ox 99


 


O2 Delivery Room Air





Capillary Refill :





Departure


Communication (Admissions)


She did have an unremarkable MRI brain here 5 days ago.





Impression





 Primary Impression:  


 Near syncope


Disposition:  01 HOME, SELF-CARE


Condition:  Stable





Departure-Patient Inst.


Decision time for Depature:  13:10


Referrals:  


STACY HURT MD (PCP/Family)


Primary Care Physician


Patient Instructions:  Syncope (Fainting) (DC)





Add. Discharge Instructions:  


1. Follow-up Dr. Hurt


2. Return to ER for any concerns


All discharge instructions reviewed with patient and/or family. Voiced 

understanding.





Copy


Copies To 1:   STACY HURT MD; YONATHAN SORIANO MD, PETER J APRN Sep 13, 2018 12:41

## 2018-09-13 NOTE — XMS REPORT
Continuity of Care Document

 Created on: 2018



CHIARA AGUIRRE

External Reference #: [id number]

: 1975

Sex: Female



Demographics







 Address  [address] [place]  [address]

 

 Home Phone  [phone] x

 

 Preferred Language  Unknown

 

 Marital Status  Unknown

 

 Pentecostal Affiliation  Unknown

 

 Race  Unknown

 

 Ethnic Group  Unknown





Author







 Author  [facility]

 

 Address  Unknown

 

 Phone  Unavailable



              



Allergies

      





 Active            Description            Code            Type            
Severity            Reaction            Onset            Reported/Identified   
         Relationship to Patient            Clinical Status        

 

 Yes            prochlorperazine            [id number]            Drug Allergy 
           Severe            TONGUE SWELLS                         2007  
                                

 

 Yes            metoclopramide            [id number]            Drug Allergy   
         Unknown            N/A                         2009             
                     

 

 Yes            Topamax                         Drug Allergy            N/A    
        N/A                         2011                                  

 

 Yes            Topamax                         Drug Allergy                   
                                2011                                  

 

 Yes            Compazine                         Drug Allergy            N/A  
          N/A                         2012                               
   

 

 Yes            Compazine                         Drug Allergy                 
                                  2012                                  

 

 Yes            Geodon                         Drug Allergy            N/A     
       N/A                         2014                                  

 

 Yes            topiramate            [id number]            Drug Allergy       
     Unknown            N/A                         2016                 
                 

 

 Yes            aripiprazole            [id number]            Drug Allergy     
       Unknown            N/A                         2018               
                   

 

 Yes            levetiracetam            [id number]            Drug Allergy    
        Unknown            N/A                         2018              
                    



                                    



Medications

      



There is no data.                  



Problems

      





 Date Dx Coded            Attending            Type            Code            
Diagnosis            Diagnosed By        

 

 1208            STACY CAMARGO MD            Ot            M25.511   
         PAIN IN RIGHT SHOULDER                     

 

 1208            STACY CAMARGO MD            Ot            M25.611   
         STIFFNESS OF RIGHT SHOULDER, NOT ELSEWHE                     

 

 1208            STACY CAMARGO MD            Ot            M54.2     
       CERVICALGIA                     

 

 1208            STACY CAMARGO MD            Ot            M54.6     
       PAIN IN THORACIC SPINE                     

 

 1622            STACY CAMARGO MD            Ot            M25.512   
         PAIN IN LEFT SHOULDER                     

 

 2008            ALEJANDRA SALAZAR DO                         296.30      
      MAJOR DEPRESSIVE AFFECTIVE DISORDER RECURRENT EPISODE UNSPECIFIED DEGREE 
                    

 

 2008            ALEJANDRA SALAZAR DO                         300.00      
      AN ANXIETY UNSPEC                     

 

 2008            ALEJANDRA SALAZAR DO                         296.30      
      MAJOR DEPRESSIVE AFFECTIVE DISORDER RECURRENT EPISODE UNSPECIFIED DEGREE 
                    

 

 2008            ALEJANDRA SALAZAR DO                         300.00      
      AN ANXIETY UNSPEC                     

 

 2008            CHAY CARNEYMATMARIN                         296.30 
           MAJOR DEPRESSIVE AFFECTIVE DISORDER RECURRENT EPISODE UNSPECIFIED 
DEGREE                     

 

 2008            CHAY CARNEYMATMARIN                         300.00 
           AN ANXIETY UNSPEC                     

 

 2008            CHAY CARNEYMATMARIN                         296.30 
           MAJOR DEPRESSIVE AFFECTIVE DISORDER RECURRENT EPISODE UNSPECIFIED 
DEGREE                     

 

 2008            CHAY CARNEYMATMARIN                         300.00 
           AN ANXIETY UNSPEC                     

 

 2008            DAVID MCCAIN DO K                         296.30         
   MAJOR DEPRESSIVE AFFECTIVE DISORDER RECURRENT EPISODE UNSPECIFIED DEGREE    
                 

 

 2008            DAVID MCCAIN DO K                         300.00         
   AN ANXIETY UNSPEC                     

 

 2008            ALEJANDRA SALAZAR DO                         301.82      
      AVOIDANT PERSONALITY DISORDER                     

 

 2008            ALEJANDRA SALAZAR DO                         307.47      
      SI DYSSOMNIA NOS                     

 

 2008            ALEJANDRA SALAZAR DO F                         301.82      
      AVOIDANT PERSONALITY DISORDER                     

 

 2008            ALEJANDRA SALAZAR DO                         307.47      
      SI DYSSOMNIA NOS                     

 

 2008            CHAY BUSTAMANTE MARIN MONTEIRO                         301.82 
           AVOIDANT PERSONALITY DISORDER                     

 

 2008            CHAY CARNEYMAT MARIN MONTEIRO                         307.47 
           SI DYSSOMNIA NOS                     

 

 2008            CARTWRIGHT APRMAT MARIN MONTEIRO                         301.82 
           AVOIDANT PERSONALITY DISORDER                     

 

 2008            CHAY CARNEYMAT MARIN MONTEIRO                         307.47 
           SI DYSSOMNIA NOS                     

 

 2008            DAVID MCCAIN DO K                         301.82         
   AVOIDANT PERSONALITY DISORDER                     

 

 2008            DAVID MCCAIN DO K                         307.47         
   SI DYSSOMNIA NOS                     

 

 10/28/2008            ALEJANDRA SALAZAR DO                         V58.69      
      MEDICATION HIGH RISK                     

 

 10/28/2008            ALEJANDRA SALAZAR DO                         V58.69      
      MEDICATION HIGH RISK                     

 

 10/28/2008            CHAY BUSTAMANTE MARIN CAYETANO                         V58.69 
           MEDICATION HIGH RISK                     

 

 10/28/2008            CHAY BUSTAMANTE MARIN HARRELLH                         V58.69 
           MEDICATION HIGH RISK                     

 

 10/28/2008            DAVID MCCAIN DO K                         V58.69         
   MEDICATION HIGH RISK                     

 

 2008            ALEJANDRA SALAZAR DO F                         300.01      
      AN PANIC DIS W/O AGORA                     

 

 2008            ALEJANDRA SALAZAR DO F                         300.23      
      AN SOCIAL PHOBIA                     

 

 2008            ALEJANDRA SALAZAR DO                         300.01      
      AN PANIC DIS W/O AGORA                     

 

 2008            ALEJANDRA SALAZAR DO MORIAH                         300.23      
      AN SOCIAL PHOBIA                     

 

 2008            CHAY BUSTAMANTE MARIN MONTEIRO                         300.01 
           AN PANIC DIS W/O AGORA                     

 

 2008            CARTWRIGHT DIANNA MARIN MONTEIRO                         300.23 
           AN SOCIAL PHOBIA                     

 

 2008            CARTWRIGHT DIANNA MARIN MONTEIRO                         300.01 
           AN PANIC DIS W/O AGORA                     

 

 2008            CHAY CARNEYMAT MARIN MONTEIRO                         300.23 
           AN SOCIAL PHOBIA                     

 

 2008            DAVID MCCAIN DO K                         300.01         
   AN PANIC DIS W/O AGORA                     

 

 2008            DAVID MCCAIN DO K                         300.23         
   AN SOCIAL PHOBIA                     

 

 2009            ALEJANDRA SALAZAR DO                         300.4       
     MO DYSTHYMIC DISORDER                     

 

 2009            ALEJANDRA SALAZAR DO MORIAH                         300.4       
     MO DYSTHYMIC DISORDER                     

 

 2009            CHAY BUSTAMANTE MARIN CAYETANO                         300.4  
          MO DYSTHYMIC DISORDER                     

 

 2009            CHAY BUSTAMANTE MARIN CAYETANO                         300.4  
          MO DYSTHYMIC DISORDER                     

 

 2009            DAVID MCCAIN DO                         300.4          
  MO DYSTHYMIC DISORDER                     

 

 2009            ALEJANDRA SALAZAR DO MORIAH                         301.6       
     PD DEPENDENT                     

 

 2009            ALEJANDRA SALAZAR DO                         301.6       
     PD DEPENDENT                     

 

 2009            CHAY BUSTAMANTE MARIN CAYETANO                         301.6  
          PD DEPENDENT                     

 

 2009            CHAY BUSTAMANTE MARIN CAYETANO                         301.6  
          PD DEPENDENT                     

 

 2009            DAVID MCCAIN DO                         301.6          
  PD DEPENDENT                     

 

 2010                         Ot            V58.61                       
           

 

 2010                         Ot            V58.69                       
           

 

 2010                         Ot            V58.83                       
           

 

 2010            ALEJANDRA SALAZAR DO                         296.32      
      MO DEPRESSIVE RECURRENT MODERATE                     

 

 2010            ALEJANDRA SALAZAR DO F                         296.32      
      MO DEPRESSIVE RECURRENT MODERATE                     

 

 2010            CHAY BUSTAMANTE MARIN CAYETANO                         296.32 
           MO DEPRESSIVE RECURRENT MODERATE                     

 

 2010            CHAY BUSTAMANTE MARIN CAYETANO                         296.32 
           MO DEPRESSIVE RECURRENT MODERATE                     

 

 2010            DAVID MCCAIN DO K                         296.32         
   MO DEPRESSIVE RECURRENT MODERATE                     

 

 2010                         Ot            V58.61                       
           

 

 2010                         Ot            V58.83                       
           

 

 2010                         Ot            780.39                       
           

 

 2010                         Ot            V58.61                       
           

 

 2010                         Ot            V58.69                       
           

 

 2010                         Ot            V71.4                        
          

 

 2010                         Ot            296.33                       
           

 

 2010                         Ot            V58.61                       
           

 

 2010                         Ot            V58.69                       
           

 

 2010                         Ot            V58.83                       
           

 

 2011                         Ot            296.33            RECUR DEPR 
DISOR-SEVERE                     

 

 2011                         Ot            V58.61            
ANTICOAGULANTS,LT,CURRENT USE                     

 

 2011                         Ot            V58.69            OTH MED,LT,
CURRENT USE                     

 

 2011                         Ot            V58.83            ENCOUNTER 
FOR THERAPEUTIC DRUG MONITORIN                     

 

 2011                         Ot            272.4            
HYPERLIPIDEMIA NEC/NOS                     

 

 2011                         Ot            276.2            ACIDOSIS    
                 

 

 2011                         Ot            288.60            LEUKOCYTOSIS
, UNSPECIFIED                     

 

 2011                         Ot            305.1            TOBACCO USE 
DISORDER                     

 

 2011                         Ot            311            DEPRESSIVE 
DISORDER NEC                     

 

 2011                         Ot            412            OLD MYOCARDIAL 
INFARCT                     

 

 2011                         Ot            414.00            CORON 
ATHEROSCLER NOS TYPE VESSEL, NATIV                     

 

 2011                         Ot            511.0            PLEURISY W/O 
EFFUS OR TB                     

 

 2011                         Ot            786.52            PAINFUL 
RESPIRATION                     

 

 2011                         Ot            V58.61            
ANTICOAGULANTS,LT,CURRENT USE                     

 

 2011                         Ot            V58.69            OTH MED,LT,
CURRENT USE                     

 

 2011                         Ot            300.4            DYSTHYMIC 
DISORDER                     

 

 2011                         Ot            305.1            TOBACCO USE 
DISORDER                     

 

 2011                         Ot            414.00            CORON 
ATHEROSCLER NOS TYPE VESSEL, NATIV                     

 

 2011                         Ot            786.09            RESPIRATORY 
ABNORM NEC                     

 

 2011                         Ot            786.59            CHEST PAIN 
NEC                     

 

 2011                         Ot            V17.49            FAMILY 
HISTORY OF OTHER CARDIOVASCULAR D                     

 

 2011                         Ot            V58.66            LONG-TERM (
CURRENT) USE OF ASPIRIN                     

 

 2011                         Ot            V58.69            OTH MED,LT,
CURRENT USE                     

 

 2011                         Ot            296.33            RECUR DEPR 
DISOR-SEVERE                     

 

 2011                         Ot            V58.61            
ANTICOAGULANTS,LT,CURRENT USE                     

 

 2011                         Ot            V58.69            OTH MED,LT,
CURRENT USE                     

 

 2011                         Ot            V58.83            ENCOUNTER 
FOR THERAPEUTIC DRUG MONITORIN                     

 

 10/21/2011                         Ot            327.23            OBSTRUCTIVE 
SLEEP APNEA (ADULT) (PEDIATR                     

 

 2011                         Ot            296.33            RECUR DEPR 
DISOR-SEVERE                     

 

 2011                         Ot            V58.61            
ANTICOAGULANTS,LT,CURRENT USE                     

 

 2011                         Ot            V58.69            OTH MED,LT,
CURRENT USE                     

 

 2011                         Ot            V58.83            ENCOUNTER 
FOR THERAPEUTIC DRUG MONITORIN                     

 

 2012                         Ot            296.33            RECUR DEPR 
DISOR-SEVERE                     

 

 2012                         Ot            V58.61            
ANTICOAGULANTS,LT,CURRENT USE                     

 

 2012                         Ot            V58.69            OTH MED,LT,
CURRENT USE                     

 

 2012                         Ot            V58.83            ENCOUNTER 
FOR THERAPEUTIC DRUG MONITORIN                     

 

 2013                         Ot            V58.61            
ANTICOAGULANTS,LT,CURRENT USE                     

 

 2013                         Ot            V58.83            ENCOUNTER 
FOR THERAPEUTIC DRUG MONITORIN                     

 

 2013            YONATHAN SORIANO MD            Ot            V58.61      
      ANTICOAGULANTS,LT,CURRENT USE                     

 

 2013            YONATHAN SORIANO MD            Ot            V58.69      
      OTH MED,LT,CURRENT USE                     

 

 2013            YONATHAN SORIANO MD            Ot            V58.83      
      ENCOUNTER FOR THERAPEUTIC DRUG MONITORIN                     

 

 10/22/2013            YONATHAN SORIANO MD            Ot            V58.61      
      ANTICOAGULANTS,LT,CURRENT USE                     

 

 10/22/2013            YONATHAN SORIANO MD            Ot            V58.69      
      OTH MED,LT,CURRENT USE                     

 

 10/22/2013            YONATHAN SORIANO MD            Ot            V58.83      
      ENCOUNTER FOR THERAPEUTIC DRUG MONITORIN                     

 

 2013            BALNCA DOUGLAS MD            Ot            272.4  
          HYPERLIPIDEMIA NEC/NOS                     

 

 2013            BLANCA DOUGLAS MD            Ot            276.8  
          HYPOPOTASSEMIA                     

 

 2013            BLANCA DOUGLAS MD            Ot            286.3  
          BECK DEF CLOT FACTOR NEC                     

 

 2013            BLANCA DOUGLAS MD            Ot            305.1  
          TOBACCO USE DISORDER                     

 

 2013            BLANCA DOUGLAS MD            Ot            311    
        DEPRESSIVE DISORDER NEC                     

 

 2013            BLANCA DOUGLAS MD            Ot            345.90 
           EPILEPSY UNSPEC W/O MENTION INTRACTABLE                      

 

 2013            BLANCA DOUGLAS MD            Ot            412    
        OLD MYOCARDIAL INFARCT                     

 

 2013            BLANCA DOUGLAS MD            Ot            414.01 
           CORONARY ATHEROSCLEROSIS OF NATIVE CORON                     

 

 2013            BLANCA DOUGLAS MD            Ot            620.1  
          CORPUS LUTEUM CYST                     

 

 2013            BLANCA DOUGLAS MD            Ot            625.8  
          FEM GENITAL SYMPTOMS NEC                     

 

 2013            BLANCA DOUGLAS MD            Ot            789.03 
           ABDOMINAL PAIN, RIGHT LOWER QUADRANT                     

 

 2013            BLANCA DOUGLAS MD            Ot            795.39 
           OTHER NONSPEC POS CULTURE FINDINGS                     

 

 2013            BLANCA DOUGLAS MD            Ot            V03.82 
           PROPHYLACTIC VACC AGAINST STREPTOCOCCUS                      

 

 2013            BLANCA DOUGLAS MD            Ot            V04.81 
           ND FOR PROPHYLACTIC VACCIN AND INOCULATI                     

 

 2014            YONATHAN SORIANO MD            Ot            V58.61      
      ANTICOAGULANTS,LT,CURRENT USE                     

 

 2014            YONATHAN SORIANO MD            Ot            V58.69      
      OTH MED,LT,CURRENT USE                     

 

 2014            YONATHAN SORIANO MD            Ot            V58.83      
      ENCOUNTER FOR THERAPEUTIC DRUG MONITORIN                     

 

 2014            YONATHAN SORIANO MD            Ot            V58.61      
      ANTICOAGULANTS,LT,CURRENT USE                     

 

 2014            YONATHAN SORIANO MD            Ot            V58.69      
      OTH MED,LT,CURRENT USE                     

 

 2014            YONATHAN SORIANO MD            Ot            V58.83      
      ENCOUNTER FOR THERAPEUTIC DRUG MONITORIN                     

 

 2014            BLANCA DOUGLAS MD            Ot            789.03 
                                 

 

 2015            DAVID MCCAIN DO                         V04.81         
   FLU SHOT                     

 

 2015            DAVID MCCAIN DO                         V06.1          
  TDAP DX                     

 

 2015                         Ot            V58.69                       
           

 

 2015                         Ot            V58.83                       
           

 

 2015                         Ot            272.4                        
          

 

 2015                         Ot            401.9                        
          

 

 2015                         Ot            414.01                       
           

 

 2015                         Ot            272.4                        
          

 

 2015                         Ot            296.33                       
           

 

 2015                         Ot            V58.69                       
           

 

 2015                         Ot            780.2                        
          

 

 2015                         Ot            V58.61                       
           

 

 2015                         Ot            V58.69                       
           

 

 2015                         Ot            414.9                        
          

 

 2015                         Ot            V58.61                       
           

 

 2015                         Ot            V58.66                       
           

 

 2015                         Ot            V58.69                       
           

 

 2015                         Ot            397.0                        
          

 

 2015                         Ot            424.0                        
          

 

 2015                         Ot            786.50                       
           

 

 2015                         Ot            272.4                        
          

 

 2015                         Ot            286.3                        
          

 

 2015                         Ot            401.9                        
          

 

 2015                         Ot            414.00                       
           

 

 2015                         Ot            780.79                       
           

 

 2015                         Ot            786.05                       
           

 

 2015                         Ot            794.39                       
           

 

 2015                         Ot            V58.61                       
           

 

 2015                         Ot            V58.69                       
           

 

 2015                         Ot            V72.63                       
           

 

 2015                         Ot            V72.81                       
           

 

 2015                         Ot            786.05                       
           

 

 2015                         Ot            V58.61                       
           

 

 2015                         Ot            V58.83                       
           

 

 2015                         Ot            V58.61                       
           

 

 2015                         Ot            V58.69                       
           

 

 2015                         Ot            V58.83                       
           

 

 2015            STELLA SOMMER, BLANCA NIEVES            Ot            272.4  
                                

 

 2015            STELLA SOMMER, BLANCA NIEVES            Ot            401.9  
                                

 

 2015            MALINA TORRES            Ot            
272.4                                  

 

 2015            STELLA SOMMER, BLANCA NIEVES            Ot            789.00 
                                 

 

 2015            STELLA SOMMER, BLANCA NIEVES            Ot            789.03 
                                 

 

 2015            BO SOMMER, YONATHAN LINDQUIST            Ot            V58.61      
                            

 

 2015            BO SOMMER, YONATHAN J            Ot            V58.69      
                            

 

 2015            BO SOMMER, YONATHAN J            Ot            V58.83      
                            

 

 2015            BO SOMMER, YONATHAN LINDQUIST            Ot            V58.61      
                            

 

 2015            BO SOMMER, YONATHAN LINDQUIST            Ot            V58.69      
                            

 

 2015            BO SOMMER, YONATHAN J            Ot            V58.83      
                            

 

 2015            BO SOMMER, YONATHAN LINDQUIST            Ot            V58.61      
                            

 

 2015            BO SOMMER, YONATHAN LINDQUIST            Ot            V58.69      
                            

 

 2015            BO SOMMER, YONATHAN J            Ot            V58.83      
                            

 

 2015            BO SOMMER, YONATHAN J            Ot            V58.61      
                            

 

 2015            BO SOMMER, YONATHAN J            Ot            V58.69      
                            

 

 2015            BO SOMMER, YONATHAN J            Ot            V58.83      
                            

 

 2015            BO SOMMER, YONATHAN J            Ot            V58.61      
                            

 

 2015            BO SOMMER, YONATHAN J            Ot            V58.69      
                            

 

 2015            BO SOMMER, YONATHAN J            Ot            V58.83      
                            

 

 2015            BO SOMMER, YONATHAN LINDQUIST            Ot            V58.61      
                            

 

 2015            BO SOMMER, BASHAR J            Ot            V58.69      
                            

 

 2015            YONATHAN SORIANO MD            Ot            V58.83      
                            

 

 2015            YONTAHAN SORIANO MD            Ot            V58.61      
                            

 

 2015            YONATHAN SORIANO MD            Ot            V58.69      
                            

 

 2015            YONATHAN SORIANO MD            Ot            V58.83      
                            

 

 2015            YONATHAN SORIANO MD            Ot            V58.61      
                            

 

 2015            YONATHAN SORIANO MD            Ot            V58.69      
                            

 

 2015            YONATHAN SORIANO MD            Ot            V58.83      
                            

 

 2015            YONATHAN SORIANO MD            Ot            V58.61      
      ANTICOAGULANTS,LT,CURRENT USE                     

 

 2015            YONATHAN SORIANO MD            Ot            V58.69      
      OT MED,LT,CURRENT USE                     

 

 2015            YONATHAN SORIANO MD            Ot            V58.83      
      ENCOUNTER FOR THERAPEUTIC DRUG MONITORIN                     

 

 2015                         Ot            272.4                        
          

 

 2015                         Ot            296.33                       
           

 

 2015                         Ot            V58.69                       
           

 

 2015                         Ot            780.2                        
          

 

 2015                         Ot            V58.61                       
           

 

 2015                         Ot            V58.69                       
           

 

 2015                         Ot            414.9                        
          

 

 2015                         Ot            V58.61                       
           

 

 2015                         Ot            V58.66                       
           

 

 2015                         Ot            V58.69                       
           

 

 2015                         Ot            397.0                        
          

 

 2015                         Ot            424.0                        
          

 

 2015                         Ot            786.50                       
           

 

 2015                         Ot            272.4                        
          

 

 2015                         Ot            286.3                        
          

 

 2015                         Ot            401.9                        
          

 

 2015                         Ot            414.00                       
           

 

 2015                         Ot            780.79                       
           

 

 2015                         Ot            786.05                       
           

 

 2015                         Ot            794.39                       
           

 

 2015                         Ot            V58.61                       
           

 

 2015                         Ot            V58.69                       
           

 

 2015                         Ot            V72.63                       
           

 

 2015                         Ot            V72.81                       
           

 

 2015                         Ot            786.05                       
           

 

 2015                         Ot            V58.61                       
           

 

 2015                         Ot            V58.83                       
           

 

 2015                         Ot            V58.61                       
           

 

 2015                         Ot            V58.69                       
           

 

 2015                         Ot            V58.83                       
           

 

 2015            BLANCA DOUGLAS MD            Ot            272.4  
                                

 

 2015            JERMAINE DOUGLAS MDHLEEN M            Ot            401.9  
                                

 

 2015            RUDY CHERY MALINA HERNANDEZ            Ot            
272.4                                  

 

 2015            STELLA SOMMER, BLANCA NIEVES            Ot            789.00 
                                 

 

 2015            STELLA SOMMER, BLANCA NIEVES            Ot            789.03 
                                 

 

 2015            BO SOMMER, YONATHAN LINDQUIST            Ot            V58.61      
                            

 

 2015            BO SOMMER, YONATHAN LINDQUIST            Ot            V58.69      
                            

 

 2015            BO SOMMER, YONATHAN LINDQUIST            Ot            V58.83      
                            

 

 2015            BO SOMMER, YONATHAN LINDQUIST            Ot            V58.61      
                            

 

 2015            BO SOMMER, YONATHAN LINDQUIST            Ot            V58.69      
                            

 

 2015            BO SOMMER, YONATHAN LINDQUIST            Ot            V58.83      
                            

 

 2015            BO SOMMER, YONATHAN LINDQUIST            Ot            V58.61      
                            

 

 2015            BO SOMMER, YONATHAN LINDQUIST            Ot            V58.69      
                            

 

 2015            BO SOMMER, YONATHAN LINDQUIST            Ot            V58.83      
                            

 

 2015            BO SOMMER, YONATHAN LINDQUIST            Ot            V58.61      
                            

 

 2015            BO SOMMER, YONATHAN J            Ot            V58.69      
                            

 

 2015            BO SOMMER, YONATHAN LINDQUIST            Ot            V58.83      
                            

 

 2015            STACY CAMARGO MD            Ot            719.46    
                              

 

 2015            STACY CAMARGO MD            Ot            724.5     
                             

 

 2015            STACY CAMARGO MD            Ot            729.5     
                             

 

 2015            YONATHAN SORIANO MD            Ot            V58.61      
      ANTICOAGULANTS,LT,CURRENT USE                     

 

 2015            YONATHAN SORIANO MD            Ot            V58.69      
      OT MED,LT,CURRENT USE                     

 

 2015            YONATHAN SORIANO MD            Ot            V58.83      
      ENCOUNTER FOR THERAPEUTIC DRUG MONITORIN                     

 

 10/06/2015            STACY CAMARGO MD            Ot            719.46    
                              

 

 10/06/2015            STACY CAMARGO MD            Ot            724.5     
                             

 

 10/06/2015            STACY CAMARGO MD            Ot            729.5     
                             

 

 2015            DARRIAN FARMER            Ot            M54.2 
                                 

 

 2015            DRARIAN FARMER ARNP            Ot            M54.6 
                                 

 

 2015            DARRIAN FARMERP            Ot            M50.20
                                  

 

 2015            MARLENY DARRIAN MÉNDEZ            Ot            M51.24
                                  

 

 2015                         Ot            272.4                        
          

 

 2015                         Ot            296.33                       
           

 

 2015                         Ot            V58.69                       
           

 

 2015                         Ot            780.2                        
          

 

 2015                         Ot            V58.61                       
           

 

 2015                         Ot            V58.69                       
           

 

 2015                         Ot            414.9                        
          

 

 2015                         Ot            V58.61                       
           

 

 2015                         Ot            V58.66                       
           

 

 2015                         Ot            V58.69                       
           

 

 2015                         Ot            397.0                        
          

 

 2015                         Ot            424.0                        
          

 

 2015                         Ot            786.50                       
           

 

 2015                         Ot            272.4                        
          

 

 2015                         Ot            286.3                        
          

 

 2015                         Ot            401.9                        
          

 

 2015                         Ot            414.00                       
           

 

 2015                         Ot            780.79                       
           

 

 2015                         Ot            786.05                       
           

 

 2015                         Ot            794.39                       
           

 

 2015                         Ot            V58.61                       
           

 

 2015                         Ot            V58.69                       
           

 

 2015                         Ot            V72.63                       
           

 

 2015                         Ot            V72.81                       
           

 

 2015                         Ot            786.05                       
           

 

 2015                         Ot            V58.61                       
           

 

 2015                         Ot            V58.83                       
           

 

 2015                         Ot            V58.61                       
           

 

 2015                         Ot            V58.69                       
           

 

 2015                         Ot            V58.83                       
           

 

 2015            STELLA SOMMER, BLANCA NIEVES            Ot            272.4  
                                

 

 2015            STELLA SOMMER, BLANCA NIEVES            Ot            401.9  
                                

 

 2015            MALINA TORRES            Ot            
272.4                                  

 

 2015            BLANCA DOUGLAS MD            Ot            789.00 
                                 

 

 2015            BLANCA DUOGLAS MD            Ot            789.03 
                                 

 

 2015            MARY JO SOMMER, STACY DOVE            Ot            719.46    
                              

 

 2015            MARY JO SOMMER, STACY DOVE            Ot            724.5     
                             

 

 2015            STACY CAMARGO MD            Ot            729.5     
                             

 

 2015            YONATHAN SORIANO MD            Ot            V58.61      
                            

 

 2015            YONATHAN SORIANO MD            Ot            V58.69      
                            

 

 2015            YONATHAN SORIANO MD            Ot            V58.83      
                            

 

 2015            YONATHAN SORIANO MD            Ot            E78.2       
                           

 

 2015            YONATHAN SORIANO MD            Ot            I10         
                         

 

 2015            YONATHAN SORIANO MD            Ot            I25.10      
                            

 

 2015            YONATHAN SORIANO MD            Ot            I65.23      
                            

 

 2015            DARRIAN FARMERP            Ot            M54.2 
                                 

 

 2015            DARRIAN FARMER ARNP            Ot            M54.6 
                                 

 

 2015            DARRIAN FARMER ARNP            Ot            M50.20
                                  

 

 2015            DARRIAN FARMER ARNP            Ot            M51.24
                                  

 

 2015            YONATHAN SORIANO MD            Ot            E78.2       
                           

 

 2015            YONATHAN SORIANO MD            Ot            I10         
                         

 

 2015            YONATHAN SORIANO MD            Ot            I25.10      
                            

 

 2015            YONATHAN SORIANO MD            Ot            I65.23      
                            

 

 2015            DARRIAN FARMER ARNP            Ot            M54.2 
                                 

 

 2015            DARRIAN FARMER ARNP            Ot            M54.6 
                                 

 

 2015            DARRIAN FARMER ARNP            Ot            M50.20
                                  

 

 2015            DARRIAN FARMERP            Ot            M51.24
                                  

 

 2015            YONATHAN SORIANO MD            Ot            E78.2       
                           

 

 2015            YONATHAN SORIANO MD            Ot            I10         
                         

 

 2015            YONATHAN SORIANO MD            Ot            I25.10      
                            

 

 2015            YONATHAN SORIANO MD            Ot            I65.23      
                            

 

 2015                         Ot            272.4                        
          

 

 2015                         Ot            296.33                       
           

 

 2015                         Ot            V58.69                       
           

 

 2015                         Ot            780.2                        
          

 

 2015                         Ot            V58.61                       
           

 

 2015                         Ot            V58.69                       
           

 

 2015                         Ot            414.9                        
          

 

 2015                         Ot            V58.61                       
           

 

 2015                         Ot            V58.66                       
           

 

 2015                         Ot            V58.69                       
           

 

 2015                         Ot            397.0                        
          

 

 2015                         Ot            424.0                        
          

 

 2015                         Ot            786.50                       
           

 

 2015                         Ot            272.4                        
          

 

 2015                         Ot            286.3                        
          

 

 2015                         Ot            401.9                        
          

 

 2015                         Ot            414.00                       
           

 

 2015                         Ot            780.79                       
           

 

 2015                         Ot            786.05                       
           

 

 2015                         Ot            794.39                       
           

 

 2015                         Ot            V58.61                       
           

 

 2015                         Ot            V58.69                       
           

 

 2015                         Ot            V72.63                       
           

 

 2015                         Ot            V72.81                       
           

 

 2015                         Ot            786.05                       
           

 

 2015                         Ot            V58.61                       
           

 

 2015                         Ot            V58.83                       
           

 

 2015                         Ot            V58.61                       
           

 

 2015                         Ot            V58.69                       
           

 

 2015                         Ot            V58.83                       
           

 

 2015            STELLA SOMMER, BLANCA NIEVES            Ot            272.4  
                                

 

 2015            STELLA SOMMER, BLANCA NIEVES            Ot            401.9  
                                

 

 2015            MALINA TORRES            Ot            
272.4                                  

 

 2015            STELLA SOMMER, BLANCA NIEVES            Ot            789.00 
                                 

 

 2015            STELLA SOMMER, BLANCA NIEVES            Ot            789.03 
                                 

 

 2015            MARY JO SOMMER, STACY DOVE            Ot            719.46    
                              

 

 2015            MARY JO SOMMER, STACY DOVE            Ot            724.5     
                             

 

 2015            STACY CAMARGO MD            Ot            729.5     
                             

 

 2015            BO SOMMER, YONATHAN LINDQUIST            Ot            V58.61      
                            

 

 2015            BO SOMMER, YONATHAN LINDQUIST            Ot            V58.69      
                            

 

 2015            BO SOMMER, YONATHAN LINDQUIST            Ot            V58.83      
                            

 

 2015            BO SOMMER, YONATHAN LINDQUIST            Ot            E78.2       
                           

 

 2015            BO SOMMER, YONATHAN LINDQUIST            Ot            I10         
                         

 

 2015            BO SOMMER, YONATHAN LINDQUIST            Ot            I25.10      
                            

 

 2015            BO SOMMER, YONATHAN LINDQUIST            Ot            I65.23      
                            

 

 2015            DARRIAN FARMER ARNP            Ot            M54.2 
                                 

 

 2015            DARRIAN FARMER ARNP            Ot            M54.6 
                                 

 

 2015            DARRIAN FARMER ARNP            Ot            M50.20
                                  

 

 2015            DARRIAN FARMER ARNP            Ot            M51.24
                                  

 

 2015            STACY CAMARGO MD            Ot            M40.204   
                               

 

 2015            STACY CAMARGO MD            Ot            M54.10    
                              

 

 2015            MARY JO MD, STACY A            Ot            M54.2     
                             

 

 2015            MARY JO SOMMER, STACY DOVE            Ot            M54.6     
                             

 

 2016            MARY JO SOMMER, STACY DOVE            Ot            M40.204   
                               

 

 2016            MARY JO SOMMER, STACY DOVE            Ot            M54.10    
                              

 

 2016            MARY JO SOMMER, STACY DOVE            Ot            M54.2     
                             

 

 2016            MARY JO SOMMER, STACY DOVE            Ot            M54.6     
                             

 

 2016                         Ot            R31.9                        
          

 

 2016            MARY JO SOMMER, STACY DOVE            Ot            M40.204   
                               

 

 2016            MARY JO SOMMER, STACY DOVE            Ot            M54.10    
                              

 

 2016            MARY JO SOMMER, STACY DOVE            Ot            M54.2     
                             

 

 2016            MARY JO SOMMER, STACY DOVE            Ot            M54.6     
                             

 

 2016                         Ot            R31.9                        
          

 

 2016                         Ot            272.4                        
          

 

 2016                         Ot            296.33                       
           

 

 2016                         Ot            V58.69                       
           

 

 2016                         Ot            780.2                        
          

 

 2016                         Ot            V58.61                       
           

 

 2016                         Ot            V58.69                       
           

 

 2016                         Ot            414.9                        
          

 

 2016                         Ot            V58.61                       
           

 

 2016                         Ot            V58.66                       
           

 

 2016                         Ot            V58.69                       
           

 

 2016                         Ot            397.0                        
          

 

 2016                         Ot            424.0                        
          

 

 2016                         Ot            786.50                       
           

 

 2016                         Ot            272.4                        
          

 

 2016                         Ot            286.3                        
          

 

 2016                         Ot            401.9                        
          

 

 2016                         Ot            414.00                       
           

 

 2016                         Ot            780.79                       
           

 

 2016                         Ot            786.05                       
           

 

 2016                         Ot            794.39                       
           

 

 2016                         Ot            V58.61                       
           

 

 2016                         Ot            V58.69                       
           

 

 2016                         Ot            V72.63                       
           

 

 2016                         Ot            V72.81                       
           

 

 2016                         Ot            786.05                       
           

 

 2016                         Ot            V58.61                       
           

 

 2016                         Ot            V58.83                       
           

 

 2016                         Ot            V58.61                       
           

 

 2016                         Ot            V58.69                       
           

 

 2016                         Ot            V58.83                       
           

 

 2016            STELLA SOMMER, BLANCA NIEVES            Ot            272.4  
                                

 

 2016            STELLA SOMMER, BLANCA NIEVES            Ot            401.9  
                                

 

 2016            MALINA TORRES            Ot            
272.4                                  

 

 2016            STELLA SOMMER, BLANCA NIEVES            Ot            789.00 
                                 

 

 2016            STELLA SOMMER, BLANCA NIEVES            Ot            789.03 
                                 

 

 2016            MARY JO SOMMER, STACY DOVE            Ot            719.46    
                              

 

 2016            MARY JO SOMMER, STACY DOVE            Ot            724.5     
                             

 

 2016            MARY JO SOMMER, STACY DOVE            Ot            729.5     
                             

 

 2016            BO SOMMER, YONATHAN LINDQUIST            Ot            V58.61      
                            

 

 2016            BO SOMMER, YONATHAN LINDQUIST            Ot            V58.69      
                            

 

 2016            BO SOMMER, YONATHAN LINDQUIST            Ot            V58.83      
                            

 

 2016            BO SOMMER, YONATHAN J            Ot            E78.2       
                           

 

 2016            BO SOMMER, YONATHAN J            Ot            I10         
                         

 

 2016            BO SOMMER, YONATHAN LINDQUIST            Ot            I25.10      
                            

 

 2016            BO SOMMER, YONATHAN LINDQUIST            Ot            I65.23      
                            

 

 2016            DARRIAN FARMER ARNP            Ot            M54.2 
                                 

 

 2016            DARRIAN FARMER ARNP            Ot            M54.6 
                                 

 

 2016            DARRIAN FARMER ARNP            Ot            M50.20
                                  

 

 2016            DARRIAN FARMER ARNP            Ot            M51.24
                                  

 

 2016            MARY JO SOMMER, STACY DOVE            Ot            M40.204   
                               

 

 2016            MARY JO SOMMER, STACY DOVE            Ot            M54.10    
                              

 

 2016            MARY JO SOMMER, STACY DOVE            Ot            M54.2     
                             

 

 2016            MARY JO SOMMER, STACY DOVE            Ot            M54.6     
                             

 

 2016                         Ot            R31.9                        
          

 

 2016            BO SOMMER, YONATHAN LINDQUIST            Ot            V58.61      
                            

 

 2016            BO SOMMER, YONATHAN LINDQUIST            Ot            V58.69      
                            

 

 2016            BO SOMMER, YONATHAN LINDQUIST            Ot            V58.83      
                            

 

 2016            MARY JO SOMMER, STACY DOVE            Ot            M40.204   
         UNSPECIFIED KYPHOSIS, THORACIC REGION                     

 

 2016            MARY JO SOMMER, STACY DOVE            Ot            M54.10    
        RADICULOPATHY, SITE UNSPECIFIED                     

 

 2016            STACY CAMARGO MD            Ot            M54.2     
       CERVICALGIA                     

 

 2016            MARY JO SOMMERSTACY            Ot            M54.6     
       PAIN IN THORACIC SPINE                     

 

 2016            YONATHAN SORIANO MD            Ot            V58.61      
                            

 

 2016            YONATHAN SORIANO MD, Ot            V58.69      
                            

 

 2016            YONATHAN SORIANO MD, Ot            V58.83      
                            

 

 2016            UMA HANDLEY DO S            Ot            N94.9     
                             

 

 2016            UMA HANDLEY DO S            Ot            N94.9     
                             

 

 2016            YONATHAN SORIANO MD, Ot            D68.2       
     HEREDITARY DEFICIENCY OF OTHER CLOTTING                      

 

 2016            YONATHAN SORIANO MD, Ot            Z79.01      
      LONG TERM (CURRENT) USE OF ANTICOAGULANT                     

 

 2016            YONATHAN SORIANO MD, Ot            Z79.899     
       OTHER LONG TERM (CURRENT) DRUG THERAPY                     

 

 2016            YONATHAN SORIANO MD, Ot            D68.2       
     HEREDITARY DEFICIENCY OF OTHER CLOTTING                      

 

 2016            YONATHAN SORIANO MD, Ot            Z79.01      
      LONG TERM (CURRENT) USE OF ANTICOAGULANT                     

 

 2016            YONATHAN SORIANO MD, Ot            Z79.899     
       OTHER LONG TERM (CURRENT) DRUG THERAPY                     

 

 2016            YONATHAN SORIANO MD, Ot            D68.2       
     HEREDITARY DEFICIENCY OF OTHER CLOTTING                      

 

 2016            YONATHAN SORIANO MD            Ot            Z79.01      
      LONG TERM (CURRENT) USE OF ANTICOAGULANT                     

 

 2016            YONATHAN SORIANO MD, Ot            Z79.899     
       OTHER LONG TERM (CURRENT) DRUG THERAPY                     

 

 2016            MALINA TORRES            Ot            
272.4            HYPERLIPIDEMIA NEC/NOS                     

 

 2016            YONATHAN SORIANO MD            Ot            D68.2       
     HEREDITARY DEFICIENCY OF OTHER CLOTTING                      

 

 2016            YONATHAN SORIANO MD            Ot            Z79.01      
      LONG TERM (CURRENT) USE OF ANTICOAGULANT                     

 

 2016            YONATHAN SORIANO MD            Ot            Z79.899     
       OTHER LONG TERM (CURRENT) DRUG THERAPY                     

 

 2016                         Ot            V58.61            
ANTICOAGULANTS,LT,CURRENT USE                     

 

 2016                         Ot            V58.83            ENCOUNTER 
FOR THERAPEUTIC DRUG MONITORIN                     

 

 2016                         Ot            V58.61            
ANTICOAGULANTS,LT,CURRENT USE                     

 

 2016                         Ot            V58.69            OTH MED,LT,
CURRENT USE                     

 

 2016                         Ot            V58.83            ENCOUNTER 
FOR THERAPEUTIC DRUG MONITORIN                     

 

 2016            YONATHAN SORIANO MD, Ot            D68.2       
     HEREDITARY DEFICIENCY OF OTHER CLOTTING                      

 

 2016            YONATHAN SORIANO MD, Ot            Z79.01      
      LONG TERM (CURRENT) USE OF ANTICOAGULANT                     

 

 2016            YONATHAN SORIANO MD, Ot            Z79.899     
       OTHER LONG TERM (CURRENT) DRUG THERAPY                     

 

 2016            JUDD ANDERSON MD, Ot            F17.210 
           NICOTINE DEPENDENCE, CIGARETTES, UNCOMPL                     

 

 2016            JUDD ANDERSON MD            Ot            I10     
       ESSENTIAL (PRIMARY) HYPERTENSION                     

 

 2016            JUDD ANDERSON MD            Ot            R11.2   
         NAUSEA WITH VOMITING, UNSPECIFIED                     

 

 2016            JUDD ANDERSON MD            Ot            R51     
       HEADACHE                     

 

 2016            JUDD ANDERSON MD, Ot            I10     
       ESSENTIAL (PRIMARY) HYPERTENSION                     

 

 2016            JUDD ANDERSON MD            Ot            R11.2   
         NAUSEA WITH VOMITING, UNSPECIFIED                     

 

 2016            JUDD ANDERSON MD            Ot            R51     
       HEADACHE                     

 

 2016            JUDD ANDERSON MD, Ot            F17.210 
           NICOTINE DEPENDENCE, CIGARETTES, UNCOMPL                     

 

 2016            JUDD ANDERSON MD            Ot            I10     
       ESSENTIAL (PRIMARY) HYPERTENSION                     

 

 2016            JUDD ANDERSON MD            Ot            R11.2   
         NAUSEA WITH VOMITING, UNSPECIFIED                     

 

 2016            JUDD ANDERSON MD            Ot            R51     
       HEADACHE                     

 

 2016            YONATHAN SORIANO MD, Ot            D68.2       
     HEREDITARY DEFICIENCY OF OTHER CLOTTING                      

 

 2016            YONATHAN SORIANO MD, Ot            Z79.01      
      LONG TERM (CURRENT) USE OF ANTICOAGULANT                     

 

 2016            YONATHAN SORIANO MD, Ot            Z79.899     
       OTHER LONG TERM (CURRENT) DRUG THERAPY                     

 

 2016            REINA LUO            Ot            R63.4     
       ABNORMAL WEIGHT LOSS                     

 

 2016            YONATHAN SORIANO MD, Ot            D68.2       
     HEREDITARY DEFICIENCY OF OTHER CLOTTING                      

 

 2016            YONATHAN SORIANO MD, Ot            Z79.01      
      LONG TERM (CURRENT) USE OF ANTICOAGULANT                     

 

 2016            YONATHAN SORIANO MD, Ot            Z79.899     
       OTHER LONG TERM (CURRENT) DRUG THERAPY                     

 

 2016            YONATHAN SORIANO MD, Ot            D68.2       
     HEREDITARY DEFICIENCY OF OTHER CLOTTING                      

 

 2016            YONATHAN SORIANO MD, Ot            Z79.01      
      LONG TERM (CURRENT) USE OF ANTICOAGULANT                     

 

 2016            YONATHAN SORIANO MD            Ot            Z79.899     
       OTHER LONG TERM (CURRENT) DRUG THERAPY                     

 

 2016            REINA LUO APRN            Ot            R07.9     
       CHEST PAIN, UNSPECIFIED                     

 

 2016            REINA LUO APRN            Ot            R63.4     
       ABNORMAL WEIGHT LOSS                     

 

 2016            REINA LUO APRN            Ot            R07.9     
       CHEST PAIN, UNSPECIFIED                     

 

 2016            REINA LUO APRN            Ot            R63.4     
       ABNORMAL WEIGHT LOSS                     

 

 2016            REINA LUO APRN            Ot            R07.9     
       CHEST PAIN, UNSPECIFIED                     

 

 2016            REINA LUO APRN            Ot            R63.4     
       ABNORMAL WEIGHT LOSS                     

 

 2016                         Ot            D68.51            ACTIVATED 
PROTEIN C RESISTANCE                     

 

 2016                         Ot            E78.2            MIXED 
HYPERLIPIDEMIA                     

 

 2016                         Ot            I10            ESSENTIAL (
PRIMARY) HYPERTENSION                     

 

 2016                         Ot            I25.10            ATHSCL 
HEART DISEASE OF NATIVE CORONARY                      

 

 2016                         Ot            I65.23            OCCLUSION 
AND STENOSIS OF BILATERAL [place]                     

 

 2016                         Ot            414.9            CHR ISCHEMIC 
HRT DIS NOS                     

 

 2016                         Ot            V58.61            
ANTICOAGULANTS,LT,CURRENT USE                     

 

 2016                         Ot            V58.66            LONG-TERM (
CURRENT) USE OF ASPIRIN                     

 

 2016                         Ot            V58.69            OTH MED,LT,
CURRENT USE                     

 

 2016                         Ot            397.0            TRICUSPID 
VALVE DISEASE                     

 

 2016                         Ot            424.0            MITRAL VALVE 
DISORDER                     

 

 2016                         Ot            786.50            CHEST PAIN 
NOS                     

 

 2016                         Ot            272.4            
HYPERLIPIDEMIA NEC/NOS                     

 

 2016                         Ot            286.3            BECK DEF 
CLOT FACTOR NEC                     

 

 2016                         Ot            401.9            HYPERTENSION 
NOS                     

 

 2016                         Ot            414.00            CORON 
ATHEROSCLER NOS TYPE VESSEL, NATIV                     

 

 2016                         Ot            780.79            OTH MALAISE 
FATIGUE                     

 

 2016                         Ot            786.05            SHORTNESS 
OF BREATH                     

 

 2016                         Ot            794.39            ABN 
CARDIOVASC STUDY NEC                     

 

 2016                         Ot            V58.61            
ANTICOAGULANTS,LT,CURRENT USE                     

 

 2016                         Ot            V58.69            OTH MED,LT,
CURRENT USE                     

 

 2016                         Ot            V72.63            PRE-
PROCEDURAL LABORATORY EXAMINATION                     

 

 2016                         Ot            V72.81            EXAM-PRE-
OPERATIVE CARDIOVASCULAR                     

 

 2016                         Ot            786.05            SHORTNESS 
OF BREATH                     

 

 2016                         Ot            V58.61            
ANTICOAGULANTS,LT,CURRENT USE                     

 

 2016                         Ot            V58.83            ENCOUNTER 
FOR THERAPEUTIC DRUG MONITORIN                     

 

 2016                         Ot            V58.61            
ANTICOAGULANTS,LT,CURRENT USE                     

 

 2016                         Ot            V58.69            OTH MED,LT,
CURRENT USE                     

 

 2016                         Ot            V58.83            ENCOUNTER 
FOR THERAPEUTIC DRUG MONITORIN                     

 

 2016            BLANCA DOUGLAS MD            Ot            272.4  
          HYPERLIPIDEMIA NEC/NOS                     

 

 2016            BLANCA DOUGLAS MD            Ot            401.9  
          HYPERTENSION NOS                     

 

 2016            MALINA TORRES            Ot            
272.4            HYPERLIPIDEMIA NEC/NOS                     

 

 2016            BLANCA DOUGLAS MD            Ot            789.00 
           ABDOMINAL PAIN, UNSPECIFIED SITE                     

 

 2016            BLANCA DOUGLAS MD            Ot            789.03 
           ABDOMINAL PAIN, RIGHT LOWER QUADRANT                     

 

 2016            MARY JO SOMMER, STACY DOVE            Ot            719.46    
        JOINT PAIN-L/LEG                     

 

 2016            MARY JO SOMMER, STACY DOVE            Ot            724.5     
       BACKACHE NOS                     

 

 2016            STACY CAMARGO MD            Ot            729.5     
       PAIN IN LIMB                     

 

 2016            YONATHAN SORIANO MD            Ot            E78.2       
     MIXED HYPERLIPIDEMIA                     

 

 2016            YONATHAN SORIANO MD            Ot            I10         
   ESSENTIAL (PRIMARY) HYPERTENSION                     

 

 2016            YONATHAN SORIANO MD            Ot            I25.10      
      ATHSCL HEART DISEASE OF NATIVE CORONARY                      

 

 2016            YONATHAN SORIANO MD            Ot            I65.23      
      OCCLUSION AND STENOSIS OF BILATERAL [place]                     

 

 2016            DARRIAN FARMER            Ot            M54.2 
           CERVICALGIA                     

 

 2016            DARRIAN FARMER            Ot            M54.6 
           PAIN IN THORACIC SPINE                     

 

 2016            DARRIAN FARMER            Ot            M50.20
            OTHER CERVICAL DISC DISPLACEMENT, UNSP C                     

 

 2016            DARRIAN FARMER            Ot            M51.24
            OTHER INTERVERTEBRAL DISC DISPLACEMENT,                      

 

 2016                         Ot            R31.9            HEMATURIA, 
UNSPECIFIED                     

 

 2016            UMA HANDLEY DO S            Ot            N94.9     
       UNSP COND ASSOC W FEMALE GENITAL ORGANS                      

 

 2016            BOYONATHAN OSBORNE MD, Ot            D68.2       
     HEREDITARY DEFICIENCY OF OTHER CLOTTING                      

 

 2016            YONATHAN SORIANO MD, Ot            Z79.01      
      LONG TERM (CURRENT) USE OF ANTICOAGULANT                     

 

 2016            YONATHAN SORIANO MD, Ot            Z79.899     
       OTHER LONG TERM (CURRENT) DRUG THERAPY                     

 

 2016            REINA LUO            Ot            R07.9     
       CHEST PAIN, UNSPECIFIED                     

 

 2016            REINA LUO            Ot            R63.4     
       ABNORMAL WEIGHT LOSS                     

 

 2016                         Ot            D68.51            ACTIVATED 
PROTEIN C RESISTANCE                     

 

 2016                         Ot            E78.2            MIXED 
HYPERLIPIDEMIA                     

 

 2016                         Ot            I10            ESSENTIAL (
PRIMARY) HYPERTENSION                     

 

 2016                         Ot            I25.10            ATHSCL 
HEART DISEASE OF NAT[place] CORONARY                      

 

 2016                         Ot            I65.23            OCCLUSION 
AND STENOSIS OF BILATERAL [place]                     

 

 2016            STACY CAMARGO MD            Ot            M25.511   
         PAIN IN RIGHT SHOULDER                     

 

 2016            STACY CAMARGO MD            Ot            M25.611   
         STIFFNESS OF RIGHT SHOULDER, NOT ELSEWHE                     

 

 2016            STACY CAMARGO MD            Ot            M54.2     
       CERVICALGIA                     

 

 2016            STACY CAMARGO MD            Ot            M54.6     
       PAIN IN THORACIC SPINE                     

 

 2016            YONATHAN SORIANO MD, Ot            D68.2       
     HEREDITARY DEFICIENCY OF OTHER CLOTTING                      

 

 2016            YONATHAN SORIANO MD            Ot            Z79.01      
      LONG TERM (CURRENT) USE OF ANTICOAGULANT                     

 

 2016            YONATHAN SORIANO MD, Ot            Z79.899     
       OTHER LONG TERM (CURRENT) DRUG THERAPY                     

 

 2016            YONATHAN SORIANO MD, Ot            D68.2       
     HEREDITARY DEFICIENCY OF OTHER CLOTTING                      

 

 2016            YONATHAN SORIANO MD, Ot            Z79.01      
      LONG TERM (CURRENT) USE OF ANTICOAGULANT                     

 

 2016            YONATHAN SORIANO MD, Ot            Z79.899     
       OTHER LONG TERM (CURRENT) DRUG THERAPY                     

 

 10/03/2016                         Ot            D68.51            ACTIVATED 
PROTEIN C RESISTANCE                     

 

 10/03/2016                         Ot            E78.2            MIXED 
HYPERLIPIDEMIA                     

 

 10/03/2016                         Ot            I10            ESSENTIAL (
PRIMARY) HYPERTENSION                     

 

 10/03/2016                         Ot            I25.10            ATHSCL 
HEART DISEASE OF NAT[place] CORONARY                      

 

 10/03/2016                         Ot            I65.23            OCCLUSION 
AND STENOSIS OF BILATERAL [place]                     

 

 10/04/2016            BO MD, BASHAR J            Ot            D68.2       
     HEREDITARY DEFICIENCY OF OTHER CLOTTING                      

 

 10/04/2016            YONATHAN SORIANO MD            Ot            Z79.01      
      LONG TERM (CURRENT) USE OF ANTICOAGULANT                     

 

 10/04/2016            YONATHAN SORIANO MD            Ot            Z79.899     
       OTHER LONG TERM (CURRENT) DRUG THERAPY                     

 

 10/14/2016                         Ot            D68.51            ACTIVATED 
PROTEIN C RESISTANCE                     

 

 10/14/2016                         Ot            E78.2            MIXED 
HYPERLIPIDEMIA                     

 

 10/14/2016                         Ot            I10            ESSENTIAL (
PRIMARY) HYPERTENSION                     

 

 10/14/2016                         Ot            I25.10            ATHSCL 
HEART DISEASE OF NAT[place] CORONARY                      

 

 10/14/2016                         Ot            I65.23            OCCLUSION 
AND STENOSIS OF BILATERAL [place]                     

 

 10/27/2016                         Ot            414.9            CHR ISCHEMIC 
HRT DIS NOS                     

 

 10/27/2016                         Ot            V58.61            
ANTICOAGULANTS,LT,CURRENT USE                     

 

 10/27/2016                         Ot            V58.66            LONG-TERM (
CURRENT) USE OF ASPIRIN                     

 

 10/27/2016                         Ot            V58.69            OTH MED,LT,
CURRENT USE                     

 

 10/27/2016                         Ot            397.0            TRICUSPID 
VALVE DISEASE                     

 

 10/27/2016                         Ot            424.0            MITRAL VALVE 
DISORDER                     

 

 10/27/2016                         Ot            786.50            CHEST PAIN 
NOS                     

 

 10/27/2016                         Ot            272.4            
HYPERLIPIDEMIA NEC/NOS                     

 

 10/27/2016                         Ot            286.3            BECK DEF 
CLOT FACTOR NEC                     

 

 10/27/2016                         Ot            401.9            HYPERTENSION 
NOS                     

 

 10/27/2016                         Ot            414.00            CORON 
ATHEROSCLER NOS TYPE VESSEL, NATIV                     

 

 10/27/2016                         Ot            780.79            OTH MALAISE 
FATIGUE                     

 

 10/27/2016                         Ot            786.05            SHORTNESS 
OF BREATH                     

 

 10/27/2016                         Ot            794.39            ABN 
CARDIOVASC STUDY NEC                     

 

 10/27/2016                         Ot            V58.61            
ANTICOAGULANTS,LT,CURRENT USE                     

 

 10/27/2016                         Ot            V58.69            OT MED,LT,
CURRENT USE                     

 

 10/27/2016                         Ot            V72.63            PRE-
PROCEDURAL LABORATORY EXAMINATION                     

 

 10/27/2016                         Ot            V72.81            EXAM-PRE-
OPERATIVE CARDIOVASCULAR                     

 

 10/27/2016                         Ot            786.05            SHORTNESS 
OF BREATH                     

 

 10/27/2016                         Ot            V58.61            
ANTICOAGULANTS,LT,CURRENT USE                     

 

 10/27/2016                         Ot            V58.83            ENCOUNTER 
FOR THERAPEUTIC DRUG MONITORIN                     

 

 10/27/2016                         Ot            V58.61            
ANTICOAGULANTS,LT,CURRENT USE                     

 

 10/27/2016                         Ot            V58.69            OTH MED,LT,
CURRENT USE                     

 

 10/27/2016                         Ot            V58.83            ENCOUNTER 
FOR THERAPEUTIC DRUG MONITORIN                     

 

 10/27/2016            STELLA SOMMER, BLANCA NIEVES            Ot            272.4  
          HYPERLIPIDEMIA NEC/NOS                     

 

 10/27/2016            BLANCA DOUGLAS MD            Ot            401.9  
          HYPERTENSION NOS                     

 

 10/27/2016            MALINA TORRES            Ot            
272.4            HYPERLIPIDEMIA NEC/NOS                     

 

 10/27/2016            STELLA SOMMER, BLANCA NIEVES            Ot            789.00 
           ABDOMINAL PAIN, UNSPECIFIED SITE                     

 

 10/27/2016            STELLA SOMMER, BLANCA NIEVES            Ot            789.03 
           ABDOMINAL PAIN, RIGHT LOWER QUADRANT                     

 

 10/27/2016            MARY JO SOMMER, STACY DOVE            Ot            719.46    
        JOINT PAIN-L/LEG                     

 

 10/27/2016            STACY CAMARGO MD            Ot            724.5     
       BACKACHE NOS                     

 

 10/27/2016            MARY JO SOMMER, STACY DOVE            Ot            729.5     
       PAIN IN LIMB                     

 

 10/27/2016            YONATHAN SORIANO MD            Ot            E78.2       
     MIXED HYPERLIPIDEMIA                     

 

 10/27/2016            YONATHAN SORIANO MD            Ot            I10         
   ESSENTIAL (PRIMARY) HYPERTENSION                     

 

 10/27/2016            YONATHAN SORIANO MD            Ot            I25.10      
      ATHSCL HEART DISEASE OF NATIVE CORONARY                      

 

 10/27/2016            YONATHAN SORIANO MD            Ot            I65.23      
      OCCLUSION AND STENOSIS OF BILATERAL [place]ILKERSON                     

 

 10/27/2016            DARRIAN FARMER ARNP            Ot            M54.2 
           CERVICALGIA                     

 

 10/27/2016            DARRIAN FARMER ARNP            Ot            M54.6 
           PAIN IN THORACIC SPINE                     

 

 10/27/2016            DARRIAN FARMER ARNP            Ot            M50.20
            OTHER CERVICAL DISC DISPLACEMENT, UNSP C                     

 

 10/27/2016            DARRIAN FARMER ARNP            Ot            M51.24
            OTHER INTERVERTEBRAL DISC DISPLACEMENT,                      

 

 10/27/2016                         Ot            R31.9            HEMATURIA, 
UNSPECIFIED                     

 

 10/27/2016            ENDER DO, UMA S            Ot            N94.9     
       UNSP COND ASSOC W FEMALE GENITAL ORGANS                      

 

 10/27/2016            REINA LUO APRMAT            Ot            R07.9     
       CHEST PAIN, UNSPECIFIED                     

 

 10/27/2016            REINA LUO            Ot            R63.4     
       ABNORMAL WEIGHT LOSS                     

 

 10/27/2016                         Ot            D68.51            ACTIVATED 
PROTEIN C RESISTANCE                     

 

 10/27/2016                         Ot            E78.2            MIXED 
HYPERLIPIDEMIA                     

 

 10/27/2016                         Ot            I10            ESSENTIAL (
PRIMARY) HYPERTENSION                     

 

 10/27/2016                         Ot            I25.10            ATHSCL 
HEART DISEASE OF NATIVE CORONARY                      

 

 10/27/2016                         Ot            I65.23            OCCLUSION 
AND STENOSIS OF BILATERAL [place]ERSON                     

 

 10/27/2016            YONATHAN SORIANO MD, Ot            D68.2       
     HEREDITARY DEFICIENCY OF OTHER CLOTTING                      

 

 10/27/2016            YONATHAN SORIANO MD, Ot            Z79.01      
      LONG TERM (CURRENT) USE OF ANTICOAGULANT                     

 

 10/27/2016            YONATHAN SORIANO MD, Ot            Z79.899     
       OTHER LONG TERM (CURRENT) DRUG THERAPY                     

 

 10/27/2016            STACY CAMARGO MD            Ot            R05       
     COUGH                     

 

 10/27/2016            STACY CAMARGO MD            Ot            R06.02    
        SHORTNESS OF BREATH                     

 

 10/28/2016            SATCY CAMARGO MD, Ot            D68.2     
       HEREDITARY DEFICIENCY OF OTHER CLOTTING                      

 

 10/28/2016            STACY CAMARGO MD            Ot            F17.210   
         NICOTINE DEPENDENCE, CIGARETTES, UNCOMPL                     

 

 10/28/2016            STACY CAMARGO MD            Ot            F32.9     
       MAJOR DEPRESSIVE DISORDER, SINGLE EPISOD                     

 

 10/28/2016            STACY CAMARGO MD            Ot            F41.9     
       ANXIETY DISORDER, UNSPECIFIED                     

 

 10/28/2016            STACY CAMARGO MD            Ot            G40.909   
         EPILEPSY, UNSP, NOT INTRACTABLE, WITHOUT                     

 

 10/28/2016            STACY CAMARGO MD            Ot            I10       
     ESSENTIAL (PRIMARY) HYPERTENSION                     

 

 10/28/2016            STACY CAMARGO MD            Ot            I25.2     
       OLD MYOCARDIAL INFARCTION                     

 

 10/28/2016            STACY CAMARGO MD            Ot            J18.9     
       PNEUMONIA, UNSPECIFIED ORGANISM                     

 

 10/28/2016            STACY CAMARGO MD            Ot            R07.2     
       PRECORDIAL PAIN                     

 

 10/28/2016            STACY CAMARGO MD            Ot            R10.13    
        EPIGASTRIC PAIN                     

 

 10/28/2016            STACY CAMARGO MD            Ot            R91.8     
       OTHER NONSPECIFIC ABNORMAL FINDING OF NIHARIKA                     

 

 10/28/2016            STACY CAMARGO MD            Ot            Z86.718   
         PERSONAL HISTORY OF OTHER VENOUS THROMBO                     

 

 2016            YONATHAN SORIANO MD, Ot            D68.2       
     HEREDITARY DEFICIENCY OF OTHER CLOTTING                      

 

 2016            YONATHAN SORIANO MD, Ot            Z79.01      
      LONG TERM (CURRENT) USE OF ANTICOAGULANT                     

 

 2016            YONATHAN SORIANO MD, Ot            Z79.899     
       OTHER LONG TERM (CURRENT) DRUG THERAPY                     

 

 2016            YONATHAN SORIANO MD, Ot            D68.2       
     HEREDITARY DEFICIENCY OF OTHER CLOTTING                      

 

 2016            YONATHAN SORIANO MD, Ot            Z79.01      
      LONG TERM (CURRENT) USE OF ANTICOAGULANT                     

 

 2016            YONATHAN SORIANO MD, Ot            Z79.899     
       OTHER LONG TERM (CURRENT) DRUG THERAPY                     

 

 2016            YONATHAN SORIANO MD, Ot            D68.2       
     HEREDITARY DEFICIENCY OF OTHER CLOTTING                      

 

 2016            YONATHAN SORIANO MD, Ot            Z79.01      
      LONG TERM (CURRENT) USE OF ANTICOAGULANT                     

 

 2016            YONATHAN SORIANO MD, Ot            Z79.899     
       OTHER LONG TERM (CURRENT) DRUG THERAPY                     

 

 11/15/2016            STACY CAMARGO MD            Ot            R05       
     COUGH                     

 

 11/15/2016            STACY CAMARGO MD, Ot            R06.02    
        SHORTNESS OF BREATH                     

 

 2016            STACY CAMARGO MD            Ot            R91.8     
       OTHER NONSPECIFIC ABNORMAL FINDING OF NIHARIKA                     

 

 2016            STACY CAMARGO MD, Ot            D68.2     
       HEREDITARY DEFICIENCY OF OTHER CLOTTING                      

 

 2016            STACY CAMARGO MD, Ot            D68.32    
        HEMORRHAGIC DISORD D/T EXTRINSIC CIRCULA                     

 

 2016            STACY CAMARGO MD, Ot            E28.2     
       POLYCYSTIC OVARIAN SYNDROME                     

 

 2016            STACY CAMARGO MD            Ot            F41.9     
       ANXIETY DISORDER, UNSPECIFIED                     

 

 2016            STACY CAMARGO MD, Ot            G40.909   
         EPILEPSY, UNSP, NOT INTRACTABLE, WITHOUT                     

 

 2016            STACY CAMARGO MD, Ot            I25.2     
       OLD MYOCARDIAL INFARCTION                     

 

 2016            STACY CAMARGO MD, Ot            I95.9     
       HYPOTENSION, UNSPECIFIED                     

 

 2016            STACY CAMARGO MD, Ot            K21.9     
       GASTRO-ESOPHAGEAL REFLUX DISEASE WITHOUT                     

 

 2016            STACY CAMARGO MD            Ot            R10.31    
        RIGHT LOWER QUADRANT PAIN                     

 

 2016            STACY CAMARGO MD, Ot            R10.32    
        LEFT LOWER QUADRANT PAIN                     

 

 2016            STACY CAMARGO MD, Ot            R11.2     
       NAUSEA WITH VOMITING, UNSPECIFIED                     

 

 2016            STACY CAMARGO MD, Ot            T45.515A  
          ADVERSE EFFECT OF ANTICOAGULANTS, INITIA                     

 

 2016            STACY CAMARGO MD, Ot            Z86.718   
         PERSONAL HISTORY OF OTHER VENOUS THROMBO                     

 

 2016            STACY CAMARGO MD, Ot            Z87.01    
        PERSONAL HISTORY OF PNEUMONIA (RECURRENT                     

 

 2016            CHE BEARD DO            Ot            D68.51         
   ACTIVATED PROTEIN C RESISTANCE                     

 

 2016            CHE BEARD DO            Ot            F17.210        
    NICOTINE DEPENDENCE, CIGARETTES, UNCOMPL                     

 

 2016            CHE BEARD DO            Ot            R20.2          
  PARESTHESIA OF SKIN                     

 

 2016            CHE BEARD DO            Ot            Z79.899        
    OTHER LONG TERM (CURRENT) DRUG THERAPY                     

 

 2016            STACY CAMARGO MD            Ot            R05       
     COUGH                     

 

 2016            STACY CAMARGO MD            Ot            R06.02    
        SHORTNESS OF BREATH                     

 

 2016            STACY CAMARGO MD            Ot            D64.9     
       ANEMIA, UNSPECIFIED                     

 

 2016            YONATHAN SORIANO MD, Ot            D68.2       
     HEREDITARY DEFICIENCY OF OTHER CLOTTING                      

 

 2016            YONATHAN SORIANO MD            Ot            Z79.01      
      LONG TERM (CURRENT) USE OF ANTICOAGULANT                     

 

 2016            YONATHAN SORIANO MD, Ot            Z79.899     
       OTHER LONG TERM (CURRENT) DRUG THERAPY                     

 

 2016            STACY CAMARGO MD            Ot            D64.9     
       ANEMIA, UNSPECIFIED                     

 

 2016            YONATHAN SORIANO MD, Ot            D68.2       
     HEREDITARY DEFICIENCY OF OTHER CLOTTING                      

 

 2016            YONATHAN SORIANO MD            Ot            Z79.01      
      LONG TERM (CURRENT) USE OF ANTICOAGULANT                     

 

 2016            YONATHAN SORIANO MD, Ot            Z79.899     
       OTHER LONG TERM (CURRENT) DRUG THERAPY                     

 

 2016            STACY CAMARGO MD            Ot            R91.8     
       OTHER NONSPECIFIC ABNORMAL FINDING OF NIHARIKA                     

 

 2016            STACY CAMARGO MD            Ot            D64.9     
       ANEMIA, UNSPECIFIED                     

 

 2016            STACY CAMARGO MD            Ot            R91.8     
       OTHER NONSPECIFIC ABNORMAL FINDING OF NIHARIKA                     

 

 2016            STACY CAMARGO MD            Ot            D64.9     
       ANEMIA, UNSPECIFIED                     

 

 2017            YONATHAN SORIANO MD, Ot            D68.2       
     HEREDITARY DEFICIENCY OF OTHER CLOTTING                      

 

 2017            YONATHAN SORIANO MD            Ot            Z79.01      
      LONG TERM (CURRENT) USE OF ANTICOAGULANT                     

 

 2017            YONATHAN SORIANO MD            Ot            Z79.899     
       OTHER LONG TERM (CURRENT) DRUG THERAPY                     

 

 2017            CHE BEARD DO            Ot            F17.210        
    NICOTINE DEPENDENCE, CIGARETTES, UNCOMPL                     

 

 2017            CHE BEARD DO            Ot            I10            
ESSENTIAL (PRIMARY) HYPERTENSION                     

 

 2017            KISHA DO, CHE K            Ot            I25.10         
   ATHSCL HEART DISEASE OF NATIVE CORONARY                      

 

 2017            KISHA DO, CHE K            Ot            I25.2          
  OLD MYOCARDIAL INFARCTION                     

 

 2017            KISHA DO, CHE K            Ot            R11.2          
  NAUSEA WITH VOMITING, UNSPECIFIED                     

 

 2017            KISHA DO, CHE K            Ot            R51            
HEADACHE                     

 

 2017            KISHA DO, CHE K            Ot            Z79.899        
    OTHER LONG TERM (CURRENT) DRUG THERAPY                     

 

 2017            KISHA DO, CHE K            Ot            F17.210        
    NICOTINE DEPENDENCE, CIGARETTES, UNCOMPL                     

 

 2017            KSIHA DO, CHE K            Ot            I10            
ESSENTIAL (PRIMARY) HYPERTENSION                     

 

 2017            KISHA DO, CHE K            Ot            I25.10         
   ATHSCL HEART DISEASE OF [place] CORONARY                      

 

 2017            KISHA DO, CHE K            Ot            I25.2          
  OLD MYOCARDIAL INFARCTION                     

 

 2017            KISHA DO, CHE K            Ot            R11.2          
  NAUSEA WITH VOMITING, UNSPECIFIED                     

 

 2017            KISHA DO, CHE K            Ot            R51            
HEADACHE                     

 

 2017            KISHA DO, CHE K            Ot            Z79.899        
    OTHER LONG TERM (CURRENT) DRUG THERAPY                     

 

 2017            YONATHAN SORIANO MD            Ot            D68.51      
      ACTIVATED PROTEIN C RESISTANCE                     

 

 2017            YONATHAN SORIANO MD            Ot            E78.2       
     MIXED HYPERLIPIDEMIA                     

 

 2017            YONATHAN SORIANO MD            Ot            I10         
   ESSENTIAL (PRIMARY) HYPERTENSION                     

 

 2017            YONATHAN SORIANO MD            Ot            I25.10      
      ATHSCL HEART DISEASE OF NAT[place] CORONARY                      

 

 2017            YONATHAN SORIANO MD            Ot            I65.23      
      OCCLUSION AND STENOSIS OF BILATERAL [place]                     

 

 2017            YONATHAN SORIANO MD            Ot            D68.51      
      ACTIVATED PROTEIN C RESISTANCE                     

 

 2017            YONATHAN SORIANO MD            Ot            E78.2       
     MIXED HYPERLIPIDEMIA                     

 

 2017            YONATHAN SORIANO MD            Ot            I10         
   ESSENTIAL (PRIMARY) HYPERTENSION                     

 

 2017            YONATHAN SORIANO MD            Ot            I25.10      
      ATHSCL HEART DISEASE OF NATIVE CORONARY                      

 

 2017            YONATHAN SORIANO MD            Ot            I65.23      
      OCCLUSION AND STENOSIS OF BILATERAL [place]                     

 

 2017            YONATHAN SORIANO MD            Ot            D68.51      
      ACTIVATED PROTEIN C RESISTANCE                     

 

 2017            YONATHAN SORIANO MD            Ot            E78.2       
     MIXED HYPERLIPIDEMIA                     

 

 2017            YONATHAN SORIANO MD            Ot            I10         
   ESSENTIAL (PRIMARY) HYPERTENSION                     

 

 2017            YONATHAN SORIANO MD            Ot            I25.10      
      ATHSCL HEART DISEASE OF NAT[place] CORONARY                      

 

 2017            YONATHAN SORIANO MD            Ot            I65.23      
      OCCLUSION AND STENOSIS OF BILATERAL [place]                     

 

 2017            YONATHAN SORIANO MD            Ot            D68.51      
      ACTIVATED PROTEIN C RESISTANCE                     

 

 2017            YONATHAN SORIANO MD            Ot            E78.2       
     MIXED HYPERLIPIDEMIA                     

 

 2017            YONATHAN SORIANO MD            Ot            I10         
   ESSENTIAL (PRIMARY) HYPERTENSION                     

 

 2017            YONATHAN SORIANO MD            Ot            I25.10      
      ATHSCL HEART DISEASE OF NAT[place] CORONARY                      

 

 2017            YONATHAN SORIANO MD            Ot            I65.23      
      OCCLUSION AND STENOSIS OF BILATERAL [place]                     

 

 2017            MARY JO SOMMER, STACY DOVE            Ot            M54.5     
       LOW BACK PAIN                     

 

 2017            MARY JO SOMMER, STACY ODVE            Ot            M54.6     
       PAIN IN THORACIC SPINE                     

 

 2017            MARY JO SOMMER, STACY DOVE            Ot            M54.5     
       LOW BACK PAIN                     

 

 2017            MARY JO SOMMER, STACY DOVE            Ot            M54.6     
       PAIN IN THORACIC SPINE                     

 

 2017            MARY JO SOMMER, STACY DOVE            Ot            M54.5     
       LOW BACK PAIN                     

 

 2017            MARY JO SOMMER, STACY DOVE            Ot            M54.6     
       PAIN IN THORACIC SPINE                     

 

 2017            MARY JO SOMMER, STACY DOVE            Ot            M54.5     
       LOW BACK PAIN                     

 

 2017            MARY JO SOMMER, STACY DOVE            Ot            M54.6     
       PAIN IN THORACIC SPINE                     

 

 2017                         Ot            R07.89            OTHER CHEST 
PAIN                     

 

 2017                         Ot            R07.89            OTHER CHEST 
PAIN                     

 

 10/06/2017                         Ot            R07.89            OTHER CHEST 
PAIN                     

 

 2017            ZAFAR NATARAJAN            Ot            F32.9      
      MAJOR DEPRESSIVE DISORDER, SINGLE EPISOD                     

 

 2017            ZAFAR NATARAJAN            Ot            F41.9      
      ANXIETY DISORDER, UNSPECIFIED                     

 

 2017            ZAFAR NATARAJAN            Ot            G40.909    
        EPILEPSY, UNSP, NOT INTRACTABLE, WITHOUT                     

 

 2017            ZAFAR NATARAJAN            Ot            I10        
    ESSENTIAL (PRIMARY) HYPERTENSION                     

 

 2017            ZAFAR NATARAJAN            Ot            I25.10     
       ATHSCL HEART DISEASE OF [place] CORONARY                      

 

 2017            ZAFAR NATARAJAN            Ot            I25.2      
      OLD MYOCARDIAL INFARCTION                     

 

 2017            ZAFAR NATARAJAN            Ot            K21.9      
      GASTRO-ESOPHAGEAL REFLUX DISEASE WITHOUT                     

 

 2017            ZAFAR NATARAJAN            Ot            M41.20     
       OTHER IDIOPATHIC SCOLIOSIS, SITE UNSPECI                     

 

 2017            ZAFAR NATARAJAN            Ot            N39.0      
      URINARY TRACT INFECTION, SITE NOT SPECIF                     

 

 2017            ZAFAR NATARAJAN            Ot            R10.12     
       LEFT UPPER QUADRANT PAIN                     

 

 2017            ZAFAR NATARAJAN            Ot            Z77.22     
       CNTCT W AND EXPSR TO ENVIRON TOBACCO SMO                     

 

 2017            ZAFAR NATARAJAN            Ot            Z79.01     
       LONG TERM (CURRENT) USE OF ANTICOAGULANT                     

 

 2017            ZAFAR NATARAJAN            Ot            Z82.49     
       FAMILY HX OF ISCHEM HEART DIS AND OTH DI                     

 

 2017            ZAFAR NATARAJAN            Ot            Z86.718    
        PERSONAL HISTORY OF OTHER VENOUS THROMBO                     

 

 2017            ZAFAR NATARAJAN            Ot            Z87.01     
       PERSONAL HISTORY OF PNEUMONIA (RECURRENT                     

 

 2017            ZAFAR NATARAJAN            Ot            Z87.19     
       PERSONAL HISTORY OF OTHER DISEASES OF TH                     

 

 2017            ZAFAR NATARAJAN            Ot            Z87.448    
        PERSONAL HISTORY OF OTHER DISEASES OF UR                     

 

 2017            ZAFAR NATARAJAN            Ot            Z90.89     
       ACQUIRED ABSENCE OF OTHER ORGANS                     

 

 2017            ZAFAR NATARAJAN            Ot            F32.9      
      MAJOR DEPRESSIVE DISORDER, SINGLE EPISOD                     

 

 2017            ZAFAR NATARAJAN            Ot            F41.9      
      ANXIETY DISORDER, UNSPECIFIED                     

 

 2017            ZAFAR NATARAJAN            Ot            G40.909    
        EPILEPSY, UNSP, NOT INTRACTABLE, WITHOUT                     

 

 2017            ZAFAR NATARAJAN            Ot            I10        
    ESSENTIAL (PRIMARY) HYPERTENSION                     

 

 2017            ZAFAR NATARAJAN            Ot            I25.10     
       ATHSCL HEART DISEASE OF [place] CORONARY                      

 

 2017            ZAFAR NATARAJAN            Ot            I25.2      
      OLD MYOCARDIAL INFARCTION                     

 

 2017            ZAFAR NATARAJAN            Ot            K21.9      
      GASTRO-ESOPHAGEAL REFLUX DISEASE WITHOUT                     

 

 2017            ZAFAR NATARAJAN            Ot            M41.20     
       OTHER IDIOPATHIC SCOLIOSIS, SITE UNSPECI                     

 

 2017            ZAFAR NATARAJAN            Ot            N39.0      
      URINARY TRACT INFECTION, SITE NOT SPECIF                     

 

 2017            ZAFAR NATARAJAN            Ot            R10.12     
       LEFT UPPER QUADRANT PAIN                     

 

 2017            ZAFAR NATARAJAN            Ot            Z77.22     
       CNTCT W AND EXPSR TO ENVIRON TOBACCO SMO                     

 

 2017            ZAFAR NATARAJAN            Ot            Z79.01     
       LONG TERM (CURRENT) USE OF ANTICOAGULANT                     

 

 2017            ZAFAR NATARAJAN            Ot            Z82.49     
       FAMILY HX OF ISCHEM HEART DIS AND OTH DI                     

 

 2017            ZAFAR NATARAJAN            Ot            Z86.718    
        PERSONAL HISTORY OF OTHER VENOUS THROMBO                     

 

 2017            ZAFAR NATARAJAN            Ot            Z87.01     
       PERSONAL HISTORY OF PNEUMONIA (RECURRENT                     

 

 2017            ZAFAR NATARAJAN            Ot            Z87.19     
       PERSONAL HISTORY OF OTHER DISEASES OF TH                     

 

 2017            ZAFAR NATARAJAN            Ot            Z87.448    
        PERSONAL HISTORY OF OTHER DISEASES OF UR                     

 

 2017            ZAFAR NATARAJAN            Ot            Z90.89     
       ACQUIRED ABSENCE OF OTHER ORGANS                     

 

 11/15/2017            STACY CAMARGO MD            Ot            I10       
     ESSENTIAL (PRIMARY) HYPERTENSION                     

 

 11/15/2017            STACY CAMARGO MD            Ot            Z79.899   
         OTHER LONG TERM (CURRENT) DRUG THERAPY                     

 

 2017            STACY CAMAGRO MD            Ot            I10       
     ESSENTIAL (PRIMARY) HYPERTENSION                     

 

 2017            STACY CAMARGO MD            Ot            Z79.899   
         OTHER LONG TERM (CURRENT) DRUG THERAPY                     

 

 2018            STACY CAMARGO MD            Ot            M25.512   
         PAIN IN LEFT SHOULDER                     

 

 2018            UMA HANDLEY DO S            Ot            Z12.31    
        ENCNTR SCREEN MAMMOGRAM FOR MALIGNANT NE                     

 

 2018            UMA HANDLEY DO S            Ot            Z12.31    
        ENCNTR SCREEN MAMMOGRAM FOR MALIGNANT NE                     

 

 2018            UMA HANDLEY DO S            Ot            N92.1     
       EXCESSIVE AND FREQUENT MENSTRUATION WITH                     

 

 2018            UMA HANDLEY DO S            Ot            Z12.31    
        ENCNTR SCREEN MAMMOGRAM FOR MALIGNANT NE                     

 

 2018            UMA HANDLEY DO S            Ot            N92.1     
       EXCESSIVE AND FREQUENT MENSTRUATION WITH                     

 

 2018            UMA HANDLEY DO S            Ot            Z12.31    
        ENCNTR SCREEN MAMMOGRAM FOR MALIGNANT NE                     

 

 2018            ENDER WEEMS UMA DRISCOLL            Ot            N63.11    
        UNSPECIFIED LUMP IN THE RIGHT BREAST, UP                     

 

 2018            ENDER UMA WEEMS            Ot            N92.1     
       EXCESSIVE AND FREQUENT MENSTRUATION WITH                     

 

 2018            ENDER UMA WEEMS            Ot            Z12.31    
        ENCNTR SCREEN MAMMOGRAM FOR MALIGNANT NE                     

 

 2018            ENDER UMA WEEMS S            Ot            N63.11    
        UNSPECIFIED LUMP IN THE RIGHT BREAST, UP                     

 

 06/15/2018            ENDER WEEMSUMA            Ot            N63.11    
        UNSPECIFIED LUMP IN THE RIGHT BREAST, UP                     

 

 2018            ENDER WEEMSUMA            Ot            Z01.818   
         ENCOUNTER FOR OTHER PREPROCEDURAL EXAMIN                     

 

 2018            ENDER UMA WEEMS            Ot            Z01.818   
         ENCOUNTER FOR OTHER PREPROCEDURAL EXAMIN                     

 

 2018                         Ot            V58.61            
ANTICOAGULANTS,LT,CURRENT USE                     

 

 2018                         Ot            V58.83            ENCOUNTER 
FOR THERAPEUTIC DRUG MONITORIN                     

 

 2018                         Ot            V58.61            
ANTICOAGULANTS,LT,CURRENT USE                     

 

 2018                         Ot            V58.69            OTH MED,LT,
CURRENT USE                     

 

 2018                         Ot            V58.83            ENCOUNTER 
FOR THERAPEUTIC DRUG MONITORIN                     

 

 2018            YONATHAN SORIANO MD            Ot            D68.2       
     HEREDITARY DEFICIENCY OF OTHER CLOTTING                      

 

 2018            YONATHAN SORIANO MD            Ot            Z79.01      
      LONG TERM (CURRENT) USE OF ANTICOAGULANT                     

 

 2018            YONATHAN SORIANO MD            Ot            Z79.899     
       OTHER LONG TERM (CURRENT) DRUG THERAPY                     

 

 2018            UMA HANDLEY DO            Ot            D68.2     
       HEREDITARY DEFICIENCY OF OTHER CLOTTING                      

 

 2018            UMA HANDLEY DO            Ot            F17.210   
         NICOTINE DEPENDENCE, CIGARETTES, UNCOMPL                     

 

 2018            UMA HANDLEY DO            Ot            I10       
     ESSENTIAL (PRIMARY) HYPERTENSION                     

 

 2018            UMA HANDLEY DO            Ot            N72       
     INFLAMMATORY DISEASE OF CERVIX UTERI                     

 

 2018            UMA HANDLEY DO            Ot            N92.0     
       EXCESSIVE AND FREQUENT MENSTRUATION WITH                     

 

 2018            UMA HANDLEY DO            Ot            R10.2     
       PELVIC AND PERINEAL PAIN                     

 

 2018            UMA HANDLEY DO            Ot            R56.9     
       UNSPECIFIED CONVULSIONS                     

 

 2018            UMA HANDLEY DO            Ot            Z79.01    
        LONG TERM (CURRENT) USE OF ANTICOAGULANT                     

 

 2018            UMA HANDLEY DO            Ot            Z79.899   
         OTHER LONG TERM (CURRENT) DRUG THERAPY                     

 

 2018            KISHA  CHE MARY            Ot            E78.00         
   PURE HYPERCHOLESTEROLEMIA, UNSPECIFIED                     

 

 2018            KISHA DO CHE K            Ot            F32.9          
  MAJOR DEPRESSIVE DISORDER, SINGLE EPISOD                     

 

 2018            KISHA WEEMS CHE K            Ot            F41.9          
  ANXIETY DISORDER, UNSPECIFIED                     

 

 2018            KISHA WEEMS CHE MARY            Ot            G40.909        
    EPILEPSY, UNSP, NOT INTRACTABLE, WITHOUT                     

 

 2018            KISHA  CHE MARY            Ot            G89.18         
   OTHER ACUTE POSTPROCEDURAL PAIN                     

 

 2018            KISHA WEEMS CHE K            Ot            I10            
ESSENTIAL (PRIMARY) HYPERTENSION                     

 

 2018            KISHA  CHE K            Ot            I25.10         
   ATHSCL HEART DISEASE OF NATIVE CORONARY                      

 

 2018            KISHA WEEMS CHE K            Ot            I25.2          
  OLD MYOCARDIAL INFARCTION                     

 

 2018            KISHABEATRIZ WEEMS CHE K            Ot            K21.9          
  GASTRO-ESOPHAGEAL REFLUX DISEASE WITHOUT                     

 

 2018            KISHA  CHE MARY            Ot            N39.0          
  URINARY TRACT INFECTION, SITE NOT SPECIF                     

 

 2018            KISHA WEEMS CHE K            Ot            R50.9          
  FEVER, UNSPECIFIED                     

 

 2018            KISHABEATRIZ WEEMS CHE K            Ot            Z77.22         
   CNTCT W AND EXPSR TO ENVIRON TOBACCO SMO                     

 

 2018            KISHABEATRIZ WEEMS CHE K            Ot            Z79.01         
   LONG TERM (CURRENT) USE OF ANTICOAGULANT                     

 

 2018            KISHABEATRIZ WEEMS CHE K            Ot            Z82.49         
   FAMILY HX OF ISCHEM HEART DIS AND OTH DI                     

 

 2018            KISHACHE HENDERSON DO            Ot            Z86.718        
    PERSONAL HISTORY OF OTHER VENOUS THROMBO                     

 

 2018            CHE BEARD DO            Ot            Z87.01         
   PERSONAL HISTORY OF PNEUMONIA (RECURRENT                     

 

 2018            CHE BEARD DO            Ot            Z87.19         
   PERSONAL HISTORY OF OTHER DISEASES OF TH                     

 

 2018            CHE BEARD DO            Ot            Z87.448        
    PERSONAL HISTORY OF OTHER DISEASES OF UR                     

 

 2018            CHE BEARD DO            Ot            Z88.8          
  ALLERGY STATUS TO OT DRUG/MEDS/BIOL SUB                     

 

 2018            CHE BEARD DO            Ot            Z90.710        
    ACQUIRED ABSENCE OF BOTH CERVIX AND UTER                     

 

 2018            KISHA  CHE MARY            Ot            Z90.89         
   ACQUIRED ABSENCE OF OTHER ORGANS                     

 

 2018            KISHA  CHE MARY            Ot            E78.00         
   PURE HYPERCHOLESTEROLEMIA, UNSPECIFIED                     

 

 2018            KISHA  CHE K            Ot            F32.9          
  MAJOR DEPRESSIVE DISORDER, SINGLE EPISOD                     

 

 2018            KISHA  CHE K            Ot            F41.9          
  ANXIETY DISORDER, UNSPECIFIED                     

 

 2018            KISHA  CHE MARY            Ot            G40.909        
    EPILEPSY, UNSP, NOT INTRACTABLE, WITHOUT                     

 

 2018            KISHA  CHE MARY            Ot            G89.18         
   OTHER ACUTE POSTPROCEDURAL PAIN                     

 

 2018            KISHA WEEMS CHE K            Ot            I10            
ESSENTIAL (PRIMARY) HYPERTENSION                     

 

 2018            KISHA  CHE MARY            Ot            I25.10         
   ATHSCL HEART DISEASE OF NATIVE CORONARY                      

 

 2018            KISHA DO CHE K            Ot            I25.2          
  OLD MYOCARDIAL INFARCTION                     

 

 2018            KISHA WEEMS CHE K            Ot            K21.9          
  GASTRO-ESOPHAGEAL REFLUX DISEASE WITHOUT                     

 

 2018            KISHA WEEMS CHE MARY            Ot            N39.0          
  URINARY TRACT INFECTION, SITE NOT SPECIF                     

 

 2018            KISHA WEEMS CHE K            Ot            R50.9          
  FEVER, UNSPECIFIED                     

 

 2018            KISHA WEEMS CHE K            Ot            Z77.22         
   CNTCT W AND EXPSR TO ENVIRON TOBACCO SMO                     

 

 2018            CHE BEARD DO            Ot            Z79.01         
   LONG TERM (CURRENT) USE OF ANTICOAGULANT                     

 

 2018            CHE BEARD DO            Ot            Z82.49         
   FAMILY HX OF ISCHEM HEART DIS AND OTH DI                     

 

 2018            CHE BEARD DO            Ot            Z86.718        
    PERSONAL HISTORY OF OTHER VENOUS THROMBO                     

 

 2018            CHE BEARD DO            Ot            Z87.01         
   PERSONAL HISTORY OF PNEUMONIA (RECURRENT                     

 

 2018            CHE BEARD DO            Ot            Z87.19         
   PERSONAL HISTORY OF OTHER DISEASES OF TH                     

 

 2018            CHE BEARD DO            Ot            Z87.448        
    PERSONAL HISTORY OF OTHER DISEASES OF UR                     

 

 2018            CHE BEARD DO            Ot            Z88.8          
  ALLERGY STATUS TO OTH DRUG/MEDS/BIOL SUB                     

 

 2018            CHE BEARD DO            Ot            Z90.710        
    ACQUIRED ABSENCE OF BOTH CERVIX AND UTER                     

 

 2018            CHE BEARD DO            Ot            Z90.89         
   ACQUIRED ABSENCE OF OTHER ORGANS                     



                                                                               
                                                                               
                                                                               
                                                                               
                                                                               
                                                                               
                                                                               
                                                                               
                                                                               
                                                                               
                                                                               
                                                                               
                                                                               
                                                                               
                                                                               
                                                                               
                                                                               
                                                                               
                                                                               
                                                                               
                                                                               
                                                           



Procedures

      





 Code            Description            Performed By            Performed On   
     

 

             65.41                                                             
        2013        



                  



Results

      





 Test            Result            Range        









 Complete blood count (CBC) with automated white blood cell (WBC) differential 
- 10/27/16 06:35         









 Blood leukocytes automated count (number/volume)            10.1 10*3/uL      
      4.3-11.0        

 

 Blood erythrocytes automated count (number/volume)            4.05 10*6/uL    
        4.35-5.85        

 

 Venous blood hemoglobin measurement (mass/volume)            14.0 g/dL        
    11.5-16.0        

 

 Blood hematocrit (volume fraction)            39 %            35-52        

 

 Automated erythrocyte mean corpuscular volume            96 [foz_us]          
  80-99        

 

 Automated erythrocyte mean corpuscular hemoglobin (mass per erythrocyte)      
      35 pg            25-34        

 

 Automated erythrocyte mean corpuscular hemoglobin concentration measurement (
mass/volume)            36 g/dL            32-36        

 

 Automated erythrocyte distribution width ratio            12.1 %            
10.0-14.5        

 

 Automated blood platelet count (count/volume)            268 10*3/uL          
  130-400        

 

 Automated blood platelet mean volume measurement            10.0 [foz_us]     
       7.4-10.4        

 

 Automated blood neutrophils/100 leukocytes            68 %            42-75   
     

 

 Automated blood lymphocytes/100 leukocytes            20 %            12-44   
     

 

 Blood monocytes/100 leukocytes            9 %            0-12        

 

 Automated blood eosinophils/100 leukocytes            3 %            0-10     
   

 

 Automated blood basophils/100 leukocytes            0 %            0-10        

 

 Blood neutrophils automated count (number/volume)            6.9 10*3         
   1.8-7.8        

 

 Blood lymphocytes automated count (number/volume)            2.1 10*3         
   1.0-4.0        

 

 Blood monocytes automated count (number/volume)            0.9 10*3            
0.0-1.0        

 

 Automated eosinophil count            0.3 10*3/uL            0.0-0.3        

 

 Automated blood basophil count (count/volume)            0.0 10*3/uL          
  0.0-0.1        









 PT panel in platelet poor plasma by coagulation assay - 10/27/16 06:35         









 Prothrombin time (PT) in platelet poor plasma by coagulation assay            
27.6 s            12.2-14.7        

 

 INR in platelet poor plasma or blood by coagulation assay            2.6      
       0.8-1.4        









 Activated partial thromboplastin time (aPTT) in platelet poor plasma 
bycoagulation assay - 10/27/16 06:35         









 Activated partial thromboplastin time (aPTT) in platelet poor plasma 
bycoagulation assay            62 s            24-35        









 Comprehensive metabolic panel - 10/27/16 06:35         









 Serum or plasma sodium measurement (moles/volume)            138 mmol/L       
     135-145        

 

 Serum or plasma potassium measurement (moles/volume)            3.9 mmol/L    
        3.6-5.0        

 

 Serum or plasma chloride measurement (moles/volume)            107 mmol/L     
               

 

 Carbon dioxide            22 mmol/L            21-32        

 

 Serum or plasma anion gap determination (moles/volume)            9 mmol/L    
        5-14        

 

 Serum or plasma urea nitrogen measurement (mass/volume)            8 mg/dL    
        7-18        

 

 Serum or plasma creatinine measurement (mass/volume)            0.65 mg/dL    
        0.60-1.30        

 

 Serum or plasma urea nitrogen/creatinine mass ratio            12             
NRG        

 

 Serum or plasma creatinine measurement with calculation of estimated 
glomerular filtration rate            >             NRG        

 

 Serum or plasma glucose measurement (mass/volume)            96 mg/dL         
           

 

 Serum or plasma calcium measurement (mass/volume)            8.6 mg/dL        
    8.5-10.1        

 

 Serum or plasma total bilirubin measurement (mass/volume)            0.5 mg/dL
            0.1-1.0        

 

 Serum or plasma alkaline phosphatase measurement (enzymatic activity/volume)  
          91 U/L                    

 

 Serum or plasma aspartate aminotransferase measurement (enzymatic activity/
volume)            18 U/L            5-34        

 

 Serum or plasma alanine aminotransferase measurement (enzymatic activity/volume
)            25 U/L            0-55        

 

 Serum or plasma protein measurement (mass/volume)            6.8 g/dL         
   6.4-8.2        

 

 Serum or plasma albumin measurement (mass/volume)            3.9 g/dL         
   3.2-4.5        









 Magnesium - 10/27/16 06:35         









 Magnesium            2.1 mg/dL            1.8-2.4        









 Serum or plasma troponin i.cardiac measurement (mass/volume) - 10/27/16 06:35 
        









 Serum or plasma troponin i.cardiac measurement (mass/volume)            < ng/
mL            <0.30        









 Myoglobin, serum - 10/27/16 06:35         









 Myoglobin, serum            27.2 ng/mL            10.0-92.0        









 Serum or plasma amylase measurement (enzymatic activity/volume) - 10/27/16 06:
35         









 Serum or plasma amylase measurement (enzymatic activity/volume)            39 U
/L                    









 Lipase - 10/27/16 06:35         









 Lipase            23 U/L            8-78        









 Serum or plasma phenytoin measurement (mass/volume) - 10/27/16 06:35         









 Serum or plasma phenytoin measurement (mass/volume)            5.2 ug/mL      
      10.0-20.0        









 Bacterial blood culture - 10/27/16 08:40         









 Bacterial blood culture            NG             NRG        









 Bacterial blood culture - 10/27/16 08:54         









 Bacterial blood culture            NG             NRG        









 Complete blood count (CBC) with automated white blood cell (WBC) differential 
- 10/28/16 08:10         









 Blood leukocytes automated count (number/volume)            7.0 10*3/uL       
     4.3-11.0        

 

 Blood erythrocytes automated count (number/volume)            3.56 10*6/uL    
        4.35-5.85        

 

 Venous blood hemoglobin measurement (mass/volume)            12.2 g/dL        
    11.5-16.0        

 

 Blood hematocrit (volume fraction)            34 %            35-52        

 

 Automated erythrocyte mean corpuscular volume            96 [foz_us]          
  80-99        

 

 Automated erythrocyte mean corpuscular hemoglobin (mass per erythrocyte)      
      34 pg            25-34        

 

 Automated erythrocyte mean corpuscular hemoglobin concentration measurement (
mass/volume)            36 g/dL            32-36        

 

 Automated erythrocyte distribution width ratio            11.9 %            
10.0-14.5        

 

 Automated blood platelet count (count/volume)            247 10*3/uL          
  130-400        

 

 Automated blood platelet mean volume measurement            9.8 [foz_us]      
      7.4-10.4        

 

 Automated blood neutrophils/100 leukocytes            61 %            42-75   
     

 

 Automated blood lymphocytes/100 leukocytes            27 %            12-44   
     

 

 Blood monocytes/100 leukocytes            9 %            0-12        

 

 Automated blood eosinophils/100 leukocytes            3 %            0-10     
   

 

 Automated blood basophils/100 leukocytes            0 %            0-10        

 

 Blood neutrophils automated count (number/volume)            4.3 10*3         
   1.8-7.8        

 

 Blood lymphocytes automated count (number/volume)            1.9 10*3         
   1.0-4.0        

 

 Blood monocytes automated count (number/volume)            0.6 10*3            
0.0-1.0        

 

 Automated eosinophil count            0.2 10*3/uL            0.0-0.3        

 

 Automated blood basophil count (count/volume)            0.0 10*3/uL          
  0.0-0.1        









 Lipid 1996 panel - 10/28/16 08:10         









 Serum or plasma triglyceride measurement (mass/volume)            72 mg/dL    
        <150        

 

 Serum or plasma cholesterol measurement (mass/volume)            114 mg/dL    
        < 200        

 

 Serum or plasma cholesterol in HDL measurement (mass/volume)            28 mg/
dL            40-60        

 

 Cholesterol in LDL [mass/volume] in serum or plasma by direct assay            
60 mg/dL            1-129        

 

 Serum or plasma cholesterol in VLDL measurement (mass/volume)            14 mg/
dL            5-40        









 Comprehensive metabolic panel - 10/28/16 08:10         









 Serum or plasma sodium measurement (moles/volume)            138 mmol/L       
     135-145        

 

 Serum or plasma potassium measurement (moles/volume)            3.8 mmol/L    
        3.6-5.0        

 

 Serum or plasma chloride measurement (moles/volume)            110 mmol/L     
               

 

 Carbon dioxide            20 mmol/L            21-32        

 

 Serum or plasma anion gap determination (moles/volume)            8 mmol/L    
        5-14        

 

 Serum or plasma urea nitrogen measurement (mass/volume)            4 mg/dL    
        7-18        

 

 Serum or plasma creatinine measurement (mass/volume)            0.58 mg/dL    
        0.60-1.30        

 

 Serum or plasma urea nitrogen/creatinine mass ratio            7             
NRG        

 

 Serum or plasma creatinine measurement with calculation of estimated 
glomerular filtration rate            >             NRG        

 

 Serum or plasma glucose measurement (mass/volume)            93 mg/dL         
           

 

 Serum or plasma calcium measurement (mass/volume)            8.2 mg/dL        
    8.5-10.1        

 

 Serum or plasma total bilirubin measurement (mass/volume)            0.2 mg/dL
            0.1-1.0        

 

 Serum or plasma alkaline phosphatase measurement (enzymatic activity/volume)  
          78 U/L                    

 

 Serum or plasma aspartate aminotransferase measurement (enzymatic activity/
volume)            17 U/L            5-34        

 

 Serum or plasma alanine aminotransferase measurement (enzymatic activity/volume
)            22 U/L            0-55        

 

 Serum or plasma protein measurement (mass/volume)            6.1 g/dL         
   6.4-8.2        

 

 Serum or plasma albumin measurement (mass/volume)            3.3 g/dL         
   3.2-4.5        









 PT panel in platelet poor plasma by coagulation assay - 10/28/16 08:10         









 Prothrombin time (PT) in platelet poor plasma by coagulation assay            
31.7 s            12.2-14.7        

 

 INR in platelet poor plasma or blood by coagulation assay            3.1      
       0.8-1.4        









 Complete blood count (CBC) with automated white blood cell (WBC) differential 
- 16 12:23         









 Blood leukocytes automated count (number/volume)            18.3 10*3/uL      
      4.3-11.0        

 

 Blood erythrocytes automated count (number/volume)            4.96 10*6/uL    
        4.35-5.85        

 

 Venous blood hemoglobin measurement (mass/volume)            16.7 g/dL        
    11.5-16.0        

 

 Blood hematocrit (volume fraction)            46 %            35-52        

 

 Automated erythrocyte mean corpuscular volume            93 [foz_us]          
  80-99        

 

 Automated erythrocyte mean corpuscular hemoglobin (mass per erythrocyte)      
      34 pg            25-34        

 

 Automated erythrocyte mean corpuscular hemoglobin concentration measurement (
mass/volume)            36 g/dL            32-36        

 

 Automated erythrocyte distribution width ratio            11.9 %            
10.0-14.5        

 

 Automated blood platelet count (count/volume)            281 10*3/uL          
  130-400        

 

 Automated blood platelet mean volume measurement            11.1 [foz_us]     
       7.4-10.4        

 

 Automated blood neutrophils/100 leukocytes            77 %            42-75   
     

 

 Automated blood lymphocytes/100 leukocytes            14 %            12-44   
     

 

 Blood monocytes/100 leukocytes            8 %            0-12        

 

 Automated blood eosinophils/100 leukocytes            0 %            0-10     
   

 

 Automated blood basophils/100 leukocytes            0 %            0-10        

 

 Blood neutrophils automated count (number/volume)            14.2 10*3        
    1.8-7.8        

 

 Blood lymphocytes automated count (number/volume)            2.6 10*3         
   1.0-4.0        

 

 Blood monocytes automated count (number/volume)            1.5 10*3            
0.0-1.0        

 

 Automated eosinophil count            0.0 10*3/uL            0.0-0.3        

 

 Automated blood basophil count (count/volume)            0.0 10*3/uL          
  0.0-0.1        









 Whole blood basic metabolic panel - 16 12:23         









 Serum or plasma sodium measurement (moles/volume)            134 mmol/L       
     135-145        

 

 Serum or plasma potassium measurement (moles/volume)            3.7 mmol/L    
        3.6-5.0        

 

 Serum or plasma chloride measurement (moles/volume)            101 mmol/L     
               

 

 Carbon dioxide            17 mmol/L            21-32        

 

 Serum or plasma anion gap determination (moles/volume)            16 mmol/L   
         5-14        

 

 Serum or plasma urea nitrogen measurement (mass/volume)            11 mg/dL   
         7-18        

 

 Serum or plasma creatinine measurement (mass/volume)            0.76 mg/dL    
        0.60-1.30        

 

 Serum or plasma urea nitrogen/creatinine mass ratio            14             
NRG        

 

 Serum or plasma creatinine measurement with calculation of estimated 
glomerular filtration rate            >             NRG        

 

 Serum or plasma glucose measurement (mass/volume)            150 mg/dL        
            

 

 Serum or plasma calcium measurement (mass/volume)            9.7 mg/dL        
    8.5-10.1        









 Blood manual differential performed detection - 16 12:23         









 Blood monocytes/100 leukocytes            5 %            NRG        

 

 Manual blood segmented neutrophils/100 leukocytes            81 %            
NRG        

 

 Manual blood lymphocytes/100 leukocytes            12 %            NRG        

 

 Manual eosinophils/100 leukocytes in nose            2 %            NRG        

 

 Blood erythrocyte morphology finding identification            NORMAL         
    NRG        









 PT panel in platelet poor plasma by coagulation assay - 16 12:23         









 Prothrombin time (PT) in platelet poor plasma by coagulation assay            
85.2 s            12.2-14.7        

 

 INR in platelet poor plasma or blood by coagulation assay            10.6     
        0.8-1.4        









 Activated partial thromboplastin time (aPTT) in platelet poor plasma 
bycoagulation assay - 16 12:23         









 Activated partial thromboplastin time (aPTT) in platelet poor plasma 
bycoagulation assay            178 s            24-35        









 Blood lactic acid measurement (moles/volume) - 16 13:05         









 Blood lactic acid measurement (moles/volume)            1.6 mmol/L            
0.5-2.0        









 Serum or plasma C reactive protein measurement (mass/volume) - 16 13:05 
        









 Serum or plasma C reactive protein measurement (mass/volume)            10.16 
mg/dL            0.00-0.50        









 Bacterial blood culture - 16 13:05         









 Bacterial blood culture            NG             NRG        









 Bacterial blood culture - 16 13:10         









 Bacterial blood culture            NG             NRG        









 Urine beta human chorionic gonadotropin (hCG) measurement - 16 13:19    
     









 Urine beta human chorionic gonadotropin (hCG) measurement            NEGATIVE 
            NEGATIVE        









 Complete urinalysis with reflex to culture - 16 13:19         









 Urine color determination            RED             NRG        

 

 Urine clarity determination            BLOODY             NRG        

 

 Urine pH measurement by test strip            6.5             5-9        

 

 Specific gravity of urine by test strip            1.015             1.016-
1.022        

 

 Urine protein assay by test strip, semi-quantitative            4+             
NEGATIVE        

 

 Urine glucose detection by automated test strip            NEGATIVE           
  NEGATIVE        

 

 Erythrocytes detection in urine sediment by light microscopy            5+    
         NEGATIVE        

 

 Urine ketones detection by automated test strip            3+             
NEGATIVE        

 

 Urine nitrite detection by test strip            NEGATIVE             NEGATIVE
        

 

 Urine total bilirubin detection by test strip            NEGATIVE             
NEGATIVE        

 

 Urine urobilinogen measurement by automated test strip (mass/volume)          
  NORMAL             NORMAL        

 

 Urine leukocyte esterase detection by dipstick            NEGATIVE             
NEGATIVE        

 

 Automated urine sediment erythrocyte count by microscopy (number/high power 
field)            TNTC             NRG        

 

 Automated urine sediment leukocyte count by microscopy (number/high power field
)             [HPF]            NRG        

 

 Bacteria detection in urine sediment by light microscopy            NEGATIVE  
           NRG        

 

 Squamous epithelial cells detection in urine sediment by light microscopy     
       10-25             NRG        

 

 Crystals detection in urine sediment by light microscopy            NONE      
       NRG        

 

 Casts detection in urine sediment by light microscopy            NONE         
    NRG        

 

 Mucus detection in urine sediment by light microscopy            NEGATIVE     
        NRG        

 

 Complete urinalysis with reflex to culture            NO             NRG      
  









 FRESH FROZEN PLASMA - 16 13:54         









 FRESH FROZEN PLASMA                           PRSM[facility] TRFSD    [id number]    
           NRG        









 Blood type T Indirect antibody screen panel - 16 13:54         









 ABO+Rh group            AP             NRG        

 

 Transfusion band number            [id number]             NRG        

 

 Blood group antibody screen            NEGATIVE             NRG        









 Whole blood hemoglobin and hematocrit panel - 16 19:33         









 Venous blood hemoglobin measurement (mass/volume)            13.4 g/dL        
    11.5-16.0        

 

 Blood hematocrit (volume fraction)            37 %            35-52        









 Whole blood hemoglobin and hematocrit panel - 16 00:20         









 Venous blood hemoglobin measurement (mass/volume)            13.1 g/dL        
    11.5-16.0        

 

 Blood hematocrit (volume fraction)            37 %            35-52        









 Complete blood count (CBC) with automated white blood cell (WBC) differential 
- 16 04:40         









 Blood leukocytes automated count (number/volume)            9.2 10*3/uL       
     4.3-11.0        

 

 Blood erythrocytes automated count (number/volume)            3.63 10*6/uL    
        4.35-5.85        

 

 Venous blood hemoglobin measurement (mass/volume)            12.3 g/dL        
    11.5-16.0        

 

 Blood hematocrit (volume fraction)            35 %            35-52        

 

 Automated erythrocyte mean corpuscular volume            96 [foz_us]          
  80-99        

 

 Automated erythrocyte mean corpuscular hemoglobin (mass per erythrocyte)      
      34 pg            25-34        

 

 Automated erythrocyte mean corpuscular hemoglobin concentration measurement (
mass/volume)            35 g/dL            32-36        

 

 Automated erythrocyte distribution width ratio            11.4 %            
10.0-14.5        

 

 Automated blood platelet count (count/volume)            170 10*3/uL          
  130-400        

 

 Automated blood platelet mean volume measurement            10.7 [foz_us]     
       7.4-10.4        

 

 Automated blood neutrophils/100 leukocytes            53 %            42-75   
     

 

 Automated blood lymphocytes/100 leukocytes            36 %            12-44   
     

 

 Blood monocytes/100 leukocytes            10 %            0-12        

 

 Automated blood eosinophils/100 leukocytes            1 %            0-10     
   

 

 Automated blood basophils/100 leukocytes            0 %            0-10        

 

 Blood neutrophils automated count (number/volume)            4.8 10*3         
   1.8-7.8        

 

 Blood lymphocytes automated count (number/volume)            3.3 10*3         
   1.0-4.0        

 

 Blood monocytes automated count (number/volume)            0.9 10*3            
0.0-1.0        

 

 Automated eosinophil count            0.1 10*3/uL            0.0-0.3        

 

 Automated blood basophil count (count/volume)            0.0 10*3/uL          
  0.0-0.1        









 PT panel in platelet poor plasma by coagulation assay - 16 04:40         









 Prothrombin time (PT) in platelet poor plasma by coagulation assay            
13.7 s            12.2-14.7        

 

 INR in platelet poor plasma or blood by coagulation assay            1.1      
       0.8-1.4        









 Comprehensive metabolic panel - 16 04:40         









 Serum or plasma sodium measurement (moles/volume)            136 mmol/L       
     135-145        

 

 Serum or plasma potassium measurement (moles/volume)            3.7 mmol/L    
        3.6-5.0        

 

 Serum or plasma chloride measurement (moles/volume)            105 mmol/L     
               

 

 Carbon dioxide            21 mmol/L            21-32        

 

 Serum or plasma anion gap determination (moles/volume)            10 mmol/L   
         5-14        

 

 Serum or plasma urea nitrogen measurement (mass/volume)            14 mg/dL   
         7-18        

 

 Serum or plasma creatinine measurement (mass/volume)            0.65 mg/dL    
        0.60-1.30        

 

 Serum or plasma urea nitrogen/creatinine mass ratio            22             
NRG        

 

 Serum or plasma creatinine measurement with calculation of estimated 
glomerular filtration rate            >             NRG        

 

 Serum or plasma glucose measurement (mass/volume)            82 mg/dL         
           

 

 Serum or plasma calcium measurement (mass/volume)            8.4 mg/dL        
    8.5-10.1        

 

 Serum or plasma total bilirubin measurement (mass/volume)            1.2 mg/dL
            0.1-1.0        

 

 Serum or plasma alkaline phosphatase measurement (enzymatic activity/volume)  
          70 U/L                    

 

 Serum or plasma aspartate aminotransferase measurement (enzymatic activity/
volume)            25 U/L            5-34        

 

 Serum or plasma alanine aminotransferase measurement (enzymatic activity/volume
)            21 U/L            0-55        

 

 Serum or plasma protein measurement (mass/volume)            6.2 g/dL         
   6.4-8.2        

 

 Serum or plasma albumin measurement (mass/volume)            3.6 g/dL         
   3.2-4.5        









 Serum or plasma phosphate measurement (mass/volume) - 16 04:40         









 Serum or plasma phosphate measurement (mass/volume)            3.2 mg/dL      
      2.3-4.7        









 Magnesium - 16 04:40         









 Magnesium            2.0 mg/dL            1.8-2.4        









 Whole blood hemoglobin and hematocrit panel - 16 08:25         









 Venous blood hemoglobin measurement (mass/volume)            11.1 g/dL        
    11.5-16.0        

 

 Blood hematocrit (volume fraction)            31 %            35-52        









 Methicillin resistant Staphylococcus aureus (MRSA) screening culture -  09:00         









 Methicillin resistant Staphylococcus aureus (MRSA) screening culture          
  NEG             NRG        









 Whole blood hemoglobin and hematocrit panel - 16 16:00         









 Venous blood hemoglobin measurement (mass/volume)            10.8 g/dL        
    11.5-16.0        

 

 Blood hematocrit (volume fraction)            31 %            35-52        









 Automated blood complete blood count (hemogram) panel - 16 05:05         









 Blood leukocytes automated count (number/volume)            6.6 10*3/uL       
     4.3-11.0        

 

 Blood erythrocytes automated count (number/volume)            3.29 10*6/uL    
        4.35-5.85        

 

 Venous blood hemoglobin measurement (mass/volume)            11.2 g/dL        
    11.5-16.0        

 

 Blood hematocrit (volume fraction)            32 %            35-52        

 

 Automated erythrocyte mean corpuscular volume            96 [foz_us]          
  80-99        

 

 Automated erythrocyte mean corpuscular hemoglobin (mass per erythrocyte)      
      34 pg            25-34        

 

 Automated erythrocyte mean corpuscular hemoglobin concentration measurement (
mass/volume)            35 g/dL            32-36        

 

 Automated erythrocyte distribution width ratio            11.4 %            
10.0-14.5        

 

 Automated blood platelet count (count/volume)            145 10*3/uL          
  130-400        

 

 Automated blood platelet mean volume measurement            10.2 [foz_us]     
       7.4-10.4        









 Comprehensive metabolic panel - 16 05:05         









 Serum or plasma sodium measurement (moles/volume)            138 mmol/L       
     135-145        

 

 Serum or plasma potassium measurement (moles/volume)            3.5 mmol/L    
        3.6-5.0        

 

 Serum or plasma chloride measurement (moles/volume)            108 mmol/L     
               

 

 Carbon dioxide            21 mmol/L            21-32        

 

 Serum or plasma anion gap determination (moles/volume)            9 mmol/L    
        5-14        

 

 Serum or plasma urea nitrogen measurement (mass/volume)            11 mg/dL   
         7-18        

 

 Serum or plasma creatinine measurement (mass/volume)            0.62 mg/dL    
        0.60-1.30        

 

 Serum or plasma urea nitrogen/creatinine mass ratio            18             
NRG        

 

 Serum or plasma creatinine measurement with calculation of estimated 
glomerular filtration rate            >             NRG        

 

 Serum or plasma glucose measurement (mass/volume)            94 mg/dL         
           

 

 Serum or plasma calcium measurement (mass/volume)            8.2 mg/dL        
    8.5-10.1        

 

 Serum or plasma total bilirubin measurement (mass/volume)            0.3 mg/dL
            0.1-1.0        

 

 Serum or plasma alkaline phosphatase measurement (enzymatic activity/volume)  
          67 U/L                    

 

 Serum or plasma aspartate aminotransferase measurement (enzymatic activity/
volume)            22 U/L            5-34        

 

 Serum or plasma alanine aminotransferase measurement (enzymatic activity/volume
)            18 U/L            0-55        

 

 Serum or plasma protein measurement (mass/volume)            5.6 g/dL         
   6.4-8.2        

 

 Serum or plasma albumin measurement (mass/volume)            3.2 g/dL         
   3.2-4.5        









 Complete blood count (CBC) with automated white blood cell (WBC) differential 
- 16 21:26         









 Blood leukocytes automated count (number/volume)            7.8 10*3/uL       
     4.3-11.0        

 

 Blood erythrocytes automated count (number/volume)            3.16 10*6/uL    
        4.35-5.85        

 

 Venous blood hemoglobin measurement (mass/volume)            10.8 g/dL        
    11.5-16.0        

 

 Blood hematocrit (volume fraction)            30 %            35-52        

 

 Automated erythrocyte mean corpuscular volume            96 [foz_us]          
  80-99        

 

 Automated erythrocyte mean corpuscular hemoglobin (mass per erythrocyte)      
      34 pg            25-34        

 

 Automated erythrocyte mean corpuscular hemoglobin concentration measurement (
mass/volume)            36 g/dL            32-36        

 

 Automated erythrocyte distribution width ratio            11.2 %            
10.0-14.5        

 

 Automated blood platelet count (count/volume)            169 10*3/uL          
  130-400        

 

 Automated blood platelet mean volume measurement            10.6 [foz_us]     
       7.4-10.4        

 

 Automated blood neutrophils/100 leukocytes            54 %            42-75   
     

 

 Automated blood lymphocytes/100 leukocytes            35 %            12-44   
     

 

 Blood monocytes/100 leukocytes            7 %            0-12        

 

 Automated blood eosinophils/100 leukocytes            4 %            0-10     
   

 

 Automated blood basophils/100 leukocytes            0 %            0-10        

 

 Blood neutrophils automated count (number/volume)            4.2 10*3         
   1.8-7.8        

 

 Blood lymphocytes automated count (number/volume)            2.7 10*3         
   1.0-4.0        

 

 Blood monocytes automated count (number/volume)            0.6 10*3            
0.0-1.0        

 

 Automated eosinophil count            0.3 10*3/uL            0.0-0.3        

 

 Automated blood basophil count (count/volume)            0.0 10*3/uL          
  0.0-0.1        









 PT panel in platelet poor plasma by coagulation assay - 16 21:26         









 Prothrombin time (PT) in platelet poor plasma by coagulation assay            
13.2 s            12.2-14.7        

 

 INR in platelet poor plasma or blood by coagulation assay            1.0      
       0.8-1.4        









 Activated partial thromboplastin time (aPTT) in platelet poor plasma 
bycoagulation assay - 16 21:26         









 Activated partial thromboplastin time (aPTT) in platelet poor plasma 
bycoagulation assay            26 s            24-35        









 Serum or plasma choriogonadotropin (pregnancy test) detection - 16 21:26
         









 Serum or plasma choriogonadotropin (pregnancy test) detection            
NEGATIVE             NEGATIVE        









 Comprehensive metabolic panel - 16 21:26         









 Serum or plasma sodium measurement (moles/volume)            140 mmol/L       
     135-145        

 

 Serum or plasma potassium measurement (moles/volume)            3.7 mmol/L    
        3.6-5.0        

 

 Serum or plasma chloride measurement (moles/volume)            109 mmol/L     
               

 

 Carbon dioxide            22 mmol/L            21-32        

 

 Serum or plasma anion gap determination (moles/volume)            9 mmol/L    
        5-14        

 

 Serum or plasma urea nitrogen measurement (mass/volume)            9 mg/dL    
        7-18        

 

 Serum or plasma creatinine measurement (mass/volume)            0.65 mg/dL    
        0.60-1.30        

 

 Serum or plasma urea nitrogen/creatinine mass ratio            14             
NRG        

 

 Serum or plasma creatinine measurement with calculation of estimated 
glomerular filtration rate            >             NRG        

 

 Serum or plasma glucose measurement (mass/volume)            93 mg/dL         
           

 

 Serum or plasma calcium measurement (mass/volume)            8.4 mg/dL        
    8.5-10.1        

 

 Serum or plasma total bilirubin measurement (mass/volume)            0.3 mg/dL
            0.1-1.0        

 

 Serum or plasma alkaline phosphatase measurement (enzymatic activity/volume)  
          69 U/L                    

 

 Serum or plasma aspartate aminotransferase measurement (enzymatic activity/
volume)            20 U/L            5-34        

 

 Serum or plasma alanine aminotransferase measurement (enzymatic activity/volume
)            19 U/L            0-55        

 

 Serum or plasma protein measurement (mass/volume)            5.8 g/dL         
   6.4-8.2        

 

 Serum or plasma albumin measurement (mass/volume)            3.4 g/dL         
   3.2-4.5        









 Magnesium - 16 21:26         









 Magnesium            2.2 mg/dL            1.8-2.4        









 Serum or plasma thyrotropin measurement by detection limit <=0.05 miu/l (units/
volume) - 16 21:26         









 Serum or plasma thyrotropin measurement by detection limit <=0.05 miu/l (units/
volume)            1.94 u[iU]/mL            0.35-4.94        









 Complete blood count (CBC) with automated white blood cell (WBC) differential 
- 16 15:59         









 Blood leukocytes automated count (number/volume)            7.0 10*3/uL       
     4.3-11.0        

 

 Blood erythrocytes automated count (number/volume)            3.57 10*6/uL    
        4.35-5.85        

 

 Venous blood hemoglobin measurement (mass/volume)            12.1 g/dL        
    11.5-16.0        

 

 Blood hematocrit (volume fraction)            34 %            35-52        

 

 Automated erythrocyte mean corpuscular volume            94 [foz_us]          
  80-99        

 

 Automated erythrocyte mean corpuscular hemoglobin (mass per erythrocyte)      
      34 pg            25-34        

 

 Automated erythrocyte mean corpuscular hemoglobin concentration measurement (
mass/volume)            36 g/dL            32-36        

 

 Automated erythrocyte distribution width ratio            11.2 %            
10.0-14.5        

 

 Automated blood platelet count (count/volume)            217 10*3/uL          
  130-400        

 

 Automated blood platelet mean volume measurement            10.6 [foz_us]     
       7.4-10.4        

 

 Automated blood neutrophils/100 leukocytes            52 %            42-75   
     

 

 Automated blood lymphocytes/100 leukocytes            39 %            12-44   
     

 

 Blood monocytes/100 leukocytes            7 %            0-12        

 

 Automated blood eosinophils/100 leukocytes            3 %            0-10     
   

 

 Automated blood basophils/100 leukocytes            0 %            0-10        

 

 Blood neutrophils automated count (number/volume)            3.6 10*3         
   1.8-7.8        

 

 Blood lymphocytes automated count (number/volume)            2.7 10*3         
   1.0-4.0        

 

 Blood monocytes automated count (number/volume)            0.5 10*3            
0.0-1.0        

 

 Automated eosinophil count            0.2 10*3/uL            0.0-0.3        

 

 Automated blood basophil count (count/volume)            0.0 10*3/uL          
  0.0-0.1        









 Complete blood count (CBC) with automated white blood cell (WBC) differential 
- 17 10:15         









 Blood leukocytes automated count (number/volume)            10.4 10*3/uL      
      4.3-11.0        

 

 Blood erythrocytes automated count (number/volume)            4.73 10*6/uL    
        4.35-5.85        

 

 Venous blood hemoglobin measurement (mass/volume)            15.4 g/dL        
    11.5-16.0        

 

 Blood hematocrit (volume fraction)            45 %            35-52        

 

 Automated erythrocyte mean corpuscular volume            94 [foz_us]          
  80-99        

 

 Automated erythrocyte mean corpuscular hemoglobin (mass per erythrocyte)      
      33 pg            25-34        

 

 Automated erythrocyte mean corpuscular hemoglobin concentration measurement (
mass/volume)            35 g/dL            32-36        

 

 Automated erythrocyte distribution width ratio            12.6 %            
10.0-14.5        

 

 Automated blood platelet count (count/volume)            217 10*3/uL          
  130-400        

 

 Automated blood platelet mean volume measurement            10.9 [foz_us]     
       7.4-10.4        

 

 Automated blood neutrophils/100 leukocytes            76 %            42-75   
     

 

 Automated blood lymphocytes/100 leukocytes            18 %            12-44   
     

 

 Blood monocytes/100 leukocytes            5 %            0-12        

 

 Automated blood eosinophils/100 leukocytes            1 %            0-10     
   

 

 Automated blood basophils/100 leukocytes            0 %            0-10        

 

 Blood neutrophils automated count (number/volume)            7.9 10*3         
   1.8-7.8        

 

 Blood lymphocytes automated count (number/volume)            1.9 10*3         
   1.0-4.0        

 

 Blood monocytes automated count (number/volume)            0.5 10*3            
0.0-1.0        

 

 Automated eosinophil count            0.1 10*3/uL            0.0-0.3        

 

 Automated blood basophil count (count/volume)            0.0 10*3/uL          
  0.0-0.1        









 Serum or plasma choriogonadotropin (pregnancy test) detection - 17 10:15
         









 Serum or plasma choriogonadotropin (pregnancy test) detection            
NEGATIVE             NEGATIVE        









 PT panel in platelet poor plasma by coagulation assay - 17 10:15         









 Prothrombin time (PT) in platelet poor plasma by coagulation assay            
12.4 s            12.2-14.7        

 

 INR in platelet poor plasma or blood by coagulation assay            1.0      
       0.8-1.4        









 Activated partial thromboplastin time (aPTT) in platelet poor plasma 
bycoagulation assay - 17 10:15         









 Activated partial thromboplastin time (aPTT) in platelet poor plasma 
bycoagulation assay            < s            24-35        









 Comprehensive metabolic panel - 17 10:15         









 Serum or plasma sodium measurement (moles/volume)            142 mmol/L       
     135-145        

 

 Serum or plasma potassium measurement (moles/volume)            3.7 mmol/L    
        3.6-5.0        

 

 Serum or plasma chloride measurement (moles/volume)            112 mmol/L     
               

 

 Carbon dioxide            16 mmol/L            21-32        

 

 Serum or plasma anion gap determination (moles/volume)            14 mmol/L   
         5-14        

 

 Serum or plasma urea nitrogen measurement (mass/volume)            10 mg/dL   
         7-18        

 

 Serum or plasma creatinine measurement (mass/volume)            0.81 mg/dL    
        0.60-1.30        

 

 Serum or plasma urea nitrogen/creatinine mass ratio            12             
NRG        

 

 Serum or plasma creatinine measurement with calculation of estimated 
glomerular filtration rate            >             NRG        

 

 Serum or plasma glucose measurement (mass/volume)            137 mg/dL        
            

 

 Serum or plasma calcium measurement (mass/volume)            9.0 mg/dL        
    8.5-10.1        

 

 Serum or plasma total bilirubin measurement (mass/volume)            0.6 mg/dL
            0.1-1.0        

 

 Serum or plasma alkaline phosphatase measurement (enzymatic activity/volume)  
          69 U/L                    

 

 Serum or plasma aspartate aminotransferase measurement (enzymatic activity/
volume)            17 U/L            5-34        

 

 Serum or plasma alanine aminotransferase measurement (enzymatic activity/volume
)            14 U/L            0-55        

 

 Serum or plasma protein measurement (mass/volume)            7.1 g/dL         
   6.4-8.2        

 

 Serum or plasma albumin measurement (mass/volume)            4.0 g/dL         
   3.2-4.5        









 Magnesium - 17 10:15         









 Magnesium            1.7 mg/dL            1.8-2.4        









 Serum or plasma troponin i.cardiac measurement (mass/volume) - 17 10:15 
        









 Serum or plasma troponin i.cardiac measurement (mass/volume)            < ng/
mL            <0.30        









 Serum or plasma amylase measurement (enzymatic activity/volume) - 17 10:
15         









 Serum or plasma amylase measurement (enzymatic activity/volume)            47 U
/L                    









 Lipase - 17 10:15         









 Lipase            35 U/L            8-78        









 Complete blood count (CBC) with automated white blood cell (WBC) differential 
- 10/25/17 10:20         









 Blood leukocytes automated count (number/volume)            7.8 10*3/uL       
     4.3-11.0        

 

 Blood erythrocytes automated count (number/volume)            4.58 10*6/uL    
        4.35-5.85        

 

 Venous blood hemoglobin measurement (mass/volume)            15.6 g/dL        
    11.5-16.0        

 

 Blood hematocrit (volume fraction)            45 %            35-52        

 

 Automated erythrocyte mean corpuscular volume            97 [foz_us]          
  80-99        

 

 Automated erythrocyte mean corpuscular hemoglobin (mass per erythrocyte)      
      34 pg            25-34        

 

 Automated erythrocyte mean corpuscular hemoglobin concentration measurement (
mass/volume)            35 g/dL            32-36        

 

 Automated erythrocyte distribution width ratio            12.5 %            
10.0-14.5        

 

 Automated blood platelet count (count/volume)            246 10*3/uL          
  130-400        

 

 Automated blood platelet mean volume measurement            10.2 [foz_us]     
       7.4-10.4        

 

 Automated blood neutrophils/100 leukocytes            60 %            42-75   
     

 

 Automated blood lymphocytes/100 leukocytes            29 %            12-44   
     

 

 Blood monocytes/100 leukocytes            8 %            0-12        

 

 Automated blood eosinophils/100 leukocytes            2 %            0-10     
   

 

 Automated blood basophils/100 leukocytes            1 %            0-10        

 

 Blood neutrophils automated count (number/volume)            4.7 10*3         
   1.8-7.8        

 

 Blood lymphocytes automated count (number/volume)            2.3 10*3         
   1.0-4.0        

 

 Blood monocytes automated count (number/volume)            0.6 10*3            
0.0-1.0        

 

 Automated eosinophil count            0.1 10*3/uL            0.0-0.3        

 

 Automated blood basophil count (count/volume)            0.0 10*3/uL          
  0.0-0.1        









 Comprehensive metabolic panel - 10/25/17 10:20         









 Serum or plasma sodium measurement (moles/volume)            138 mmol/L       
     135-145        

 

 Serum or plasma potassium measurement (moles/volume)            4.2 mmol/L    
        3.6-5.0        

 

 Serum or plasma chloride measurement (moles/volume)            110 mmol/L     
               

 

 Carbon dioxide            19 mmol/L            21-32        

 

 Serum or plasma anion gap determination (moles/volume)            9 mmol/L    
        5-14        

 

 Serum or plasma urea nitrogen measurement (mass/volume)            8 mg/dL    
        7-18        

 

 Serum or plasma creatinine measurement (mass/volume)            0.80 mg/dL    
        0.60-1.30        

 

 Serum or plasma urea nitrogen/creatinine mass ratio            10             
NRG        

 

 Serum or plasma creatinine measurement with calculation of estimated 
glomerular filtration rate            >             NRG        

 

 Serum or plasma glucose measurement (mass/volume)            100 mg/dL        
            

 

 Serum or plasma calcium measurement (mass/volume)            9.4 mg/dL        
    8.5-10.1        

 

 Serum or plasma total bilirubin measurement (mass/volume)            1.1 mg/dL
            0.1-1.0        

 

 Serum or plasma alkaline phosphatase measurement (enzymatic activity/volume)  
          56 U/L                    

 

 Serum or plasma aspartate aminotransferase measurement (enzymatic activity/
volume)            18 U/L            5-34        

 

 Serum or plasma alanine aminotransferase measurement (enzymatic activity/volume
)            12 U/L            0-55        

 

 Serum or plasma protein measurement (mass/volume)            8.1 g/dL         
   6.4-8.2        

 

 Serum or plasma albumin measurement (mass/volume)            4.4 g/dL         
   3.2-4.5        









 Lipid 1996 panel - 10/25/17 10:20         









 Serum or plasma triglyceride measurement (mass/volume)            118 mg/dL   
         <150        

 

 Serum or plasma cholesterol measurement (mass/volume)            139 mg/dL    
        < 200        

 

 Serum or plasma cholesterol in HDL measurement (mass/volume)            43 mg/
dL            40-60        

 

 Cholesterol in LDL [mass/volume] in serum or plasma by direct assay            
73 mg/dL            1-129        

 

 Serum or plasma cholesterol in VLDL measurement (mass/volume)            24 mg/
dL            5-40        









 THYROID STIMULATING HORMONE - 10/25/17 10:20         









 THYROID STIMULATING HORMONE            0.80 u[iU]/mL            0.35-4.94     
   









 Complete blood count (CBC) with automated white blood cell (WBC) differential 
- 17 13:25         









 Blood leukocytes automated count (number/volume)            4.9 10*3/uL       
     4.3-11.0        

 

 Blood erythrocytes automated count (number/volume)            4.51 10*6/uL    
        4.35-5.85        

 

 Venous blood hemoglobin measurement (mass/volume)            15.2 g/dL        
    11.5-16.0        

 

 Blood hematocrit (volume fraction)            43 %            35-52        

 

 Automated erythrocyte mean corpuscular volume            95 [foz_us]          
  80-99        

 

 Automated erythrocyte mean corpuscular hemoglobin (mass per erythrocyte)      
      34 pg            25-34        

 

 Automated erythrocyte mean corpuscular hemoglobin concentration measurement (
mass/volume)            36 g/dL            32-36        

 

 Automated erythrocyte distribution width ratio            11.7 %            
10.0-14.5        

 

 Automated blood platelet count (count/volume)            182 10*3/uL          
  130-400        

 

 Automated blood platelet mean volume measurement            10.1 [foz_us]     
       7.4-10.4        

 

 Automated blood neutrophils/100 leukocytes            46 %            42-75   
     

 

 Automated blood lymphocytes/100 leukocytes            37 %            12-44   
     

 

 Blood monocytes/100 leukocytes            12 %            0-12        

 

 Automated blood eosinophils/100 leukocytes            3 %            0-10     
   

 

 Automated blood basophils/100 leukocytes            3 %            0-10        

 

 Blood neutrophils automated count (number/volume)            2.2 10*3         
   1.8-7.8        

 

 Blood lymphocytes automated count (number/volume)            1.8 10*3         
   1.0-4.0        

 

 Blood monocytes automated count (number/volume)            0.6 10*3            
0.0-1.0        

 

 Automated eosinophil count            0.2 10*3/uL            0.0-0.3        

 

 Automated blood basophil count (count/volume)            0.1 10*3/uL          
  0.0-0.1        









 Comprehensive metabolic panel - 17 13:25         









 Serum or plasma sodium measurement (moles/volume)            138 mmol/L       
     135-145        

 

 Serum or plasma potassium measurement (moles/volume)            4.2 mmol/L    
        3.6-5.0        

 

 Serum or plasma chloride measurement (moles/volume)            106 mmol/L     
               

 

 Carbon dioxide            20 mmol/L            21-32        

 

 Serum or plasma anion gap determination (moles/volume)            12 mmol/L   
         5-14        

 

 Serum or plasma urea nitrogen measurement (mass/volume)            10 mg/dL   
         7-18        

 

 Serum or plasma creatinine measurement (mass/volume)            0.72 mg/dL    
        0.60-1.30        

 

 Serum or plasma urea nitrogen/creatinine mass ratio            14             
NRG        

 

 Serum or plasma creatinine measurement with calculation of estimated 
glomerular filtration rate            >             NRG        

 

 Serum or plasma glucose measurement (mass/volume)            93 mg/dL         
           

 

 Serum or plasma calcium measurement (mass/volume)            9.3 mg/dL        
    8.5-10.1        

 

 Serum or plasma total bilirubin measurement (mass/volume)            0.6 mg/dL
            0.1-1.0        

 

 Serum or plasma alkaline phosphatase measurement (enzymatic activity/volume)  
          61 U/L                    

 

 Serum or plasma aspartate aminotransferase measurement (enzymatic activity/
volume)            26 U/L            5-34        

 

 Serum or plasma alanine aminotransferase measurement (enzymatic activity/volume
)            17 U/L            0-55        

 

 Serum or plasma protein measurement (mass/volume)            8.3 g/dL         
   6.4-8.2        

 

 Serum or plasma albumin measurement (mass/volume)            4.2 g/dL         
   3.2-4.5        









 Serum or plasma troponin i.cardiac measurement (mass/volume) - 17 13:25 
        









 Serum or plasma troponin i.cardiac measurement (mass/volume)            < ng/
mL            <0.30        









 Lipase - 17 13:25         









 Lipase            30 U/L            8-78        









 Complete urinalysis with reflex to culture - 17 14:07         









 Urine color determination            YELLOW             NRG        

 

 Urine clarity determination            CLEAR             NRG        

 

 Urine pH measurement by test strip            6.5             5-9        

 

 Specific gravity of urine by test strip            1.015             1.016-
1.022        

 

 Urine protein assay by test strip, semi-quantitative            2+             
NEGATIVE        

 

 Urine glucose detection by automated test strip            NEGATIVE           
  NEGATIVE        

 

 Erythrocytes detection in urine sediment by light microscopy            1+    
         NEGATIVE        

 

 Urine ketones detection by automated test strip            3+             
NEGATIVE        

 

 Urine nitrite detection by test strip            NEGATIVE             NEGATIVE
        

 

 Urine total bilirubin detection by test strip            1+             
NEGATIVE        

 

 Urine urobilinogen measurement by automated test strip (mass/volume)          
  4 mg/dL            NORMAL        

 

 Urine leukocyte esterase detection by dipstick            1+             
NEGATIVE        

 

 Automated urine sediment erythrocyte count by microscopy (number/high power 
field)            NONE             NRG        

 

 Automated urine sediment leukocyte count by microscopy (number/high power field
)             [HPF]            NRG        

 

 Bacteria detection in urine sediment by light microscopy            TRACE     
        NRG        

 

 Squamous epithelial cells detection in urine sediment by light microscopy     
       5-10             NRG        

 

 Crystals detection in urine sediment by light microscopy            NONE      
       NRG        

 

 Casts detection in urine sediment by light microscopy            NONE         
    NRG        

 

 Mucus detection in urine sediment by light microscopy            SMALL        
     NRG        

 

 Complete urinalysis with reflex to culture            NO             NRG      
  









 Methicillin resistant Staphylococcus aureus (MRSA) screening culture - 07/10/
18 09:35         









 Methicillin resistant Staphylococcus aureus (MRSA) screening culture          
  NEG             NRG        









 Urine beta human chorionic gonadotropin (hCG) measurement - 18 06:10    
     









 Urine beta human chorionic gonadotropin (hCG) measurement            NEGATIVE 
            NEGATIVE        









 Complete blood count (CBC) with automated white blood cell (WBC) differential 
- 18 06:30         









 Blood leukocytes automated count (number/volume)            7.3 10*3/uL       
     4.3-11.0        

 

 Blood erythrocytes automated count (number/volume)            3.95 10*6/uL    
        4.35-5.85        

 

 Venous blood hemoglobin measurement (mass/volume)            13.5 g/dL        
    11.5-16.0        

 

 Blood hematocrit (volume fraction)            39 %            35-52        

 

 Automated erythrocyte mean corpuscular volume            98 [foz_us]          
  80-99        

 

 Automated erythrocyte mean corpuscular hemoglobin (mass per erythrocyte)      
      34 pg            25-34        

 

 Automated erythrocyte mean corpuscular hemoglobin concentration measurement (
mass/volume)            35 g/dL            32-36        

 

 Automated erythrocyte distribution width ratio            11.8 %            
10.0-14.5        

 

 Automated blood platelet count (count/volume)            250 10*3/uL          
  130-400        

 

 Automated blood platelet mean volume measurement            9.9 [foz_us]      
      7.4-10.4        

 

 Automated blood neutrophils/100 leukocytes            56 %            42-75   
     

 

 Automated blood lymphocytes/100 leukocytes            33 %            12-44   
     

 

 Blood monocytes/100 leukocytes            9 %            0-12        

 

 Automated blood eosinophils/100 leukocytes            2 %            0-10     
   

 

 Automated blood basophils/100 leukocytes            0 %            0-10        

 

 Blood neutrophils automated count (number/volume)            4.1 10*3         
   1.8-7.8        

 

 Blood lymphocytes automated count (number/volume)            2.4 10*3         
   1.0-4.0        

 

 Blood monocytes automated count (number/volume)            0.6 10*3            
0.0-1.0        

 

 Automated eosinophil count            0.2 10*3/uL            0.0-0.3        

 

 Automated blood basophil count (count/volume)            0.0 10*3/uL          
  0.0-0.1        









 Blood type T Indirect antibody screen panel - 18 06:30         









 ABO+Rh group            AP             NRG        

 

 Transfusion band number            [id number]             NRG        

 

 Blood group antibody screen            NEGATIVE             NRG        









 Complete blood count (CBC) with automated white blood cell (WBC) differential 
- 18 22:00         









 Blood leukocytes automated count (number/volume)            10.8 10*3/uL      
      4.3-11.0        

 

 Blood erythrocytes automated count (number/volume)            3.93 10*6/uL    
        4.35-5.85        

 

 Venous blood hemoglobin measurement (mass/volume)            13.8 g/dL        
    11.5-16.0        

 

 Blood hematocrit (volume fraction)            38 %            35-52        

 

 Automated erythrocyte mean corpuscular volume            96 [foz_us]          
  80-99        

 

 Automated erythrocyte mean corpuscular hemoglobin (mass per erythrocyte)      
      35 pg            25-34        

 

 Automated erythrocyte mean corpuscular hemoglobin concentration measurement (
mass/volume)            37 g/dL            32-36        

 

 Automated erythrocyte distribution width ratio            11.4 %            
10.0-14.5        

 

 Automated blood platelet count (count/volume)            209 10*3/uL          
  130-400        

 

 Automated blood platelet mean volume measurement            10.3 [foz_us]     
       7.4-10.4        

 

 Automated blood neutrophils/100 leukocytes            73 %            42-75   
     

 

 Automated blood lymphocytes/100 leukocytes            16 %            12-44   
     

 

 Blood monocytes/100 leukocytes            10 %            0-12        

 

 Automated blood eosinophils/100 leukocytes            1 %            0-10     
   

 

 Automated blood basophils/100 leukocytes            0 %            0-10        

 

 Blood neutrophils automated count (number/volume)            7.9 10*3         
   1.8-7.8        

 

 Blood lymphocytes automated count (number/volume)            1.8 10*3         
   1.0-4.0        

 

 Blood monocytes automated count (number/volume)            1.1 10*3            
0.0-1.0        

 

 Automated eosinophil count            0.1 10*3/uL            0.0-0.3        

 

 Automated blood basophil count (count/volume)            0.0 10*3/uL          
  0.0-0.1        









 Comprehensive metabolic panel - 18 22:00         









 Serum or plasma sodium measurement (moles/volume)            136 mmol/L       
     135-145        

 

 Serum or plasma potassium measurement (moles/volume)            3.8 mmol/L    
        3.6-5.0        

 

 Serum or plasma chloride measurement (moles/volume)            105 mmol/L     
               

 

 Carbon dioxide            20 mmol/L            21-32        

 

 Serum or plasma anion gap determination (moles/volume)            11 mmol/L   
         5-14        

 

 Serum or plasma urea nitrogen measurement (mass/volume)            12 mg/dL   
         7-18        

 

 Serum or plasma creatinine measurement (mass/volume)            0.82 mg/dL    
        0.60-1.30        

 

 Serum or plasma urea nitrogen/creatinine mass ratio            15             
NRG        

 

 Serum or plasma creatinine measurement with calculation of estimated 
glomerular filtration rate            >             NRG        

 

 Serum or plasma glucose measurement (mass/volume)            94 mg/dL         
           

 

 Serum or plasma calcium measurement (mass/volume)            9.2 mg/dL        
    8.5-10.1        

 

 Serum or plasma total bilirubin measurement (mass/volume)            1.1 mg/dL
            0.1-1.0        

 

 Serum or plasma alkaline phosphatase measurement (enzymatic activity/volume)  
          51 U/L                    

 

 Serum or plasma aspartate aminotransferase measurement (enzymatic activity/
volume)            11 U/L            5-34        

 

 Serum or plasma alanine aminotransferase measurement (enzymatic activity/volume
)            6 U/L            0-55        

 

 Serum or plasma protein measurement (mass/volume)            6.7 g/dL         
   6.4-8.2        

 

 Serum or plasma albumin measurement (mass/volume)            3.9 g/dL         
   3.2-4.5        









 Complete urinalysis with reflex to culture - 18 00:29         









 Urine color determination            YELLOW             NRG        

 

 Urine clarity determination            CLEAR             NRG        

 

 Urine pH measurement by test strip            7             5-9        

 

 Specific gravity of urine by test strip            1.010             1.016-
1.022        

 

 Urine protein assay by test strip, semi-quantitative            2+             
NEGATIVE        

 

 Urine glucose detection by automated test strip            NEGATIVE           
  NEGATIVE        

 

 Erythrocytes detection in urine sediment by light microscopy            2+    
         NEGATIVE        

 

 Urine ketones detection by automated test strip            NEGATIVE           
  NEGATIVE        

 

 Urine nitrite detection by test strip            NEGATIVE             NEGATIVE
        

 

 Urine total bilirubin detection by test strip            NEGATIVE             
NEGATIVE        

 

 Urine urobilinogen measurement by automated test strip (mass/volume)          
  NORMAL             NORMAL        

 

 Urine leukocyte esterase detection by dipstick            1+             
NEGATIVE        

 

 Automated urine sediment erythrocyte count by microscopy (number/high power 
field)             [HPF]            NRG        

 

 Automated urine sediment leukocyte count by microscopy (number/high power field
)            RARE             NRG        

 

 Bacteria detection in urine sediment by light microscopy            TRACE     
        NRG        

 

 Squamous epithelial cells detection in urine sediment by light microscopy     
       2-5             NRG        

 

 Crystals detection in urine sediment by light microscopy            NONE      
       NRG        

 

 Casts detection in urine sediment by light microscopy            NONE         
    NRG        

 

 Mucus detection in urine sediment by light microscopy            NEGATIVE     
        NRG        

 

 Complete urinalysis with reflex to culture            NO             NRG      
  









 Bacterial urine culture - 18 00:29         









 Bacterial urine culture            RML             NRG        

 

 COLONY COUNT            .             NRG        



                                                                               
                                                                               
      



Encounters

      





 ACCT No.            Visit Date/Time            Discharge            Status    
        Pt. Type            Provider            Facility            Loc./Unit  
          Complaint        

 

 [id number]            2015 09:18:00            2015 23:59:59          
  CLS            Outpatient            DAVID MCCAIN DO                        
                       

 

 [id number]            2014 14:55:00            2014 23:59:59          
  CLS            Outpatient            CHAY CARNEYMATMARIN                
                               

 

 [id number]            2014 11:44:00            2014 23:59:59          
  CLS            Outpatient            CHAY DIANNAMARIN                
                               

 

 [id number]            2013 16:02:00            2013 23:59:59          
  CLS            Outpatient            MARIE DO ALEJANDRA F                     
                          

 

 [id number]            10/04/2012 13:57:00            10/04/2012 23:59:59          
  CLS            Outpatient            ALEJANDRA SALAZAR DO                     
                          

 

 [id number]            11/15/2017 14:51:17            11/15/2017 23:59:59            
CLS            Outpatient                                                      
      

 

 [id number]            2015 04:44:40                         [place]            
Document Registration                                                          
  

 

 [id number]            2018 14:41:00            2018 23:59:59    
        CLS            Preadmit            REINA LUO            [address] 
[facility]            RAD            CONFUSION,HX OF TIAS    
    

 

 [id number]            2018 21:21:00            2018 01:23:00    
        DIS            Emergency            CHE BEARD DO            [facility]ia 
[facility]            ER            FEVER,HYSTERECTOMY 
THURSDAY,PAIN,BLOOD THINNERS        

 

 [id number]            2018 06:02:00            2018 17:00:00    
        DIS            Outpatient            UMA HANDLEY DO            
[facility]            SDC            CHRONIC PELVIC PAIN,
FACTOR V LEIDEN        

 

 [id number]            07/10/2018 09:05:00            07/10/2018 10:00:00    
        DIS            Outpatient            UMA HANDLEY DO            
[facility]            PREOP            CHRONIC PELVIC PAIN
,FACTOR V LEIDEN        

 

 [id number]            2018 14:09:00            2018 23:59:59    
        CLS            Outpatient            UMA HANDLEY DO            
[facility]            RAD            R92.8 ABN MAMMO     
   

 

 [id number]            2018 14:27:00            2018 23:59:59    
        CLS            Outpatient            UMA HANDLEY DO            
[facility]            RAD            SCREENING        

 

 [id number]            2018 14:15:00            2018 16:23:00    
        DIS            Outpatient            STACY CAMARGO MD            
[facility]            REHAB            L SHOULDER PAIN 
WITH ROTATOR CUFF INFLAMMATION        

 

 [id number]            2017 13:16:00            2017 15:42:00    
        DIS            Emergency            NATARAJANZAFAR APRN            [facility] 
[facility]            ER            L SIDE ABD PAIN        

 

 [id number]            10/25/2017 09:59:00            10/25/2017 23:59:59    
        CLS            Outpatient            STACY CAAMRGO MD            
[facility]            LAB            I10, Z79.899        

 

 [id number]            2017 11:15:00            2017 11:59:00    
        DIS            Outpatient            STACY CAMARGO MD            
[facility]            REHAB            LBP; THORACIC PAIN
        

 

 [id number]            2017 11:49:00            2017 23:59:59    
        CLS            Preadmit            STACY CAMARGO MD            [facility] 
[facility]            RAD            SCREENING Z12.31        

 

 [id number]            2017 08:35:00            2017 23:59:59    
        CLS            Outpatient            YONATHAN SORIANO MD            [facility] 
[facility]            CARD            CAD,CAROTID STENOSIS   
     

 

 [id number]            2017 09:40:00            2017 11:56:00    
        DIS            Emergency            CHE BEARD DO            [facility] 
[facility]            ER            HEADACHE/NAUSEA        

 

 [id number]            2017 00:09:00            2017 23:59:59    
        CLS            Preadmit            YONATHAN SORIANO MD            [facility] 
[facility]            LAB            COUMIDIN,THERAPY,MED 
MGMT        

 

 [id number]            10/31/2016 10:02:00            2017 00:01:00    
        DIS            Outpatient            YONATHAN SORIANO MD            [facility] 
[facility]            LAB            COUMIDIN,THERAPY,MED 
MGMT        

 

 [id number]            2016 15:27:00            2016 23:59:59    
        CLS            Outpatient            STACY CAMARGO MD            
[facility]            LAB            ANEMIA        

 

 [id number]            2016 20:47:00            2016 22:28:00    
        DIS            Emergency            CHE BEARD DO            [facility] 
[facility]            ER            FACIAL NUMBNESS        

 

 [id number]            2016 16:25:00            2016 09:20:00    
        DIS            Inpatient            STACY CAMARGO MD            [facility] 
[facility]            ICU            SUPRATHERAPRUTIC INR; 
HEMATURIA; HEMATEMESIS        

 

 [id number]            2016 08:51:00            2016 23:59:59    
        CLS            Outpatient            STACY CAMARGO MD            
[facility]            RAD            LUNG MASS        

 

 [id number]            10/27/2016 08:51:00            10/28/2016 10:40:00    
        DIS            Inpatient            STACY CAMARGO MD            [facility] 
[facility]            4TH            POST OBSTRUCTIVE PNA LLL
        

 

 [id number]            10/26/2016 09:36:00            10/26/2016 23:59:59    
        CLS            Outpatient            STACY CAMARGO MD            
[facility]            RAD            SOB,COUGH        

 

 [id number]            2016 10:51:00            2016 00:01:00    
        DIS            Outpatient            YONATHAN SORIANO MD            [facility] 
[facility]            LAB            COUMIDIN,THERAPY,MED 
MGMT        

 

 [id number]            2016 11:16:00            2016 12:09:00    
        DIS            Outpatient            STACY CAMARGO MD            
[facility]            REHAB            MID BACK/NECK/
SHOULDER PAIN AND STIFFNESS        

 

 [id number]            2016 13:32:00            2016 23:59:59    
        CLS            Outpatient            REINA LUO            
[facility]            LAB            CHEST PAIN, ABN 
WEIGHT LOSS        

 

 [id number]            2016 20:25:00            2016 22:38:00    
        DIS            Emergency            JUDD ANDERSON MD            
[facility]            ER            HEADACHE/ELEVATED BP/
LOW HEART RATE        

 

 [id number]            2016 12:20:00            2016 00:01:00    
        DIS            Outpatient            YONATHAN SORIANO MD            [facility] 
[facility]            LAB            COUMIDIN,THERAPY,MED 
MGMT        

 

 [id number]            2016 08:59:00            2016 10:05:00    
        DIS            Outpatient            STACY CAMARGO MD            
[facility]            REHAB            NECK AND UPPER 
BACK PAIN,KYPHOSIS,RADICULOPATHY        

 

 [id number]            2016 11:41:00            2016 23:59:59    
        CLS            Outpatient            UMA HANDLEY DO S            
[facility]            RAD            ACUTE PELVIC PAIN   
     

 

 [id number]            2015 07:47:00            2015 23:59:59    
        CLS            Outpatient            YONATHAN SORIANO MD            [facility] 
[facility]            CARD            CAD,CELESTE,HLP,HTN        

 

 [id number]            2015 14:31:00            2015 23:59:59    
        CLS            Outpatient            DARRIAN FARMERP          
  [facility]            RAD            NECK THORACIC 
LUMBAR BACK PAIN        

 

 [id number]            2015 15:06:00            2015 23:59:59    
        CLS            Outpatient            DARRIAN FARMER ARNP          
  [facility]            RAD            NECK PAIN,THORACIC 
BACK PAIN        

 

 [id number]            2015 10:17:00            2015 00:01:00    
        DIS            Outpatient            YONATHAN SORIANO MD            [facility] 
[facility]            LAB            COUMIDIN,THERAPY,MED 
MGMT        

 

 [id number]            2015 10:22:00            2015 23:59:59    
        CLS            Outpatient            STACY CAMARGO MD            
[facility]            RAD            KNEE PAIN,BACK PAIN,
HEEL PAIN        

 

 [id number]            2015 09:14:00            2015 00:01:00    
        DIS            Outpatient            YONATHAN SORIANO MD            [facility] 
[facility]            LAB            COUMIDIN,THERAPY,MED 
MGMT        

 

 [id number]            10/28/2014 09:51:00            2014 00:01:00    
        DIS            Outpatient            YONATHAN SORIANO MD            [facility] 
[facility]            LAB            COUMIDIN,THERAPY,MED 
MGMT        

 

 [id number]            2014 12:56:00            2014 23:59:59    
        CLS            Outpatient            BLANCA DOUGLAS MD            
[facility]            RAD            RLQ PAIN        

 

 [id number]            2014 11:41:00            2014 23:59:59    
        CLS            Outpatient            BLANCA DOUGLAS MD            
[facility]            RAD            ABD PAIN        

 

 [id number]            2014 09:10:00            2014 00:01:00    
        DIS            Outpatient            YONATHAN SORIANO MD            [facility] 
[facility]            LAB            COUMIDIN,THERAPY,MED 
MGMT        

 

 [id number]            2013 10:39:00            2013 12:00:00    
        DIS            Inpatient            BLANCA DOUGLAS MD            
[facility]            SURGICAL            ABD PAIN        

 

 [id number]            10/21/2013 08:42:00            10/22/2013 00:01:00    
        DIS            Outpatient            YONATHAN SORIANO MD            [facility] 
[facility]            LAB            COUMIDIN,THERAPY,MED 
MGMT        

 

 [id number]            10/21/2013 08:38:00            10/21/2013 23:59:59    
        CLS            Outpatient            MALINA TORRES    
        [facility]            LAB            
HYPERLIPIDEMIA        

 

 [id number]            10/21/2013 08:32:00            10/21/2013 23:59:59    
        CLS            Outpatient            BLANCA DOUGLAS MD            
[facility]            LAB                     

 

 [id number]            2013 10:10:00            2013 00:01:00    
        DIS            Outpatient            YONATHAN SORIANO MD            [facility] 
[facility]            LAB            MED MANGEMENT        

 

 [id number]            2018 11:51:00                         [facility]       
     Emergency            ZAFAR NATARAJAN            [facility]            ER            LOW BP/DIZZY        

 

 [id number]            2017 16:55:00                                   
   Document Registration                                                       
     

 

 [id number]            2016 07:48:00                                   
   Document Registration                                                       
     

 

 [id number]            2016 16:15:00                                   
   Document Registration                                                       
     

 

 [id number]            2015 08:43:00                                   
   Document Registration                                                       
     

 

 [id number]            2015 08:42:00                                   
   Document Registration                                                       
     

 

 [id number]            2015 08:42:00                                   
   Document Registration                                                       
     

 

 [id number]            2015 08:42:00                                   
   Document Registration                                                       
     

 

 [id number]            2015 08:42:00                                   
   Document Registration                                                       
     

 

 [id number]            2015 08:42:00                                   
   Document Registration                                                       
     

 

 [id number]            2013 00:00:00                                   
   Document Registration                                                       
     

 

 [id number]            2012 14:28:00                                   
   Document Registration                                                       
     

 

 [id number]            2012 12:16:00                                   
   Document Registration                                                       
     

 

 [id number]            10/20/2011 21:26:00                                   
   Document Registration                                                       
     

 

 [id number]            2011 13:07:00                                   
   Document Registration                                                       
     

 

 [id number]            2011 14:34:00                                   
   Document Registration                                                       
     

 

 [id number]            2011 07:00:00                                   
   Document Registration                                                       
     

 

 [id number]            2011 08:57:00                                   
   Document Registration                                                       
     

 

 [id number]            2011 06:20:00                                   
   Document Registration                                                       
     

 

 [id number]            2011 09:10:00                                   
   Document Registration                                                       
     

 

 [id number]            2011 20:45:00                                   
   Document Registration                                                       
     

 

 [id number]            02/15/2011 13:30:00                                   
   Document Registration                                                       
     

 

 [id number]            10/20/2010 06:22:00                                   
   Document Registration                                                       
     

 

 [id number]            10/20/2010 06:16:00                                   
   Document Registration                                                       
     

 

 [id number]            10/20/2010 06:12:00                                   
   Document Registration                                                       
     

 

 [id number]            2010 13:51:00                                   
   Document Registration                                                       
     

 

 [id number]            2010 13:32:00                                   
   Document Registration                                                       
     

 

 [id number]            2010 08:12:00                                   
   Document Registration

## 2018-09-13 NOTE — XMS REPORT
CCD document using C-CDA

 Created on: 08/15/2018



Erin Richmond

External Reference #: [id number]

: 1975

Sex: Female



Demographics







 Address  [facility] [address]

[place]  [address]

 

 Home Phone  [phone]

 

 Preferred Language  English

 

 Marital Status  Unknown

 

 Pentecostal Affiliation  Unknown

 

 Race  White

 

 Ethnic Group  Not  or 





Author







 Author  Jordyn Rosenberg MD, [facility]

 

 Address  [address] [place]  [address]



 

 Phone  [phone]







Care Team Providers







 Care Team Member Name  Role  Phone

 

  PP  Unavailable

 

  CCM  Unavailable



                                            



Summary Purpose

          Interface Exchange                                                   
                 



Insurance Providers

                      





 Payer name                    Policy type / Coverage type                    
Covered party ID                    Effective Begin Date                    
Effective End Date                

 

 [employer] Medicare Part B                    Medicare Part B                    
[id number]                    Unknown                    Unknown                

 

 [place] [employer] [facility]                    Medicare Part B                  
  [id number]                    Unknown                    Unknown            
    



                                                                               
         



Family history

                      



Mother            





 Diagnosis                    Age At Onset                

 

 kidney disease                    Unknown                

 

 Depression                    Unknown                



            



Father            





 Diagnosis                    Age At Onset                

 

 Coronary Artery Disease                    Unknown                

 

 Hyperlipidemia                    Unknown                

 

 Bleeding disorders                    Unknown                

 

 Hypertension                    Unknown                



                                                                               
         



Social History

                      





 Social History Element                    Codes                    Description
                    Effective Dates                

 

 Marital status                    Unknown                    Single           
         2015                

 

 Number of children                    Unknown                    0            
        2015                

 

 Tobacco history                    SNOMED CT: 7829679                    
Former smoker                    2015                

 

 Alcohol history                    SNOMED CT: 302442336                    
Never drinks alcohol                    2015                



                                                                               
                                       



Allergies, Adverse Reactions, Alerts

                      





 Substance                    Reaction                    Codes                
    Entered Date                    Inactivated Date                    Status 
               

 

                     * OTHER REACTION - SEE ANSWER BOX                         
             Keppra- extreme agitation, restlessness                    Unknown
                    2016                    No Inactive Date             
       Active                

 

                     Topamax                                                   
        RxNorm: 41705                    2016                    No 
Inactive Date                    Active                

 

                     Compazine                                                 
          RxNorm: 8706                    2015                    No 
Inactive Date                    Active                



                                                                               
                   



Past Medical History

                      





 Illness                    Codes                    Condition Status          
          Onset Date                    Resolved Date                

 

                     Bipolar disorder, current episode depressed, severe, 
without psychotic features                                      ICD-9: 296.53

ICD-10: F31.4                    Active                    2018          
          Unknown                

 

                     Slow transit constipation                                 
     ICD-9: 564.01

ICD-10: K59.01                    Active                    2018         
           Unknown                

 

                     Generalized anxiety disorder                              
        ICD-9: 300.00

ICD-10: F41.1                    Active                    2015          
          Unknown                

 

                     Major depressive disorder, single episode, moderate       
                               ICD-9: 296.22

ICD-10: F32.1                    Active                    2018          
          Unknown                

 

                     Other depressive episodes                                 
     ICD-9: 311

ICD-10: F32.8                    Active                    2015          
          Unknown                

 

                     Essential (primary) hypertension                          
            ICD-9: 401.1

ICD-10: I10                    Active                    2016            
        Unknown                

 

                     Fever, unspecified                                      ICD
-9: 780.60

ICD-10: R50.9                    Active                    2018          
          Unknown                

 

                     Other specified coagulation defects                       
               ICD-9: 289.81

ICD-10: D68.8                    Active                    2018          
          Unknown                

 

                     Pain in left shoulder                                      
ICD-9: 719.41

ICD-10: M25.512                    Active                    2018        
            Unknown                

 

                     Encounter for immunization                                
      ICD-9: V04.81

ICD-10: Z23                    Active                    10/18/2017            
        Unknown                

 

                     Low back pain                                      ICD-9: 
724.2

ICD-10: M54.5                    Active                    2015          
          Unknown                

 

                     Other generalized epilepsy and epileptic syndromes, not 
intractable, without status epilepticus                                      ICD
-9: 345.90

ICD-10: G40.409                    Active                    2015        
            Unknown                

 

                     Encounter for screening mammogram for malignant neoplasm 
of breast                                      ICD-9: V76.12

ICD-10: Z12.31                    Active                    2017         
           Unknown                

 

                     Pain in thoracic spine                                    
  ICD-9: 724.1

ICD-10: M54.6                    Active                    2015          
          Unknown                

 

                     Essential (primary) hypertension                          
            ICD-9: 401.9

ICD-10: I10                    Active                    2015            
        Unknown                

 

                     Restlessness and agitation                                
      ICD-9: 307.9

ICD-10: R45.1                    Active                    2016          
          Unknown                

 

                     Localized swelling, mass and lump, trunk                  
                    ICD-9: 786.6

ICD-10: R22.2                    Active                    2016          
          Unknown                

 

                     Cough                                      ICD-9: 786.2

ICD-10: R05                    Active                    10/25/2016            
        Unknown                

 

                     Pleurisy                                      ICD-9: 511.0

ICD-10: R09.1                    Active                    10/25/2016          
          Unknown                

 

                     Cervicalgia                                      ICD-9: 
723.1

ICD-10: M54.2                    Active                    2016          
          Unknown                

 

                     Radiculopathy, cervicothoracic region                     
                 ICD-9: 723.4

ICD-10: M54.13                    Active                    2016         
           Unknown                

 

                     Rash and other nonspecific skin eruption                  
                    ICD-9: 782.1

ICD-10: R21                    Active                    2016            
        Unknown                

 

                     Generalized abdominal pain                                
      ICD-9: 789.07

ICD-10: R10.84                    Active                    2016         
           Unknown                

 

                     Gross hematuria                                      ICD-9
: 599.71

ICD-10: R31.0                    Active                    2016          
          Unknown                

 

                     Long term (current) use of antithrombotics/antiplatelets  
                                    ICD-9: V58.63

ICD-10: Z79.02                    Active                    2016         
           Unknown                

 

                     Urinary tract infection, site not specified               
                       ICD-9: 599.0

ICD-10: N39.0                    Active                    2016          
          Unknown                

 

                     Lumbar spine pain                                      ICD-
9: 724.2                    Active                    2015               
     Unknown                

 

                     Plantar fasciitis of right foot                           
           ICD-9: 728.71                    Active                    2015                    Unknown                

 

                     Right knee pain                                      ICD-9
: 719.46                    Active                    2015               
     Unknown                

 

                     Depression                                      Unknown   
                 Active                    2015                    
Unknown                

 

                     Factor V                                      Unknown     
               Active                    2015                    Unknown 
               

 

                     Hypertension                                      Unknown 
                   Active                    2015                    
Unknown                

 

                     Seizures                                      Unknown     
               Active                    2015                    Unknown 
               

 

                     Anxiety                                      ICD-9: 300.00
                    Active                    2015                    
Unknown                

 

                     Depression                                      ICD-9: 311
                    Active                    2015                    
Unknown                

 

                     ESSENTIAL HYPERTENSION                                    
  ICD-9: 401.9                    Active                    2015         
           Unknown                

 

                     Factor 5 Leiden mutation, heterozygous                    
                  ICD-9: 289.81                    Active                    2015                    Unknown                

 

                     Seizure disorder                                      ICD-9
: 345.90                    Active                    2015               
     Unknown                



                                                                               
                                                                               
                                                                               
                                                                               
                                                                               
                                                               



Problems

                      





 Condition                    Codes                    Effective Dates         
           Condition Status                

 

                     Bipolar disorder, current episode depressed, severe, 
without psychotic features                                      ICD-9: 296.53

ICD-10: F31.4                    2018                    Active          
      

 

                     Slow transit constipation                                 
     ICD-9: 564.01

ICD-10: K59.01                    2018                    Active         
       

 

                     Generalized anxiety disorder                              
        ICD-9: 300.00

ICD-10: F41.1                    2015                    Active          
      

 

                     Major depressive disorder, single episode, moderate       
                               ICD-9: 296.22

ICD-10: F32.1                    2018                    Active          
      

 

                     Other depressive episodes                                 
     ICD-9: 311

ICD-10: F32.8                    2015                    Active          
      

 

                     Essential (primary) hypertension                          
            ICD-9: 401.1

ICD-10: I10                    2016                    Active            
    

 

                     Fever, unspecified                                      ICD
-9: 780.60

ICD-10: R50.9                    2018                    Active          
      

 

                     Other specified coagulation defects                       
               ICD-9: 289.81

ICD-10: D68.8                    2018                    Active          
      

 

                     Pain in left shoulder                                      
ICD-9: 719.41

ICD-10: M25.512                    2018                    Active        
        

 

                     Encounter for immunization                                
      ICD-9: V04.81

ICD-10: Z23                    10/18/2017                    Active            
    

 

                     Low back pain                                      ICD-9: 
724.2

ICD-10: M54.5                    2015                    Active          
      

 

                     Other generalized epilepsy and epileptic syndromes, not 
intractable, without status epilepticus                                      ICD
-9: 345.90

ICD-10: G40.409                    2015                    Active        
        

 

                     Encounter for screening mammogram for malignant neoplasm 
of breast                                      ICD-9: V76.12

ICD-10: Z12.31                    2017                    Active         
       

 

                     Pain in thoracic spine                                    
  ICD-9: 724.1

ICD-10: M54.6                    2015                    Active          
      

 

                     Essential (primary) hypertension                          
            ICD-9: 401.9

ICD-10: I10                    2015                    Active            
    

 

                     Restlessness and agitation                                
      ICD-9: 307.9

ICD-10: R45.1                    2016                    Active          
      

 

                     Localized swelling, mass and lump, trunk                  
                    ICD-9: 786.6

ICD-10: R22.2                    2016                    Active          
      

 

                     Cough                                      ICD-9: 786.2

ICD-10: R05                    10/25/2016                    Active            
    

 

                     Pleurisy                                      ICD-9: 511.0

ICD-10: R09.1                    10/25/2016                    Active          
      

 

                     Cervicalgia                                      ICD-9: 
723.1

ICD-10: M54.2                    2016                    Active          
      

 

                     Radiculopathy, cervicothoracic region                     
                 ICD-9: 723.4

ICD-10: M54.13                    2016                    Active         
       

 

                     Rash and other nonspecific skin eruption                  
                    ICD-9: 782.1

ICD-10: R21                    2016                    Active            
    

 

                     Generalized abdominal pain                                
      ICD-9: 789.07

ICD-10: R10.84                    2016                    Active         
       

 

                     Gross hematuria                                      ICD-9
: 599.71

ICD-10: R31.0                    2016                    Active          
      

 

                     Long term (current) use of antithrombotics/antiplatelets  
                                    ICD-9: V58.63

ICD-10: Z79.02                    2016                    Active         
       

 

                     Urinary tract infection, site not specified               
                       ICD-9: 599.0

ICD-10: N39.0                    2016                    Active          
      

 

                     Lumbar spine pain                                      ICD-
9: 724.2                    2015                    Active                

 

                     Plantar fasciitis of right foot                           
           ICD-9: 728.71                    2015                    
Active                

 

                     Right knee pain                                      ICD-9
: 719.46                    2015                    Active                

 

                     Depression                                      Unknown   
                 2015                    Active                

 

                     Factor V                                      Unknown     
               2015                    Active                

 

                     Hypertension                                      Unknown 
                   2015                    Active                

 

                     Seizures                                      Unknown     
               2015                    Active                

 

                     Anxiety                                      ICD-9: 300.00
                    2015                    Active                

 

                     Depression                                      ICD-9: 311
                    2015                    Active                

 

                     ESSENTIAL HYPERTENSION                                    
  ICD-9: 401.9                    2015                    Active         
       

 

                     Factor 5 Leiden mutation, heterozygous                    
                  ICD-9: 289.81                    2015                  
  Active                

 

                     Seizure disorder                                      ICD-9
: 345.90                    2015                    Active                



                                                                               
                                                                               
                                                                               
                                                                               
                                                                               
                                                               



Medications

                      





 Medication                    Codes                    Instructions           
         Start Date                    Stop Date                    Status     
               Fill Instructions                

 

                     Zofran ODT 4 mg disintegrating tablet                     
                 RxNorm: 010397                    1 Tablet(s) PO TID as needed
                    2018                    
Active                                    

 

                     alprazolam 2 mg tablet                                    
  RxNorm: 279798                    1-2 Tablet(s) PO TID as needed             
       2018                    Active      
                              

 

                     Lexapro 10 mg tablet                                      
RxNorm: 187170                    1 Tablet(s) PO daily                    2019                    Active                   
                 

 

                     fluvoxamine 50 mg tablet                                  
    RxNorm: 852856                    1 Tablet(s) PO BID                    2019                    Active                
                    

 

                     Lexapro 20 mg tablet                                      
RxNorm: 103478                    1 Tablet(s) PO daily                    2018                    Inactive                 
                   

 

                     buspirone 10 mg tablet                                    
  RxNorm: 550299                    1 Tablet(s) PO daily                    2018                    Active                
                    

 

                     BuSpar 10 mg tablet                                      
RxNorm: 344795                    1 Tablet(s) PO daily                    07/16/
2018                    07/15/2018                    Inactive                 
                   

 

                     Lexapro 20 mg tablet                                      
RxNorm: 110390                    1 Tablet(s) PO daily                    2018                    Inactive                 
                   

 

                     Lexapro 20 mg tablet                                      
RxNorm: 089306                    1.5 Tablet(s) PO daily                    07/
                    07/15/2018                    Inactive              
                      

 

                     fluvoxamine 50 mg tablet                                  
    RxNorm: 896720                    1 Tablet(s) PO BID                    07/
2018                    07/15/2018                    Inactive              
                      

 

                     Soma 350 mg tablet                                      
RxNorm: 497106                    1 Tablet(s) PO Q6 as needed                  
  2018                    No Stop Date                    Active         
                           

 

                     alprazolam 2 mg tablet                                    
  RxNorm: 200231                    1-2 Tablet(s) PO TID as needed             
       2018                    Inactive    
                                

 

                     Lexapro 20 mg tablet                                      
RxNorm: 834094                    1.5 Tablet(s) PO daily                    2018                    Inactive              
                      

 

                     nicotine 14 mg/24 hr daily transdermal patch              
                        RxNorm: 658209                    1 TD daily           
         2018                    Inactive  
                                  

 

                     nicotine 14 mg/24 hr daily transdermal patch              
                        RxNorm: 308281                    1 TD daily           
         2018                    Inactive  
                                  

 

                     Percocet 5 mg-325 mg tablet                               
       RxNorm: 9304888                    1-2 Tablet(s) PO Q6 as needed for 
back pain                    2018          
          Inactive                                    

 

                     Ativan 1 mg tablet                                      
RxNorm: 945836                    1-2 Tablet(s) PO TID as needed               
     2018                    Inactive      
                              

 

                     cefdinir 300 mg capsule                                   
   RxNorm: 041093                    1 Capsule(s) PO BID                    2018                    Inactive              
                      

 

                     cefdinir 300 mg capsule                                   
   RxNorm: 011003                    1 Capsule(s) PO BID                    2018                    Inactive              
                      

 

                     Percocet 5 mg-325 mg tablet                               
       RxNorm: 7786920                    1-2 Tablet(s) PO Q6 as needed for 
back pain                    2018          
          Inactive                                    

 

                     Percocet 5 mg-325 mg tablet                               
       RxNorm: 4701611                    1-2 Tablet(s) PO Q6 as needed for 
back pain                    2018          
          Inactive                                    

 

                     Percocet 5 mg-325 mg tablet                               
       RxNorm: 9805651                    1-2 Tablet(s) PO Q6 as needed for 
back pain                    2018          
          Inactive                                    

 

                     Ativan 1 mg tablet                                      
RxNorm: 400947                    1-2 Tablet(s) PO TID as needed               
     2018                    Inactive      
                              

 

                     Percocet 5 mg-325 mg tablet                               
       RxNorm: 5354082                    1-2 Tablet(s) PO Q6 as needed for 
back pain                    2018          
          Inactive                                    

 

                     Lexapro 20 mg tablet                                      
RxNorm: 614784                    TAKE ONE TABLET BY MOUTH DAILY               
     2018                    Inactive      
                              

 

                     Ativan 1 mg tablet                                      
RxNorm: 899972                    1-2 Tablet(s) PO TID as needed               
     2018                    Inactive      
                              

 

                     Percocet 5 mg-325 mg tablet                               
       RxNorm: 4498531                    1-2 Tablet(s) PO Q6 as needed for 
back pain                    2017          
          Inactive                                    

 

                     Percocet 5 mg-325 mg tablet                               
       RxNorm: 8554052                    1-2 Tablet(s) PO Q6 as needed for 
back pain                    11/15/2017                    2017          
          Inactive                                    

 

                     Percocet 5 mg-325 mg tablet                               
       RxNorm: 9301588                    1-2 Tablet(s) PO Q6 as needed for 
back pain                    10/03/2017                    10/13/2017          
          Inactive                                    

 

                     Lexapro 20 mg tablet                                      
RxNorm: 542582                    TAKE ONE TABLET BY MOUTH DAILY               
     2017                    Inactive      
                              

 

                     Percocet 5 mg-325 mg tablet                               
       RxNorm: 3142477                    1-2 Tablet(s) PO Q6 as needed for 
back pain                    2017          
          Inactive                                    

 

                     Percocet 5 mg-325 mg tablet                               
       RxNorm: 8169527                    1-2 Tablet(s) PO Q6 as needed for 
back pain                    2017          
          Inactive                                    

 

                     famotidine 20 mg tablet                                   
   RxNorm: 720108                    1 Tablet(s) PO daily                                        No Stop Date                    Active              
                      

 

                     Percocet 5 mg-325 mg tablet                               
       RxNorm: 2076467                    1 Tablet(s) PO Q6 as needed for back 
pain                    2017               
     Inactive                                    

 

                     Percocet 5 mg-325 mg tablet                               
       RxNorm: 9129052                    1 Tablet(s) PO Q6 as needed for back 
pain                    2017               
     Inactive                                    

 

                     Ativan 1 mg tablet                                      
RxNorm: 106125                    1-2 Tablet(s) PO TID as needed               
     2017                    Inactive      
                              

 

                     Lexapro 20 mg tablet                                      
RxNorm: 471864                    TAKE ONE TABLET BY MOUTH DAILY               
     2017                    Inactive      
                              

 

                     Percocet 5 mg-325 mg tablet                               
       RxNorm: 1816977                    1 Tablet(s) PO Q6 as needed for back 
pain                    2017               
     Inactive                                    

 

                     Percocet 5 mg-325 mg tablet                               
       RxNorm: 6491835                    1 Tablet(s) PO Q6 as needed for pain 
                   2017                    
Inactive                                    

 

                     Ativan 1 mg tablet                                      
RxNorm: 178505                    1-2 Tablet(s) PO TID                    2017                    Inactive                 
                   

 

                     Lamictal 25 mg tablet                                      
RxNorm: 276065                    1 Tablet(s) PO BID                    2017                    Inactive                 
                   

 

                     Flonase Allergy Relief 50 mcg/actuation nasal spray,
suspension                                      RxNorm: 2206816                
    1 Spray NASAL BID                    2017                    Inactive                                    

 

                     Lamictal XR 50 mg tablet,extended release                 
                     RxNorm: 686209                    TAKE ONE TABLET BY MOUTH 
TWICE A DAY                    2016        
            Inactive                                    

 

                     Lamictal XR 25 mg tablet,extended release                 
                     RxNorm: 312089                    1 Tablet(s) PO BID      
              2016                    
Inactive                                    

 

                     Trokendi XR 50 mg capsule, extended release               
                       RxNorm: 1427911                    1 Capsule(s) PO daily
                    2016                    
Inactive                                    

 

                     Lamictal XR 50 mg tablet,extended release                 
                     RxNorm: 549579                    1 Tablet(s) PO daily    
                2016                    
Inactive                                    

 

                     Trokendi XR 50 mg capsule, extended release               
                       RxNorm: 5217268                    1 Capsule(s) PO daily
                    2016                    
Inactive                                    

 

                     Lamictal XR 50 mg tablet,extended release                 
                     RxNorm: 399116                    1 Tablet(s) PO daily    
                2016                    
Inactive                                    

 

                     Ativan 1 mg tablet                                      
RxNorm: 737789                    1-2 Tablet(s) PO TID                    2018                    Inactive                 
                   

 

                     Lexapro 20 mg tablet                                      
RxNorm: 987708                    Tablet(s) TAKE ONE TABLET BY MOUTH DAILY     
               2016                    
Inactive                                    

 

                     Diflucan 150 mg tablet                                    
  RxNorm: 112409                    1 Tablet(s) PO daily                    2016                    Inactive              
                      

 

                     Diflucan 150 mg tablet                                    
  RxNorm: 099648                    1 Tablet(s) PO daily                    2016                    Inactive              
                      

 

                     Zofran 4 mg tablet                                      
RxNorm: 357715                    1 Tablet(s) PO TID as needed nausea          
          2016                    Inactive 
                                   

 

                     Percocet 5 mg-325 mg tablet                               
       RxNorm: 7747565                    1 Tablet(s) PO Q6 as needed for pain 
                   10/26/2016                    2017                    
Inactive                                    

 

                     clonidine HCl 0.1 mg tablet                               
       RxNorm: 873900                    1 Tablet(s) PO BID                    
2016                    Inactive           
                         

 

                     Percocet 5 mg-325 mg tablet                               
       RxNorm: 8763267                    1-2 Tablet(s) PO Q6 as needed for 
pain                    2016               
     Inactive                                    

 

                     clonidine HCl 0.1 mg tablet                               
       RxNorm: 667604                    1 Tablet(s) PO BID                    
2016                    Inactive           
                         

 

                     Lexapro 20 mg tablet                                      
RxNorm: 758000                    TAKE ONE TABLET BY MOUTH DAILY               
     2016                    11/10/2016                    Inactive      
                              

 

                     Kenalog 40 mg/mL suspension for injection                 
                     RxNorm: 2276645                    Milliliter(s) Inj      
              2016                    
Inactive                                    

 

                     Dilantin Kapseal 100 mg capsule                           
           RxNorm: 491601                    TAKE THREE CAPSULES BY MOUTH EVERY 
NIGHT AT BEDTIME                    2016   
                 Inactive                                    

 

                     Keflex 500 mg capsule                                      
RxNorm: 360108                    1 Capsule(s) PO TID                    2016                    Inactive                 
                   

 

                     Lexapro 20 mg tablet                                      
RxNorm: 053262                    TAKE ONE TABLET BY MOUTH DAILY               
     2015                    06/10/2016                    Inactive      
                              

 

                     metoprolol tartrate 25 mg tablet                          
            RxNorm: 804531                    1 Tablet(s) PO BID               
     2015                    Inactive      
                              

 

                     warfarin 5 mg tablet                                      
RxNorm: 625925                    m and w; all other days takes 7.5 Tablet(s) 
PO daily                    2015           
         Inactive                                    

 

                     tramadol 50 mg tablet                                      
RxNorm: 585707                    1-2 Tablet(s) PO Q6 as needed                
    2015                    Inactive       
                             

 

                     Soma 350 mg tablet                                      
RxNorm: 615350                    1 Tablet(s) PO Q6 as needed                  
  2015                    Inactive         
                           

 

                     Xanax 1 mg tablet                                      
RxNorm: 460260                    Tablet(s) PO daily as needed                 
   2015                    Inactive        
                            

 

                     Lexapro 20 mg tablet                                      
RxNorm: 487375                    1 Tablet(s) PO daily                    2015                    Inactive                 
                   

 

                     Dilantin Kapseal 100 mg capsule                           
           RxNorm: 980757                    3 Capsule(s) PO QHS               
     2015                    Inactive      
                              

 

                     Eliquis 5 mg tablet                                      
RxNorm: 8516575                    1 Tablet(s) PO BID                    No 
Start Date                                         Active                      
              

 

                     lisinopril 10 mg tablet                                   
   RxNorm: 049857                    1 Tablet(s) PO daily                    No 
Start Date                                         Active                      
              

 

                     folic acid 1 mg tablet                                    
  RxNorm: 407534                    1 Tablet(s) PO BID                    No 
Start Date                                         Active                      
              

 

                     Norvasc 5 mg tablet                                      
RxNorm: 751709                    1 Tablet(s) PO QAM                    No 
Start Date                                         Active                      
              

 

                     Lamictal  mg tablet,extended release                
                      RxNorm: 896880                    1 Tablet(s) PO QHS -
Started by Dr. Ugarte                    No Start Date                       
                  Active                                    

 

                     warfarin 5 mg tablet                                      
RxNorm: 871949                    m-f all other days takes 7.2 Tablet(s) PO    
                No Start Date                    2015                    
Inactive                                    

 

                     lisinopril 20 mg tablet                                   
   RxNorm: 984138                    1 Tablet(s) PO daily                    No 
Start Date                    10/17/2017                    Inactive           
                         

 

                     tramadol 50 mg tablet                                      
RxNorm: 320039                    1-2 Tablet(s) PO Q6 as needed                
    No Start Date                    11/15/2015                    Inactive    
                                

 

                     Soma 350 mg tablet                                      
RxNorm: 854573                    1 Tablet(s) PO as needed                    
No Start Date                    11/10/2015                    Inactive        
                            

 

                     famotidine 20 mg tablet                                   
   RxNorm: 087638                    1 Tablet(s) PO BID                    No 
Start Date                    2017                    Inactive           
                         

 

                     aspirin, buffered 81 mg tablet                            
          RxNorm: 608782                    1 Tablet(s) PO daily               
     No Start Date                    10/17/2017                    Inactive   
                                 

 

                     Xanax 1 mg tablet                                      
RxNorm: 577724                    Tablet(s) PO as needed                    No 
Start Date                    2015                    Inactive           
                         

 

                     metoprolol tartrate 25 mg tablet                          
            RxNorm: 129282                    1/2 in am a 1 in pm Tablet(s) PO 
BID                    No Start Date                    2015             
       Inactive                                    

 

                     Dilantin Kapseal 100 mg capsule                           
           RxNorm: 102009                    3 Capsule(s) PO daily             
       No Start Date                    2015                    Inactive 
                                   

 

                     lovastatin 20 mg tablet                                   
   RxNorm: 367835                    1 Tablet(s) PO daily                    No 
Start Date                    10/17/2017                    Inactive           
                         

 

                     Lexapro 20 mg tablet                                      
RxNorm: 589762                    1 Tablet(s) PO daily                    No 
Start Date                    2015                    Inactive           
                         

 

                     Percocet 5 mg-325 mg tablet                               
       RxNorm: 2730544                    1 Tablet(s) PO Q6 as needed for back 
pain                    No Start Date                    2017            
        Inactive                                    



                                                                               
                                                                               
                                                                               
                                                                               
                                                                               
                                                                               
                                                                               
                                                                               
                                                                               
                                                                               
                                                                               
                                                           



Medication Administered

                      





 Medication                    Codes                    Instructions           
         Start Date                    Status                

 

 Kenalog 40 mg/mL suspension for injection                    RxNorm: 3184575  
                  Milliliter                    2016                    
No longer Active                



                                                                              



Immunizations

                      





 Vaccine                    Codes                    Date                    
Status                

 

 Influenza                    CVX: 141                    10/18/2017           
         completed                

 

 Influenza                    CVX: 141                    2015           
         completed                

 

 Pneumococcal                    CVX: 33                    2015         
           completed                



                                                                               
                   



Assessments

                      





 Condition                    Codes                    Effective Dates         
       

 

 Bipolar disorder, current episode depressed, severe, without psychotic 
features                    ICD-10: F31.4

ICD-9: 296.53                    2018                

 

 Slow transit constipation                    ICD-10: K59.01

ICD-9: 564.01                    2018                

 

 Major depressive disorder, single episode, moderate                    ICD-10: 
F32.1

ICD-9: 296.22                    2018                

 

 Generalized anxiety disorder                    ICD-10: F41.1

ICD-9: 300.00                    2018                

 

 Essential (primary) hypertension                    ICD-10: I10

ICD-9: 401.1                    2018                

 

 Fever, unspecified                    ICD-10: R50.9

ICD-9: 780.60                    2018                

 

 Pain in left shoulder                    ICD-10: M25.512

ICD-9: 719.41                    2018                

 

 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

ICD-9: 289.81                    2018                

 

 Other depressive episodes                    ICD-10: F32.8

ICD-9: 311                    10/18/2017                

 

 Other generalized epilepsy and epileptic syndromes, not intractable, without 
status epilepticus                    ICD-10: G40.409

ICD-9: 345.90                    10/18/2017                

 

 Low back pain                    ICD-10: M54.5

ICD-9: 724.2                    10/18/2017                

 

 Encounter for immunization                    ICD-10: Z23

ICD-9: V04.81                    10/18/2017                

 

 Pain in thoracic spine                    ICD-10: M54.6

ICD-9: 724.1                    2017                

 

 Encounter for screening mammogram for malignant neoplasm of breast            
        ICD-10: Z12.31

ICD-9: V76.12                    2017                

 

 Essential (primary) hypertension                    ICD-10: I10

ICD-9: 401.9                    2016                

 

 Restlessness and agitation                    ICD-10: R45.1

ICD-9: 307.9                    2016                

 

 Localized swelling, mass and lump, trunk                    ICD-10: R22.2

ICD-9: 786.6                    2016                

 

 Pleurisy                    ICD-10: R09.1

ICD-9: 511.0                    10/26/2016                

 

 Cough                    ICD-10: R05

ICD-9: 786.2                    10/26/2016                

 

 Radiculopathy, cervicothoracic region                    ICD-10: M54.13

ICD-9: 723.4                    2016                

 

 Cervicalgia                    ICD-10: M54.2

ICD-9: 723.1                    2016                

 

 Rash and other nonspecific skin eruption                    ICD-10: R21

ICD-9: 782.1                    2016                

 

 Long term (current) use of antithrombotics/antiplatelets                    ICD
-10: Z79.02

ICD-9: V58.63                    2016                

 

 Gross hematuria                    ICD-10: R31.0

ICD-9: 599.71                    2016                

 

 Generalized abdominal pain                    ICD-10: R10.84

ICD-9: 789.07                    2016                

 

 Urinary tract infection, site not specified                    ICD-10: N39.0

ICD-9: 599.0                    2016                

 

 Plantar fasciitis of right foot                    ICD-9: 728.71              
      2015                

 

 Right knee pain                    ICD-9: 719.46                    2015
                

 

 Lumbar spine pain                    ICD-9: 724.2                    2015                

 

 Seizure disorder                    ICD-9: 345.90                    2015                

 

 Anxiety                    ICD-9: 300.00                    2015        
        

 

 Factor 5 Leiden mutation, heterozygous                    ICD-9: 289.81       
             2015                

 

 ESSENTIAL HYPERTENSION                    ICD-9: 401.9                                    

 

 Depression                    ICD-9: 311                    2015        
        



                                                                    



Reason For Visit

                      





 Reason For Visit                    Effective Dates                    Notes  
              

 

 depression                    2018                                    

 

 depression                    2018                                    

 

 depression                    2018                                    

 

 back pain                    2018                    thoracic pain that 
wraps around to rib pain.                

 

 back pain                    2018                    thoracic pain that 
wraps around to rib pain.                

 

 back pain                    10/18/2017                    thoracic pain that 
wraps around to rib pain.                

 

 back pain                    2017                    thoracic pain that 
wraps around to rib pain.                 

 

 medication follow up                    2017                            
        

 

 back pain                    2016                                    

 

 anxiety                    2016                                    

 

 Hospital Follow Up                    2016                              
      

 

 chest pain/pressure                    10/26/2016                             
       

 

 hypertension                    2016                                    

 

 Hospital Follow Up                    2016                              
      

 

 back pain                    2016                                    

 

 pelvic pain                    2016                                    

 

 pelvic pain                    2016                                    

 

 back pain                    2015                                    

 

 back pain                    2015                                    

 

 foot pain                    2015                                    

 

 depression                    2015                    she uses xanax 
prn.                 



                                                                              



Results

                      





 Observation                    Observation Code                    Item       
             Item Code                    Result                    Date       
         

 

 Quick Strep                    [id number]                    Quick Strep         
                               Negative                     2018         
       

 

 Urine Culture                    Ucult                    Complete            
                            No Growth Day 2                     2016     
           

 

 Urine Culture                    Ucult                    Preliminary         
                               No Growth Day 1                     2016  
              

 

 Dilantin                    Ord7                    DILANTIN                  
                      6.9 UG/ML                    2016                

 

 Pt                    [id number]                    PT                           
             26.0 seconds                    2016                

 

 Pt                    [id number]                    INR                          
              2.5                     2016                

 

 Pt                    [id number]                    Low Intensity                
                        - 1.5-2.0                    2016                

 

 Pt                    [id number]                    Mod intensity                
                        - 2.0-3.0                    2016                

 

 Pt                    [id number]                    Hi intensity                 
                       - 3.0-4.0                    2016                

 

 Cbc With Differential                    Ord2                    WBC          
                              8.06 K/ul                    2016          
      

 

 Cbc With Differential                    Ord2                    RBC          
                              4.69 M/ul                    2016          
      

 

 Cbc With Differential                    Ord2                    HGB          
                              15.6 g/dl                    2016          
      

 

 Cbc With Differential                    Ord2                    Neut%        
                                58.7 %                    2016           
     

 

 Cbc With Differential                    Ord2                    HCT          
                              44.2 %                    2016             
   

 

 Cbc With Differential                    Ord2                    Lymph%       
                                 30.0 %                    2016          
      

 

 Cbc With Differential                    Ord2                    MCV          
                              94.2 fl                    2016            
    

 

 Cbc With Differential                    Ord2                    Mono%        
                                7.8 %                    2016            
    

 

 Cbc With Differential                    Ord2                    MCH          
                              33.3 pg                    2016            
    

 

 Cbc With Differential                    Ord2                    MCHC         
                               35.3 pg                    2016           
     

 

 Cbc With Differential                    Ord2                    Eos%         
                               3.3 %                    2016             
   

 

 Cbc With Differential                    Ord2                    Baso%        
                                0.2 %                    2016            
    

 

 Cbc With Differential                    Ord2                    PLT          
                              292 K/ul                    2016           
     

 

 Cbc With Differential                    Ord2                    RDW          
                              12.8 %                    2016             
   

 

 Cbc With Differential                    Ord2                    Neut ABS#    
                                    4.72 K/ul                    2016    
            

 

 Cbc With Differential                    Ord2                    Lymph ABS#   
                                     2.42 K/ul                    2016   
             

 

 Cbc With Differential                    Ord2                    Mono ABS#    
                                    0.6 K/ul                    2016     
           

 

 Cbc With Differential                    Ord2                    Eos ABS#     
                                   0.3 K/ul                    2016      
          

 

 Cbc With Differential                    Ord2                    Baso ABS#    
                                    0.0 K/ul                    2016     
           

 

 Cbc With Differential                    Ord2                    New Analyzer 
Notice                                        Please note new ref ranges 
starting 2016 due to implemntation of new five part differential hematolgy 
analyzer.                     2016                

 

 Comp Metabolic                    Xht103                    NA                
                        136 mEq/L                    2016                

 

 Comp Metabolic                    Oje198                    K                 
                       4.1 mEq/L                    2016                

 

 Comp Metabolic                    Rlf195                    CL                
                        103 mEq/L                    2016                

 

 Comp Metabolic                    Wzw154                    CO2               
                         23.0 mEq/L                    2016              
  

 

 Comp Metabolic                    Wpl421                    ANION GAP         
                               14                     2016                

 

 Comp Metabolic                    Gxd961                    GLUCOSE           
                             88 mg/dL                    2016            
    

 

 Comp Metabolic                    Few211                    Creat             
                           0.6 mg/dL                    2016             
   

 

 Comp Metabolic                    Juv308                    eGFR              
                          114 ml/min/1.73m2                    2016      
          

 

 Comp Metabolic                    Bpw642                    BUN               
                         10 mg/dL                    2016                

 

 Comp Metabolic                    Pap597                    B/C Ratio         
                               16.1 Ratio                    2016        
        

 

 Comp Metabolic                    Zjr755                    CALCIUM           
                             8.7 mg/dL                    2016           
     

 

 Comp Metabolic                    Djc957                    ALK PHOS          
                              98 U/L                    2016             
   

 

 Comp Metabolic                    Jhz853                    AST(SGOT)         
                               20 U/L                    2016            
    

 

 Comp Metabolic                    Lpe139                    ALT(SGPT)         
                               21 U/L                    2016            
    

 

 Comp Metabolic                    Dyk136                    BILI T            
                            0.5 mg/dL                    2016            
    

 

 Comp Metabolic                    Viv257                    ALBUMIN           
                             4.1 g/dL                    2016            
    

 

 Comp Metabolic                    Ztl606                    TPRO              
                          7.0 g/dL                    2016                

 

 Comp Metabolic                    Zin750                    GLOB              
                          2.9 g/dL                    2016                

 

 Comp Metabolic                    Nzf211                    A/G Ratio         
                               1.4 Ratio                    2016         
       

 

 Comp Metabolic                    Cbf511                    Osmo              
                          270 mOsmo                    2016              
  



                                                                               
                                                 



Review of Systems

                      





 System                    Result                    Effective Dates           
     

 

 Constitutional                    recent illness                    2018
                

 

 Constitutional                    No anorexia                    2018   
             

 

 Constitutional                    No night sweats                    2018                

 

 Constitutional                    No chills                    2018     
           

 

 Constitutional                    No diaphoresis                    2018
                

 

 Constitutional                    fatigue                    2018       
         

 

 Constitutional                    No fever                    2018      
          

 

 Constitutional                    insomnia                    2018      
          

 

 Constitutional                    malaise                    2018       
         

 

 Ears/Nose/Throat/Neck                    No dizziness                    2018                

 

 Ears/Nose/Throat/Neck                    No headache                    2018                

 

 Cardiovascular                    No chest pain/pressure                                    

 

 Respiratory                    No cough                    2018         
       

 

 Gastrointestinal                    abdominal pain                    2018                

 

 Gastrointestinal                    constipation                    2018
                

 

 Gastrointestinal                    No diarrhea                    2018 
               

 

 Genitourinary/Nephrology                    No dysuria                                    

 

 Musculoskeletal                    back pain                    2018    
            

 

 Neurologic                    No alteration of consciousness                  
  2018                

 

 Psychiatric                    anxiety                    2018          
      

 

 Psychiatric                    depression                    2018       
         

 

 Psychiatric                    disturbances of emotion                                    

 

 Constitutional                    No recent illness                    2018                

 

 Constitutional                    No anorexia                    2018   
             

 

 Constitutional                    No night sweats                    2018                

 

 Constitutional                    No chills                    2018     
           

 

 Constitutional                    No diaphoresis                    2018
                

 

 Constitutional                    fatigue                    2018       
         

 

 Constitutional                    No fever                    2018      
          

 

 Constitutional                    insomnia                    2018      
          

 

 Constitutional                    malaise                    2018       
         

 

 Ears/Nose/Throat/Neck                    No dizziness                    2018                

 

 Ears/Nose/Throat/Neck                    No headache                    2018                

 

 Cardiovascular                    No chest pain/pressure                    2018                

 

 Respiratory                    No cough                    2018         
       

 

 Gastrointestinal                    No abdominal pain                    2018                

 

 Gastrointestinal                    No constipation                    2018                

 

 Gastrointestinal                    No diarrhea                    2018 
               

 

 Genitourinary/Nephrology                    No dysuria                                    

 

 Musculoskeletal                    back pain                    2018    
            

 

 Neurologic                    No alteration of consciousness                  
  2018                

 

 Psychiatric                    anxiety                    2018          
      

 

 Psychiatric                    depression                    2018       
         

 

 Psychiatric                    disturbances of emotion                                    

 

 Constitutional                    No recent illness                    2018                

 

 Constitutional                    No anorexia                    2018   
             

 

 Constitutional                    No night sweats                    2018                

 

 Constitutional                    No chills                    2018     
           

 

 Constitutional                    No diaphoresis                    2018
                

 

 Constitutional                    No fatigue                    2018    
            

 

 Constitutional                    No fever                    2018      
          

 

 Constitutional                    No insomnia                    2018   
             

 

 Constitutional                    No malaise                    2018    
            

 

 Ears/Nose/Throat/Neck                    No dizziness                    2018                

 

 Ears/Nose/Throat/Neck                    No headache                    2018                

 

 Cardiovascular                    No chest pain/pressure                    2018                

 

 Respiratory                    No cough                    2018         
       

 

 Gastrointestinal                    No abdominal pain                    2018                

 

 Gastrointestinal                    No constipation                    2018                

 

 Gastrointestinal                    No diarrhea                    2018 
               

 

 Genitourinary/Nephrology                    No dysuria                                    

 

 Musculoskeletal                    back pain                    2018    
            

 

 Neurologic                    No alteration of consciousness                  
  2018                

 

 Psychiatric                    depression                    2018       
         

 

 Psychiatric                    anxiety                    2018          
      

 

 Constitutional                    No recent illness                    2018                

 

 Constitutional                    No anorexia                    2018   
             

 

 Constitutional                    No night sweats                    2018                

 

 Constitutional                    No chills                    2018     
           

 

 Constitutional                    No diaphoresis                    2018
                

 

 Constitutional                    No fatigue                    2018    
            

 

 Constitutional                    No fever                    2018      
          

 

 Constitutional                    No insomnia                    2018   
             

 

 Constitutional                    No malaise                    2018    
            

 

 Eyes                    No eye discharge                    2018        
        

 

 Eyes                    No eye erythema                    2018         
       

 

 Ears/Nose/Throat/Neck                    No dizziness                    2018                

 

 Ears/Nose/Throat/Neck                    No headache                    2018                

 

 Cardiovascular                    No chest pain/pressure                                    

 

 Respiratory                    No cough                    2018         
       

 

 Gastrointestinal                    No abdominal pain                    2018                

 

 Gastrointestinal                    No constipation                    2018                

 

 Gastrointestinal                    No diarrhea                    2018 
               

 

 Genitourinary/Nephrology                    No dysuria                                    

 

 Musculoskeletal                    back pain                    2018    
            

 

 Dermatologic                    No rash                    2018         
       

 

 Neurologic                    No alteration of consciousness                  
  2018                

 

 Neurologic                    neck pain                    2018         
       

 

 Psychiatric                    No anxiety                    2018       
         

 

 Psychiatric                    No depression                    2018    
            

 

 Constitutional                    No recent illness                    2018                

 

 Constitutional                    No anorexia                    2018   
             

 

 Constitutional                    No night sweats                    2018                

 

 Constitutional                    No chills                    2018     
           

 

 Constitutional                    No diaphoresis                    2018
                

 

 Constitutional                    No fatigue                    2018    
            

 

 Constitutional                    No fever                    2018      
          

 

 Constitutional                    No insomnia                    2018   
             

 

 Constitutional                    No malaise                    2018    
            

 

 Eyes                    No eye discharge                    2018        
        

 

 Eyes                    No eye erythema                    2018         
       

 

 Ears/Nose/Throat/Neck                    No dizziness                    2018                

 

 Ears/Nose/Throat/Neck                    No headache                    2018                

 

 Cardiovascular                    No chest pain/pressure                                    

 

 Respiratory                    No cough                    2018         
       

 

 Gastrointestinal                    No abdominal pain                    2018                

 

 Gastrointestinal                    No constipation                    2018                

 

 Gastrointestinal                    No diarrhea                    2018 
               

 

 Genitourinary/Nephrology                    No dysuria                                    

 

 Musculoskeletal                    back pain                    2018    
            

 

 Dermatologic                    No rash                    2018         
       

 

 Neurologic                    No alteration of consciousness                  
  2018                

 

 Neurologic                    neck pain                    2018         
       

 

 Neurologic                    paresthesia                    2018       
         

 

 Psychiatric                    No anxiety                    2018       
         

 

 Psychiatric                    No depression                    2018    
            

 

 Musculoskeletal                    shoulder pain                    2018
                

 

 Constitutional                    No recent illness                    10/18/
2017                

 

 Constitutional                    No anorexia                    10/18/2017   
             

 

 Constitutional                    No night sweats                    10/18/
2017                

 

 Constitutional                    No chills                    10/18/2017     
           

 

 Constitutional                    No diaphoresis                    10/18/2017
                

 

 Constitutional                    No fatigue                    10/18/2017    
            

 

 Constitutional                    No fever                    10/18/2017      
          

 

 Constitutional                    No insomnia                    10/18/2017   
             

 

 Constitutional                    No malaise                    10/18/2017    
            

 

 Eyes                    No eye discharge                    10/18/2017        
        

 

 Eyes                    No eye erythema                    10/18/2017         
       

 

 Ears/Nose/Throat/Neck                    No dizziness                    10/18/
2017                

 

 Ears/Nose/Throat/Neck                    No headache                    10/18/
2017                

 

 Cardiovascular                    No chest pain/pressure                    10/
                

 

 Respiratory                    No cough                    10/18/2017         
       

 

 Gastrointestinal                    No abdominal pain                    10/18/
2017                

 

 Gastrointestinal                    No constipation                    10/18/
2017                

 

 Gastrointestinal                    No diarrhea                    10/18/2017 
               

 

 Genitourinary/Nephrology                    No dysuria                    10/18
/2017                

 

 Musculoskeletal                    back pain                    10/18/2017    
            

 

 Dermatologic                    No rash                    10/18/2017         
       

 

 Neurologic                    No alteration of consciousness                  
  10/18/2017                

 

 Neurologic                    neck pain                    10/18/2017         
       

 

 Neurologic                    paresthesia                    10/18/2017       
         

 

 Psychiatric                    No anxiety                    10/18/2017       
         

 

 Psychiatric                    No depression                    10/18/2017    
            

 

 Constitutional                    recent illness                    2017
                

 

 Constitutional                    No anorexia                    2017   
             

 

 Constitutional                    No night sweats                    2017                

 

 Constitutional                    No chills                    2017     
           

 

 Constitutional                    No diaphoresis                    2017
                

 

 Constitutional                    fatigue                    2017       
         

 

 Constitutional                    No fever                    2017      
          

 

 Constitutional                    No insomnia                    2017   
             

 

 Constitutional                    No malaise                    2017    
            

 

 Eyes                    No eye discharge                    2017        
        

 

 Eyes                    No eye erythema                    2017         
       

 

 Ears/Nose/Throat/Neck                    No dizziness                    2017                

 

 Ears/Nose/Throat/Neck                    No headache                    2017                

 

 Cardiovascular                    No chest pain/pressure                                    

 

 Respiratory                    cough                    2017            
    

 

 Gastrointestinal                    No abdominal pain                    2017                

 

 Gastrointestinal                    No constipation                    2017                

 

 Gastrointestinal                    No diarrhea                    2017 
               

 

 Genitourinary/Nephrology                    No dysuria                                    

 

 Musculoskeletal                    back pain                    2017    
            

 

 Musculoskeletal                    neck pain                    2017    
            

 

 Dermatologic                    No rash                    2017         
       

 

 Neurologic                    No alteration of consciousness                  
  2017                

 

 Neurologic                    neck pain                    2017         
       

 

 Psychiatric                    anxiety                    2017          
      

 

 Psychiatric                    depression                    2017       
         

 

 Psychiatric                    disturbances of emotion                                    

 

 Constitutional                    recent illness                    2017
                

 

 Constitutional                    No anorexia                    2017   
             

 

 Constitutional                    No night sweats                    2017                

 

 Constitutional                    No chills                    2017     
           

 

 Constitutional                    No diaphoresis                    2017
                

 

 Constitutional                    fatigue                    2017       
         

 

 Constitutional                    No fever                    2017      
          

 

 Constitutional                    No insomnia                    2017   
             

 

 Constitutional                    No malaise                    2017    
            

 

 Eyes                    No eye discharge                    2017        
        

 

 Eyes                    No eye erythema                    2017         
       

 

 Ears/Nose/Throat/Neck                    No dizziness                    2017                

 

 Ears/Nose/Throat/Neck                    No headache                    2017                

 

 Cardiovascular                    No chest pain/pressure                    2017                

 

 Respiratory                    cough                    2017            
    

 

 Gastrointestinal                    No abdominal pain                    2017                

 

 Gastrointestinal                    No constipation                    2017                

 

 Gastrointestinal                    No diarrhea                    2017 
               

 

 Genitourinary/Nephrology                    No dysuria                                    

 

 Musculoskeletal                    back pain                    2017    
            

 

 Musculoskeletal                    neck pain                    2017    
            

 

 Dermatologic                    No rash                    2017         
       

 

 Neurologic                    No alteration of consciousness                  
  2017                

 

 Neurologic                    neck pain                    2017         
       

 

 Psychiatric                    anxiety                    2017          
      

 

 Psychiatric                    depression                    2017       
         

 

 Psychiatric                    disturbances of emotion                                    

 

 Constitutional                    recent illness                    2016
                

 

 Constitutional                    No anorexia                    2016   
             

 

 Constitutional                    No night sweats                    2016                

 

 Constitutional                    No chills                    2016     
           

 

 Constitutional                    No diaphoresis                    2016
                

 

 Constitutional                    fatigue                    2016       
         

 

 Constitutional                    No fever                    2016      
          

 

 Constitutional                    No insomnia                    2016   
             

 

 Constitutional                    No malaise                    2016    
            

 

 Eyes                    No eye discharge                    2016        
        

 

 Eyes                    No eye erythema                    2016         
       

 

 Ears/Nose/Throat/Neck                    No dizziness                    2016                

 

 Ears/Nose/Throat/Neck                    No headache                    2016                

 

 Cardiovascular                    No chest pain/pressure                                    

 

 Respiratory                    cough                    2016            
    

 

 Gastrointestinal                    No abdominal pain                    2016                

 

 Gastrointestinal                    No constipation                    2016                

 

 Gastrointestinal                    No diarrhea                    2016 
               

 

 Genitourinary/Nephrology                    No dysuria                                    

 

 Musculoskeletal                    back pain                    2016    
            

 

 Musculoskeletal                    neck pain                    2016    
            

 

 Dermatologic                    No rash                    2016         
       

 

 Neurologic                    No alteration of consciousness                  
  2016                

 

 Neurologic                    neck pain                    2016         
       

 

 Psychiatric                    anxiety                    2016          
      

 

 Psychiatric                    depression                    2016       
         

 

 Psychiatric                    disturbances of emotion                                    

 

 Constitutional                    recent illness                    2016
                

 

 Eyes                    No eye erythema                    2016         
       

 

 Respiratory                    No cough                    2016         
       

 

 Psychiatric                    anxiety                    2016          
      

 

 Psychiatric                    depression                    2016       
         

 

 Psychiatric                    psychosis                    2016        
        

 

 Psychiatric                    disturbances of thinking                                    

 

 Psychiatric                    disturbances of emotion                                    

 

 Constitutional                    recent illness                    2016
                

 

 Constitutional                    No anorexia                    2016   
             

 

 Constitutional                    No night sweats                    2016                

 

 Constitutional                    No chills                    2016     
           

 

 Constitutional                    No diaphoresis                    2016
                

 

 Constitutional                    fatigue                    2016       
         

 

 Constitutional                    No fever                    2016      
          

 

 Constitutional                    No insomnia                    2016   
             

 

 Constitutional                    No malaise                    2016    
            

 

 Eyes                    No eye discharge                    2016        
        

 

 Eyes                    No eye erythema                    2016         
       

 

 Ears/Nose/Throat/Neck                    No dizziness                    2016                

 

 Ears/Nose/Throat/Neck                    No headache                    2016                

 

 Cardiovascular                    No chest pain/pressure                    2016                

 

 Respiratory                    cough                    2016            
    

 

 Gastrointestinal                    No abdominal pain                    2016                

 

 Gastrointestinal                    No constipation                    2016                

 

 Gastrointestinal                    No diarrhea                    2016 
               

 

 Genitourinary/Nephrology                    No dysuria                                    

 

 Musculoskeletal                    back pain                    2016    
            

 

 Musculoskeletal                    neck pain                    2016    
            

 

 Dermatologic                    No rash                    2016         
       

 

 Neurologic                    No alteration of consciousness                  
  2016                

 

 Neurologic                    neck pain                    2016         
       

 

 Psychiatric                    No anxiety                    2016       
         

 

 Psychiatric                    No depression                    2016    
            

 

 Constitutional                    recent illness                    10/26/2016
                

 

 Eyes                    No eye discharge                    10/26/2016        
        

 

 Eyes                    No eye erythema                    10/26/2016         
       

 

 Cardiovascular                    chest pain/pressure                    10/26/
2016                

 

 Respiratory                    cough                    10/26/2016            
    

 

 Neurologic                    No alteration of consciousness                  
  10/26/2016                

 

 Neurologic                    No mental status change                    10/26/
2016                

 

 Ears/Nose/Throat/Neck                    No nasal discharge                    
10/26/2016                

 

 Respiratory                    chest tightness                    10/26/2016  
              

 

 Constitutional                    No recent illness                    2016                

 

 Constitutional                    No anorexia                    2016   
             

 

 Constitutional                    No night sweats                    2016                

 

 Constitutional                    No chills                    2016     
           

 

 Constitutional                    No diaphoresis                    2016
                

 

 Constitutional                    No fatigue                    2016    
            

 

 Constitutional                    No fever                    2016      
          

 

 Constitutional                    No insomnia                    2016   
             

 

 Constitutional                    No malaise                    2016    
            

 

 Eyes                    No eye discharge                    2016        
        

 

 Eyes                    No eye erythema                    2016         
       

 

 Ears/Nose/Throat/Neck                    No dizziness                    2016                

 

 Ears/Nose/Throat/Neck                    No headache                    2016                

 

 Cardiovascular                    No chest pain/pressure                                    

 

 Respiratory                    No cough                    2016         
       

 

 Gastrointestinal                    No abdominal pain                    2016                

 

 Gastrointestinal                    No constipation                    2016                

 

 Gastrointestinal                    No diarrhea                    2016 
               

 

 Genitourinary/Nephrology                    No dysuria                                    

 

 Musculoskeletal                    back pain                    2016    
            

 

 Musculoskeletal                    neck pain                    2016    
            

 

 Dermatologic                    No rash                    2016         
       

 

 Neurologic                    No alteration of consciousness                  
  2016                

 

 Neurologic                    neck pain                    2016         
       

 

 Psychiatric                    No anxiety                    2016       
         

 

 Psychiatric                    No depression                    2016    
            

 

 Constitutional                    No fever                    2016      
          

 

 Eyes                    No eye discharge                    2016        
        

 

 Eyes                    No eye erythema                    2016         
       

 

 Cardiovascular                    No chest pain/pressure                                    

 

 Respiratory                    No cough                    2016         
       

 

 Neurologic                    No alteration of consciousness                  
  2016                

 

 Psychiatric                    No anxiety                    2016       
         

 

 Psychiatric                    No depression                    2016    
            

 

 Constitutional                    No recent illness                    2016                

 

 Ears/Nose/Throat/Neck                    No nasal allergies                    
2016                

 

 Ears/Nose/Throat/Neck                    No nasal discharge                    
2016                

 

 Cardiovascular                    No dyspnea                    2016    
            

 

 Respiratory                    No dyspnea                    2016       
         

 

 Dermatologic                    No rash                    2016         
       

 

 Neurologic                    No mental status change                    2016                

 

 Constitutional                    No recent illness                    2016                

 

 Constitutional                    No anorexia                    2016   
             

 

 Constitutional                    No night sweats                    2016                

 

 Constitutional                    No chills                    2016     
           

 

 Constitutional                    No diaphoresis                    2016
                

 

 Constitutional                    No fatigue                    2016    
            

 

 Constitutional                    No fever                    2016      
          

 

 Constitutional                    No insomnia                    2016   
             

 

 Constitutional                    No malaise                    2016    
            

 

 Eyes                    No eye discharge                    2016        
        

 

 Eyes                    No eye erythema                    2016         
       

 

 Ears/Nose/Throat/Neck                    No dizziness                    2016                

 

 Ears/Nose/Throat/Neck                    No headache                    2016                

 

 Cardiovascular                    No chest pain/pressure                                    

 

 Respiratory                    No cough                    2016         
       

 

 Gastrointestinal                    No abdominal pain                    2016                

 

 Gastrointestinal                    No constipation                    2016                

 

 Gastrointestinal                    No diarrhea                    2016 
               

 

 Genitourinary/Nephrology                    No dysuria                                    

 

 Genitourinary/Nephrology                    pelvic pain                                    

 

 Dermatologic                    No rash                    2016         
       

 

 Neurologic                    No alteration of consciousness                  
  2016                

 

 Neurologic                    neck pain                    2016         
       

 

 Neurologic                    paresthesia                    2016       
         

 

 Psychiatric                    No anxiety                    2016       
         

 

 Psychiatric                    No depression                    2016    
            

 

 Musculoskeletal                    back pain                    2016    
            

 

 Constitutional                    No recent illness                    2016                

 

 Constitutional                    No anorexia                    2016   
             

 

 Constitutional                    No night sweats                    2016                

 

 Constitutional                    No chills                    2016     
           

 

 Constitutional                    No diaphoresis                    2016
                

 

 Constitutional                    No fatigue                    2016    
            

 

 Constitutional                    No fever                    2016      
          

 

 Constitutional                    No insomnia                    2016   
             

 

 Constitutional                    No malaise                    2016    
            

 

 Eyes                    No eye discharge                    2016        
        

 

 Eyes                    No eye erythema                    2016         
       

 

 Ears/Nose/Throat/Neck                    No dizziness                    2016                

 

 Ears/Nose/Throat/Neck                    No headache                    2016                

 

 Cardiovascular                    No chest pain/pressure                                    

 

 Respiratory                    No cough                    2016         
       

 

 Gastrointestinal                    No abdominal pain                    2016                

 

 Gastrointestinal                    No constipation                    2016                

 

 Gastrointestinal                    No diarrhea                    2016 
               

 

 Genitourinary/Nephrology                    No dysuria                                    

 

 Musculoskeletal                    back pain                    2016    
            

 

 Musculoskeletal                    neck pain                    2016    
            

 

 Dermatologic                    No rash                    2016         
       

 

 Neurologic                    No alteration of consciousness                  
  2016                

 

 Neurologic                    neck pain                    2016         
       

 

 Neurologic                    paresthesia                    2016       
         

 

 Psychiatric                    No anxiety                    2016       
         

 

 Psychiatric                    No depression                    2016    
            

 

 Genitourinary/Nephrology                    pelvic pain                                    

 

 Genitourinary/Nephrology                    No urinary frequency              
      2016                

 

 Genitourinary/Nephrology                    No urinary incontinence           
         2016                

 

 Genitourinary/Nephrology                    No urinary retention/hesitancy    
                2016                

 

 Genitourinary/Nephrology                    No urinary urgency                
    2016                

 

 Genitourinary/Nephrology                    dysuria                    2016                

 

 Genitourinary/Nephrology                    hematuria                    2016                

 

 Constitutional                    No recent illness                    2016                

 

 Constitutional                    No anorexia                    2016   
             

 

 Constitutional                    No night sweats                    2016                

 

 Constitutional                    No chills                    2016     
           

 

 Constitutional                    No diaphoresis                    2016
                

 

 Constitutional                    fatigue                    2016       
         

 

 Constitutional                    No fever                    2016      
          

 

 Constitutional                    No insomnia                    2016   
             

 

 Constitutional                    No malaise                    2016    
            

 

 Constitutional                    No weight loss                    2016
                

 

 Constitutional                    No weight gain                    2016
                

 

 Constitutional                    No obesity                    2016    
            

 

 Gastrointestinal                    nausea                    2016      
          

 

 Gastrointestinal                    vomiting                    2016    
            

 

 Cardiovascular                    near-syncope/dizziness                                    

 

 Gastrointestinal                    constipation                    2016
                

 

 Gastrointestinal                    No diarrhea                    2016 
               

 

 Respiratory                    No dyspnea                    2016       
         

 

 Respiratory                    No dyspnea on exertion                    2016                

 

 Respiratory                    No daytime hypersomnolence                                    

 

 Respiratory                    No cough                    2016         
       

 

 Ears/Nose/Throat/Neck                    headache                    2016                

 

 Dermatologic                    rash                    2016            
    

 

 Dermatologic                    No sores                    2016        
        

 

 Psychiatric                    anxiety                    2016          
      

 

 Psychiatric                    depression                    2016       
         

 

 Eyes                    No vision change                    2016        
        

 

 Musculoskeletal                    myalgias                    2016     
           

 

 Musculoskeletal                    No muscle weakness                    2016                

 

 Musculoskeletal                    No joint complaint                    2016                

 

 Constitutional                    No recent illness                    2015                

 

 Constitutional                    No anorexia                    2015   
             

 

 Constitutional                    No night sweats                    2015                

 

 Constitutional                    No chills                    2015     
           

 

 Constitutional                    No diaphoresis                    2015
                

 

 Constitutional                    No fatigue                    2015    
            

 

 Constitutional                    No fever                    2015      
          

 

 Constitutional                    No insomnia                    2015   
             

 

 Constitutional                    No malaise                    2015    
            

 

 Eyes                    No eye discharge                    2015        
        

 

 Eyes                    No eye erythema                    2015         
       

 

 Ears/Nose/Throat/Neck                    No dizziness                    2015                

 

 Ears/Nose/Throat/Neck                    No headache                    2015                

 

 Cardiovascular                    No chest pain/pressure                    2015                

 

 Respiratory                    No cough                    2015         
       

 

 Gastrointestinal                    No abdominal pain                    2015                

 

 Gastrointestinal                    No constipation                    2015                

 

 Gastrointestinal                    No diarrhea                    2015 
               

 

 Genitourinary/Nephrology                    No dysuria                                    

 

 Musculoskeletal                    back pain                    2015    
            

 

 Musculoskeletal                    neck pain                    2015    
            

 

 Dermatologic                    No rash                    2015         
       

 

 Neurologic                    No alteration of consciousness                  
  2015                

 

 Neurologic                    neck pain                    2015         
       

 

 Neurologic                    paresthesia                    2015       
         

 

 Dermatologic                    No rash                    2015         
       

 

 Neurologic                    No alteration of consciousness                  
  2015                

 

 Neurologic                    neck pain                    2015         
       

 

 Neurologic                    paresthesia                    2015       
         

 

 Musculoskeletal                    back pain                    2015    
            

 

 Musculoskeletal                    neck pain                    2015    
            

 

 Constitutional                    No recent illness                    2015                

 

 Constitutional                    No anorexia                    2015   
             

 

 Constitutional                    No night sweats                    2015                

 

 Constitutional                    No chills                    2015     
           

 

 Constitutional                    No diaphoresis                    2015
                

 

 Constitutional                    No fatigue                    2015    
            

 

 Constitutional                    No fever                    2015      
          

 

 Constitutional                    No insomnia                    2015   
             

 

 Constitutional                    No malaise                    2015    
            

 

 Constitutional                    No weight loss                    2015
                

 

 Constitutional                    No weight gain                    2015
                

 

 Eyes                    No eye discharge                    2015        
        

 

 Eyes                    No eye erythema                    2015         
       

 

 Ears/Nose/Throat/Neck                    No dizziness                    2015                

 

 Ears/Nose/Throat/Neck                    No headache                    2015                

 

 Cardiovascular                    No chest pain/pressure                    2015                

 

 Respiratory                    No cough                    2015         
       

 

 Gastrointestinal                    No abdominal pain                    2015                

 

 Gastrointestinal                    No constipation                    2015                

 

 Gastrointestinal                    No diarrhea                    2015 
               

 

 Genitourinary/Nephrology                    No dysuria                                    

 

 Constitutional                    No recent illness                    2015                

 

 Constitutional                    No anorexia                    2015   
             

 

 Constitutional                    No night sweats                    2015                

 

 Constitutional                    No chills                    2015     
           

 

 Constitutional                    No diaphoresis                    2015
                

 

 Constitutional                    No fatigue                    2015    
            

 

 Constitutional                    No fever                    2015      
          

 

 Constitutional                    No insomnia                    2015   
             

 

 Constitutional                    No malaise                    2015    
            

 

 Constitutional                    No weight loss                    2015
                

 

 Constitutional                    No weight gain                    2015
                

 

 Constitutional                    No recent illness                    2015                

 

 Constitutional                    No anorexia                    2015   
             

 

 Constitutional                    No night sweats                    2015                

 

 Constitutional                    No chills                    2015     
           

 

 Constitutional                    No weight gain                    2015
                

 

 Constitutional                    No weight loss                    2015
                

 

 Constitutional                    No malaise                    2015    
            

 

 Constitutional                    No insomnia                    2015   
             

 

 Constitutional                    No fever                    2015      
          

 

 Constitutional                    fatigue                    2015       
         

 

 Constitutional                    No diaphoresis                    2015
                

 

 Eyes                    No eye discharge                    2015        
        

 

 Eyes                    No eye erythema                    2015         
       

 

 Ears/Nose/Throat/Neck                    dizziness                    2015                

 

 Ears/Nose/Throat/Neck                    headache                    2015                

 

 Ears/Nose/Throat/Neck                    No nasal discharge                    
2015                

 

 Cardiovascular                    No chest pain/pressure                    2015                

 

 Cardiovascular                    No edema                    2015      
          

 

 Cardiovascular                    No dyspnea                    2015    
            

 

 Respiratory                    No chest congestion                    2015                

 

 Respiratory                    No cough                    2015         
       

 

 Gastrointestinal                    No abdominal pain                    2015                

 

 Gastrointestinal                    No constipation                    2015                

 

 Gastrointestinal                    No diarrhea                    2015 
               

 

 Gastrointestinal                    No vomiting                    2015 
               

 

 Gastrointestinal                    No nausea                    2015   
             

 

 Genitourinary/Nephrology                    No dysuria                                    

 

 Genitourinary/Nephrology                    pelvic pain                    2015                

 

 Musculoskeletal                    No joint complaint                    2015                

 

 Dermatologic                    No rash                    2015         
       

 

 Neurologic                    No alteration of consciousness                  
  2015                

 

 Neurologic                    seizure                    2015           
     

 

 Psychiatric                    anxiety                    2015          
      

 

 Psychiatric                    depression                    2015       
         

 

 Endocrine                    No dry or coarse skin                    2015                

 

 Hematologic/Lymphatic                    abnormal bleeding and bruising       
             2015                



                                                                    



Physical Exam

                      





 Exam Name                    System Name                    Item Name         
           Status                    Result                    Effective Dates 
                   Notes                

 

 Full Exam - General                     Constitutional                     
general appearance                    Development:                    well 
developed                    2018                    None                

 

 Full Exam - General                     Constitutional                     
general appearance                    Development:                    appears 
stated age                    2018                    None                

 

 Full Exam - General                     Constitutional                     
general appearance                    Overall:                    well 
developed                    2018                    None                

 

 Full Exam - General                     Constitutional                     
general appearance                    Overall:                    well 
nourished                    2018                    None                

 

 Full Exam - General                     Constitutional                     
general appearance                    Evidence of Distress:                    
anxious                    2018                    None                

 

 Full Exam - General                     Constitutional                     
general appearance                    Evidence of Distress:                    
tearful                    2018                    None                

 

 Full Exam - General                     Constitutional                     
general appearance                    Hygiene/Attention to Grooming:           
         good hygiene                    2018                    None    
            

 

 Full Exam - General                     Eyes                    conjunctiva
/eyelids                    Overall:                    conjunctiva clear      
              2018                    None                

 

 Full Exam - General                     Eyes                    conjunctiva
/eyelids                    Overall:                    cornea clear           
         2018                    None                

 

 Full Exam - General                     Eyes                    conjunctiva
/eyelids                    Overall:                    eyelids normal         
           2018                    None                

 

 Full Exam - General                     Respiratory                    
auscultation                    Overall:                    breath sounds clear 
bilaterally                    2018                    None              
  

 

 Full Exam - General                     Respiratory                    
respiratory effort/rhythm                    Overall:                    no 
retractions                    2018                    None              
  

 

 Full Exam - General                     Respiratory                    
respiratory effort/rhythm                    Overall:                    normal 
rate                    2018                    None                

 

 Full Exam - General                     Cardiovascular                    
extremities                    Overall:                    no clubbing         
           2018                    None                

 

 Full Exam - General                     Cardiovascular                    
auscultation of heart                    Overall:                    regular 
rate                    2018                    None                

 

 Full Exam - General                     Cardiovascular                    
auscultation of heart                    Overall:                    normal 
heart sounds                    2018                    None             
   

 

 Full Exam - General                     Musculoskeletal                    
gait and station                    Overall:                    normal gait    
                2018                    None                

 

 Full Exam - General                     Musculoskeletal                    
gait and station                    Overall:                    normal station 
                   2018                    None                

 

 Full Exam - General                     Psychiatric                    
orientation/consciousness                    Overall:                    
oriented to person, place and time                    2018               
     None                

 

 Full Exam - General                     Psychiatric                    
mood and affect                    Mood:                    depressed          
          2018                    None                

 

 Full Exam - General                     Psychiatric                    
mood and affect                    Affect:                    mood congruent   
                 2018                    None                

 

 Full Exam - General                     Abdomen                    
abdominal exam                    Bowel sounds:                    hypoactive  
                  2018                    mildly ttp over lower abdomen  
              

 

 Full Exam - General                     Constitutional                     
general appearance                    Development:                    well 
developed                    2018                    None                

 

 Full Exam - General                     Constitutional                     
general appearance                    Development:                    appears 
stated age                    2018                    None                

 

 Full Exam - General                     Constitutional                     
general appearance                    Overall:                    well 
developed                    2018                    None                

 

 Full Exam - General                     Constitutional                     
general appearance                    Overall:                    well 
nourished                    2018                    None                

 

 Full Exam - General                     Constitutional                     
general appearance                    Hygiene/Attention to Grooming:           
         good hygiene                    2018                    None    
            

 

 Full Exam - General                     Eyes                    conjunctiva
/eyelids                    Overall:                    conjunctiva clear      
              2018                    None                

 

 Full Exam - General                     Eyes                    conjunctiva
/eyelids                    Overall:                    cornea clear           
         2018                    None                

 

 Full Exam - General                     Eyes                    conjunctiva
/eyelids                    Overall:                    eyelids normal         
           2018                    None                

 

 Full Exam - General                     Respiratory                    
auscultation                    Overall:                    breath sounds clear 
bilaterally                    2018                    None              
  

 

 Full Exam - General                     Respiratory                    
respiratory effort/rhythm                    Overall:                    no 
retractions                    2018                    None              
  

 

 Full Exam - General                     Respiratory                    
respiratory effort/rhythm                    Overall:                    normal 
rate                    2018                    None                

 

 Full Exam - General                     Cardiovascular                    
extremities                    Overall:                    no clubbing         
           2018                    None                

 

 Full Exam - General                     Cardiovascular                    
auscultation of heart                    Overall:                    regular 
rate                    2018                    None                

 

 Full Exam - General                     Cardiovascular                    
auscultation of heart                    Overall:                    normal 
heart sounds                    2018                    None             
   

 

 Full Exam - General                     Musculoskeletal                    
gait and station                    Overall:                    normal gait    
                2018                    None                

 

 Full Exam - General                     Musculoskeletal                    
gait and station                    Overall:                    normal station 
                   2018                    None                

 

 Full Exam - General                     Psychiatric                    
orientation/consciousness                    Overall:                    
oriented to person, place and time                    2018               
     None                

 

 Full Exam - General                     Constitutional                     
general appearance                    Evidence of Distress:                    
anxious                    2018                    None                

 

 Full Exam - General                     Constitutional                     
general appearance                    Evidence of Distress:                    
tearful                    2018                    None                

 

 Full Exam - General                     Psychiatric                    
mood and affect                    Mood:                    depressed          
          2018                    None                

 

 Full Exam - General                     Psychiatric                    
mood and affect                    Affect:                    mood congruent   
                 2018                    None                

 

 Full Exam - General                     Constitutional                     
general appearance                    Development:                    well 
developed                    2018                    None                

 

 Full Exam - General                     Constitutional                     
general appearance                    Development:                    appears 
stated age                    2018                    None                

 

 Full Exam - General                     Constitutional                     
general appearance                    Overall:                    well 
developed                    2018                    None                

 

 Full Exam - General                     Constitutional                     
general appearance                    Overall:                    in no acute 
distress                    2018                    None                

 

 Full Exam - General                     Constitutional                     
general appearance                    Overall:                    well 
nourished                    2018                    None                

 

 Full Exam - General                     Constitutional                     
general appearance                    Hygiene/Attention to Grooming:           
         good hygiene                    2018                    None    
            

 

 Full Exam - General                     Eyes                    conjunctiva
/eyelids                    Overall:                    conjunctiva clear      
              2018                    None                

 

 Full Exam - General                     Eyes                    conjunctiva
/eyelids                    Overall:                    cornea clear           
         2018                    None                

 

 Full Exam - General                     Eyes                    conjunctiva
/eyelids                    Overall:                    eyelids normal         
           2018                    None                

 

 Full Exam - General                     Respiratory                    
auscultation                    Overall:                    breath sounds clear 
bilaterally                    2018                    None              
  

 

 Full Exam - General                     Respiratory                    
respiratory effort/rhythm                    Overall:                    no 
retractions                    2018                    None              
  

 

 Full Exam - General                     Respiratory                    
respiratory effort/rhythm                    Overall:                    normal 
rate                    2018                    None                

 

 Full Exam - General                     Cardiovascular                    
extremities                    Overall:                    no clubbing         
           2018                    None                

 

 Full Exam - General                     Cardiovascular                    
auscultation of heart                    Overall:                    regular 
rate                    2018                    None                

 

 Full Exam - General                     Cardiovascular                    
auscultation of heart                    Overall:                    normal 
heart sounds                    2018                    None             
   

 

 Full Exam - General                     Musculoskeletal                    
gait and station                    Overall:                    normal gait    
                2018                    None                

 

 Full Exam - General                     Musculoskeletal                    
gait and station                    Overall:                    normal station 
                   2018                    None                

 

 Full Exam - General                     Psychiatric                    
orientation/consciousness                    Overall:                    
oriented to person, place and time                    2018               
     None                

 

 Full Exam - General                     Psychiatric                    
mood and affect                    Overall:                    normal mood and 
affect                    2018                    None                

 

 Full Exam - General                     Constitutional                     
general appearance                    Development:                    well 
developed                    2018                    None                

 

 Full Exam - General                     Constitutional                     
general appearance                    Development:                    appears 
stated age                    2018                    None                

 

 Full Exam - General                     Constitutional                     
general appearance                    Overall:                    well 
developed                    2018                    None                

 

 Full Exam - General                     Constitutional                     
general appearance                    Overall:                    in no acute 
distress                    2018                    None                

 

 Full Exam - General                     Constitutional                     
general appearance                    Overall:                    well 
nourished                    2018                    None                

 

 Full Exam - General                     Constitutional                     
general appearance                    Hygiene/Attention to Grooming:           
         good hygiene                    2018                    None    
            

 

 Full Exam - General                     Eyes                    conjunctiva
/eyelids                    Overall:                    conjunctiva clear      
              2018                    None                

 

 Full Exam - General                     Eyes                    conjunctiva
/eyelids                    Overall:                    cornea clear           
         2018                    None                

 

 Full Exam - General                     Eyes                    conjunctiva
/eyelids                    Overall:                    eyelids normal         
           2018                    None                

 

 Full Exam - General                     Eyes                    pupils and 
irises                    Overall:                    pupils equal, round, 
reactive to light and accomodation                    2018               
     None                

 

 Full Exam - General                     Ears/Nose/Throat                  
  otoscopic exam                    Overall:                    external 
auditory canals clear                    2018                    None    
            

 

 Full Exam - General                     Ears/Nose/Throat                  
  otoscopic exam                    Overall:                    tympanic 
membranes clear                    2018                    None          
      

 

 Full Exam - General                     Ears/Nose/Throat                  
  lips/teeth/gingiva                    Overall:                    benign lips
                    2018                    None                

 

 Full Exam - General                     Ears/Nose/Throat                  
  lips/teeth/gingiva                    Overall:                    normal 
dentition                    2018                    None                

 

 Full Exam - General                     Ears/Nose/Throat                  
  oral cavity/pharynx/larynx                     Overall:                    
oral mucosa clear                    2018                    None        
        

 

 Full Exam - General                     Ears/Nose/Throat                  
  oral cavity/pharynx/larynx                     Overall:                    
oropharyngeal mucosa clear                    2018                    
None                

 

 Full Exam - General                     Ears/Nose/Throat                  
  oral cavity/pharynx/larynx                     Overall:                    
hypopharynx benign                    2018                    None       
         

 

 Full Exam - General                     Ears/Nose/Throat                  
  oral cavity/pharynx/larynx                     Overall:                    no 
masses                    2018                    None                

 

 Full Exam - General                     Respiratory                    
auscultation                    Overall:                    breath sounds clear 
bilaterally                    2018                    None              
  

 

 Full Exam - General                     Respiratory                    
respiratory effort/rhythm                    Overall:                    no 
retractions                    2018                    None              
  

 

 Full Exam - General                     Respiratory                    
respiratory effort/rhythm                    Overall:                    normal 
rate                    2018                    None                

 

 Full Exam - General                     Cardiovascular                    
extremities                    Overall:                    no clubbing         
           2018                    None                

 

 Full Exam - General                     Cardiovascular                    
auscultation of heart                    Overall:                    regular 
rate                    2018                    None                

 

 Full Exam - General                     Cardiovascular                    
auscultation of heart                    Overall:                    normal 
heart sounds                    2018                    None             
   

 

 Full Exam - General                     Abdomen                    
abdominal exam                    Overall:                    no tenderness    
                2018                    None                

 

 Full Exam - General                     Abdomen                    
abdominal exam                    Overall:                    normal bowel 
sounds                    2018                    None                

 

 Full Exam - General                     Musculoskeletal                    
lower extremity                    Palpation -  knee:                    no 
effusion                    2018                    None                

 

 Full Exam - General                     Musculoskeletal                    
lower extremity                    Inspection -  lower leg:                    
normal appearance                    2018                    None        
        

 

 Full Exam - General                     Musculoskeletal                    
lower extremity                    Palpation -  lower leg:                    
normal on palpation                    2018                    None      
          

 

 Full Exam - General                     Musculoskeletal                    
spine, ribs and pelvis                    Posture:                    kyphosis 
                   2018                    None                

 

 Full Exam - General                     Musculoskeletal                    
spine, ribs and pelvis                    Spine:                    tender @ 
cervical spine                    2018                    None           
     

 

 Full Exam - General                     Musculoskeletal                    
gait and station                    Overall:                    normal gait    
                2018                    None                

 

 Full Exam - General                     Musculoskeletal                    
gait and station                    Overall:                    normal station 
                   2018                    None                

 

 Full Exam - General                     Integument                    
inspection of skin                    Overall:                    few scattered 
moles, no gross abnormalities                    2018                    
None                

 

 Full Exam - General                     Neurologic                    deep 
tendon reflexes                    Overall:                    deep tendon 
reflexes intact                    2018                    None          
      

 

 Full Exam - General                     Neurologic                    
cranial nerves                    Overall:                    crainial nerves 2 
- 12 grossly intact                    2018                    None      
          

 

 Full Exam - General                     Psychiatric                    
orientation/consciousness                    Overall:                    
oriented to person, place and time                    2018               
     None                

 

 Full Exam - General                     Psychiatric                    
mood and affect                    Overall:                    normal mood and 
affect                    2018                    None                

 

 Full Exam - General                     Constitutional                     
general appearance                    Development:                    well 
developed                    2018                    None                

 

 Full Exam - General                     Constitutional                     
general appearance                    Development:                    appears 
stated age                    2018                    None                

 

 Full Exam - General                     Constitutional                     
general appearance                    Overall:                    well 
developed                    2018                    None                

 

 Full Exam - General                     Constitutional                     
general appearance                    Overall:                    in no acute 
distress                    2018                    None                

 

 Full Exam - General                     Constitutional                     
general appearance                    Overall:                    well 
nourished                    2018                    None                

 

 Full Exam - General                     Constitutional                     
general appearance                    Hygiene/Attention to Grooming:           
         good hygiene                    2018                    None    
            

 

 Full Exam - General                     Eyes                    conjunctiva
/eyelids                    Overall:                    conjunctiva clear      
              2018                    None                

 

 Full Exam - General                     Eyes                    conjunctiva
/eyelids                    Overall:                    cornea clear           
         2018                    None                

 

 Full Exam - General                     Eyes                    conjunctiva
/eyelids                    Overall:                    eyelids normal         
           2018                    None                

 

 Full Exam - General                     Eyes                    pupils and 
irises                    Overall:                    pupils equal, round, 
reactive to light and accomodation                    2018               
     None                

 

 Full Exam - General                     Ears/Nose/Throat                  
  otoscopic exam                    Overall:                    external 
auditory canals clear                    2018                    None    
            

 

 Full Exam - General                     Ears/Nose/Throat                  
  otoscopic exam                    Overall:                    tympanic 
membranes clear                    2018                    None          
      

 

 Full Exam - General                     Ears/Nose/Throat                  
  lips/teeth/gingiva                    Overall:                    benign lips
                    2018                    None                

 

 Full Exam - General 1995                    Ears/Nose/Throat                  
  lips/teeth/gingiva                    Overall:                    normal 
dentition                    2018                    None                

 

 Full Exam - General                     Ears/Nose/Throat                  
  oral cavity/pharynx/larynx                     Overall:                    
oral mucosa clear                    2018                    None        
        

 

 Full Exam - General                     Ears/Nose/Throat                  
  oral cavity/pharynx/larynx                     Overall:                    
oropharyngeal mucosa clear                    2018                    
None                

 

 Full Exam - General                     Ears/Nose/Throat                  
  oral cavity/pharynx/larynx                     Overall:                    
hypopharynx benign                    2018                    None       
         

 

 Full Exam - General                     Ears/Nose/Throat                  
  oral cavity/pharynx/larynx                     Overall:                    no 
masses                    2018                    None                

 

 Full Exam - General                     Respiratory                    
auscultation                    Overall:                    breath sounds clear 
bilaterally                    2018                    None              
  

 

 Full Exam - General                     Respiratory                    
respiratory effort/rhythm                    Overall:                    no 
retractions                    2018                    None              
  

 

 Full Exam - General                     Respiratory                    
respiratory effort/rhythm                    Overall:                    normal 
rate                    2018                    None                

 

 Full Exam - General                     Cardiovascular                    
extremities                    Overall:                    no clubbing         
           2018                    None                

 

 Full Exam - General                     Cardiovascular                    
auscultation of heart                    Overall:                    regular 
rate                    2018                    None                

 

 Full Exam - General                     Cardiovascular                    
auscultation of heart                    Overall:                    normal 
heart sounds                    2018                    None             
   

 

 Full Exam - General                     Abdomen                    
abdominal exam                    Overall:                    no tenderness    
                2018                    None                

 

 Full Exam - General                     Abdomen                    
abdominal exam                    Overall:                    normal bowel 
sounds                    2018                    None                

 

 Full Exam - General                     Musculoskeletal                    
lower extremity                    Palpation -  knee:                    no 
effusion                    2018                    None                

 

 Full Exam - General                     Musculoskeletal                    
lower extremity                    Inspection -  lower leg:                    
normal appearance                    2018                    None        
        

 

 Full Exam - General                     Musculoskeletal                    
lower extremity                    Palpation -  lower leg:                    
normal on palpation                    2018                    None      
          

 

 Full Exam - General                     Musculoskeletal                    
spine, ribs and pelvis                    Posture:                    kyphosis 
                   2018                    None                

 

 Full Exam - General                     Musculoskeletal                    
spine, ribs and pelvis                    Spine:                    tender @ 
cervical spine                    2018                    None           
     

 

 Full Exam - General                     Musculoskeletal                    
gait and station                    Overall:                    normal gait    
                2018                    None                

 

 Full Exam - General                     Musculoskeletal                    
gait and station                    Overall:                    normal station 
                   2018                    None                

 

 Full Exam - General                     Integument                    
inspection of skin                    Overall:                    few scattered 
moles, no gross abnormalities                    2018                    
None                

 

 Full Exam - General                     Neurologic                    deep 
tendon reflexes                    Overall:                    deep tendon 
reflexes intact                    2018                    None          
      

 

 Full Exam - General                     Neurologic                    
cranial nerves                    Overall:                    crainial nerves 2 
- 12 grossly intact                    2018                    None      
          

 

 Full Exam - General                     Psychiatric                    
orientation/consciousness                    Overall:                    
oriented to person, place and time                    2018               
     None                

 

 Full Exam - General                     Psychiatric                    
mood and affect                    Overall:                    normal mood and 
affect                    2018                    None                

 

 Full Exam - General                     Musculoskeletal                    
upper extremity                    Palpation -  shoulder:                    
pain with resisted abduction                    2018                    
None                

 

 Full Exam - General                     Musculoskeletal                    
upper extremity                    Palpation -  shoulder:                    
pain with resisted internal rotation                    2018             
       None                

 

 Full Exam - General                     Constitutional                     
general appearance                    Development:                    well 
developed                    10/18/2017                    None                

 

 Full Exam - General                     Constitutional                     
general appearance                    Development:                    appears 
stated age                    10/18/2017                    None                

 

 Full Exam - General                     Constitutional                     
general appearance                    Overall:                    well 
developed                    10/18/2017                    None                

 

 Full Exam - General                     Constitutional                     
general appearance                    Overall:                    in no acute 
distress                    10/18/2017                    None                

 

 Full Exam - General                     Constitutional                     
general appearance                    Overall:                    well 
nourished                    10/18/2017                    None                

 

 Full Exam - General                     Constitutional                     
general appearance                    Hygiene/Attention to Grooming:           
         good hygiene                    10/18/2017                    None    
            

 

 Full Exam - General                     Eyes                    conjunctiva
/eyelids                    Overall:                    conjunctiva clear      
              10/18/2017                    None                

 

 Full Exam - General                     Eyes                    conjunctiva
/eyelids                    Overall:                    cornea clear           
         10/18/2017                    None                

 

 Full Exam - General                     Eyes                    conjunctiva
/eyelids                    Overall:                    eyelids normal         
           10/18/2017                    None                

 

 Full Exam - General                     Eyes                    pupils and 
irises                    Overall:                    pupils equal, round, 
reactive to light and accomodation                    10/18/2017               
     None                

 

 Full Exam - General                     Ears/Nose/Throat                  
  otoscopic exam                    Overall:                    external 
auditory canals clear                    10/18/2017                    None    
            

 

 Full Exam - General                     Ears/Nose/Throat                  
  otoscopic exam                    Overall:                    tympanic 
membranes clear                    10/18/2017                    None          
      

 

 Full Exam - General                     Ears/Nose/Throat                  
  lips/teeth/gingiva                    Overall:                    benign lips
                    10/18/2017                    None                

 

 Full Exam - General                     Ears/Nose/Throat                  
  lips/teeth/gingiva                    Overall:                    normal 
dentition                    10/18/2017                    None                

 

 Full Exam - General                     Ears/Nose/Throat                  
  oral cavity/pharynx/larynx                     Overall:                    
oral mucosa clear                    10/18/2017                    None        
        

 

 Full Exam - General                     Ears/Nose/Throat                  
  oral cavity/pharynx/larynx                     Overall:                    
oropharyngeal mucosa clear                    10/18/2017                    
None                

 

 Full Exam - General                     Ears/Nose/Throat                  
  oral cavity/pharynx/larynx                     Overall:                    
hypopharynx benign                    10/18/2017                    None       
         

 

 Full Exam - General                     Ears/Nose/Throat                  
  oral cavity/pharynx/larynx                     Overall:                    no 
masses                    10/18/2017                    None                

 

 Full Exam - General                     Respiratory                    
auscultation                    Overall:                    breath sounds clear 
bilaterally                    10/18/2017                    None              
  

 

 Full Exam - General                     Respiratory                    
respiratory effort/rhythm                    Overall:                    no 
retractions                    10/18/2017                    None              
  

 

 Full Exam - General                     Respiratory                    
respiratory effort/rhythm                    Overall:                    normal 
rate                    10/18/2017                    None                

 

 Full Exam - General                     Cardiovascular                    
extremities                    Overall:                    no clubbing         
           10/18/2017                    None                

 

 Full Exam - General                     Cardiovascular                    
auscultation of heart                    Overall:                    regular 
rate                    10/18/2017                    None                

 

 Full Exam - General                     Cardiovascular                    
auscultation of heart                    Overall:                    normal 
heart sounds                    10/18/2017                    None             
   

 

 Full Exam - General                     Abdomen                    
abdominal exam                    Overall:                    no tenderness    
                10/18/2017                    None                

 

 Full Exam - General                     Abdomen                    
abdominal exam                    Overall:                    normal bowel 
sounds                    10/18/2017                    None                

 

 Full Exam - General                     Musculoskeletal                    
lower extremity                    Palpation -  knee:                    no 
effusion                    10/18/2017                    None                

 

 Full Exam - General                     Musculoskeletal                    
lower extremity                    Inspection -  lower leg:                    
normal appearance                    10/18/2017                    None        
        

 

 Full Exam - General                     Musculoskeletal                    
lower extremity                    Palpation -  lower leg:                    
normal on palpation                    10/18/2017                    None      
          

 

 Full Exam - General                     Musculoskeletal                    
spine, ribs and pelvis                    Posture:                    kyphosis 
                   10/18/2017                    None                

 

 Full Exam - General                     Musculoskeletal                    
spine, ribs and pelvis                    Spine:                    tender @ 
cervical spine                    10/18/2017                    None           
     

 

 Full Exam - General                     Musculoskeletal                    
gait and station                    Overall:                    normal gait    
                10/18/2017                    None                

 

 Full Exam - General                     Musculoskeletal                    
gait and station                    Overall:                    normal station 
                   10/18/2017                    None                

 

 Full Exam - General                     Integument                    
inspection of skin                    Overall:                    few scattered 
moles, no gross abnormalities                    10/18/2017                    
None                

 

 Full Exam - General                     Neurologic                    deep 
tendon reflexes                    Overall:                    deep tendon 
reflexes intact                    10/18/2017                    None          
      

 

 Full Exam - General                     Neurologic                    
cranial nerves                    Overall:                    crainial nerves 2 
- 12 grossly intact                    10/18/2017                    None      
          

 

 Full Exam - General                     Psychiatric                    
orientation/consciousness                    Overall:                    
oriented to person, place and time                    10/18/2017               
     None                

 

 Full Exam - General                     Psychiatric                    
mood and affect                    Overall:                    normal mood and 
affect                    10/18/2017                    None                

 

 Full Exam - General                     Constitutional                     
general appearance                    Development:                    well 
developed                    2017                    None                

 

 Full Exam - General                     Constitutional                     
general appearance                    Development:                    appears 
stated age                    2017                    None                

 

 Full Exam - General                     Constitutional                     
general appearance                    Overall:                    well 
developed                    2017                    None                

 

 Full Exam - General                     Constitutional                     
general appearance                    Overall:                    in no acute 
distress                    2017                    None                

 

 Full Exam - General                     Constitutional                     
general appearance                    Overall:                    well 
nourished                    2017                    None                

 

 Full Exam - General                     Constitutional                     
general appearance                    Hygiene/Attention to Grooming:           
         good hygiene                    2017                    None    
            

 

 Full Exam - General                     Eyes                    conjunctiva
/eyelids                    Overall:                    conjunctiva clear      
              2017                    None                

 

 Full Exam - General                     Eyes                    conjunctiva
/eyelids                    Overall:                    cornea clear           
         2017                    None                

 

 Full Exam - General                     Eyes                    conjunctiva
/eyelids                    Overall:                    eyelids normal         
           2017                    None                

 

 Full Exam - General                     Eyes                    pupils and 
irises                    Overall:                    pupils equal, round, 
reactive to light and accomodation                    2017               
     None                

 

 Full Exam - General                     Ears/Nose/Throat                  
  otoscopic exam                    Overall:                    external 
auditory canals clear                    2017                    None    
            

 

 Full Exam - General                     Ears/Nose/Throat                  
  otoscopic exam                    Overall:                    tympanic 
membranes clear                    2017                    None          
      

 

 Full Exam - General                     Ears/Nose/Throat                  
  lips/teeth/gingiva                    Overall:                    benign lips
                    2017                    None                

 

 Full Exam - General                     Ears/Nose/Throat                  
  lips/teeth/gingiva                    Overall:                    normal 
dentition                    2017                    None                

 

 Full Exam - General                     Ears/Nose/Throat                  
  oral cavity/pharynx/larynx                     Overall:                    
oral mucosa clear                    2017                    None        
        

 

 Full Exam - General                     Ears/Nose/Throat                  
  oral cavity/pharynx/larynx                     Overall:                    
oropharyngeal mucosa clear                    2017                    
None                

 

 Full Exam - General                     Ears/Nose/Throat                  
  oral cavity/pharynx/larynx                     Overall:                    
hypopharynx benign                    2017                    None       
         

 

 Full Exam - General                     Ears/Nose/Throat                  
  oral cavity/pharynx/larynx                     Overall:                    no 
masses                    2017                    None                

 

 Full Exam - General                     Respiratory                    
auscultation                    Overall:                    breath sounds clear 
bilaterally                    2017                    None              
  

 

 Full Exam - General                     Respiratory                    
respiratory effort/rhythm                    Overall:                    no 
retractions                    2017                    None              
  

 

 Full Exam - General                     Respiratory                    
respiratory effort/rhythm                    Overall:                    normal 
rate                    2017                    None                

 

 Full Exam - General                     Cardiovascular                    
extremities                    Overall:                    no clubbing         
           2017                    None                

 

 Full Exam - General                     Cardiovascular                    
auscultation of heart                    Overall:                    regular 
rate                    2017                    None                

 

 Full Exam - General                     Cardiovascular                    
auscultation of heart                    Overall:                    normal 
heart sounds                    2017                    None             
   

 

 Full Exam - General                     Abdomen                    
abdominal exam                    Overall:                    no tenderness    
                2017                    None                

 

 Full Exam - General                     Abdomen                    
abdominal exam                    Overall:                    normal bowel 
sounds                    2017                    None                

 

 Full Exam - General                     Neurologic                    
cranial nerves                    Overall:                    crainial nerves 2 
- 12 grossly intact                    2017                    None      
          

 

 Full Exam - General                     Psychiatric                    
orientation/consciousness                    Overall:                    
oriented to person, place and time                    2017               
     None                

 

 Full Exam - General                     Psychiatric                    
mood and affect                    Mood:                    flat               
     2017                    None                

 

 Full Exam - General                     Psychiatric                    
mood and affect                    Affect:                    flat             
       2017                    None                

 

 Full Exam - General                     Musculoskeletal                    
spine, ribs and pelvis                    Spine:                    tender @ 
thoracic spine                    2017                    None           
     

 

 Full Exam - General                     Musculoskeletal                    
spine, ribs and pelvis                    Spine:                    tender @ 
lumbar spine                    2017                    None             
   

 

 Full Exam - General                     Musculoskeletal                    
spine, ribs and pelvis                    Spine:                    decreased 
extension                    2017                    None                

 

 Full Exam - General                     Constitutional                     
general appearance                    Development:                    well 
developed                    2017                    None                

 

 Full Exam - General                     Constitutional                     
general appearance                    Development:                    appears 
stated age                    2017                    None                

 

 Full Exam - General                     Constitutional                     
general appearance                    Overall:                    well 
developed                    2017                    None                

 

 Full Exam - General                     Constitutional                     
general appearance                    Overall:                    in no acute 
distress                    2017                    None                

 

 Full Exam - General                     Constitutional                     
general appearance                    Overall:                    well 
nourished                    2017                    None                

 

 Full Exam - General                     Constitutional                     
general appearance                    Hygiene/Attention to Grooming:           
         good hygiene                    2017                    None    
            

 

 Full Exam - General                     Eyes                    conjunctiva
/eyelids                    Overall:                    conjunctiva clear      
              2017                    None                

 

 Full Exam - General                     Eyes                    conjunctiva
/eyelids                    Overall:                    cornea clear           
         2017                    None                

 

 Full Exam - General                     Eyes                    conjunctiva
/eyelids                    Overall:                    eyelids normal         
           2017                    None                

 

 Full Exam - General                     Eyes                    pupils and 
irises                    Overall:                    pupils equal, round, 
reactive to light and accomodation                    2017               
     None                

 

 Full Exam - General                     Ears/Nose/Throat                  
  otoscopic exam                    Overall:                    external 
auditory canals clear                    2017                    None    
            

 

 Full Exam - General                     Ears/Nose/Throat                  
  otoscopic exam                    Overall:                    tympanic 
membranes clear                    2017                    None          
      

 

 Full Exam - General                     Ears/Nose/Throat                  
  lips/teeth/gingiva                    Overall:                    benign lips
                    2017                    None                

 

 Full Exam - General                     Ears/Nose/Throat                  
  lips/teeth/gingiva                    Overall:                    normal 
dentition                    2017                    None                

 

 Full Exam - General                     Ears/Nose/Throat                  
  oral cavity/pharynx/larynx                     Overall:                    
oral mucosa clear                    2017                    None        
        

 

 Full Exam - General                     Ears/Nose/Throat                  
  oral cavity/pharynx/larynx                     Overall:                    
oropharyngeal mucosa clear                    2017                    
None                

 

 Full Exam - General                     Ears/Nose/Throat                  
  oral cavity/pharynx/larynx                     Overall:                    
hypopharynx benign                    2017                    None       
         

 

 Full Exam - General                     Ears/Nose/Throat                  
  oral cavity/pharynx/larynx                     Overall:                    no 
masses                    2017                    None                

 

 Full Exam - General                     Respiratory                    
auscultation                    Overall:                    breath sounds clear 
bilaterally                    2017                    None              
  

 

 Full Exam - General                     Respiratory                    
respiratory effort/rhythm                    Overall:                    no 
retractions                    2017                    None              
  

 

 Full Exam - General                     Respiratory                    
respiratory effort/rhythm                    Overall:                    normal 
rate                    2017                    None                

 

 Full Exam - General                     Cardiovascular                    
extremities                    Overall:                    no clubbing         
           2017                    None                

 

 Full Exam - General                     Cardiovascular                    
auscultation of heart                    Overall:                    regular 
rate                    2017                    None                

 

 Full Exam - General                     Cardiovascular                    
auscultation of heart                    Overall:                    normal 
heart sounds                    2017                    None             
   

 

 Full Exam - General                     Abdomen                    
abdominal exam                    Overall:                    no tenderness    
                2017                    None                

 

 Full Exam - General                     Abdomen                    
abdominal exam                    Overall:                    normal bowel 
sounds                    2017                    None                

 

 Full Exam - General                     Neurologic                    
cranial nerves                    Overall:                    crainial nerves 2 
- 12 grossly intact                    2017                    None      
          

 

 Full Exam - General                     Psychiatric                    
orientation/consciousness                    Overall:                    
oriented to person, place and time                    2017               
     None                

 

 Full Exam - General                     Psychiatric                    
mood and affect                    Mood:                    flat               
     2017                    None                

 

 Full Exam - General                     Psychiatric                    
mood and affect                    Affect:                    flat             
       2017                    None                

 

 Full Exam - General                     Constitutional                     
general appearance                    Development:                    well 
developed                    2016                    None                

 

 Full Exam - General                     Constitutional                     
general appearance                    Development:                    appears 
stated age                    2016                    None                

 

 Full Exam - General                     Constitutional                     
general appearance                    Overall:                    well 
developed                    2016                    None                

 

 Full Exam - General                     Constitutional                     
general appearance                    Overall:                    in no acute 
distress                    2016                    None                

 

 Full Exam - General                     Constitutional                     
general appearance                    Overall:                    well 
nourished                    2016                    None                

 

 Full Exam - General                     Constitutional                     
general appearance                    Hygiene/Attention to Grooming:           
         good hygiene                    2016                    None    
            

 

 Full Exam - General                     Eyes                    conjunctiva
/eyelids                    Overall:                    conjunctiva clear      
              2016                    None                

 

 Full Exam - General                     Eyes                    conjunctiva
/eyelids                    Overall:                    cornea clear           
         2016                    None                

 

 Full Exam - General                     Eyes                    conjunctiva
/eyelids                    Overall:                    eyelids normal         
           2016                    None                

 

 Full Exam - General                     Eyes                    pupils and 
irises                    Overall:                    pupils equal, round, 
reactive to light and accomodation                    2016               
     None                

 

 Full Exam - General                     Ears/Nose/Throat                  
  otoscopic exam                    Overall:                    external 
auditory canals clear                    2016                    None    
            

 

 Full Exam - General                     Ears/Nose/Throat                  
  otoscopic exam                    Overall:                    tympanic 
membranes clear                    2016                    None          
      

 

 Full Exam - General                     Ears/Nose/Throat                  
  lips/teeth/gingiva                    Overall:                    benign lips
                    2016                    None                

 

 Full Exam - General                     Ears/Nose/Throat                  
  lips/teeth/gingiva                    Overall:                    normal 
dentition                    2016                    None                

 

 Full Exam - General                     Ears/Nose/Throat                  
  oral cavity/pharynx/larynx                     Overall:                    
oral mucosa clear                    2016                    None        
        

 

 Full Exam - General                     Ears/Nose/Throat                  
  oral cavity/pharynx/larynx                     Overall:                    
oropharyngeal mucosa clear                    2016                    
None                

 

 Full Exam - General                     Ears/Nose/Throat                  
  oral cavity/pharynx/larynx                     Overall:                    
hypopharynx benign                    2016                    None       
         

 

 Full Exam - General                     Ears/Nose/Throat                  
  oral cavity/pharynx/larynx                     Overall:                    no 
masses                    2016                    None                

 

 Full Exam - General                     Respiratory                    
auscultation                    Overall:                    breath sounds clear 
bilaterally                    2016                    None              
  

 

 Full Exam - General                     Respiratory                    
respiratory effort/rhythm                    Overall:                    no 
retractions                    2016                    None              
  

 

 Full Exam - General                     Respiratory                    
respiratory effort/rhythm                    Overall:                    normal 
rate                    2016                    None                

 

 Full Exam - General                     Cardiovascular                    
extremities                    Overall:                    no clubbing         
           2016                    None                

 

 Full Exam - General                     Cardiovascular                    
auscultation of heart                    Overall:                    regular 
rate                    2016                    None                

 

 Full Exam - General                     Cardiovascular                    
auscultation of heart                    Overall:                    normal 
heart sounds                    2016                    None             
   

 

 Full Exam - General                     Abdomen                    
abdominal exam                    Overall:                    no tenderness    
                2016                    None                

 

 Full Exam - General                     Abdomen                    
abdominal exam                    Overall:                    normal bowel 
sounds                    2016                    None                

 

 Full Exam - General                     Neurologic                    
cranial nerves                    Overall:                    crainial nerves 2 
- 12 grossly intact                    2016                    None      
          

 

 Full Exam - General                     Psychiatric                    
orientation/consciousness                    Overall:                    
oriented to person, place and time                    2016               
     None                

 

 Full Exam - General                     Psychiatric                    
mood and affect                    Mood:                    flat               
     2016                    None                

 

 Full Exam - General                     Psychiatric                    
mood and affect                    Affect:                    flat             
       2016                    None                

 

 Full Exam - General                     Constitutional                     
general appearance                    Evidence of Distress:                    
in acute distress                    2016                    None        
        

 

 Full Exam - General                     Constitutional                     
general appearance                    Evidence of Distress:                    
agitated                    2016                    None                

 

 Full Exam - General                     Constitutional                     
general appearance                    Evidence of Distress:                    
anxious                    2016                    None                

 

 Full Exam - General                     Neurologic                    gait
                    Overall:                    no ataxia, no unsteadiness     
               2016                    None                

 

 Full Exam - General                     Psychiatric                    
orientation/consciousness                    Overall:                    
oriented to person, place and time                    2016               
     None                

 

 Full Exam - General                     Psychiatric                    
behavior/psychomotor activity                    Behavior:                    
agitation/restlessness                    2016                    None   
             

 

 Full Exam - General                     Psychiatric                    
mood and affect                    Mood:                    irritable          
          2016                    None                

 

 Full Exam - General                     Psychiatric                    
mood and affect                    Mood:                    angry              
      2016                    None                

 

 Full Exam - General                     Psychiatric                    
mood and affect                    Mood:                    labile mood        
            2016                    None                

 

 Full Exam - General                     Psychiatric                    
mood and affect                    Appropriateness:                    
inappropriate emotional responses                    2016                
    None                

 

 Full Exam - General                     Constitutional                     
general appearance                    Development:                    well 
developed                    2016                    None                

 

 Full Exam - General                     Constitutional                     
general appearance                    Development:                    appears 
stated age                    2016                    None                

 

 Full Exam - General                     Constitutional                     
general appearance                    Overall:                    well 
developed                    2016                    None                

 

 Full Exam - General                     Constitutional                     
general appearance                    Overall:                    in no acute 
distress                    2016                    None                

 

 Full Exam - General                     Constitutional                     
general appearance                    Overall:                    well 
nourished                    2016                    None                

 

 Full Exam - General                     Constitutional                     
general appearance                    Hygiene/Attention to Grooming:           
         good hygiene                    2016                    None    
            

 

 Full Exam - General                     Eyes                    conjunctiva
/eyelids                    Overall:                    conjunctiva clear      
              2016                    None                

 

 Full Exam - General                     Eyes                    conjunctiva
/eyelids                    Overall:                    cornea clear           
         2016                    None                

 

 Full Exam - General                     Eyes                    conjunctiva
/eyelids                    Overall:                    eyelids normal         
           2016                    None                

 

 Full Exam - General                     Eyes                    pupils and 
irises                    Overall:                    pupils equal, round, 
reactive to light and accomodation                    2016               
     None                

 

 Full Exam - General                     Ears/Nose/Throat                  
  otoscopic exam                    Overall:                    external 
auditory canals clear                    2016                    None    
            

 

 Full Exam - General                     Ears/Nose/Throat                  
  otoscopic exam                    Overall:                    tympanic 
membranes clear                    2016                    None          
      

 

 Full Exam - General                     Ears/Nose/Throat                  
  lips/teeth/gingiva                    Overall:                    benign lips
                    2016                    None                

 

 Full Exam - General                     Ears/Nose/Throat                  
  lips/teeth/gingiva                    Overall:                    normal 
dentition                    2016                    None                

 

 Full Exam - General                     Ears/Nose/Throat                  
  oral cavity/pharynx/larynx                     Overall:                    
oral mucosa clear                    2016                    None        
        

 

 Full Exam - General                     Ears/Nose/Throat                  
  oral cavity/pharynx/larynx                     Overall:                    
oropharyngeal mucosa clear                    2016                    
None                

 

 Full Exam - General                     Ears/Nose/Throat                  
  oral cavity/pharynx/larynx                     Overall:                    
hypopharynx benign                    2016                    None       
         

 

 Full Exam - General                     Ears/Nose/Throat                  
  oral cavity/pharynx/larynx                     Overall:                    no 
masses                    2016                    None                

 

 Full Exam - General                     Respiratory                    
auscultation                    Overall:                    breath sounds clear 
bilaterally                    2016                    None              
  

 

 Full Exam - General                     Respiratory                    
respiratory effort/rhythm                    Overall:                    no 
retractions                    2016                    None              
  

 

 Full Exam - General                     Respiratory                    
respiratory effort/rhythm                    Overall:                    normal 
rate                    2016                    None                

 

 Full Exam - General                     Cardiovascular                    
extremities                    Overall:                    no clubbing         
           2016                    None                

 

 Full Exam - General                     Cardiovascular                    
auscultation of heart                    Overall:                    regular 
rate                    2016                    None                

 

 Full Exam - General                     Cardiovascular                    
auscultation of heart                    Overall:                    normal 
heart sounds                    2016                    None             
   

 

 Full Exam - General                     Abdomen                    
abdominal exam                    Overall:                    no tenderness    
                2016                    None                

 

 Full Exam - General                     Abdomen                    
abdominal exam                    Overall:                    normal bowel 
sounds                    2016                    None                

 

 Full Exam - General                     Musculoskeletal                    
spine, ribs and pelvis                    Posture:                    kyphosis 
                   2016                    None                

 

 Full Exam - General                     Musculoskeletal                    
gait and station                    Overall:                    normal gait    
                2016                    None                

 

 Full Exam - General                     Musculoskeletal                    
gait and station                    Overall:                    normal station 
                   2016                    None                

 

 Full Exam - General                     Neurologic                    
cranial nerves                    Overall:                    crainial nerves 2 
- 12 grossly intact                    2016                    None      
          

 

 Full Exam - General                     Psychiatric                    
orientation/consciousness                    Overall:                    
oriented to person, place and time                    2016               
     None                

 

 Full Exam - General                     Psychiatric                    
mood and affect                    Overall:                    normal mood and 
affect                    2016                    None                

 

 Full Exam - General                     Constitutional                     
general appearance                    Overall:                    well 
developed                    10/26/2016                    None                

 

 Full Exam - General                     Constitutional                     
general appearance                    Overall:                    well 
nourished                    10/26/2016                    None                

 

 Full Exam - General                     Eyes                    conjunctiva
/eyelids                    Overall:                    conjunctiva clear      
              10/26/2016                    None                

 

 Full Exam - General                     Eyes                    conjunctiva
/eyelids                    Overall:                    cornea clear           
         10/26/2016                    None                

 

 Full Exam - General                     Eyes                    conjunctiva
/eyelids                    Overall:                    eyelids normal         
           10/26/2016                    None                

 

 Full Exam - General                     Ears/Nose/Throat                  
  lips/teeth/gingiva                    Overall:                    benign lips
                    10/26/2016                    None                

 

 Full Exam - General                     Ears/Nose/Throat                  
  lips/teeth/gingiva                    Overall:                    normal 
dentition                    10/26/2016                    None                

 

 Full Exam - General                     Respiratory                    
auscultation                    Overall:                    breath sounds clear 
bilaterally                    10/26/2016                    None              
  

 

 Full Exam - General                     Respiratory                    
respiratory effort/rhythm                    Overall:                    no 
retractions                    10/26/2016                    None              
  

 

 Full Exam - General                     Respiratory                    
respiratory effort/rhythm                    Overall:                    normal 
rate                    10/26/2016                    None                

 

 Full Exam - General                     Cardiovascular                    
extremities                    Overall:                    no clubbing         
           10/26/2016                    None                

 

 Full Exam - General                     Cardiovascular                    
auscultation of heart                    Overall:                    regular 
rate                    10/26/2016                    None                

 

 Full Exam - General                     Cardiovascular                    
auscultation of heart                    Overall:                    normal 
heart sounds                    10/26/2016                    None             
   

 

 Full Exam - General                     Musculoskeletal                    
spine, ribs and pelvis                    Posture:                    kyphosis 
                   10/26/2016                    None                

 

 Full Exam - General                     Neurologic                    
cranial nerves                    Overall:                    crainial nerves 2 
- 12 grossly intact                    10/26/2016                    None      
          

 

 Full Exam - General                     Psychiatric                    
orientation/consciousness                    Overall:                    
oriented to person, place and time                    10/26/2016               
     None                

 

 Full Exam - General                     Psychiatric                    
mood and affect                    Overall:                    normal mood and 
affect                    10/26/2016                    None                

 

 Full Exam - General                     Respiratory                    
auscultation                    Diffuse:                    diminished         
           10/26/2016                    None                

 

 Full Exam - General                     Constitutional                     
general appearance                    Development:                    well 
developed                    2016                    None                

 

 Full Exam - General                     Constitutional                     
general appearance                    Development:                    appears 
stated age                    2016                    None                

 

 Full Exam - General                     Constitutional                     
general appearance                    Overall:                    well 
developed                    2016                    None                

 

 Full Exam - General                     Constitutional                     
general appearance                    Overall:                    in no acute 
distress                    2016                    None                

 

 Full Exam - General                     Constitutional                     
general appearance                    Overall:                    well 
nourished                    2016                    None                

 

 Full Exam - General                     Constitutional                     
general appearance                    Hygiene/Attention to Grooming:           
         good hygiene                    2016                    None    
            

 

 Full Exam - General                     Eyes                    conjunctiva
/eyelids                    Overall:                    conjunctiva clear      
              2016                    None                

 

 Full Exam - General                     Eyes                    conjunctiva
/eyelids                    Overall:                    cornea clear           
         2016                    None                

 

 Full Exam - General                     Eyes                    conjunctiva
/eyelids                    Overall:                    eyelids normal         
           2016                    None                

 

 Full Exam - General                     Eyes                    pupils and 
irises                    Overall:                    pupils equal, round, 
reactive to light and accomodation                    2016               
     None                

 

 Full Exam - General                     Ears/Nose/Throat                  
  otoscopic exam                    Overall:                    external 
auditory canals clear                    2016                    None    
            

 

 Full Exam - General                     Ears/Nose/Throat                  
  otoscopic exam                    Overall:                    tympanic 
membranes clear                    2016                    None          
      

 

 Full Exam - General                     Ears/Nose/Throat                  
  lips/teeth/gingiva                    Overall:                    benign lips
                    2016                    None                

 

 Full Exam - General                     Ears/Nose/Throat                  
  lips/teeth/gingiva                    Overall:                    normal 
dentition                    2016                    None                

 

 Full Exam - General                     Ears/Nose/Throat                  
  oral cavity/pharynx/larynx                     Overall:                    
oral mucosa clear                    2016                    None        
        

 

 Full Exam - General                     Ears/Nose/Throat                  
  oral cavity/pharynx/larynx                     Overall:                    
oropharyngeal mucosa clear                    2016                    
None                

 

 Full Exam - General                     Ears/Nose/Throat                  
  oral cavity/pharynx/larynx                     Overall:                    
hypopharynx benign                    2016                    None       
         

 

 Full Exam - General                     Ears/Nose/Throat                  
  oral cavity/pharynx/larynx                     Overall:                    no 
masses                    2016                    None                

 

 Full Exam - General                     Respiratory                    
auscultation                    Overall:                    breath sounds clear 
bilaterally                    2016                    None              
  

 

 Full Exam - General                     Respiratory                    
respiratory effort/rhythm                    Overall:                    no 
retractions                    2016                    None              
  

 

 Full Exam - General                     Respiratory                    
respiratory effort/rhythm                    Overall:                    normal 
rate                    2016                    None                

 

 Full Exam - General                     Cardiovascular                    
extremities                    Overall:                    no clubbing         
           2016                    None                

 

 Full Exam - General                     Cardiovascular                    
auscultation of heart                    Overall:                    regular 
rate                    2016                    None                

 

 Full Exam - General                     Cardiovascular                    
auscultation of heart                    Overall:                    normal 
heart sounds                    2016                    None             
   

 

 Full Exam - General                     Abdomen                    
abdominal exam                    Overall:                    no tenderness    
                2016                    None                

 

 Full Exam - General                     Abdomen                    
abdominal exam                    Overall:                    normal bowel 
sounds                    2016                    None                

 

 Full Exam - General                     Musculoskeletal                    
lower extremity                    Palpation -  knee:                    no 
effusion                    2016                    None                

 

 Full Exam - General                     Musculoskeletal                    
lower extremity                    Inspection -  lower leg:                    
normal appearance                    2016                    None        
        

 

 Full Exam - General                     Musculoskeletal                    
lower extremity                    Palpation -  lower leg:                    
normal on palpation                    2016                    None      
          

 

 Full Exam - General                     Musculoskeletal                    
spine, ribs and pelvis                    Posture:                    kyphosis 
                   2016                    None                

 

 Full Exam - General                     Musculoskeletal                    
spine, ribs and pelvis                    Spine:                    tender @ 
cervical spine                    2016                    None           
     

 

 Full Exam - General                     Musculoskeletal                    
gait and station                    Overall:                    normal gait    
                2016                    None                

 

 Full Exam - General                     Musculoskeletal                    
gait and station                    Overall:                    normal station 
                   2016                    None                

 

 Full Exam - General                     Integument                    
inspection of skin                    Overall:                    few scattered 
moles, no gross abnormalities                    2016                    
None                

 

 Full Exam - General                     Neurologic                    deep 
tendon reflexes                    Overall:                    deep tendon 
reflexes intact                    2016                    None          
      

 

 Full Exam - General                     Neurologic                    
cranial nerves                    Overall:                    crainial nerves 2 
- 12 grossly intact                    2016                    None      
          

 

 Full Exam - General                     Psychiatric                    
orientation/consciousness                    Overall:                    
oriented to person, place and time                    2016               
     None                

 

 Full Exam - General                     Psychiatric                    
mood and affect                    Overall:                    normal mood and 
affect                    2016                    None                

 

 Full Exam - General                     Constitutional                     
general appearance                    Overall:                    well 
developed                    2016                    None                

 

 Full Exam - General                     Constitutional                     
general appearance                    Overall:                    in no acute 
distress                    2016                    None                

 

 Full Exam - General                     Constitutional                     
general appearance                    Overall:                    well 
nourished                    2016                    None                

 

 Full Exam - General                     Constitutional                     
general appearance                    Hygiene/Attention to Grooming:           
         good hygiene                    2016                    None    
            

 

 Full Exam - General                     Eyes                    conjunctiva
/eyelids                    Overall:                    conjunctiva clear      
              2016                    None                

 

 Full Exam - General                     Eyes                    conjunctiva
/eyelids                    Overall:                    cornea clear           
         2016                    None                

 

 Full Exam - General                     Eyes                    conjunctiva
/eyelids                    Overall:                    eyelids normal         
           2016                    None                

 

 Full Exam - General                     Eyes                    pupils and 
irises                    Overall:                    pupils equal, round, 
reactive to light and accomodation                    2016               
     None                

 

 Full Exam - General                     Ears/Nose/Throat                  
  lips/teeth/gingiva                    Overall:                    benign lips
                    2016                    None                

 

 Full Exam - General                     Ears/Nose/Throat                  
  lips/teeth/gingiva                    Overall:                    normal 
dentition                    2016                    None                

 

 Full Exam - General                     Ears/Nose/Throat                  
  oral cavity/pharynx/larynx                     Overall:                    
oral mucosa clear                    2016                    None        
        

 

 Full Exam - General                     Respiratory                    
auscultation                    Overall:                    breath sounds clear 
bilaterally                    2016                    None              
  

 

 Full Exam - General                     Respiratory                    
respiratory effort/rhythm                    Overall:                    no 
retractions                    2016                    None              
  

 

 Full Exam - General                     Respiratory                    
respiratory effort/rhythm                    Overall:                    normal 
rate                    2016                    None                

 

 Full Exam - General                     Cardiovascular                    
extremities                    Overall:                    no clubbing         
           2016                    None                

 

 Full Exam - General                     Cardiovascular                    
auscultation of heart                    Overall:                    regular 
rate                    2016                    None                

 

 Full Exam - General                     Cardiovascular                    
auscultation of heart                    Overall:                    normal 
heart sounds                    2016                    None             
   

 

 Full Exam - General                     Musculoskeletal                    
spine, ribs and pelvis                    Posture:                    kyphosis 
                   2016                    None                

 

 Full Exam - General                     Musculoskeletal                    
gait and station                    Overall:                    normal gait    
                2016                    None                

 

 Full Exam - General                     Musculoskeletal                    
gait and station                    Overall:                    normal station 
                   2016                    None                

 

 Full Exam - General                     Neurologic                    
cranial nerves                    Overall:                    crainial nerves 2 
- 12 grossly intact                    2016                    None      
          

 

 Full Exam - General                     Psychiatric                    
orientation/consciousness                    Overall:                    
oriented to person, place and time                    2016               
     None                

 

 Full Exam - General                     Psychiatric                    
mood and affect                    Overall:                    normal mood and 
affect                    2016                    None                

 

 Full Exam - General                     Constitutional                     
general appearance                    Development:                    well 
developed                    2016                    None                

 

 Full Exam - General                     Constitutional                     
general appearance                    Development:                    appears 
stated age                    2016                    None                

 

 Full Exam - General                     Constitutional                     
general appearance                    Overall:                    well 
developed                    2016                    None                

 

 Full Exam - General                     Constitutional                     
general appearance                    Overall:                    in no acute 
distress                    2016                    None                

 

 Full Exam - General                     Constitutional                     
general appearance                    Overall:                    well 
nourished                    2016                    None                

 

 Full Exam - General                     Constitutional                     
general appearance                    Hygiene/Attention to Grooming:           
         good hygiene                    2016                    None    
            

 

 Full Exam - General                     Eyes                    conjunctiva
/eyelids                    Overall:                    conjunctiva clear      
              2016                    None                

 

 Full Exam - General                     Eyes                    conjunctiva
/eyelids                    Overall:                    cornea clear           
         2016                    None                

 

 Full Exam - General                     Eyes                    conjunctiva
/eyelids                    Overall:                    eyelids normal         
           2016                    None                

 

 Full Exam - General                     Eyes                    pupils and 
irises                    Overall:                    pupils equal, round, 
reactive to light and accomodation                    2016               
     None                

 

 Full Exam - General                     Ears/Nose/Throat                  
  otoscopic exam                    Overall:                    external 
auditory canals clear                    2016                    None    
            

 

 Full Exam - General                     Ears/Nose/Throat                  
  otoscopic exam                    Overall:                    tympanic 
membranes clear                    2016                    None          
      

 

 Full Exam - General                     Ears/Nose/Throat                  
  lips/teeth/gingiva                    Overall:                    benign lips
                    2016                    None                

 

 Full Exam - General                     Ears/Nose/Throat                  
  lips/teeth/gingiva                    Overall:                    normal 
dentition                    2016                    None                

 

 Full Exam - General                     Ears/Nose/Throat                  
  oral cavity/pharynx/larynx                     Overall:                    
oral mucosa clear                    2016                    None        
        

 

 Full Exam - General                     Ears/Nose/Throat                  
  oral cavity/pharynx/larynx                     Overall:                    
oropharyngeal mucosa clear                    2016                    
None                

 

 Full Exam - General                     Ears/Nose/Throat                  
  oral cavity/pharynx/larynx                     Overall:                    
hypopharynx benign                    2016                    None       
         

 

 Full Exam - General                     Ears/Nose/Throat                  
  oral cavity/pharynx/larynx                     Overall:                    no 
masses                    2016                    None                

 

 Full Exam - General                     Respiratory                    
auscultation                    Overall:                    breath sounds clear 
bilaterally                    2016                    None              
  

 

 Full Exam - General                     Respiratory                    
respiratory effort/rhythm                    Overall:                    no 
retractions                    2016                    None              
  

 

 Full Exam - General                     Respiratory                    
respiratory effort/rhythm                    Overall:                    normal 
rate                    2016                    None                

 

 Full Exam - General                     Cardiovascular                    
extremities                    Overall:                    no clubbing         
           2016                    None                

 

 Full Exam - General                     Cardiovascular                    
auscultation of heart                    Overall:                    regular 
rate                    2016                    None                

 

 Full Exam - General                     Cardiovascular                    
auscultation of heart                    Overall:                    normal 
heart sounds                    2016                    None             
   

 

 Full Exam - General                     Abdomen                    
abdominal exam                    Overall:                    no tenderness    
                2016                    None                

 

 Full Exam - General                     Abdomen                    
abdominal exam                    Overall:                    normal bowel 
sounds                    2016                    None                

 

 Full Exam - General                     Musculoskeletal                    
lower extremity                    Palpation -  knee:                    no 
effusion                    2016                    None                

 

 Full Exam - General                     Musculoskeletal                    
lower extremity                    Inspection -  lower leg:                    
normal appearance                    2016                    None        
        

 

 Full Exam - General                     Musculoskeletal                    
lower extremity                    Palpation -  lower leg:                    
normal on palpation                    2016                    None      
          

 

 Full Exam - General                     Musculoskeletal                    
spine, ribs and pelvis                    Posture:                    kyphosis 
                   2016                    None                

 

 Full Exam - General                     Musculoskeletal                    
spine, ribs and pelvis                    Spine:                    tender @ 
cervical spine                    2016                    None           
     

 

 Full Exam - General                     Musculoskeletal                    
gait and station                    Overall:                    normal gait    
                2016                    None                

 

 Full Exam - General                     Musculoskeletal                    
gait and station                    Overall:                    normal station 
                   2016                    None                

 

 Full Exam - General                     Integument                    
inspection of skin                    Overall:                    few scattered 
moles, no gross abnormalities                    2016                    
None                

 

 Full Exam - General                     Neurologic                    deep 
tendon reflexes                    Overall:                    deep tendon 
reflexes intact                    2016                    None          
      

 

 Full Exam - General                     Neurologic                    
cranial nerves                    Overall:                    crainial nerves 2 
- 12 grossly intact                    2016                    None      
          

 

 Full Exam - General                     Psychiatric                    
orientation/consciousness                    Overall:                    
oriented to person, place and time                    2016               
     None                

 

 Full Exam - General                     Psychiatric                    
mood and affect                    Overall:                    normal mood and 
affect                    2016                    None                

 

 Full Exam - General                     Constitutional                     
general appearance                    Development:                    well 
developed                    2016                    None                

 

 Full Exam - General                     Constitutional                     
general appearance                    Development:                    appears 
stated age                    2016                    None                

 

 Full Exam - General                     Constitutional                     
general appearance                    Overall:                    well 
developed                    2016                    None                

 

 Full Exam - General                     Constitutional                     
general appearance                    Overall:                    in no acute 
distress                    2016                    None                

 

 Full Exam - General                     Constitutional                     
general appearance                    Overall:                    well 
nourished                    2016                    None                

 

 Full Exam - General                     Constitutional                     
general appearance                    Hygiene/Attention to Grooming:           
         good hygiene                    2016                    None    
            

 

 Full Exam - General                     Eyes                    conjunctiva
/eyelids                    Overall:                    conjunctiva clear      
              2016                    None                

 

 Full Exam - General                     Eyes                    conjunctiva
/eyelids                    Overall:                    cornea clear           
         2016                    None                

 

 Full Exam - General                     Eyes                    conjunctiva
/eyelids                    Overall:                    eyelids normal         
           2016                    None                

 

 Full Exam - General                     Eyes                    pupils and 
irises                    Overall:                    pupils equal, round, 
reactive to light and accomodation                    2016               
     None                

 

 Full Exam - General                     Ears/Nose/Throat                  
  otoscopic exam                    Overall:                    external 
auditory canals clear                    2016                    None    
            

 

 Full Exam - General                     Ears/Nose/Throat                  
  otoscopic exam                    Overall:                    tympanic 
membranes clear                    2016                    None          
      

 

 Full Exam - General                     Ears/Nose/Throat                  
  lips/teeth/gingiva                    Overall:                    benign lips
                    2016                    None                

 

 Full Exam - General                     Ears/Nose/Throat                  
  lips/teeth/gingiva                    Overall:                    normal 
dentition                    2016                    None                

 

 Full Exam - General                     Ears/Nose/Throat                  
  oral cavity/pharynx/larynx                     Overall:                    
oral mucosa clear                    2016                    None        
        

 

 Full Exam - General                     Ears/Nose/Throat                  
  oral cavity/pharynx/larynx                     Overall:                    
oropharyngeal mucosa clear                    2016                    
None                

 

 Full Exam - General                     Ears/Nose/Throat                  
  oral cavity/pharynx/larynx                     Overall:                    
hypopharynx benign                    2016                    None       
         

 

 Full Exam - General                     Ears/Nose/Throat                  
  oral cavity/pharynx/larynx                     Overall:                    no 
masses                    2016                    None                

 

 Full Exam - General                     Respiratory                    
auscultation                    Overall:                    breath sounds clear 
bilaterally                    2016                    None              
  

 

 Full Exam - General                     Respiratory                    
respiratory effort/rhythm                    Overall:                    no 
retractions                    2016                    None              
  

 

 Full Exam - General                     Respiratory                    
respiratory effort/rhythm                    Overall:                    normal 
rate                    2016                    None                

 

 Full Exam - General                     Cardiovascular                    
extremities                    Overall:                    no clubbing         
           2016                    None                

 

 Full Exam - General                     Cardiovascular                    
auscultation of heart                    Overall:                    regular 
rate                    2016                    None                

 

 Full Exam - General                     Cardiovascular                    
auscultation of heart                    Overall:                    normal 
heart sounds                    2016                    None             
   

 

 Full Exam - General                     Abdomen                    
abdominal exam                    Overall:                    no tenderness    
                2016                    None                

 

 Full Exam - General                     Abdomen                    
abdominal exam                    Overall:                    normal bowel 
sounds                    2016                    None                

 

 Full Exam - General                     Musculoskeletal                    
lower extremity                    Palpation -  knee:                    no 
effusion                    2016                    None                

 

 Full Exam - General                     Musculoskeletal                    
lower extremity                    Inspection -  lower leg:                    
normal appearance                    2016                    None        
        

 

 Full Exam - General                     Musculoskeletal                    
lower extremity                    Palpation -  lower leg:                    
normal on palpation                    2016                    None      
          

 

 Full Exam - General                     Musculoskeletal                    
spine, ribs and pelvis                    Posture:                    kyphosis 
                   2016                    None                

 

 Full Exam - General                     Musculoskeletal                    
spine, ribs and pelvis                    Spine:                    tender @ 
cervical spine                    2016                    None           
     

 

 Full Exam - General                     Musculoskeletal                    
gait and station                    Overall:                    normal gait    
                2016                    None                

 

 Full Exam - General                     Musculoskeletal                    
gait and station                    Overall:                    normal station 
                   2016                    None                

 

 Full Exam - General                     Integument                    
inspection of skin                    Overall:                    few scattered 
moles, no gross abnormalities                    2016                    
None                

 

 Full Exam - General                     Neurologic                    deep 
tendon reflexes                    Overall:                    deep tendon 
reflexes intact                    2016                    None          
      

 

 Full Exam - General                     Neurologic                    
cranial nerves                    Overall:                    crainial nerves 2 
- 12 grossly intact                    2016                    None      
          

 

 Full Exam - General                     Psychiatric                    
orientation/consciousness                    Overall:                    
oriented to person, place and time                    2016               
     None                

 

 Full Exam - General                     Psychiatric                    
mood and affect                    Overall:                    normal mood and 
affect                    2016                    None                

 

 Full Exam - General                     Constitutional                     
general appearance                    Overall:                    well 
developed                    2016                    None                

 

 Full Exam - General                     Constitutional                     
general appearance                    Overall:                    in no acute 
distress                    2016                    None                

 

 Full Exam - General                     Constitutional                     
general appearance                    Overall:                    well 
nourished                    2016                    None                

 

 Full Exam - General                     Eyes                    conjunctiva
/eyelids                    Overall:                    conjunctiva clear      
              2016                    None                

 

 Full Exam - General                     Eyes                    pupils and 
irises                    Overall:                    pupils equal, round, 
reactive to light and accomodation                    2016               
     None                

 

 Full Exam - General                     Ears/Nose/Throat                  
  otoscopic exam                    Overall:                    external 
auditory canals clear                    2016                    None    
            

 

 Full Exam - General                     Ears/Nose/Throat                  
  otoscopic exam                    Overall:                    tympanic 
membranes clear                    2016                    None          
      

 

 Full Exam - General                     Ears/Nose/Throat                  
  oral cavity/pharynx/larynx                     Overall:                    
oral mucosa clear                    2016                    None        
        

 

 Full Exam - General                     Respiratory                    
auscultation                    Overall:                    breath sounds clear 
bilaterally                    2016                    None              
  

 

 Full Exam - General                     Respiratory                    
respiratory effort/rhythm                    Overall:                    no 
retractions                    2016                    None              
  

 

 Full Exam - General                     Respiratory                    
respiratory effort/rhythm                    Overall:                    normal 
rate                    2016                    None                

 

 Full Exam - General                     Cardiovascular                    
extremities                    Overall:                    no clubbing         
           2016                    None                

 

 Full Exam - General                     Cardiovascular                    
auscultation of heart                    Overall:                    regular 
rate                    2016                    None                

 

 Full Exam - General                     Cardiovascular                    
auscultation of heart                    Overall:                    normal 
heart sounds                    2016                    None             
   

 

 Full Exam - General                     Cardiovascular                    
auscultation of heart                    Overall:                    no murmurs
                    2016                    None                

 

 Full Exam - General                     Abdomen                    
abdominal exam                    Overall:                    no tenderness    
                2016                    None                

 

 Full Exam - General                     Abdomen                    
abdominal exam                    Overall:                    normal bowel 
sounds                    2016                    None                

 

 Full Exam - General                     Lymphatic                    neck 
nodes                    Overall:                    anterior cervical chain 
benign                    2016                    None                

 

 Full Exam - General                     Lymphatic                    neck 
nodes                    Overall:                    posterior cervical chain 
benign                    2016                    None                

 

 Full Exam - General                     Musculoskeletal                    
gait and station                    Overall:                    normal gait    
                2016                    None                

 

 Full Exam - General                     Musculoskeletal                    
gait and station                    Overall:                    normal station 
                   2016                    None                

 

 Full Exam - General                     Integument                    
inspection of skin                    Overall:                    no rash, 
lesions                    2016                    None                

 

 Full Exam - General                     Neurologic                    
cranial nerves                    Overall:                    crainial nerves 2 
- 12 grossly intact                    2016                    None      
          

 

 Full Exam - General                     Psychiatric                    
orientation/consciousness                    Overall:                    
oriented to person, place and time                    2016               
     None                

 

 Full Exam - General                     Psychiatric                    
mood and affect                    Mood:                    anxious            
        2016                    tearful                

 

 Full Exam - General                     Constitutional                     
general appearance                    Overall:                    well 
developed                    2015                    None                

 

 Full Exam - General                     Constitutional                     
general appearance                    Overall:                    in no acute 
distress                    2015                    None                

 

 Full Exam - General                     Constitutional                     
general appearance                    Overall:                    well 
nourished                    2015                    None                

 

 Full Exam - General                     Respiratory                    
auscultation                    Overall:                    breath sounds clear 
bilaterally                    2015                    None              
  

 

 Full Exam - General                     Respiratory                    
respiratory effort/rhythm                    Overall:                    no 
retractions                    2015                    None              
  

 

 Full Exam - General                     Respiratory                    
respiratory effort/rhythm                    Overall:                    normal 
rate                    2015                    None                

 

 Full Exam - General                     Cardiovascular                    
auscultation of heart                    Overall:                    regular 
rate                    2015                    None                

 

 Full Exam - General                     Cardiovascular                    
auscultation of heart                    Overall:                    normal 
heart sounds                    2015                    None             
   

 

 Full Exam - General                     Musculoskeletal                    
lower extremity                    Palpation -  knee:                    no 
effusion                    2015                    None                

 

 Full Exam - General                     Musculoskeletal                    
lower extremity                    Inspection -  lower leg:                    
normal appearance                    2015                    None        
        

 

 Full Exam - General                     Musculoskeletal                    
lower extremity                    Palpation -  lower leg:                    
normal on palpation                    2015                    None      
          

 

 Full Exam - General                     Musculoskeletal                    
spine, ribs and pelvis                    Posture:                    kyphosis 
                   2015                    None                

 

 Full Exam - General                     Musculoskeletal                    
spine, ribs and pelvis                    Spine:                    tender @ 
cervical spine                    2015                    None           
     

 

 Full Exam - General                     Musculoskeletal                    
gait and station                    Overall:                    normal gait    
                2015                    None                

 

 Full Exam - General                     Musculoskeletal                    
gait and station                    Overall:                    normal station 
                   2015                    None                

 

 Full Exam - General                     Psychiatric                    
orientation/consciousness                    Overall:                    
oriented to person, place and time                    2015               
     None                

 

 Full Exam - General                     Constitutional                     
general appearance                    Development:                    appears 
stated age                    2015                    None                

 

 Full Exam - General                     Constitutional                     
general appearance                    Development:                    well 
developed                    2015                    None                

 

 Full Exam - General                     Constitutional                     
general appearance                    Hygiene/Attention to Grooming:           
         good hygiene                    2015                    None    
            

 

 Full Exam - General                     Eyes                    conjunctiva
/eyelids                    Overall:                    conjunctiva clear      
              2015                    None                

 

 Full Exam - General                     Eyes                    conjunctiva
/eyelids                    Overall:                    cornea clear           
         2015                    None                

 

 Full Exam - General                     Eyes                    conjunctiva
/eyelids                    Overall:                    eyelids normal         
           2015                    None                

 

 Full Exam - General                     Eyes                    pupils and 
irises                    Overall:                    pupils equal, round, 
reactive to light and accomodation                    2015               
     None                

 

 Full Exam - General                     Ears/Nose/Throat                  
  otoscopic exam                    Overall:                    external 
auditory canals clear                    2015                    None    
            

 

 Full Exam - General                     Ears/Nose/Throat                  
  otoscopic exam                    Overall:                    tympanic 
membranes clear                    2015                    None          
      

 

 Full Exam - General                     Ears/Nose/Throat                  
  lips/teeth/gingiva                    Overall:                    benign lips
                    2015                    None                

 

 Full Exam - General                     Ears/Nose/Throat                  
  lips/teeth/gingiva                    Overall:                    normal 
dentition                    2015                    None                

 

 Full Exam - General                     Ears/Nose/Throat                  
  oral cavity/pharynx/larynx                     Overall:                    
hypopharynx benign                    2015                    None       
         

 

 Full Exam - General                     Ears/Nose/Throat                  
  oral cavity/pharynx/larynx                     Overall:                    no 
masses                    2015                    None                

 

 Full Exam - General                     Ears/Nose/Throat                  
  oral cavity/pharynx/larynx                     Overall:                    
oral mucosa clear                    2015                    None        
        

 

 Full Exam - General                     Ears/Nose/Throat                  
  oral cavity/pharynx/larynx                     Overall:                    
oropharyngeal mucosa clear                    2015                    
None                

 

 Full Exam - General                     Cardiovascular                    
extremities                    Overall:                    no clubbing         
           2015                    None                

 

 Full Exam - General                     Abdomen                    
abdominal exam                    Overall:                    no tenderness    
                2015                    None                

 

 Full Exam - General                     Abdomen                    
abdominal exam                    Overall:                    normal bowel 
sounds                    2015                    None                

 

 Full Exam - General                     Integument                    
inspection of skin                    Overall:                    few scattered 
moles, no gross abnormalities                    2015                    
None                

 

 Full Exam - General                     Neurologic                    deep 
tendon reflexes                    Overall:                    deep tendon 
reflexes intact                    2015                    None          
      

 

 Full Exam - General                     Neurologic                    
cranial nerves                    Overall:                    crainial nerves 2 
- 12 grossly intact                    2015                    None      
          

 

 Full Exam - General                     Psychiatric                    
mood and affect                    Overall:                    normal mood and 
affect                    2015                    None                

 

 Full Exam - General                     Constitutional                     
general appearance                    Overall:                    well 
developed                    2015                    None                

 

 Full Exam - General                     Constitutional                     
general appearance                    Overall:                    in no acute 
distress                    2015                    None                

 

 Full Exam - General                     Constitutional                     
general appearance                    Overall:                    well 
nourished                    2015                    None                

 

 Full Exam - General                     Respiratory                    
auscultation                    Overall:                    breath sounds clear 
bilaterally                    2015                    None              
  

 

 Full Exam - General                     Respiratory                    
respiratory effort/rhythm                    Overall:                    no 
retractions                    2015                    None              
  

 

 Full Exam - General                     Respiratory                    
respiratory effort/rhythm                    Overall:                    normal 
rate                    2015                    None                

 

 Full Exam - General                     Cardiovascular                    
auscultation of heart                    Overall:                    regular 
rate                    2015                    None                

 

 Full Exam - General                     Cardiovascular                    
auscultation of heart                    Overall:                    normal 
heart sounds                    2015                    None             
   

 

 Full Exam - General                     Musculoskeletal                    
lower extremity                    Palpation -  knee:                    no 
effusion                    2015                    None                

 

 Full Exam - General                     Musculoskeletal                    
lower extremity                    Inspection -  lower leg:                    
normal appearance                    2015                    None        
        

 

 Full Exam - General                     Musculoskeletal                    
lower extremity                    Palpation -  lower leg:                    
normal on palpation                    2015                    None      
          

 

 Full Exam - General                     Musculoskeletal                    
gait and station                    Overall:                    normal gait    
                2015                    None                

 

 Full Exam - General                     Musculoskeletal                    
gait and station                    Overall:                    normal station 
                   2015                    None                

 

 Full Exam - General                     Psychiatric                    
orientation/consciousness                    Overall:                    
oriented to person, place and time                    2015               
     None                

 

 Full Exam - General                     Musculoskeletal                    
spine, ribs and pelvis                    Posture:                    kyphosis 
                   2015                    None                

 

 Full Exam - General                     Musculoskeletal                    
spine, ribs and pelvis                    Spine:                    tender @ 
cervical spine                    2015                    None           
     

 

 Full Exam - General                     Constitutional                     
general appearance                    Overall:                    well 
developed                    2015                    None                

 

 Full Exam - General                     Constitutional                     
general appearance                    Overall:                    in no acute 
distress                    2015                    None                

 

 Full Exam - General                     Constitutional                     
general appearance                    Overall:                    well 
nourished                    2015                    None                

 

 Full Exam - General                     Psychiatric                    
orientation/consciousness                    Overall:                    
oriented to person, place and time                    2015               
     None                

 

 Full Exam - General                     Respiratory                    
auscultation                    Overall:                    breath sounds clear 
bilaterally                    2015                    None              
  

 

 Full Exam - General                     Respiratory                    
respiratory effort/rhythm                    Overall:                    no 
retractions                    2015                    None              
  

 

 Full Exam - General                     Respiratory                    
respiratory effort/rhythm                    Overall:                    normal 
rate                    2015                    None                

 

 Full Exam - General                     Cardiovascular                    
auscultation of heart                    Overall:                    regular 
rate                    2015                    None                

 

 Full Exam - General                     Cardiovascular                    
auscultation of heart                    Overall:                    normal 
heart sounds                    2015                    None             
   

 

 Full Exam - General                     Musculoskeletal                    
spine, ribs and pelvis                    Overall:                    spine 
benign                    2015                    None                

 

 Full Exam - General                     Musculoskeletal                    
gait and station                    Overall:                    normal gait    
                2015                    None                

 

 Full Exam - General                     Musculoskeletal                    
gait and station                    Overall:                    normal station 
                   2015                    None                

 

 Full Exam - General                     Musculoskeletal                    
lower extremity                    Palpation -  knee:                    no 
effusion                    2015                    None                

 

 Full Exam - General                     Musculoskeletal                    
lower extremity                    Inspection -  lower leg:                    
normal appearance                    2015                    None        
        

 

 Full Exam - General                     Musculoskeletal                    
lower extremity                    Palpation -  lower leg:                    
normal on palpation                    2015                    None      
          

 

 Full Exam - General                     Musculoskeletal                    
lower extremity                    Palpation -  lower leg:                    
tenderness at the calcaneus                    2015                    
None                

 

 Full Exam - General                     Musculoskeletal                    
lower extremity                    Inspection -  foot:                    
callus                    2015                    None                

 

 Full Exam - General                     Constitutional                     
general appearance                    Overall:                    in no acute 
distress                    2015                    None                

 

 Full Exam - General                     Constitutional                     
general appearance                    Overall:                    well 
developed                    2015                    None                

 

 Full Exam - General                     Constitutional                     
general appearance                    Overall:                    well 
nourished                    2015                    None                

 

 Full Exam - General                     Psychiatric                    
orientation/consciousness                    Overall:                    
oriented to person, place and time                    2015               
     None                

 

 Full Exam - General                     Psychiatric                    
mood and affect                    Mood:                    anxious            
        2015                    tearful                

 

 Full Exam - General                     Neurologic                    
cranial nerves                    Overall:                    crainial nerves 2 
- 12 grossly intact                    2015                    None      
          

 

 Full Exam - General                     Integument                    
inspection of skin                    Overall:                    no rash, 
lesions                    2015                    None                

 

 Full Exam - General                     Musculoskeletal                    
gait and station                    Overall:                    normal station 
                   2015                    None                

 

 Full Exam - General                     Musculoskeletal                    
gait and station                    Overall:                    normal gait    
                2015                    None                

 

 Full Exam - General                     Lymphatic                    neck 
nodes                    Overall:                    anterior cervical chain 
benign                    2015                    None                

 

 Full Exam - General                     Lymphatic                    neck 
nodes                    Overall:                    posterior cervical chain 
benign                    2015                    None                

 

 Full Exam - General                     Abdomen                    
abdominal exam                    Overall:                    no tenderness    
                2015                    None                

 

 Full Exam - General                     Abdomen                    
abdominal exam                    Overall:                    normal bowel 
sounds                    2015                    None                

 

 Full Exam - General                     Cardiovascular                    
auscultation of heart                    Overall:                    regular 
rate                    2015                    None                

 

 Full Exam - General                     Cardiovascular                    
auscultation of heart                    Overall:                    normal 
heart sounds                    2015                    None             
   

 

 Full Exam - General                     Cardiovascular                    
auscultation of heart                    Overall:                    no murmurs
                    2015                    None                

 

 Full Exam - General                     Cardiovascular                    
extremities                    Overall:                    no clubbing         
           2015                    None                

 

 Full Exam - General                     Respiratory                    
auscultation                    Overall:                    breath sounds clear 
bilaterally                    2015                    None              
  

 

 Full Exam - General                     Respiratory                    
respiratory effort/rhythm                    Overall:                    normal 
rate                    2015                    None                

 

 Full Exam - General                     Respiratory                    
respiratory effort/rhythm                    Overall:                    no 
retractions                    2015                    None              
  

 

 Full Exam - General                     Ears/Nose/Throat                  
  otoscopic exam                    Overall:                    external 
auditory canals clear                    2015                    None    
            

 

 Full Exam - General                     Ears/Nose/Throat                  
  otoscopic exam                    Overall:                    tympanic 
membranes clear                    2015                    None          
      

 

 Full Exam - General                     Ears/Nose/Throat                  
  oral cavity/pharynx/larynx                     Overall:                    
oral mucosa clear                    2015                    None        
        

 

 Full Exam - General                     Eyes                    conjunctiva
/eyelids                    Overall:                    conjunctiva clear      
              2015                    None                

 

 Full Exam - General                     Eyes                    pupils and 
irises                    Overall:                    pupils equal, round, 
reactive to light and accomodation                    2015               
     None                



                                                                              



Procedures

                      





 Procedure                    Codes                    Date                

 

                     FLU VAC NO PRSV 4 DILLAN 3 YRS+                              
        CPT-4: 03941                    10/18/2017                

 

                     ADMIN INFLUENZA VIRUS VAC                                 
     CPT-4:                     10/18/2017                

 

                     TRIAMCINOLONE ACET INJ NOS                                
      CPT-4:                     2016                

 

                     THER/PROPH/DIAG INJ SC/IM                                 
     CPT-4: 86605                    2016                



                                                                               
                             



Vital Signs

                      





 Date                    Vital                









 2018                                        Blood Pressure 1: 104/70 Code
: 8480-6                                                          BMI: 23.9 Code
: 85547-5                                                          Heart Rate 1
: 72 bpm                                                          Height: 5'3" 
                                                         SpO2: 95%             
                                             Weight: 135 lbs                   
                

 

 2018                                        Blood Pressure 1: 124/64 Code
: 8480-6                                                          BMI: 23.6 Code
: 99561-3                                                          Heart Rate 1
: 84 bpm                                                          Height: 5'3" 
                                                         SpO2: 97%             
                                             Weight: 133 lbs                   
                

 

 2018                                        Blood Pressure 1: 98/62 Code
: 8480-6                                                          BMI: 24.6 Code
: 89782-3                                                          Heart Rate 1
: 61 bpm                                                          Height: 5'3" 
                                                         SpO2: 98%             
                                             Weight: 139 lbs                   
                

 

 2018                                        Blood Pressure 1: 106/68 Code
: 8480-6                                                          BMI: 24.4 Code
: 17896-2                                                          Heart Rate 1
: 64 bpm                                                          Height: 5'3" 
                                                         SpO2: 98%             
                                             Weight: 138 lbs                   
                

 

 2018                                        Blood Pressure 1: 116/78 Code
: 8480-6                                                          BMI: 25.9 Code
: 66318-4                                                          Heart Rate 1
: 81 bpm                                                          Height: 5'3" 
                                                         SpO2: 98%             
                                             Weight: 146 lbs                   
                

 

 10/18/2017                                        Blood Pressure 1: 110/66 Code
: 8480-6                                                          BMI: 25.7 Code
: 65471-7                                                          Heart Rate 1
: 81 bpm                                                          Height: 5'3" 
                                                         SpO2: 97%             
                                             Weight: 145 lbs                   
                

 

 2017                                        Blood Pressure 1: 124/70 Code
: 8480-6                                                          BMI: 25.2 Code
: 51462-2                                                          Heart Rate 1
: 87 bpm                                                          Height: 5'3" 
                                                         SpO2: 97%             
                                             Weight: 142 lbs                   
                









 2017                                        Blood Pressure 1: 160/94 Code
: 8480-6                                                          Blood 
Pressure 1: 136/74 Code: 8480-6                                                
          BMI: 25.5 Code: 44555-2                                              
            Heart Rate 1: 80 bpm                                               
           Height: 5'3"                                                        
  SpO2: 98%                                                          Weight: 
144 lbs                                   









 2016                                        Blood Pressure 1: 122/78 Code
: 8480-6                                                          BMI: 25.9 Code
: 05273-4                                                          Heart Rate 1
: 107 bpm                                                          Height: 5'3"
                                                          SpO2: 98%            
                                              Weight: 146 lbs                  
                 









 2016                                        Height:                     
                                      Weight:                                   









 2016                                        Blood Pressure 1: 112/68 Code
: 8480-6                                                          BMI: 26.7 Code
: 71174-6                                                          Heart Rate 1
: 62 bpm                                                          Height: 5'3" 
                                                         SpO2: 94%             
                                             Weight: 151 lbs                   
                









 10/26/2016                                        Blood Pressure 1: 120/64 Code
: 8480-6                                                          BMI: 26.7 Code
: 05875-0                                                          Heart Rate 1
: 77 bpm                                                          Height: 5'3" 
                                                         SpO2: 97%             
                                             Temperature: 36.7 (C) / 98.1 (F)  
                                                        Weight: 151 lbs        
                           









 2016                                        Blood Pressure 1: 118/80 Code
: 8480-6                                                          BMI: 27.8 Code
: 24468-6                                                          Heart Rate 1
: 80 bpm                                                          Height: 5'3" 
                                                         SpO2: 98%             
                                             Weight: 157 lbs                   
                

 

 2016                                        Blood Pressure 1: 180/106 
Code: 8480-6                                                          BMI: 27.1 
Code: 57244-1                                                          Heart 
Rate 1: 98 bpm                                                          Height: 
5'3"                                                          SpO2: 98%        
                                                  Weight: 153 lbs              
                     

 

 2016                                        Blood Pressure 1: 132/88 Code
: 8480-6                                                          BMI: 31.0 Code
: 90887-8                                                          Heart Rate 1
: 75 bpm                                                          Height: 5'   
                                                       SpO2: 97%               
                                           Weight: 158 lbs 8 oz                
                  

 

 2016                                        Blood Pressure 1: 136/80 Code
: 8480-6                                                          BMI: 32.1 Code
: 34961-8                                                          Heart Rate 1
: 78 bpm                                                          Height: 5'   
                                                       SpO2: 97%               
                                           Weight: 164 lbs 8 oz                
                  

 

 2016                                        Blood Pressure 1: 142/94 Code
: 8480-6                                                          BMI: 32.4 Code
: 24891-8                                                          Heart Rate 1
: 100 bpm                                                          Height: 5'  
                                                        SpO2: 98%              
                                            Weight: 166 lbs                    
               

 

 2015                                        Blood Pressure 1: 120/80 Code
: 8480-6                                                          BMI: 34.2 Code
: 59076-3                                                          Heart Rate 1
: 68 bpm                                                          Height: 5'   
                                                       SpO2: 98%               
                                           Weight: 175 lbs                     
              

 

 2015                                        Blood Pressure 1: 138/70 Code
: 8480-6                                                          BMI: 34.4 Code
: 22987-9                                                          Heart Rate 1
: 61 bpm                                                          Height: 5'   
                                                       SpO2: 97%               
                                           Weight: 176 lbs                     
              

 

 2015                                        Blood Pressure 1: 122/82 Code
: 8480-6                                                          BMI: 34.6 Code
: 24933-5                                                          Heart Rate 1
: 62 bpm                                                          Height: 5'   
                                                       SpO2: 96%               
                                           Weight: 177 lbs                     
              

 

 2015                                        Blood Pressure 1: 132/88 Code
: 8480-6                                                          BMI: 32.1 Code
: 64852-7                                                          Heart Rate 1
: 68 bpm                                                          Height: 5'3" 
                                                         SpO2: 97%             
                                             Weight: 181 lbs                   
                



                                                                               
                                                                               
                                                                               
                                                   



Functional Status

          No Functional Status data                                            
              



History of Present Illness

                      





 Symptom Name                    Status                    Result              
      Effective Date                    Notes                

 

 depression                    Quality                    chronic              
      2018                    None                

 

 depression                    Quality                    worsening            
        2018                    None                

 

 depression                    Onset and Resolution                    ongoing 
                   2018                    None                

 

 depression                    Frequency of Episodes                    
increasing                    2018                    None                

 

 depression                    Alleviating Factors                    
medication                    2018                    None                

 

 depression                    Pertinent Findings                    anxiety   
                 2018                    None                

 

 depression                    Pertinent Findings                    depressed 
mood                    2018                    None                

 

 abdominal pain                    Location                    diffusely       
             2018                    None                

 

 abdominal pain                    Quality                    intermittent     
               2018                    None                

 

 abdominal pain                    Onset of Symptom                    2 weeks 
ago                    2018                    None                

 

 abdominal pain                    Pertinent Findings                    
abdominal distension                    2018                    None     
           

 

 abdominal pain                    Pertinent Findings                    Denies 
weight loss                    2018                    None              
  

 

 depression                    Quality                    chronic              
      2018                    None                

 

 depression                    Quality                    worsening            
        2018                    None                

 

 depression                    Onset and Resolution                    ongoing 
                   2018                    None                

 

 depression                    Frequency of Episodes                    
increasing                    2018                    None                

 

 depression                    Alleviating Factors                    
medication                    2018                    None                

 

 depression                    Pertinent Findings                    anxiety   
                 2018                    None                

 

 depression                    Pertinent Findings                    depressed 
mood                    2018                    None                

 

 depression                    Quality                    chronic              
      2018                    None                

 

 depression                    Onset and Resolution                    ongoing 
                   2018                    None                

 

 depression                    Quality                    worsening            
        2018                    None                

 

 depression                    Alleviating Factors                    
medication                    2018                    None                

 

 depression                    Pertinent Findings                    anxiety   
                 2018                    None                

 

 depression                    Pertinent Findings                    depressed 
mood                    2018                    None                

 

 depression                    Frequency of Episodes                    
increasing                    2018                    None                

 

 back pain                    Location                    thoracic spine       
             2018                    None                

 

 back pain                    Location                    Cervical spine       
             2018                    as the day goes on                

 

 back pain                    Quality                    constant              
      2018                    None                

 

 back pain                    Onset and Resolution                    ongoing  
                  2018                    None                

 

 back pain                    Onset of Symptom                     years ago   
                 2018                    None                

 

 back pain                    Limitation on Activities                    
moderately limits activities                    2018                    
None                

 

 back pain                    Severity                    severe               
     2018                    None                

 

 back pain                    Pertinent Findings                    extremity 
numbness                    2018                    None                

 

 back pain                    Pertinent Findings                    extremity 
weakness                    2018                    None                

 

 back pain                    Pertinent Findings                    sleep 
disturbance                    2018                    None              
  

 

 back pain                    Pertinent Findings                    weakness   
                 2018                    None                

 

 hypertension                    Quality                    primary 
hypertension                    2018                    None             
   

 

 hypertension                    Quality                    stable             
       2018                    None                

 

 hypertension                    Onset and Resolution                    
ongoing                    2018                    None                

 

 hypertension                    Onset of Symptom                    during 
adulthood                    2018                    None                

 

 hypertension                    Blood Pressure Values                    not 
checking blood pressure at home                    2018                  
  None                

 

 hypertension                    Alleviating Factors                    
medication                    2018                    None                

 

 hypertension                    Pertinent Findings                    Denies 
dizziness                    2018                    None                

 

 hypertension                    Pertinent Findings                    Denies 
dyspnea                    2018                    None                

 

 hypertension                    Pertinent Findings                    edema   
                 2018                    if she eats salty foods         
       

 

 shoulder pain                    Quality                    aching            
        2018                    None                

 

 shoulder pain                    Quality                    dull              
      2018                    None                

 

 shoulder pain                    Quality                    intermittent      
              2018                    None                

 

 shoulder pain                    Quality                    tenderness        
            2018                    None                

 

 shoulder pain                    Onset of Symptom                    2-3 weeks 
ago                    2018                    None                

 

 shoulder pain                    Triggers                    activity         
           2018                    None                

 

 shoulder pain                    Triggers                    no known 
associated factors                    2018                    None       
         

 

 shoulder pain                    Alleviating Factors                    
activity                    2018                    None                

 

 shoulder pain                    Alleviating Factors                    
medication                    2018                    None                

 

 shoulder pain                    Exacerbating Factors                    
exertion                    2018                    None                

 

 shoulder pain                    Exacerbating Factors                    
lifting                    2018                    None                

 

 shoulder pain                    Exacerbating Factors                    
pulling                    2018                    None                

 

 shoulder pain                    Exacerbating Factors                    
activity                    2018                    None                

 

 shoulder pain                    Exacerbating Factors                    
extension                    2018                    None                

 

 shoulder pain                    Exacerbating Factors                    
abduction/external rotation                    2018                    
None                

 

 shoulder pain                    Exacerbating Factors                    
throwing                    2018                    None                

 

 shoulder pain                    Pertinent Findings                    point 
tenderness                    2018                    None                

 

 shoulder pain                    Pertinent Findings                    Denies 
loss of range of motion                    2018                    None  
              

 

 shoulder pain                    Pertinent Findings                    Denies 
loss of strength                    2018                    None         
       

 

 shoulder pain                    Onset and Resolution                    
resolved                    2018                    None                

 

 back pain                    Location                    thoracic spine       
             2018                    None                

 

 back pain                    Location                    Cervical spine       
             2018                    as the day goes on                

 

 back pain                    Quality                    constant              
      2018                    None                

 

 back pain                    Onset and Resolution                    ongoing  
                  2018                    None                

 

 back pain                    Onset of Symptom                     years ago   
                 2018                    None                

 

 back pain                    Limitation on Activities                    
moderately limits activities                    2018                    
None                

 

 back pain                    Severity                    severe               
     2018                    None                

 

 back pain                    Pertinent Findings                    extremity 
numbness                    2018                    None                

 

 back pain                    Pertinent Findings                    extremity 
weakness                    2018                    None                

 

 back pain                    Pertinent Findings                    sleep 
disturbance                    2018                    None              
  

 

 back pain                    Pertinent Findings                    weakness   
                 2018                    None                

 

 hypertension                    Quality                    primary 
hypertension                    2018                    None             
   

 

 hypertension                    Quality                    stable             
       2018                    None                

 

 hypertension                    Onset and Resolution                    
ongoing                    2018                    None                

 

 hypertension                    Onset of Symptom                    during 
adulthood                    2018                    None                

 

 hypertension                    Blood Pressure Values                    not 
checking blood pressure at home                    2018                  
  None                

 

 hypertension                    Alleviating Factors                    
medication                    2018                    None                

 

 hypertension                    Pertinent Findings                    Denies 
dizziness                    2018                    None                

 

 hypertension                    Pertinent Findings                    Denies 
dyspnea                    2018                    None                

 

 hypertension                    Pertinent Findings                    edema   
                 2018                    if she eats salty foods         
       

 

 shoulder pain                    Quality                    aching            
        2018                    None                

 

 shoulder pain                    Onset and Resolution                    
sudden in onset                    2018                    None          
      

 

 shoulder pain                    Quality                    dull              
      2018                    None                

 

 shoulder pain                    Quality                    intermittent      
              2018                    None                

 

 shoulder pain                    Quality                    tenderness        
            2018                    None                

 

 shoulder pain                    Onset of Symptom                    2-3 weeks 
ago                    2018                    None                

 

 shoulder pain                    Triggers                    activity         
           2018                    None                

 

 shoulder pain                    Triggers                    no known 
associated factors                    2018                    None       
         

 

 shoulder pain                    Alleviating Factors                    
activity                    2018                    None                

 

 shoulder pain                    Alleviating Factors                    
medication                    2018                    None                

 

 shoulder pain                    Exacerbating Factors                    
exertion                    2018                    None                

 

 shoulder pain                    Exacerbating Factors                    
activity                    2018                    None                

 

 shoulder pain                    Exacerbating Factors                    
lifting                    2018                    None                

 

 shoulder pain                    Exacerbating Factors                    
pulling                    2018                    None                

 

 shoulder pain                    Exacerbating Factors                    
extension                    2018                    None                

 

 shoulder pain                    Exacerbating Factors                    
abduction/external rotation                    2018                    
None                

 

 shoulder pain                    Exacerbating Factors                    
throwing                    2018                    None                

 

 shoulder pain                    Pertinent Findings                    point 
tenderness                    2018                    None                

 

 shoulder pain                    Pertinent Findings                    Denies 
loss of range of motion                    2018                    None  
              

 

 shoulder pain                    Pertinent Findings                    Denies 
loss of strength                    2018                    None         
       

 

 back pain                    Location                    thoracic spine       
             10/18/2017                    None                

 

 back pain                    Location                    Cervical spine       
             10/18/2017                    as the day goes on                 

 

 back pain                    Onset and Resolution                    ongoing  
                  10/18/2017                    None                

 

 back pain                    Onset of Symptom                     years ago   
                 10/18/2017                    None                

 

 back pain                    Limitation on Activities                    
moderately limits activities                    10/18/2017                    
None                

 

 back pain                    Severity                    severe               
     10/18/2017                    None                

 

 back pain                    Pertinent Findings                    extremity 
numbness                    10/18/2017                    None                

 

 back pain                    Pertinent Findings                    extremity 
weakness                    10/18/2017                    None                

 

 back pain                    Pertinent Findings                    sleep 
disturbance                    10/18/2017                    None              
  

 

 back pain                    Pertinent Findings                    weakness   
                 10/18/2017                    None                

 

 hypertension                    Onset and Resolution                    
ongoing                    10/18/2017                    None                

 

 hypertension                    Onset of Symptom                    during 
adulthood                    10/18/2017                    None                

 

 hypertension                    Alleviating Factors                    
medication                    10/18/2017                    None                

 

 hypertension                    Pertinent Findings                    Denies 
dizziness                    10/18/2017                    None                

 

 hypertension                    Pertinent Findings                    Denies 
dyspnea                    10/18/2017                    None                

 

 hypertension                    Pertinent Findings                    edema   
                 10/18/2017                    if she eats salty foods         
        

 

 hypertension                    Quality                    primary 
hypertension                    10/18/2017                    None             
   

 

 hypertension                    Blood Pressure Values                    not 
checking blood pressure at home                    10/18/2017                  
  None                

 

 back pain                    Quality                    constant              
      10/18/2017                    None                

 

 hypertension                    Quality                    stable             
       10/18/2017                    None                

 

 back pain                    Location                    thoracic spine       
             2017                    None                

 

 back pain                    Location                    Cervical spine       
             2017                    None                

 

 back pain                    Quality                    intermittent          
          2017                    None                

 

 back pain                    Onset and Resolution                    ongoing  
                  2017                    None                

 

 back pain                    Onset of Symptom                     years ago   
                 2017                    None                

 

 back pain                    Limitation on Activities                    
moderately limits activities                    2017                    
None                

 

 back pain                    Triggers                    no known associated 
factors                    2017                    None                

 

 back pain                    Severity                    severe               
     2017                    None                

 

 back pain                    Pertinent Findings                    extremity 
numbness                    2017                    None                

 

 back pain                    Pertinent Findings                    extremity 
weakness                    2017                    None                

 

 back pain                    Pertinent Findings                    sleep 
disturbance                    2017                    None              
  

 

 back pain                    Pertinent Findings                    weakness   
                 2017                    None                

 

 hypertension                    Onset and Resolution                    
ongoing                    2017                    None                

 

 hypertension                    Onset of Symptom                    during 
adulthood                    2017                    None                

 

 hypertension                    Blood Pressure Values                    
patient checking blood pressure at home - did not bring in readings            
        2017                    None                

 

 hypertension                    Alleviating Factors                    
medication                    2017                    None                

 

 hypertension                    Pertinent Findings                    
dizziness                    2017                    intermittently      
          

 

 hypertension                    Pertinent Findings                    Denies 
dyspnea                    2017                    None                

 

 hypertension                    Pertinent Findings                    Denies 
edema                    2017                    None                

 

 medication follow up                    Location                    oral 
intake                    2017                    None                

 

 medication follow up                    Additional Comments                    
medication use                    2017                    None           
     

 

 back pain                    Location                    thoracic spine       
             2016                    None                

 

 back pain                    Location                    Cervical spine       
             2016                    None                

 

 back pain                    Quality                    intermittent          
          2016                    None                

 

 back pain                    Onset and Resolution                    ongoing  
                  2016                    None                

 

 back pain                    Onset of Symptom                     years ago   
                 2016                    None                

 

 back pain                    Limitation on Activities                    
moderately limits activities                    2016                    
None                

 

 back pain                    Triggers                    no known associated 
factors                    2016                    None                

 

 back pain                    Severity                    severe               
     2016                    None                

 

 back pain                    Pertinent Findings                    extremity 
numbness                    2016                    None                

 

 back pain                    Pertinent Findings                    extremity 
weakness                    2016                    None                

 

 back pain                    Pertinent Findings                    sleep 
disturbance                    2016                    None              
  

 

 back pain                    Pertinent Findings                    weakness   
                 2016                    None                

 

 hypertension                    Onset and Resolution                    
ongoing                    2016                    None                

 

 hypertension                    Onset of Symptom                    during 
adulthood                    2016                    None                

 

 hypertension                    Alleviating Factors                    
medication                    2016                    None                

 

 hypertension                    Pertinent Findings                    
dizziness                    2016                    intermittently      
          

 

 hypertension                    Pertinent Findings                    Denies 
dyspnea                    2016                    None                

 

 hypertension                    Blood Pressure Values                    
patient checking blood pressure at home - did not bring in readings            
        2016                    None                

 

 hypertension                    Pertinent Findings                    Denies 
edema                    2016                    None                

 

 Hospital Follow Up                    Quality                    acute        
            2016                    None                

 

 anxiety                    Quality                    acute                    
2016                    None                

 

 anxiety                    Quality                    agitation               
     2016                    None                

 

 anxiety                    Quality                    worsening               
     2016                    None                

 

 anxiety                    Onset and Resolution                    sudden in 
onset                    2016                    None                

 

 anxiety                    Limitation on Activities                    is 
incapacitating                    2016                    None           
     

 

 anxiety                    Exacerbating Factors                    medication 
                   2016                    None                

 

 Hospital Follow Up                    _                    pneumonia          
          2016                    None                

 

 Hospital Follow Up                    Frequency of Episodes                    
1 weeks                    2016                    None                

 

 Hospital Follow Up                    Alleviating Factors                    
rest                    2016                    None                

 

 Hospital Follow Up                    Alleviating Factors                    
medication                    2016                    None                

 

 Hospital Follow Up                    Pertinent Findings                    
Denies fever                    2016                    None             
   

 

 chest pain/pressure                    Location                    stabbing   
                 10/26/2016                    None                

 

 chest pain/pressure                    Location                    on the left 
side of on the chest                    10/26/2016                    None     
           

 

 chest pain/pressure                    Quality                    aching      
              10/26/2016                    None                

 

 chest pain/pressure                    Quality                    constant    
                10/26/2016                    None                

 

 chest pain/pressure                    Onset and Resolution                    
sudden in onset                    10/26/2016                    None          
      

 

 chest pain/pressure                    Onset of Symptom                    5 
days ago                    10/26/2016                    None                

 

 cough                    Location                    in the throat            
        10/26/2016                    None                

 

 cough                    Quality                    dry                    10/
                    None                

 

 cough                    Quality                    constant                  
  10/26/2016                    None                

 

 cough                    Onset and Resolution                    sudden in 
onset                    10/26/2016                    None                

 

 cough                    Onset of Symptom                    5 days ago       
             10/26/2016                    None                

 

 hypertension                    Quality                    constant           
         2016                    None                

 

 hypertension                    Onset and Resolution                    
ongoing                    2016                    None                

 

 hypertension                    Blood Pressure Values                    
patient checking blood pressure at home - did not bring in readings            
        2016                    None                

 

 hypertension                    Pertinent Findings                    Denies 
dizziness                    2016                    None                

 

 hypertension                    Pertinent Findings                    Denies 
dyspnea                    2016                    None                

 

 hypertension                    Pertinent Findings                    Denies 
edema                    2016                    None                

 

 Hospital Follow Up                    _                    Other: hypertention
                     2016                    None                

 

 Hospital Follow Up                    Quality                    chronic 
illness                    2016                    None                

 

 hypertension                    Quality                    constant           
         2016                    None                

 

 hypertension                    Onset and Resolution                    
ongoing                    2016                    None                

 

 back pain                    Location                    thoracic spine       
             2016                    None                

 

 back pain                    Location                    Cervical spine       
             2016                    None                

 

 back pain                    Onset and Resolution                    ongoing  
                  2016                    None                

 

 back pain                    Onset of Symptom                     years ago   
                 2016                    None                

 

 back pain                    Limitation on Activities                    
moderately limits activities                    2016                    
None                

 

 back pain                    Triggers                    no known associated 
factors                    2016                    None                

 

 back pain                    Severity                    severe               
     2016                    None                

 

 back pain                    Pertinent Findings                    extremity 
numbness                    2016                    None                

 

 back pain                    Pertinent Findings                    extremity 
weakness                    2016                    None                

 

 back pain                    Pertinent Findings                    sleep 
disturbance                    2016                    None              
  

 

 back pain                    Pertinent Findings                    weakness   
                 2016                    None                

 

 hypertension                    Onset and Resolution                    
ongoing                    2016                    None                

 

 hypertension                    Onset of Symptom                    during 
adulthood                    2016                    None                

 

 hypertension                    Blood Pressure Values                    not 
checking blood pressure at home                    2016                  
  None                

 

 hypertension                    Pertinent Findings                    Denies 
dizziness                    2016                    None                

 

 hypertension                    Pertinent Findings                    Denies 
dyspnea                    2016                    None                

 

 back pain                    Quality                    intermittent          
          2016                    None                

 

 back pain                    Frequency of Episodes                    
decreasing                    2016                    None                

 

 hypertension                    Alleviating Factors                    
medication                    2016                    None                

 

 rash                    Location-Major                    in a generalized 
area                    2016                    None                

 

 rash                    Location-Major                    on the upper body   
                 2016                    None                

 

 rash                    Location-Major                    on the lower body   
                 2016                    None                

 

 rash                    Quality                    acute                                        None                

 

 rash                    Quality                    new                                        None                

 

 rash                    Quality                    worsening                  
  2016                    None                

 

 rash                    Quality                    pruritic                    
2016                    None                

 

 rash                    Color                    erythematous                 
   2016                    None                

 

 rash                    Onset and Resolution                    sudden in 
onset                    2016                    None                

 

 rash                    Onset of Symptom                    2.5 weeks ago     
               2016                    None                

 

 rash                    Triggers                    no known triggers         
           2016                    None                

 

 pelvic pain                    Location                    on both sides      
              2016                    None                

 

 pelvic pain                    Quality                    aching              
      2016                    None                

 

 pelvic pain                    Quality                    dull                
    2016                    None                

 

 pelvic pain                    Quality                    sharp               
     2016                    None                

 

 pelvic pain                    Onset and Resolution                    sudden 
in onset                    2016                    None                

 

 pelvic pain                    Frequency of Episodes                    daily 
                   2016                    None                

 

 pelvic pain                    Onset and Resolution                    
resolved                    2016                    None                

 

 pelvic pain                    Location                    on both sides      
              2016                    None                

 

 pelvic pain                    Quality                    dull                
    2016                    None                

 

 pelvic pain                    Quality                    aching              
      2016                    None                

 

 pelvic pain                    Quality                    sharp               
     2016                    None                

 

 pelvic pain                    Onset and Resolution                    sudden 
in onset                    2016                    None                

 

 pelvic pain                    Onset of Symptom                    1 weeks ago
                    2016                    None                

 

 pelvic pain                    Limitation on Activities                    
moderately limits activities                    2016                    
None                

 

 pelvic pain                    Frequency of Episodes                    daily 
                   2016                    None                

 

 pelvic pain                    Pertinent Findings                    bladder 
pain                    2016                    None                

 

 pelvic pain                    Pertinent Findings                    back pain
                    2016                    None                

 

 back pain                    Location                    thoracic spine       
             2015                    None                

 

 back pain                    Quality                    constant              
      2015                    None                

 

 back pain                    Quality                    worsening             
       2015                    None                

 

 back pain                    Onset and Resolution                    ongoing  
                  2015                    None                

 

 back pain                    Onset of Symptom                    _ months ago 
                   2015                    None                

 

 back pain                    Onset of Symptom                    _ years ago  
                  2015                    None                

 

 back pain                    Limitation on Activities                    
moderately limits activities                    2015                    
None                

 

 back pain                    Frequency of Episodes                    
increasing                    2015                    None                

 

 back pain                    Triggers                    no known associated 
factors                    2015                    None                

 

 back pain                    Pertinent Findings                    extremity 
numbness                    2015                    None                

 

 back pain                    Pertinent Findings                    extremity 
weakness                    2015                    None                

 

 back pain                    Pertinent Findings                    sleep 
disturbance                    2015                    None              
  

 

 back pain                    Pertinent Findings                    weakness   
                 2015                    None                

 

 back pain                    Radiating                    down the right arm  
                  2015                    None                

 

 back pain                    Severity                    severe               
     2015                    None                

 

 back pain                    Location                    Cervical spine       
             2015                    None                

 

 back pain                    Location                    thoracic spine       
             2015                    None                

 

 back pain                    Quality                    worsening             
       2015                    None                

 

 back pain                    Quality                    constant              
      2015                    None                

 

 back pain                    Onset and Resolution                    ongoing  
                  2015                    None                

 

 back pain                    Pertinent Findings                    extremity 
numbness                    2015                    None                

 

 back pain                    Pertinent Findings                    extremity 
weakness                    2015                    None                

 

 back pain                    Pertinent Findings                    sleep 
disturbance                    2015                    None              
  

 

 back pain                    Pertinent Findings                    weakness   
                 2015                    None                

 

 back pain                    Onset of Symptom                    _ months ago 
                   2015                    None                

 

 back pain                    Onset of Symptom                    _ years ago  
                  2015                    None                

 

 back pain                    Limitation on Activities                    
moderately limits activities                    2015                    
None                

 

 back pain                    Frequency of Episodes                    
increasing                    2015                    None                

 

 back pain                    Triggers                    no known associated 
factors                    2015                    None                

 

 back pain                    Radiating                    does not radiate    
                2015                    None                

 

 foot pain                    Location                    on the right         
           2015                    None                

 

 foot pain                    Quality                    tingling              
      2015                    None                

 

 foot pain                    Quality                    numbness              
      2015                    None                

 

 foot pain                    Quality                    sharp pain            
        2015                    None                

 

 foot pain                    Quality                    intermittent          
          2015                    None                

 

 foot pain                    Timing of Episodes                    in the 
morning                    2015                    None                

 

 foot pain                    Frequency of Episodes                    daily   
                 2015                    None                

 

 foot pain                    Onset of Symptom                    2 months ago 
                   2015                    None                

 

 foot pain                    Mechanism of injury                    unknown   
                 2015                    None                

 

 foot pain                    Pertinent Findings                    tingling   
                 2015                    None                

 

 foot pain                    Pertinent Findings                    limping    
                2015                    None                

 

 foot pain                    Pertinent Findings                    pain with 
movement                    2015                    None                

 

 knee pain                    Location                    on the right         
           2015                    None                

 

 knee pain                    Quality                    dull pain             
       2015                    None                

 

 knee pain                    Quality                    tenderness            
        2015                    None                

 

 knee pain                    Quality                    throbbing             
       2015                    None                

 

 knee pain                    Pertinent Findings                    limping    
                2015                    None                

 

 knee pain                    Pertinent Findings                    tingling   
                 2015                    None                

 

 seizure                    Quality                    multiple event          
          2015                    last seizure in October around 
[facility]. Seizures started as a teenager-[employer] [place] [employer]               
  

 

 seizure                    Onset and Resolution                    ongoing    
                2015                    None                

 

 seizure                    Onset of Symptom                    _ years ago    
                2015                    None                

 

 seizure                    Frequency of Episodes                    unchanged 
                   2015                    None                

 

 seizure                    Pertinent Findings                    anxiety      
              2015                    None                

 

 seizure                    Pertinent Findings                    dizziness    
                2015                    occ                

 

 seizure                    Pertinent Findings                    Denies 
dyspnea                    2015                    None                

 

 anxiety                    Quality                    intermittent            
        2015                    None                

 

 anxiety                    Onset and Resolution                    ongoing    
                2015                    None                

 

 anxiety                    Frequency of Episodes                    weekly    
                2015                    None                

 

 anxiety                    Frequency of Episodes                    daily     
               2015                    None                

 

 anxiety                    Pertinent Findings                    Denies 
dyspnea                    2015                    None                

 

 anxiety                    Pertinent Findings                    Denies 
insomnia                    2015                    None                

 

 anxiety                    Pertinent Findings                    syncope      
              2015                    only with seizures                 

 

 depression                    Quality                    intermittent         
           2015                    None                

 

 depression                    Onset and Resolution                    ongoing 
                   2015                    None                

 

 depression                    Pertinent Findings                    anxiety   
                 2015                    None                

 

 depression                    Pertinent Findings                    Denies self
-harm                    2015                    None                

 

 hypertension                    Quality                    constant           
         2015                    None                

 

 hypertension                    Onset and Resolution                    
ongoing                    2015                    None                

 

 hypertension                    Blood Pressure Values                    not 
checking blood pressure at home                    2015                  
  has a machine if needed                 

 

 hypertension                    Pertinent Findings                    anxiety 
                   2015                    None                

 

 hypertension                    Pertinent Findings                    
dizziness                    2015                    only occ            
     

 

 hypertension                    Pertinent Findings                    Denies 
dyspnea                    2015                    None                

 

 depression                    Onset of Symptom                    during 
adulthood                    2015                    None                

 

 depression                    Limitation on Activities                    does 
not limit activities                    2015                    None     
           

 

 depression                    Frequency of Episodes                    
unchanged                    2015                    None                

 

 depression                    Significant Medical Conditions                  
  anxiety disorder                    2015                    None       
         

 

 depression                    Triggers                    no known associated 
factors                    2015                    None                

 

 depression                    Alleviating Factors                    
medication                    2015                    None                

 

 anxiety                    Significant Family History                    
anxiety disorder                    2015                    None         
       

 

 anxiety                    Significant Family History                    
depression                    2015                    None                

 

 anxiety                    Triggers                    stress                 
   2015                    None                

 

 anxiety                    Onset of Symptom                    during 
childhood                    2015                    None                

 

 anxiety                    Alleviating Factors                    medication  
                  2015                    None                

 

 seizure                    Significant Medical Conditions                    
known seizure disorder                    2015                    None   
             

 

 seizure                    Significant Medications                    
antiepileptic medications                    2015                    
dilantin                 

 

 seizure                    Triggers                    no known associated 
factors                    2015                    None                

 

 seizure                    Alleviating Factors                    medication  
                  2015                    None                



                                                                    



Advance Directives

          No Advance Directive data                                            
                        



Encounters

                      





 Encounter                    Performer                    Location            
        Codes                    Date                

 

                     [phone]) [phone] EST. PATIENT, LEVEL III

Diagnosis: Slow transit constipation[ICD10: K59.01]

Diagnosis: Bipolar disorder, current episode depressed, severe, without 
psychotic features[ICD10: F31.4]                                      Lynne Hurt MD, [facility]                    CPT-4: 
37358                    2018                

 

                     [phone] [phone] EST. PATIENT, LEVEL IV

Diagnosis: Bipolar disorder, current episode depressed, severe, without 
psychotic features[ICD10: F31.4]                                      Lynne Hurt MD, [facility]                    CPT-4: 
54022                    2018                

 

                     [id number]) 74474 EST. PATIENT, LEVEL III

Diagnosis: Generalized anxiety disorder[ICD10: F41.1]

Diagnosis: Major depressive disorder, single episode, moderate[ICD10: F32.1]   
                                   Lynne Hurt MD, [facility]                    CPT-4: 11269                    2018 
               

 

                     [phone] 01156 EST. PATIENT, LEVEL IV

Diagnosis: Essential (primary) hypertension[ICD10: I10]

Diagnosis: Generalized anxiety disorder[ICD10: F41.1]

Diagnosis: Major depressive disorder, single episode, moderate[ICD10: F32.1]   
                                   Lynne Hurt MD, [facility]                    CPT-4: 33617                    2018 
               

 

                     [id number]) [id number] EST. PATIENT, LEVEL IV

Diagnosis: Essential (primary) hypertension[ICD10: I10]

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

Diagnosis: Generalized anxiety disorder[ICD10: F41.1]

Diagnosis: Pain in left shoulder[ICD10: M25.512]                               
       Lynne Hurt MD, [facility]                
    CPT-4: 11912                    2018                

 

                     [id number] EST. PATIENT, LEVEL IV

Diagnosis: Essential (primary) hypertension[ICD10: I10]

Diagnosis: Generalized anxiety disorder[ICD10: F41.1]

Diagnosis: Low back pain[ICD10: M54.5]

Diagnosis: Other generalized epilepsy and epileptic syndromes, not intractable, 
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

Diagnosis: Other depressive episodes[ICD10: F32.8]

Diagnosis: Encounter for immunization[ICD10: Z23]                              
        Lynne Hurt MD, [facility]               
     CPT-4: 22263                    10/18/2017                

 

                     [phone] EST. PATIENT, LEVEL IV

Diagnosis: Essential (primary) hypertension[ICD10: I10]

Diagnosis: Generalized anxiety disorder[ICD10: F41.1]

Diagnosis: Low back pain[ICD10: M54.5]

Diagnosis: Pain in thoracic spine[ICD10: M54.6]

Diagnosis: Encounter for screening mammogram for malignant neoplasm of breast[
ICD10: Z12.31]                                      [facility]                    CPT-4: 16139                  
  2017                

 

                     [id number] EST. PATIENT, LEVEL III

Diagnosis: Essential (primary) hypertension[ICD10: I10]

Diagnosis: Generalized anxiety disorder[ICD10: F41.1]                          
            [facility]           
         CPT-4: 28339                    2017                

 

                     [phone] EST. PATIENT, LEVEL IV

Diagnosis: Essential (primary) hypertension[ICD10: I10]

Diagnosis: Other depressive episodes[ICD10: F32.8]

Diagnosis: Other generalized epilepsy and epileptic syndromes, not intractable, 
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

Diagnosis: Generalized anxiety disorder[ICD10: F41.1]

Diagnosis: Low back pain[ICD10: M54.5]                                      
[facility]                    CPT-
4: 21387                    2016                

 

                     [id number] EST. PATIENT, LEVEL IV

Diagnosis: Generalized anxiety disorder[ICD10: F41.1]

Diagnosis: Other depressive episodes[ICD10: F32.8]

Diagnosis: Restlessness and agitation[ICD10: R45.1]                            
          Mary Hurt MD [facility]               
     CPT-4: 37387                    2016                

 

                     [phone] [phone] EST. PATIENT, LEVEL III

Diagnosis: Localized swelling, mass and lump, trunk[ICD10: R22.2]              
                        Lynne Hurt MD 
[facility]                    CPT-4: 45466                    2016              
  

 

                     [id number] EST. PATIENT, LEVEL III

Diagnosis: Pleurisy[ICD10: R09.1]

Diagnosis: Cough[ICD10: R05]                                      Mary Hurt MD, [facility]                    CPT-4: 78456      
              10/26/2016                

 

                     [phone] EST. PATIENT, LEVEL IV

Diagnosis: Essential (primary) hypertension[ICD10: I10]

Diagnosis: Cervicalgia[ICD10: M54.2]

Diagnosis: Radiculopathy, cervicothoracic region[ICD10: M54.13]                
                      Lynne Hurt MD, [facility] 
                   CPT-4: 52092                    2016                

 

                     [id number] EST. PATIENT, LEVEL III

Diagnosis: Essential (primary) hypertension[ICD10: I10]                        
              Mary Hurt MD [facility]           
         CPT-4: 12339                    2016                

 

                     [phone] [phone] EST. PATIENT, LEVEL III

Diagnosis: Essential (primary) hypertension[ICD10: I10]                        
              Lynne Hurt MD, [facility]         
           CPT-4: 30887                    2016                

 

                     [phone] 96764 EST. PATIENT, LEVEL IV

Diagnosis: Essential (primary) hypertension[ICD10: I10]

Diagnosis: Low back pain[ICD10: M54.5]

Diagnosis: Other depressive episodes[ICD10: F32.8]                             
         Lynne Hurt MD, [facility]              
      CPT-4: 29785                    2016                

 

                     [phone] 64638 EST. PATIENT, LEVEL IV

Diagnosis: Generalized abdominal pain[ICD10: R10.84]

Diagnosis: Gross hematuria[ICD10: R31.0]

Diagnosis: Long term (current) use of antithrombotics/antiplatelets[ICD10: 
Z79.02]

Diagnosis: Urinary tract infection, site not specified[ICD10: N39.0]           
                           Jordyn Hurt MD
, [facility]                    CPT-4: 43415                    2016            
    

 

                     [phone] 88915 EST. PATIENT, LEVEL IV

Diagnosis: Cervicalgia[ICD10: M54.2]

Diagnosis: Radiculopathy, cervicothoracic region[ICD10: M54.13]

Diagnosis: Pain in thoracic spine[ICD10: M54.6]                                
      Lynne Hurt MD, [facility]                 
   CPT-4: 94729                    2015                

 

                     [id number]) [id number] EST. PATIENT, LEVEL III

Diagnosis: Cervicalgia[ICD10: M54.2]

Diagnosis: Pain in thoracic spine[ICD10: M54.6]

Diagnosis: Radiculopathy, cervicothoracic region[ICD10: M54.13]

Diagnosis: Low back pain[ICD10: M54.5]                                      
Jordyn Hurt MD, [facility]                    
CPT-4: 59701                    2015                

 

                     ([id number]) 63550 EST. PATIENT, LEVEL III

Diagnosis: Plantar fasciitis of right foot[ICD9: 728.71]

Diagnosis: Lumbar spine pain[ICD9: 724.2]

Diagnosis: Right knee pain[ICD9: 719.46]                                      
[facility]                    
CPT-4: 64689                    2015                

 

                     ([id number]) OFFICE  VISIT, NEW - LEVEL 4

Diagnosis: ESSENTIAL HYPERTENSION[ICD9: 401.9]

Diagnosis: Seizure disorder[ICD9: 345.90]

Diagnosis: Factor 5 Leiden mutation, heterozygous[ICD9: 289.81]

Diagnosis: Anxiety[ICD9: 300.00]

Diagnosis: Depression[ICD9: 311]                                      Jordyn Hurt MD, [facility]                    CPT-4: 
17876                    2015                



                                                                               
                                                                               
                                                                               
                                                                               
            



Plan of Care

                      





 Planned Activity                    Notes                    Codes            
        Status                    Date                

 

 Referral: External, Ordering Provider                     Patient's mother 
informed. Referral info faxed to [phone]. Appt scheduled with intake couselor 
to do an in house referral.                                         Completed  
                  2018                

 

 Visit Plan:                     Constipation - sample of relistor 150mg daily 
x 3 days then every other day as needed for constipation. Bipolar mood disorder 
with Depression - improved symptoms on lexapro to 10mg daily and keep on luvox 
50mg bid.

                                                             2018        
        

 

 Appointment: Lynne Hurt 

WPtel:[phone]

 [address] [place], [place] [address]

                                                             (15 min) 
Moderate                    2018                

 

 Patient Education: Patient Medication Summary                                 
                            Completed                    2018            
    

 

 Visit Plan:                     Bipolar mood disorder with Depression - 
uncontrolled - Pt has been counseled about the diagnosis of bipolar depression, 
the potential causes, and risks associated with the diagnosis. The pt denies 
suicidal ideation, or plans. The patient has been counseled about treatment 
options, and understands the risks associated with treatment of depression, as 
well as the risks associated with NOT treating the depression. I believe the pt 
will benefit from medical intervention and an antidepressant has been 
appropriately prescribed for this patient. I have recommended that we pursue a 
referral to the psychiatrist at [facility]. Her medication is being 
changed as follows: lexapro 20mg daily x 1 week - luvox 50mg daily x 1 week 
then lexapro 20mg daily x 1 week and luvox 50mg bid x 1 week then decrease 
lexapro to 10mg daily and keep on luvox 50mg bid then decrease lexapro to 5mg 
daily and keep on luvox 50mg bid then stop lexapro and keep on luvox

                                                             2018        
        

 

 Appointment: Lynne Hurt 

WPtel:[phone]

 [address] [facility]

                                                             (15 min) 
Moderate                    2018                

 

 Patient Education: Patient Medication Summary                                 
                            Completed                    2018            
    

 

 Care Plan: Referral Order                                        SNOMED-CT : 
[id number]

                    Pending                    2018                

 

 Visit Plan:                     Anxiety and depression - not well controlled - 
stop the lexapro and start on fluvoxamine 1 tab twice daily - RTC in about 2-3 
weeks

                                                             2018        
        

 

 Visit Plan:                     Anxiety and depression - not well controlled - 
stop the lexapro and start on fluvoxamine 1 tab twice daily - RTC in about 2-3 
weeks

                                                             2018        
        

 

 Appointment: Lynne Hurt 

WPtel:[phone]

 [address] [facility]

                                                             (15 min) 
Moderate                    2018                

 

 Patient Education: Patient Medication Summary                                 
                            Completed                    2018            
    

 

 Visit Plan:                     Hypertension - well controlled - continue with 
current medications, continue with no added salt diet. Pt has been encouraged 
to exercise daily. The pt has been advised to call the office if there are any 
acute concerns about change in blood pressure readings at home. Anxiety - the 
patient has uncontrolled anxiety and will benefit from an SSRI on a daily basis 
to attempt control of the symptoms of anxiety (tachycardia, overwhelming 
sensations, stress, insomnia, etc). I also believe that the patient will 
benefit from very low dose of prn benzodiazepine. Pt is aware of the risks and 
benefits of treatment with the above medications.

                                                             2018        
        

 

 Visit Plan:                     Hypertension - well controlled - continue with 
current medications, continue with no added salt diet. Pt has been encouraged 
to exercise daily. The pt has been advised to call the office if there are any 
acute concerns about change in blood pressure readings at home. Anxiety - the 
patient has uncontrolled anxiety and will benefit from an SSRI on a daily basis 
to attempt control of the symptoms of anxiety (tachycardia, overwhelming 
sensations, stress, insomnia, etc). I also believe that the patient will 
benefit from very low dose of prn benzodiazepine. Pt is aware of the risks and 
benefits of treatment with the above medications.

                                                             2018        
        

 

 Appointment: Lynne Hurt 

WPtel:[phone]

 [address] [facility]

                                                             (15 min) 
Moderate                    2018                

 

 Patient Education: Patient Medication Summary                                 
                            Completed                    2018            
    

 

 Appointment:                                                              
Nurse Visit                    2018                

 

 Patient Education: Patient Medication Summary                                 
                            Completed                    2018            
    

 

 Visit Plan:                     Hypertension - well controlled - continue with 
current medications, continue with no added salt diet. Pt has been encouraged 
to exercise daily. The pt has been advised to call the office if there are any 
acute concerns about change in blood pressure readings at home. Pain - chronic 
of low back - Chronic Depression and anxiety - the pt has symptoms of chronic 
anxiety and depression that have been fairly well controlled since the last 
office visit. The pt has expected periods of exacerbation with abatement of the 
symptoms with change in situational exposure. No change in current medications. 
Left shoulder pain - referral to [facility] -

                                                             2018        
        

 

 Appointment: Lynne Hurt 

WPtel:[phone]

 [address] [facility]

                                                             (15 min) 
Moderate                    2018                

 

 Patient Education: Patient Medication Summary                                 
                            Completed                    2018            
    

 

 Care Plan: Referral Order                                        SNOMED-CT : 
[id number]

                    Pending                    2018                

 

 Appointment: Lynne Hurt 

WPtel:[phone]

 [address] [facility]

                                                             (30 min) Complex
                    2018                

 

 Visit Plan:                     Hypertension - well controlled - continue with 
current medications, continue with no added salt diet. Pt has been encouraged 
to exercise daily. The pt has been advised to call the office if there are any 
acute concerns about change in blood pressure readings at home. Pain - chronic 
of low back - I have recommended a referral to Dr. Artis - for injections. 
Refilled muscle relaxers. Chronic Depression and anxiety - the pt has symptoms 
of chronic anxiety and depression that have been fairly well controlled since 
the last office visit. The pt has expected periods of exacerbation with 
abatement of the symptoms with change in situational exposure. No change in 
current medications. flu shot today

                                                             10/18/2017        
        

 

 Appointment: Lynne Hutr 

WPtel:[phone]

 [address] [facility]

                                                             (15 min) 
Moderate                    10/18/2017                

 

 Patient Education: Patient Medication Summary                                 
                            Completed                    10/18/2017            
    

 

 Patient Education: Obesity                                                    
         Completed                    10/18/2017                

 

 Care Plan: Referral Order                                        SNOMED-CT : 
[id number]

                    Pending                    10/18/2017                

 

 Visit Plan:                     Hypertension - well controlled - continue with 
current medications, continue with no added salt diet. Pt has been encouraged 
to exercise daily. The pt has been advised to call the office if there are any 
acute concerns about change in blood pressure readings at home. Back pain, 
Thoracic back pain, - referral to [facility] - at [address] - see if pt 
can get in to be seen for therapy on her upper and lower back. Chronic 
Depression and anxiety - the pt has symptoms of chronic anxiety and depression 
that have been fairly well controlled since the last office visit. The pt has 
expected periods of exacerbation with abatement of the symptoms with change in 
situational exposure. No change in current medications.

                                                             2017        
        

 

 Appointment: Lynne Hurt 

WPtel:[phone]

 [address] [facility]

[place]                                                             (15 min) 
Moderate                    2017                

 

 Patient Education: Patient Medication Summary                                 
                            Completed                    2017            
    

 

 Patient Education: Obesity                                                    
         Completed                    2017                

 

 Care Plan: SCREENINGMAMMOGRAPHYDIGITAL                                        
[facility] : 66509-2

                    Pending                    2017                

 

 Appointment: Lynne Hurt 

WPtel:[phone]

 [address] [facility]

[place]                                                             (30 min) Complex
                    2017                

 

 Visit Plan:                     Hypertension - well controlled - continue with 
current medications, continue with no added salt diet. Pt has been encouraged 
to exercise daily. The pt has been advised to call the office if there are any 
acute concerns about change in blood pressure readings at home. Seizure 
disorder - Depression and Anxiety - continue with current medications - 
symptoms are significantly improved per patient report

                                                             2017        
        

 

 Appointment: Lynne Hurt 

WPtel:[phone] [address] [facility]

                                                             (30 min) Complex
                    2017                

 

 Patient Education: Patient Medication Summary                                 
                            Completed                    2017            
    

 

 Patient Education: Obesity                                                    
         Completed                    2017                

 

 Visit Plan:                     Hypertension - well controlled - continue with 
current medications, continue with no added salt diet. Pt has been encouraged 
to exercise daily. The pt has been advised to call the office if there are any 
acute concerns about change in blood pressure readings at home. Depression and 
anxiety - Erin reports that her depression episode related to the keppra seems 
to be resolved - she feels much better on the Lamictal. I have discussed her 
case with the patient and her mom - she had previously seen a psychiatrist -but 
with a shortage of any local psychiatrists, I have recommended that she needs 
to be seen by a psychiatrist at [facility] and we are making a referral to 
outpatient psychiatry. They understand that it will probably be several months 
before we can get Erin in to be seen. Hx of seizure disorder - I have also 
recommended that with her history of seizures we need for her to have a 
relationship with a neurologist - they are okay with staying in the area for a 
neurologist and would like to be seen in [place]. I have recommended Dr. Ugarte at [facility] for further evaluation and to establish care. They 
understand that it will most-likely be several months before she is seen by 
neurology.

                                                             2016        
        

 

 Appointment: Deb Hutry 

WPtel:[phone]

 [address] [facility]

[place]                                                             (15 min) 
Moderate                    2016                

 

 Patient Education: Patient Medication Summary                                 
                            Completed                    2016            
    

 

 Patient Education: Obesity                                                    
         Completed                    2016                

 

 Patient Education: Hypertension                                               
              Completed                    2016                

 

 Visit Plan:                     The pt had recently been in the hospital with 
an INR of over 10. Dr. Cueva manages her coumadin. Dr. Cueva switched her to 
eliquis, therefore her dilantin was changed to keppra due to medication 
interactions. Pt's mother called the clinic this morning stating that Erin was 
very agitated, anxious, and restless - spoke with Dr. Hurt on the phone and 
pt was to stop the keppra and start trokendi 50mg daily. Pt and her mother 
picked up samples and spoke to the pharmacy who told her not to take the 
trokendi because she had metabolic acidosis reaction in the past to topamax. 
Per other staff report the pt and her mother came to the office and demanded to 
speak to a nurse and did not wait in the lobby for a nurse to take her to the 
back but came back to the nurses desk and asked the nurses if they knew who she 
was. Pt was then taken to a room for a walk in appointment. Upon entering the 
room the pt was very agitated and upset stating that everyone in this office is 
an "imbecile and an idiot who is trying to kill her." I explained to her that 
the topamax was listed as an "other reaction" and that I was very sorry that it 
was sent. The patient wanted to know why Dr. Hurt ordered that medication, 
which I explained that it was an alternative to the keppra. I told her that I 
had called Dr. Hurt on her cell phone and that she was not able to access 
her chart. The pt stated that "doctors can pull records up on their phones". 
The pt and her mother were also very upset that she had been released from the 
hospital after adjusting her medications instead of being monitored. The pt's 
mother stated that Dr. Hurt had asked the patient why she was depressed 
which made them both very upset. Erin said that when Dr. Hurt asked her 
about her depression she wanted to "slap her in the face". The pt's mother 
wanted her to be admitted today. I explained that medically she was stable and 
did not need to be admitted. The pt was very upset stating that "it is 
obviously all about money". Pt stated that she should just "stop her medication 
and die so her mother could isabel for medical malpractice". The pt stated that 
she would "go to the ER to get another doctor." The pt's mother stated that 
Erin has not slept and has been getting increasingly agitated. They agreed to 
the new orders to start Lamictal per Dr. Hurt.

                                                             2016        
        

 

 Patient Education: Patient Medication Summary                                 
                            Completed                    2016            
    

 

 Visit Plan:                     Abnormal CT scan - planning on repeat scan to 
see if area on previous scan was truly a mass or just an atypical pneumonia. 
Finish out antibiotics as previously directed. call if having trouble pt to 
have PT/INR continued to be monitored by Dr. Cueva

                                                             2016        
        

 

 Appointment: Lynne Hurt 

WPtel:[phone]

 [address] [facility]

                                                             (15 min) 
Moderate                    2016                

 

 Patient Education: Patient Medication Summary                                 
                            Completed                    2016            
    

 

 Patient Education: Obesity                                                    
         Completed                    2016                

 

 Visit Plan:                     Chest pain, cough - lung sounds clear, but 
diminished, will check chest x-ray - will treat as indicated. Pt is to notify 
clinic if symptoms do not improve, if they worsen, or with any questions or 
concerns.

                                                             10/26/2016        
        

 

 Appointment: Jordyn Rosenberg 

WPtel:[phone]

 [address] [facility]

                                                             (30 min) Complex
                    10/26/2016                

 

 Patient Education: Patient Medication Summary                                 
                            Completed                    10/26/2016            
    

 

 Patient Education: Obesity                                                    
         Completed                    10/26/2016                

 

 Visit Plan:                     Hypertension - well controlled - continue with 
current medications, continue with no added salt diet. Pt has been encouraged 
to exercise daily. The pt has been advised to call the office if there are any 
acute concerns about change in blood pressure readings at home. Chronic Pain 
Syndrome - pt has chronic pain - has been maintained on current medications, 
has not sought out other medications, only uses PRN pain medications as directed
, and understands the consequences of over-medication. Chronic Depression and 
anxiety - the pt has symptoms of chronic anxiety and depression that have been 
fairly well controlled since the last office visit. The pt has expected periods 
of exacerbation with abatement of the symptoms with change in situational 
exposure. No change in current medications.

                                                             2016        
        

 

 Patient Education: Patient Medication Summary                                 
                            Completed                    2016            
    

 

 Patient Education: Obesity                                                    
         Completed                    2016                

 

 Patient Education: .Cervicalgia Neck Pain                                     
                        Completed                    2016                

 

 Visit Plan:                     Hypertension - uncontrolled - the patient's 
medications have been modified as documented in the visit note. The patient has 
been counseled to cut back on salt in diet for a no added salt diet, low fat 
diet, start an exercise program with low weight bearing exercises and higher 
aerobic activity for heart health. The patient is to check blood pressure 
readings as an outpatient and either fax, call, or email the readings to the 
office next week for practitioner to review. The pt is to call for acute 
concerns.

                                                             2016        
        

 

 Appointment: Mary Bond 

WPtel:[phone]

 [address] [place], [place] [address]

                                                             (30 min) Complex
                    2016                

 

 Patient Education: Patient Medication Summary                                 
                            Completed                    2016            
    

 

 Patient Education: Obesity                                                    
         Completed                    2016                

 

 Visit Plan:                     Hypertension - well controlled - continue with 
current medications, continue with no added salt diet. Pt has been encouraged 
to exercise daily. The pt has been advised to call the office if there are any 
acute concerns about change in blood pressure readings at home. Rash - RX for 
injection - kenalog shot

                                                             2016        
        

 

 Patient Education: Patient Medication Summary                                 
                            Completed                    2016            
    

 

 Patient Education: Obesity                                                    
         Completed                    2016                

 

 Visit Plan:                     Hypertension - well controlled - continue with 
current medications, continue with no added salt diet. Pt has been encouraged 
to exercise daily. The pt has been advised to call the office if there are any 
acute concerns about change in blood pressure readings at home. Chronic 
Depression and anxiety - the pt has symptoms of chronic anxiety and depression 
that have been fairly well controlled since the last office visit. The pt has 
expected periods of exacerbation with abatement of the symptoms with change in 
situational exposure. No change in current medications. Chronic Pain Syndrome - 
pt has chronic pain - has been maintained on current medications, has not 
sought out other medications, only uses PRN pain medications as directed, and 
understands the consequences of over-medication.

                                                             2016        
        

 

 Patient Education: Patient Medication Summary                                 
                            Completed                    2016            
    

 

 Patient Education: Hypertension                                               
              Completed                    2016                

 

 Visit Plan:                     Abdominal pain-mild and improved-likely due to 
constipation-check labs today-cld advance to bland as tolerated-ER over the 
weekend if pain worsens-continue adequate fluids Urinary Tract Infection-
discussed natural and expected course of this diagnosis and to alert me if 
symptoms do not follow expected course, or if any worse. UA positive for 
infection today in the office-plan to send for culture and will call patient 
with results. RX sent to patient's pharmacy. Avoid tub baths, restrictive 
underwear, etc. Recommend patient start on probiotic while taking the 
antibiotic to prevent diarrhea. Patient verbalized understanding of plan. 
Chronic Anticoagulant use - Pt has been counseled about the anticoagulant, need 
for serial monitoring, and need for the pt to alert the physician as to any new 
bruising, or acute bleeding. Therapeutic goal for INR is between 2.0 and 3.5.

                                                             2016        
        

 

 Appointment:                                                              (15 
min) Moderate                    2016                

 

 Patient Education: Patient Medication Summary                                 
                            Completed                    2016            
    

 

 Referral: External, Ordering Provider                     Referral            
                             Completed                    2015           
     

 

 Visit Plan:                     Chronic Back pain - the patient was counseled 
to always first attempt to use modalities other than pain medication for 
alleviation of the muscle spasms and pain. The patient was also encouraged to 
continue with back exercises as previously directed. Pt is to use pain 
medication as directed. If pain medications are used inappropriately or early 
refills are requested, the patient understands that is a breech of trust/
contract and could result in the patient's termination from this medical 
practice. Referral to physical therapy/manipulation of her neck/upper back.

                                                             2015        
        

 

 Appointment: Lynne Hurt 

WPtel:[phone]

 [address] [place], [place] [id number]

                                                             (15 min) 
Moderate                    2015                

 

 Patient Education: Patient Medication Summary                                 
                            Completed                    2015            
    

 

 Patient Education: .Cervicalgia Neck Pain                                     
                        Completed                    2015                

 

 Care Plan: Referral Order                                        SNOMED-CT : 
[id number]

                    Ordered                    2015                

 

 Visit Plan:                     Cervical, thoracic, and lumbar back pain-
worsening-numbness and tingling down right arm-will xray cervical and thoracic 
spine-plan for MRI of cervical, thoracic and lumbar spine if indicated and 
refer to spine specialist-patient wants to see Dr Mirnada if referred.

                                                             2015        
        

 

 Patient Education: Patient Medication Summary                                 
                            Completed                    2015            
    

 

 Patient Education: .Cervicalgia Neck Pain                                     
                        Completed                    2015                

 

 Visit Plan:                     Plantar fasciitis- pt was educated that this 
is an inflammatory process, need to stretch the foot and reduce inflammation by 
using a frozen bottle of water or a tennis ball on the bottom of the foot at 
least three times a day until the pain is improved. Right knee pain-low back 
pain-xray low back and knee to evaluate for acute abnormality-samples of 
pennsaid provided and instructed on use-consider referral for PT or MRI lumbar 
spine if symptoms do not improve-

                                                             2015        
        

 

 Appointment:                                                              (30 
min) Complex                    2015                

 

 Patient Education: Patient Medication Summary                                 
                            Completed                    2015            
    

 

 Visit Plan:                     Hypertension - well controlled - continue with 
current medications, continue with no added salt diet. Pt has been encouraged 
to exercise daily. The pt has been advised to call the office if there are any 
acute concerns about change in blood pressure readings at home. History of 
seizures-check dilantin level Factor V Leiden-on coumadin-managed by Dr Cueva 
Chronic Depression and anxiety - the pt has symptoms of chronic anxiety and 
depression that have been fairly well controlled since the last office visit. 
The pt has expected periods of exacerbation with abatement of the symptoms with 
change in situational exposure. No change in current medications.

                                                             2015        
        

 

 Visit Plan:                     Hypertension - well controlled - continue with 
current medications, continue with no added salt diet. Pt has been encouraged 
to exercise daily. The pt has been advised to call the office if there are any 
acute concerns about change in blood pressure readings at home. History of 
seizures-check dilantin level Factor V Leiden-on coumadin-managed by Dr Cueva 
Chronic Depression and anxiety - the pt has symptoms of chronic anxiety and 
depression that have been fairly well controlled since the last office visit. 
The pt has expected periods of exacerbation with abatement of the symptoms with 
change in situational exposure. No change in current medications.

                                                             2015        
        

 

 Appointment:                                                              (S) 
New Patient                    2015                

 

 Patient Education: Patient Medication Summary                                 
                            Completed                    2015            
    

 

 Patient Education: Hypertension                                               
              Completed                    2015                

 

 Care Plan: COMPLETE CBC AUTOMATED                                        LOINC 
: [id number]

                    Ordered                    2015                

 

 Referral: [facility]                    Referral info faxed. They will 
call the patient to set up date/time.                                         
Completed                                    

 

 Referral: [facility] [place] [facility]

WPtel:[phone]                    Referral                              
           Appointment Requested                                    

 

 Referral: External, Ordering Provider                     Referral            
                             Appointment Requested                             
       

 

 Referral: External, Ordering Provider                     Referral            
                             Appointment Requested                             
       

 

 Referral: [facility]                    Referral                         
                Appointment Requested                                    



                                                                               
                                                                               
                                                                               
                                                                               
                                                                               
                                                                               
                                                                               
                                                                               
                                                                               
                                                                               
                            



Instructions

                      





 Comment                

 

                     talk to Dr. Kong about an endometrial ablation - 

 . Hypertension - well controlled - continue with current medications, continue 
with no added salt diet.  Pt has been encouraged to exercise daily.

The pt has been advised to call the office if there are any acute concerns 
about change in blood pressure readings at home.



Chronic Pain Syndrome - pt has chronic pain - has been maintained on current 
medications, has not sought out other medications, only uses PRN pain 
medications as directed, and understands the consequences of over-medication.



Chronic Depression and anxiety - the pt has symptoms of chronic anxiety and 
depression that have been fairly well controlled since the last office visit.  
The pt has expected periods of exacerbation with abatement of the symptoms with 
change in situational exposure.  No change in current medications.

                                  

 

                     . Hypertension - well controlled - continue with current 
medications, continue with no added salt diet.  Pt has been encouraged to 
exercise daily.

The pt has been advised to call the office if there are any acute concerns 
about change in blood pressure readings at home.



Depression and anxiety - Erin reports that her depression episode related to 
the keppra seems to be resolved - she feels much better on the Lamictal.  I 
have discussed her case with the patient and her mom - she had previously seen 
a psychiatrist -but with a shortage of any local psychiatrists, I have 
recommended that she needs to be seen by a psychiatrist at [facility] and we 
are making a referral to outpatient psychiatry.  They understand that it will 
probably be several months before we can get Erin in to be seen.



Hx of seizure disorder - I have also recommended that with her history of 
seizures we need for her to have a relationship with a neurologist - they are 
okay with staying in the area for a neurologist and would like to be seen in 
[place].  I have recommended Dr. Ugarte at [facility] for further evaluation and 
to establish care.  They understand that it will most-likely be several months 
before she is seen by neurology.

                                  

 

                     . Hypertension - uncontrolled - the patient's medications 
have been modified as documented in the visit note.  The patient has been 
counseled to cut back on salt in diet for a no added salt diet, low fat diet, 
start an exercise program with low weight bearing exercises and higher aerobic 
activity for heart health.  

The patient is to check blood pressure readings as an outpatient and either fax
, call, or email the readings to the office next week for practitioner to 
review.

The pt is to call for acute concerns.

                                  

 

                     . Hypertension - well controlled - continue with current 
medications, continue with no added salt diet.  Pt has been encouraged to 
exercise daily.

The pt has been advised to call the office if there are any acute concerns 
about change in blood pressure readings at home.



Back pain, Thoracic back pain, - referral to [facility] - at [address] 
- see if pt can get in to be seen for therapy on her upper and lower back.





Chronic Depression and anxiety - the pt has symptoms of chronic anxiety and 
depression that have been fairly well controlled since the last office visit.  
The pt has expected periods of exacerbation with abatement of the symptoms with 
change in situational exposure.  No change in current medications.

                                  

 

                     XRAY RIGHT KNEE AND FOOT, LUMBAR SPINE 



 . Plantar fasciitis- pt was educated that this is an inflammatory process, 
need to stretch the foot and reduce inflammation by using a frozen bottle of 
water or a tennis ball on the bottom of the foot at least three times a day 
until the pain is improved.



Right knee pain-low back pain-xray low back and knee to evaluate for acute 
abnormality-samples of pennsaid provided and instructed on use-consider 
referral for PT or  MRI lumbar spine if symptoms do not improve-

                                  

 

                     . Chronic Back pain - the patient was counseled to always 
first attempt to use modalities other than pain medication for alleviation of 
the muscle spasms and pain.  The patient was also encouraged to continue with 
back exercises as previously directed.  Pt is to use pain medication as 
directed.  If pain medications are used inappropriately or early refills are 
requested, the patient understands that  is a breech of trust/contract and 
could result in the patient's termination from this medical practice.

Referral to physical therapy/manipulation of her neck/upper back.              
                    

 

                     . Hypertension - well controlled - continue with current 
medications, continue with no added salt diet.  Pt has been encouraged to 
exercise daily.

The pt has been advised to call the office if there are any acute concerns 
about change in blood pressure readings at home.



Pain - chronic of low back -



Chronic Depression and anxiety - the pt has symptoms of chronic anxiety and 
depression that have been fairly well controlled since the last office visit.  
The pt has expected periods of exacerbation with abatement of the symptoms with 
change in situational exposure.  No change in current medications.



Left shoulder pain - referral to [facility] - 





                                  

 

                     lexapro 20mg daily x 1 week - luvox 50mg daily x 1 week

then lexapro 20mg daily x 1 week and luvox 50mg bid x 1 week

then decrease lexapro to 10mg daily and keep on luvox 50mg bid

then decrease lexapro to 5mg daily and keep on luvox 50mg bid

then stop lexapro and keep on luvox

 . Bipolar mood disorder with Depression - uncontrolled - Pt has been counseled 
about the diagnosis of bipolar depression, the potential causes, and risks 
associated with the diagnosis.  The pt denies suicidal ideation, or plans.  The 
patient has been counseled about treatment options, and understands the risks 
associated with treatment of depression, as well as the risks associated with 
NOT treating the depression.

I believe the pt will benefit from medical intervention and an antidepressant 
has been appropriately prescribed for this patient.

I have recommended that we pursue a referral to the psychiatrist at [facility].

Her medication is being changed as follows:

lexapro 20mg daily x 1 week - luvox 50mg daily x 1 week

then lexapro 20mg daily x 1 week and luvox 50mg bid x 1 week

then decrease lexapro to 10mg daily and keep on luvox 50mg bid

then decrease lexapro to 5mg daily and keep on luvox 50mg bid

then stop lexapro and keep on luvox

                                  

 

                     . Abdominal pain-mild and improved-likely due to 
constipation-check labs today-cld advance to bland as tolerated-ER over the 
weekend if pain worsens-continue adequate fluids 



Urinary Tract Infection-discussed natural and expected course of this diagnosis 
and to alert me if symptoms do not follow expected course, or if any worse. UA 
positive for infection today in the office-plan to send for culture and will 
call patient with results. RX sent to patient's pharmacy. Avoid tub baths, 
restrictive underwear, etc. Recommend patient start on probiotic while taking 
the antibiotic to prevent diarrhea. Patient verbalized understanding of plan. 



Chronic Anticoagulant use - Pt has been counseled about the anticoagulant, need 
for serial monitoring, and need for the pt to alert the physician as to any new 
bruising, or acute bleeding.  Therapeutic goal for INR is between 2.0 and 3.5.

                                  

 

                     . Hypertension - well controlled - continue with current 
medications, continue with no added salt diet.  Pt has been encouraged to 
exercise daily.

The pt has been advised to call the office if there are any acute concerns 
about change in blood pressure readings at home.



Chronic Depression and anxiety - the pt has symptoms of chronic anxiety and 
depression that have been fairly well controlled since the last office visit.  
The pt has expected periods of exacerbation with abatement of the symptoms with 
change in situational exposure.  No change in current medications.



Chronic Pain Syndrome - pt has chronic pain - has been maintained on current 
medications, has not sought out other medications, only uses PRN pain 
medications as directed, and understands the consequences of over-medication.

                                  

 

                     . Constipation - sample of relistor 150mg daily x 3 days 
then every other day as needed for constipation.



Bipolar mood disorder with Depression - improved symptoms on lexapro to 10mg 
daily and keep on luvox 50mg bid.



                                  

 

                     . Cervical, thoracic, and lumbar back pain-worsening-
numbness and tingling down right arm-will xray cervical and thoracic spine-plan 
for MRI of cervical, thoracic and lumbar spine if indicated and refer to spine 
specialist-patient wants to see Dr Miranda if referred. 

                                  

 

                     . Hypertension - well controlled - continue with current 
medications, continue with no added salt diet.  Pt has been encouraged to 
exercise daily.

The pt has been advised to call the office if there are any acute concerns 
about change in blood pressure readings at home.



Seizure disorder - Depression and Anxiety - continue with current medications - 
symptoms are significantly improved per patient report                         
         

 

                     . Hypertension - well controlled - continue with current 
medications, continue with no added salt diet.  Pt has been encouraged to 
exercise daily.

The pt has been advised to call the office if there are any acute concerns 
about change in blood pressure readings at home.



Rash - RX for injection - kenalog shot

                                  

 

                     . Hypertension - well controlled - continue with current 
medications, continue with no added salt diet.  Pt has been encouraged to 
exercise daily.

The pt has been advised to call the office if there are any acute concerns 
about change in blood pressure readings at home.



Pain - chronic of low back - I have recommended a referral to Dr. Artis - 
for injections.

Refilled muscle relaxers.



Chronic Depression and anxiety - the pt has symptoms of chronic anxiety and 
depression that have been fairly well controlled since the last office visit.  
The pt has expected periods of exacerbation with abatement of the symptoms with 
change in situational exposure.  No change in current medications.



flu shot today

                                  

 

                     . Chest pain, cough - lung sounds clear, but diminished, 
will check chest x-ray - will treat as indicated.  Pt is to notify clinic if 
symptoms do not improve, if they worsen, or with any questions or concerns.    
                               

 

                     . The pt had recently been in the hospital with an INR of 
over 10.  Dr. Cueva manages her coumadin.  Dr. Cueva switched her to eliquis, 
therefore her dilantin was changed to keppra due to medication interactions.  Pt
's mother called the clinic this morning stating that Erin was very agitated, 
anxious, and restless - spoke with Dr. Hurt on the phone and pt was to stop 
the keppra and start trokendi 50mg daily.  Pt and her mother picked up samples 
and spoke to the pharmacy who told her not to take the trokendi because she had 
metabolic acidosis reaction in the past to topamax.  Per other staff report the 
pt and her mother came to the office and demanded to speak to a nurse and did 
not wait in the lobby for a nurse to take her to the back but came back to the 
nurses desk and asked the nurses if they knew who she was.  Pt was then taken 
to a room for a walk in appointment.  Upon entering the room the pt was very 
agitated and upset stating that everyone in this office is an "imbecile and an 
idiot who is trying to kill her." I explained to her that the topamax was 
listed as an "other reaction" and that I was very sorry that it was sent.  The 
patient wanted to know why Dr. Hurt ordered that medication, which I 
explained that it was an alternative to the keppra.  I told her that I had 
called Dr. Hurt on her cell phone and that she was not able to access her 
chart. The pt stated that "doctors can pull records up on their phones". The pt 
and her mother were also very upset that she had been released from the 
hospital after adjusting her medications instead of being monitored.  The pt's 
mother stated that Dr. Hurt had asked the patient why she was depressed 
which made them both very upset.  Erin said that when Dr. Hurt asked her 
about her depression she wanted to "slap her in the face".  The pt's mother 
wanted her to be admitted today.   I explained that medically she was stable 
and did not need to be admitted.  The pt was very upset stating that "it is 
obviously all about money".  Pt stated that she should just "stop her 
medication and die so her mother could isabel for medical malpractice".  The pt 
stated that she would "go to the ER to get another doctor." The pt's mother 
stated that Erin has not slept and has been getting increasingly agitated.  
They agreed to the new orders to start Lamictal per Dr. Hurt.  

                                  

 

                     . Abnormal CT scan - planning on repeat scan to see if 
area on previous scan was truly a mass or just an atypical pneumonia.

Finish out antibiotics as previously directed.

call if having trouble

pt to have PT/INR continued to be monitored by Dr. Cueva                       
           

 

                     stop lexapro

start on fluvoxamine 1 tab twice daily

 . Anxiety and depression - not well controlled - stop the lexapro and start on 
fluvoxamine 1 tab twice daily - RTC in about 2-3 weeks

                                  

 

                     stop lexapro

start on fluvoxamine 1 tab twice daily

 . Anxiety and depression - not well controlled - stop the lexapro and start on 
fluvoxamine 1 tab twice daily - RTC in about 2-3 weeks

                                  

 

                     . Hypertension - well controlled - continue with current 
medications, continue with no added salt diet.  Pt has been encouraged to 
exercise daily.

The pt has been advised to call the office if there are any acute concerns 
about change in blood pressure readings at home.



Anxiety - the patient has uncontrolled anxiety and will benefit from an SSRI on 
a daily basis to attempt control of the symptoms of anxiety (tachycardia, 
overwhelming sensations, stress, insomnia, etc).  I also believe that the 
patient will benefit from very low dose of prn benzodiazepine.  Pt is aware of 
the risks and benefits of treatment with the above medications.

                                  

 

                     . Hypertension - well controlled - continue with current 
medications, continue with no added salt diet.  Pt has been encouraged to 
exercise daily.

The pt has been advised to call the office if there are any acute concerns 
about change in blood pressure readings at home.



Anxiety - the patient has uncontrolled anxiety and will benefit from an SSRI on 
a daily basis to attempt control of the symptoms of anxiety (tachycardia, 
overwhelming sensations, stress, insomnia, etc).  I also believe that the 
patient will benefit from very low dose of prn benzodiazepine.  Pt is aware of 
the risks and benefits of treatment with the above medications.

                                  

 

                     . Hypertension - well controlled - continue with current 
medications, continue with no added salt diet.  Pt has been encouraged to 
exercise daily.

The pt has been advised to call the office if there are any acute concerns 
about change in blood pressure readings at home.



History of seizures-check dilantin level 



Factor V Leiden-on coumadin-managed by Dr Cueva 



Chronic Depression and anxiety - the pt has symptoms of chronic anxiety and 
depression that have been fairly well controlled since the last office visit.  
The pt has expected periods of exacerbation with abatement of the symptoms with 
change in situational exposure.  No change in current medications.

                                  

 

                     . Hypertension - well controlled - continue with current 
medications, continue with no added salt diet.  Pt has been encouraged to 
exercise daily.

The pt has been advised to call the office if there are any acute concerns 
about change in blood pressure readings at home.



History of seizures-check dilantin level 



Factor V Leiden-on coumadin-managed by Dr Cueva 



Chronic Depression and anxiety - the pt has symptoms of chronic anxiety and 
depression that have been fairly well controlled since the last office visit.  
The pt has expected periods of exacerbation with abatement of the symptoms with 
change in situational exposure.  No change in current medications.

## 2019-01-03 ENCOUNTER — HOSPITAL ENCOUNTER (OUTPATIENT)
Dept: HOSPITAL 75 - REHAB | Age: 44
LOS: 34 days | Discharge: HOME | End: 2019-02-06
Attending: NURSE PRACTITIONER
Payer: MEDICARE

## 2019-01-03 DIAGNOSIS — M54.5: Primary | ICD-10-CM

## 2019-10-06 ENCOUNTER — HOSPITAL ENCOUNTER (EMERGENCY)
Dept: HOSPITAL 75 - ER | Age: 44
Discharge: HOME | End: 2019-10-06
Payer: MEDICARE

## 2019-10-06 VITALS — BODY MASS INDEX: 30.43 KG/M2 | HEIGHT: 61.81 IN | WEIGHT: 165.35 LBS

## 2019-10-06 VITALS — SYSTOLIC BLOOD PRESSURE: 159 MMHG | DIASTOLIC BLOOD PRESSURE: 87 MMHG

## 2019-10-06 DIAGNOSIS — F17.210: ICD-10-CM

## 2019-10-06 DIAGNOSIS — Z82.49: ICD-10-CM

## 2019-10-06 DIAGNOSIS — Z88.8: ICD-10-CM

## 2019-10-06 DIAGNOSIS — K21.9: ICD-10-CM

## 2019-10-06 DIAGNOSIS — I25.2: ICD-10-CM

## 2019-10-06 DIAGNOSIS — Z79.01: ICD-10-CM

## 2019-10-06 DIAGNOSIS — G40.909: ICD-10-CM

## 2019-10-06 DIAGNOSIS — Z90.89: ICD-10-CM

## 2019-10-06 DIAGNOSIS — Z90.710: ICD-10-CM

## 2019-10-06 DIAGNOSIS — E78.00: ICD-10-CM

## 2019-10-06 DIAGNOSIS — I10: ICD-10-CM

## 2019-10-06 DIAGNOSIS — F41.9: ICD-10-CM

## 2019-10-06 DIAGNOSIS — I25.10: ICD-10-CM

## 2019-10-06 DIAGNOSIS — K04.7: Primary | ICD-10-CM

## 2019-10-06 DIAGNOSIS — F32.9: ICD-10-CM

## 2019-10-06 DIAGNOSIS — Z86.718: ICD-10-CM

## 2019-10-06 PROCEDURE — 99282 EMERGENCY DEPT VISIT SF MDM: CPT

## 2019-10-06 NOTE — ED EENT
History of Present Illness


General


Chief Complaint:  Dental Problems/Pain


Stated Complaint:  DENTAL PAIN


Nursing Triage Note:  


DENTAL PAIN AND SWELLING TO RIGHT LOWER JAW STARTING ON FRIDAY.  HAS BEEN TAKING




MOMS ABX OF ERYTHROMYCIN THAT IS NOT HELPING.


Source:  patient, other (mom)


Exam Limitations:  no limitations





History of Present Illness


Date Seen by Provider:  Oct 6, 2019


Time Seen by Provider:  12:15


Initial Comments


Patient presents ER by private conveyance with chief complaint of right dental 

pain on the right lower mandible as well as swelling starting about 2 days ago 

on Friday. She called the dentist appointment for next Wednesday. She starting 

some azithromycin she had left over. She says the pain and swelling kept her 

from sleeping. No fever chills nausea vomiting diarrhea. She cannot take NSAIDs 

due to being on Eliquis for factor V Leiden.





Allergies and Home Medications


Allergies


Coded Allergies:  


     prochlorperazine (Unverified  Allergy, Severe, TONGUE SWELLS, 2/16/07)


     metoclopramide (Verified  Allergy, Unknown, 9/7/09)


     topiramate (Unverified  Allergy, Unknown, 6/26/16)


   


   causes metabolic acidosis


     aripiprazole (Unverified  Adverse Reaction, Unknown, 7/19/18)


     levetiracetam (Unverified  Adverse Reaction, Unknown, 7/19/18)





Home Medications


Amlodipine Besylate 5 Mg Tablet, 5 MG PO DAILY, (Reported)


Amoxicillin 500 Mg Capsule, 500 MG PO TID


   Prescribed by: PATRICIA HERRERA on 10/6/19 1235


Apixaban 2.5 Mg Tablet, 2.5 MG PO BID, (Reported)


Atorvastatin Calcium 20 Mg Tablet, 20 MG PO HS, (Reported)


Docusate Sodium 100 Mg Capsule, 100 MG PO BID PRN for CONSTIPATION-1ST LINE


   Prescribed by: UMA HANDLEY on 7/19/18 0839


Escitalopram Oxalate 20 Mg Tablet, 20 MG PO DAILY, (Reported)


Famotidine 20 Mg Tablet, 20 MG PO HS, (Reported)


Hydrocodone Bit/Acetaminophen 1 Ea Tablet, 2 EA PO Q6H PRN for Pain-See 

Instructions


   Prescribed by: UMA HANDLEY on 7/19/18 0839


Ibuprofen 600 Mg Tablet, 600 MG PO Q6H PRN for PAIN-MODERATE


   Prescribed by: UMA HANDLEY on 7/19/18 0839


Lamotrigine 100 Mg Tab.er.24, 100 MG PO HS, (Reported)


Lisinopril 10 Mg Tablet, 10 MG PO DAILY, (Reported)


Lorazepam 2 Mg Tablet, 2-4 MG PO TID PRN for ANXIETY, (Reported)


   TAKE 1-2 (2MG) TABS 


Nitrofurantoin Monohyd/M-Cryst 100 Mg Capsule, 100 MG PO BID


   Prescribed by: CHE BEARD on 7/23/18 0107


Phenazopyridine HCl 200 Mg Tablet, 1 TAB PO TID


   Prescribed by: CHE BEARD on 7/23/18 0107


Simethicone 80 Mg Tab.chew, 40 MG PO TID PRN for INDIGESTION 2ND LINE


   Prescribed by: UMA HANDLEY on 7/19/18 0839





Patient Home Medication List


Home Medication List Reviewed:  Yes





Review of Systems


Review of Systems


Constitutional:  No chills, No diaphoresis


Eyes:  Denies Blindness, Denies Drainage


Ears:  Denies Dizziness, Denies Pain


Nose:  denies clots, denies congestion


Mouth:  see HPI





Past Medical-Social-Family Hx


Patient Social History


Alcohol Use:  Denies Use


Recreational Drug Use:  No


Drug of Choice:  MARIJUANA


Smoking Status:  Current Everyday Smoker


Type Used:  Cigarettes


2nd Hand Smoke Exposure:  Yes


Recent Foreign Travel:  No


Contact w/Someone Who Travel:  No


Recent Infectious Disease Expo:  No


Recent Hopitalizations:  No





Immunizations Up To Date


Tetanus Booster (TDap):  Less than 5yrs





Seasonal Allergies


Seasonal Allergies:  No





Past Medical History


Surgeries:  Yes


Abdominal, Adenoidectomy, Cardiac, Hysterectomy, Oophorectomy, Tonsillectomy


Respiratory:  Yes


Pneumonia


Currently Using CPAP:  No


Currently Using BIPAP:  No


Cardiac:  Yes


Coronary Artery Disease, Deep Vein Thrombosis, Heart Attack, High Cholesterol, 

Hypertension


Neurological:  Yes ("NON-EPILEPTIC SEIZURES" PER PT)


Seizure Disorder


Pregnant:  No


Reproductive Disorders:  Yes (HPV; OVARIAN TORSION/LEFT S.O.)


Female Reproductive Disorders:  Menstrual Problems, Ovarian Cyst, Polycystic 

Ovarian Dis


GYN History:  Hysterectomy


Sexually Transmitted Disease:  Yes (HPV)


Genitourinary:  Yes


Bladder Infection


Gastrointestinal:  Yes (GI BLEED 11/20/16,  ALL OTHER DISORDERS AS A CHILD)


Abdominal Hernia, Gastroesophageal Reflux, Gastrointestinal Bleed, Hepatitis


Musculoskeletal:  Yes (STENOSIS)


Scoliosis, Chronic Back Pain


Endocrine:  No


Loss of Vision:  Denies


Hearing Impairment:  Denies


Cancer:  No


Psychosocial:  Yes


Anxiety, Depression


Integumentary:  No (BRUISING)


Blood Disorders:  Yes (DVT; FACTOR 5 LEIDEN)





Family Medical History





Family history: Cardiovascular disease


  03 FATHER


Family history: Coronary thrombosis


  03 FATHER


Family history: Diabetes mellitus


  03 MOTHER


Heart disease


  03 FATHER


History of - disorder


  03 FATHER (FACTOR FIVE LEIDEN)


Psychotic disorder


  03 MOTHER (DEPRESSION)


Heart Disease, Cancer, Hypertension





Physical Exam


Vital Signs





Vital Signs - First Documented








 10/6/19





 11:56


 


Temp 36.3


 


Pulse 70


 


Resp 16


 


B/P (MAP) 159/87 (111)


 


Pulse Ox 98


 


O2 Delivery Room Air








Height, Weight, BMI


Height: 5'5.00"


Weight: 175lbs. 0.0oz. [id number]; 30.00 BMI


Method:Stated


General Appearance:  WD/WN, mild distress


Eyes:  bilateral eye normal inspection, bilateral eye PERRL, bilateral eye EOMI


Ears:  bilateral ear auricle normal, bilateral ear canal normal, bilateral ear 

TM normal


Nose:  normal inspection; No active bleeding


Mouth/Throat:  dental tenderness (right mandibular); No foreign body; maxillary 

swelling (right), other (no area of fluctuance to be lanced)


Neck:  non-tender, full range of motion, normal inspection


Cardiovascular:  normal peripheral pulses, regular rate, rhythm





Procedures/Interventions





Progress


Using usual technique and a 25-gauge 1/2 inch needle we injected the 

infra-alveolar nerve with a 50-50 solution of half percent Marcaine and 2% 

lidocaine with epinephrine. A total of 2.5 cc were injected. The patient 

tolerated procedure well.





Progress/Results/Core Measures


Results/Orders


My Orders





Orders - PATRICIA HERRERA


Bupivacaine 0.5% Injection (Sensorcaine (10/6/19 12:30)


Lidocaine/Epi 2% 1:100,000 (Xylocaine/Ep (10/6/19 12:30)





Vital Signs/I&O











 10/6/19





 11:56


 


Temp 36.3


 


Pulse 70


 


Resp 16


 


B/P (MAP) 159/87 (111)


 


Pulse Ox 98


 


O2 Delivery Room Air














Blood Pressure Mean:                    111











Progress


Progress Note :  


   Time:  12:30


Progress Note


Decided upon Marcaine/lidocaine inferior alveolar nerve block, viscous 

lidocaine, amoxicillin and follow up Wednesday with dentist.





Departure


Impression





   Primary Impression:  


   Dental abscess


Disposition:  01 HOME, SELF-CARE


Condition:  Stable





Departure-Patient Inst.


Decision time for Depature:  12:45


Referrals:  


STACY CAMARGO MD (PCP/Family)


Primary Care Physician


Patient Instructions:  Dental Pain (DC)





Add. Discharge Instructions:  


Heating pads, Tylenol and your prescribed pain medicine as ordered.


Viscous lidocaine 5-10 cc applied to gauze directly over the tooth that hurts 

every 2-4 hours as needed for pain relief.


Keep your follow-up appointment with the dentist.


Amoxicillin one capsule 3 times a day for the next week.


All discharge instructions reviewed with patient and/or family. Voiced 

understanding.


Scripts


Amoxicillin (Amoxicillin) 500 Mg Capsule


500 MG PO TID, #21 CAP 0 Refills


   Prov: PATRICIA HERRERA         10/6/19











PATRICIA HERRERA                  Oct 6, 2019 12:34

## 2021-01-12 NOTE — PROGRESS NOTE-PRE OPERATIVE
Pre-Operative Progress Note


H&P Reviewed


The H&P was reviewed, patient examined and no changes noted.


Date Seen by Provider:  Jul 19, 2018


Time Seen by Provider:  07:00


Date H&P Reviewed:  Jul 19, 2018


Time H&P Reviewed:  07:10


Pre-Operative Diagnosis:  CPP, Menorrhagia, Factor V Leiden











UMA HANDLEY DO Jul 19, 2018 7:26 am
ADMIT

## 2021-09-22 ENCOUNTER — HOSPITAL ENCOUNTER (OUTPATIENT)
Dept: HOSPITAL 75 - REHAB | Age: 46
LOS: 53 days | Discharge: HOME | End: 2021-11-14
Attending: FAMILY MEDICINE
Payer: MEDICARE

## 2021-09-22 DIAGNOSIS — R20.0: ICD-10-CM

## 2021-09-22 DIAGNOSIS — M54.2: Primary | ICD-10-CM

## 2021-09-22 DIAGNOSIS — M54.6: ICD-10-CM

## 2024-05-08 NOTE — ECHOCARDIOGRAPHY REPORT
PROCEDURE PHYSICIAN:   YONATHAN SORIANO

 

DATE OF PROCEDURE:  04/12/2017

 

TWO DIMENSIONAL ECHOCARDIOGRAM REPORT 

 

PRIMARY PHYSICIAN:       

OTHER PHYSICIAN:         

REFERRING PHYSICIAN:          Dr. Hurt 

ORDERING PHYSICIAN:      

 

INDICATION FOR THE PROCEDURE:  Chest pain.  

 

MEASUREMENTS                  DERIVED VALUES 

 

LV DIAMETER (LAX)   NORMALS                       NORMALS

Diastolic      4.4  (3.6-5.2)      Eject. Fract.  60% (60%+/-6%)

      

Systolic            (2.3-3.9)      Diastolic Vol.      

% Shortening        (0.22-0.42)    Systolic Vol.       

                                   Aortic Root         

IVS THICKNESS 

Diastolic      0.9  (0.6-1.1)

 

LVPW THICKNESS 

Diastolic      0.9  (0.6-1.1)

 

LA DIAMETER 

Systolic       2.9  (2.1-3.7)  

 

 

FINDINGS:

1.   Technical quality is good. 

2.   The left ventricle is normal in size with normal

contractility. Systolic function appeared to be normal. Estimated

ejection fraction 60%. 

3.   The left atrium is normal in size. No clot or thrombus were

seen within the left atrium. 

4.   The right atrium and right ventricle are normal in size. No

clot or thrombus were seen within the right side. 

5.   Mitral valve is normal in morphology with mild mitral

regurgitation noted by color Doppler flow. No mitral valve

prolapse. No mitral valve stenosis. 

6.   Aortic valve is trileaflet with normal opening and closing

pattern. No significant aortic stenosis or regurgitation was

seen. 

7.   Tricuspid valve is normal in morphology with mild tricuspid

regurgitation noted by color Doppler flow. Doppler across

tricuspid valve estimated pulmonary artery pressure of 32+ right

atrial pressure. 

8.   Pulmonic valve is functioning normally. 

9.   No pericardial effusion. 

 

IN CONCLUSION:

1.   Normal left ventricular size and systolic function.

Estimated ejection fraction 60%. 

2.   Mild mitral and tricuspid regurgitation. 

3.   Estimated pulmonary artery pressure of 40 mmHg.  

 

 

 

 

Job ID: [id number]

Dictated Date: 04/12/2017 16:33:45 

Transcription Date: 04/13/2017 08:37:49 / tbk
(E4) spontaneous

## 2025-06-25 NOTE — DIAGNOSTIC IMAGING REPORT
INDICATION: Chest pain.



TECHNIQUE: PA and lateral views of the chest are obtained.



COMPARISON: Comparison is made to study of 03/12/2017.



FINDINGS: Heart size and pulmonary vascularity are within normal

limits, and the lungs are clear, bilaterally.



IMPRESSION: Unremarkable chest.



  Dictated on workstation # [id number]
[phone]